# Patient Record
Sex: FEMALE | Race: BLACK OR AFRICAN AMERICAN | NOT HISPANIC OR LATINO | Employment: OTHER | URBAN - METROPOLITAN AREA
[De-identification: names, ages, dates, MRNs, and addresses within clinical notes are randomized per-mention and may not be internally consistent; named-entity substitution may affect disease eponyms.]

---

## 2017-01-19 ENCOUNTER — HOSPITAL ENCOUNTER (OUTPATIENT)
Dept: PREADMISSION TESTING | Facility: OTHER | Age: 72
Discharge: HOME | End: 2017-01-19
Attending: ORTHOPAEDIC SURGERY
Payer: MEDICARE

## 2017-01-19 VITALS
BODY MASS INDEX: 26.97 KG/M2 | OXYGEN SATURATION: 98 % | SYSTOLIC BLOOD PRESSURE: 160 MMHG | RESPIRATION RATE: 20 BRPM | HEART RATE: 90 BPM | WEIGHT: 125 LBS | DIASTOLIC BLOOD PRESSURE: 88 MMHG | HEIGHT: 57 IN | TEMPERATURE: 98 F

## 2017-01-19 LAB
ANION GAP SERPL CALC-SCNC: 10 MMOL/L
BASOPHILS # BLD AUTO: 0.02 K/UL
BASOPHILS NFR BLD: 0.2 %
BUN SERPL-MCNC: 13 MG/DL
CALCIUM SERPL-MCNC: 9.4 MG/DL
CHLORIDE SERPL-SCNC: 106 MMOL/L
CO2 SERPL-SCNC: 26 MMOL/L
CREAT SERPL-MCNC: 0.8 MG/DL
DIFFERENTIAL METHOD: ABNORMAL
EOSINOPHIL # BLD AUTO: 0.4 K/UL
EOSINOPHIL NFR BLD: 3.5 %
ERYTHROCYTE [DISTWIDTH] IN BLOOD BY AUTOMATED COUNT: 15.2 %
EST. GFR  (AFRICAN AMERICAN): >60 ML/MIN/1.73 M^2
EST. GFR  (NON AFRICAN AMERICAN): >60 ML/MIN/1.73 M^2
GLUCOSE SERPL-MCNC: 80 MG/DL
HCT VFR BLD AUTO: 39.6 %
HGB BLD-MCNC: 12.4 G/DL
LYMPHOCYTES # BLD AUTO: 2.8 K/UL
LYMPHOCYTES NFR BLD: 24.2 %
MCH RBC QN AUTO: 26.2 PG
MCHC RBC AUTO-ENTMCNC: 31.3 %
MCV RBC AUTO: 84 FL
MONOCYTES # BLD AUTO: 0.9 K/UL
MONOCYTES NFR BLD: 7.9 %
NEUTROPHILS # BLD AUTO: 7.5 K/UL
NEUTROPHILS NFR BLD: 63.9 %
PLATELET # BLD AUTO: 359 K/UL
PMV BLD AUTO: 9.2 FL
POTASSIUM SERPL-SCNC: 3.8 MMOL/L
RBC # BLD AUTO: 4.74 M/UL
SODIUM SERPL-SCNC: 142 MMOL/L
WBC # BLD AUTO: 11.71 K/UL

## 2017-01-19 PROCEDURE — 36415 COLL VENOUS BLD VENIPUNCTURE: CPT

## 2017-01-19 PROCEDURE — 85025 COMPLETE CBC W/AUTO DIFF WBC: CPT

## 2017-01-19 PROCEDURE — 80048 BASIC METABOLIC PNL TOTAL CA: CPT

## 2017-01-19 RX ORDER — HYDROXYZINE HYDROCHLORIDE 25 MG/1
25 TABLET, FILM COATED ORAL 3 TIMES DAILY PRN
COMMUNITY
End: 2017-08-15 | Stop reason: CLARIF

## 2017-01-19 RX ORDER — SODIUM CHLORIDE, SODIUM LACTATE, POTASSIUM CHLORIDE, CALCIUM CHLORIDE 600; 310; 30; 20 MG/100ML; MG/100ML; MG/100ML; MG/100ML
INJECTION, SOLUTION INTRAVENOUS CONTINUOUS
Status: CANCELLED | OUTPATIENT
Start: 2017-01-19

## 2017-01-19 RX ORDER — LIDOCAINE HYDROCHLORIDE 10 MG/ML
1 INJECTION, SOLUTION EPIDURAL; INFILTRATION; INTRACAUDAL; PERINEURAL ONCE
Status: CANCELLED | OUTPATIENT
Start: 2017-01-19 | End: 2017-01-19

## 2017-01-19 RX ORDER — CLINDAMYCIN HYDROCHLORIDE 300 MG/1
300 CAPSULE ORAL DAILY
COMMUNITY
End: 2019-10-22

## 2017-01-19 RX ORDER — HYDROCODONE BITARTRATE AND ACETAMINOPHEN 5; 325 MG/1; MG/1
1 TABLET ORAL EVERY 6 HOURS PRN
COMMUNITY
End: 2017-08-15 | Stop reason: CLARIF

## 2017-01-19 RX ORDER — FLUCONAZOLE 150 MG/1
150 TABLET ORAL DAILY
COMMUNITY
End: 2017-08-18

## 2017-01-19 NOTE — DISCHARGE INSTRUCTIONS
PRE-ADMIT TESTING -  [phone]    [address]  [facility]        OUTPATIENT SURGERY UNIT - [phone]    Your surgery has been scheduled at [facility]. We are pleased to have the opportunity to serve you. For Further Information please call [phone].    On the day of surgery please report to the Information Desk on the 1st floor.    CONTACT YOUR PHYSICIAN'S OFFICE THE DAY PRIOR TO YOUR SURGERY TO OBTAIN YOUR ARRIVAL TIME.     The evening before surgery do not eat anything after 9 p.m. ( this includes hard candy, chewing gum and mints).  You may have GATORADE, POWERADE AND WATER FROM 9 p.m. until leaving home to come to the hospital.   DO NOT DRINK ANY LIQUIDS ON THE WAY TO THE HOSPITAL.     SPECIAL MEDICATION INSTRUCTIONS: TAKE medications checked off by the Anesthesiologist on your Medication List.    Angiogram Patients: Take medications as instructed by your physician, including aspirin.     Surgery Patients:    If you take ASPIRIN - Your PHYSICIAN/SURGEON will need to inform you IF/OR when you need to stop taking aspirin prior to your surgery.     Do Not take any medications containing IBUPROFEN.  Do Not Wear any make-up or dark nail polish   (especially eye make-up) to surgery. If you come to surgery with makeup on you will be required to remove the makeup or nail polish.    Do not shave your surgical area at least 5 days prior to your surgery. The surgical prep will be performed at the hospital according to Infection Control regulations.    Leave all valuables at home.   Do Not wear any jewelry or watches, including any metal in body piercings.  Contact Lens must be removed before surgery. Either do not wear the contact lens or bring a case and solution for storage.  Please bring a container for eyeglasses or dentures as required.  Bring any paperwork your physician has provided, such as consent forms,  history and physicals, doctor's orders, etc.   Bring comfortable  clothes that are loose fitting to wear upon discharge. Take into consideration the type of surgery being performed.  Maintain your diet as advised per your physician the day prior to surgery.      Adequate rest the night before surgery is advised.   Park in the Parking lot behind the hospital or in the [facility] across the street from the parking lot. Parking is complimentary.  If you will be discharged the same day as your procedure, please arrange for a responsible adult to drive you home or to accompany you if traveling by taxi.   YOU WILL NOT BE PERMITTED TO DRIVE OR TO LEAVE THE HOSPITAL ALONE AFTER SURGERY.   It is strongly recommended that you arrange for someone to remain with you for the first 24 hrs following your surgery.       Thank you for your cooperation.  The Staff of [facility].        Bathing Instructions                                                                 Please shower the evening before and morning of your procedure with    ANTIBACTERIAL SOAP. ( DIAL, etc )  Concentrate on the surgical area   for at least 3 minutes and rinse completely. Dry off as usual.                            No lotions or creams.

## 2017-01-19 NOTE — PRE ADMISSION SCREENING
Pt cxd per Dr Juarez. Will see [facility] today for resp status. Dr Waite office called. Left message for Chanelle and BIRGIT Suazo notified.

## 2017-01-19 NOTE — IP AVS SNAPSHOT
[facility] Location ([facility])  [address] [place] [id number]  Phone: [phone]           Patient Discharge Instructions    Our goal is to set you up for success. This packet includes information on your condition, medications, and your home care. It will help you to care for yourself so you don't get sicker.     Please ask your nurse if you have any questions.        There are many details to remember when preparing for your surgery. Here is what you will need to do, please ask your nurse if there are more specific instructions and if you have any questions:    1. 24 hours before procedure Do not smoke or drink alcoholic beverages 24 hours prior to your procedure    2. Eating before procedure Do not eat or drink anything 8 hours before your procedure - this includes gum, mints, and candy.     3. Day of procedure Please remove all jewelry for the procedure. If you wear contact lenses, dentures, hearing aids or glasses, bring a container to put them in during your surgery and give to a family member for safekeeping.  If your doctor has scheduled you for an overnight stay, bring a small overnight bag with any personal items that you need.    4. After procedure Make arrangements in advance for transportation home by a responsible adult. It is not safe to drive a vehicle during the 24 hours following surgery.     PLEASE NOTE: You may be contacted the day before your surgery to confirm your surgery date and arrival time. The Surgery schedule has many variables which may affect the time of your surgery case. Family members should be available if your surgery time changes.                ** Verify the list of medication(s) below is accurate and up to date. Carry this with you in case of emergency. If your medications have changed, please notify your healthcare provider.             Medication List      TAKE these medications        Additional Info                      acetaminophen 325 MG tablet    Commonly known as:  TYLENOL   Refills:  0   Dose:  325 mg    Instructions:  Take 325 mg by mouth every 6 (six) hours as needed for Pain.     Begin Date    AM    Noon    PM    Bedtime       alprazolam 1 MG tablet   Commonly known as:  XANAX   Refills:  1      Begin Date    AM    Noon    PM    Bedtime       clindamycin 300 MG capsule   Commonly known as:  CLEOCIN   Refills:  0   Dose:  300 mg    Instructions:  Take 300 mg by mouth once daily.     Begin Date    AM    Noon    PM    Bedtime       fluconazole 150 MG Tab   Commonly known as:  DIFLUCAN   Refills:  0   Dose:  150 mg    Instructions:  Take 150 mg by mouth once daily.     Begin Date    AM    Noon    PM    Bedtime       hydrochlorothiazide 12.5 mg capsule   Commonly known as:  MICROZIDE   Refills:  6   Dose:  12.5 mg    Instructions:  Take 12.5 mg by mouth once daily.     Begin Date    AM    Noon    PM    Bedtime       hydrocodone-acetaminophen 5-325mg 5-325 mg per tablet   Commonly known as:  NORCO   Refills:  0   Dose:  1 tablet    Instructions:  Take 1 tablet by mouth every 6 (six) hours as needed for Pain.     Begin Date    AM    Noon    PM    Bedtime       hydrOXYzine HCl 25 MG tablet   Commonly known as:  ATARAX   Refills:  0   Dose:  25 mg    Instructions:  Take 25 mg by mouth 3 (three) times daily as needed for Itching.     Begin Date    AM    Noon    PM    Bedtime       leflunomide 20 MG Tab   Commonly known as:  ARAVA   Refills:  0   Dose:  20 mg    Instructions:  Take 20 mg by mouth once daily.     Begin Date    AM    Noon    PM    Bedtime       melatonin 5 mg Tab   Refills:  0    Instructions:  Take by mouth nightly.     Begin Date    AM    Noon    PM    Bedtime       memantine 5 MG Tab   Commonly known as:  NAMENDA   Quantity:  60 tablet   Refills:  5   Dose:  5 mg    Instructions:  Take 1 tablet (5 mg total) by mouth once daily.     Begin Date    AM    Noon    PM    Bedtime       olmesartan 40 MG tablet   Commonly known as:  BENICAR   Refills:   0   Dose:  40 mg    Instructions:  Take 40 mg by mouth 2 (two) times daily.     Begin Date    AM    Noon    PM    Bedtime       tramadol 50 mg tablet   Commonly known as:  ULTRAM   Refills:  1   Dose:  50 mg    Instructions:  Take 50 mg by mouth 2 (two) times daily as needed. for pain     Begin Date    AM    Noon    PM    Bedtime                  Please bring to all follow up appointments:    1. A copy of your discharge instructions.  2. All medicines you are currently taking in their original bottles.  3. Identification and insurance card.    Please arrive 15 minutes ahead of scheduled appointment time.    Please call 24 hours in advance if you must reschedule your appointment and/or time.        Your Scheduled Appointments     Jan 19, 2017 12:00 PM CST   Pre-Admit Testing Visit with PRE-ADMIT, [facility] [address] [place] [id number] [phone]              Your Future Surgeries/Procedures     Jan 20, 2017   Surgery with Claude S. Williams IV, MD   [facility]    [address] [place] [id number] [phone]                  Discharge Instructions       PRE-ADMIT TESTING -  [phone]    [address] [place] [address]        OUTPATIENT SURGERY UNIT - [phone]    Your surgery has been scheduled at [facility]. We are pleased to have the opportunity to serve you. For Further Information please call [phone].    On the day of surgery please report to the Information Desk on the 1st floor.    CONTACT YOUR PHYSICIAN'S OFFICE THE DAY PRIOR TO YOUR SURGERY TO OBTAIN YOUR ARRIVAL TIME.     The evening before surgery do not eat anything after 9 p.m. ( this includes hard candy, chewing gum and mints).  You may have GATORADE, POWERADE AND WATER FROM 9 p.m. until leaving home to come to the hospital.   DO NOT DRINK ANY LIQUIDS ON THE WAY TO THE HOSPITAL.      SPECIAL MEDICATION INSTRUCTIONS: TAKE medications checked off by the Anesthesiologist on your Medication List.    Angiogram Patients: Take medications as instructed by your physician, including aspirin.     Surgery Patients:    If you take ASPIRIN - Your PHYSICIAN/SURGEON will need to inform you IF/OR when you need to stop taking aspirin prior to your surgery.     Do Not take any medications containing IBUPROFEN.  Do Not Wear any make-up or dark nail polish   (especially eye make-up) to surgery. If you come to surgery with makeup on you will be required to remove the makeup or nail polish.    Do not shave your surgical area at least 5 days prior to your surgery. The surgical prep will be performed at the hospital according to Infection Control regulations.    Leave all valuables at home.   Do Not wear any jewelry or watches, including any metal in body piercings.  Contact Lens must be removed before surgery. Either do not wear the contact lens or bring a case and solution for storage.  Please bring a container for eyeglasses or dentures as required.  Bring any paperwork your physician has provided, such as consent forms,  history and physicals, doctor's orders, etc.   Bring comfortable clothes that are loose fitting to wear upon discharge. Take into consideration the type of surgery being performed.  Maintain your diet as advised per your physician the day prior to surgery.      Adequate rest the night before surgery is advised.   Park in the Parking lot behind the hospital or in the [facility] across the street from the parking lot. Parking is complimentary.  If you will be discharged the same day as your procedure, please arrange for a responsible adult to drive you home or to accompany you if traveling by taxi.   YOU WILL NOT BE PERMITTED TO DRIVE OR TO LEAVE THE HOSPITAL ALONE AFTER SURGERY.   It is strongly recommended that you arrange for someone to remain with you for the first 24 hrs  "following your surgery.       Thank you for your cooperation.  The Staff of [facility].        Bathing Instructions                                                                 Please shower the evening before and morning of your procedure with    ANTIBACTERIAL SOAP. ( DIAL, etc )  Concentrate on the surgical area   for at least 3 minutes and rinse completely. Dry off as usual.                            No lotions or creams.                        Admission Information     Date & Time Provider Department CSN    1/19/2017 12:00 PM Claude S. Williams IV, MD [facility] [id number]      Care Providers     Provider Role Specialty Primary office phone    Claude S. Williams IV, MD Attending Provider Orthopedic Surgery [phone]      Your Vitals Were     BP Pulse Temp Resp Height Weight    160/88 90 98 °F (36.7 °C) 20 4' 9" (1.448 m) 56.7 kg (125 lb)    SpO2 BMI             98% 27.05 kg/m2         Recent Lab Values     No lab values to display.      Allergies as of 1/19/2017     No Known Allergies      [facility]     [facility] Nurse Care Line - 24/7 Assistance  Unless otherwise directed by your provider, please contact [facility], our nurse care line that is available for 24/7 assistance.     Registered nurses in the [facility] Center provide clinical advisement, health education, appointment booking, and other advisory services.  Call for this free service at [phone].        Advance Directives     An advance directive is a document which, in the event you are no longer able to make decisions for yourself, tells your healthcare team what kind of treatment you do or do not want to receive, or who you would like to make those decisions for you.  If you do not currently have an advance directive, [facility] encourages you to create one.  For more information call:  [phone]), [phone] ([phone]),  or log on to " [email or web address].        Language Assistance Services     ATTENTION: Language assistance services are available, free of charge. Please call [phone].      ATENCIÓN: Si kwaku white, tiene a diaz disposición servicios gratuitos de asistencia lingüística. Llame al [phone].     CHÚ Ý: N?u b?n nói Ti?ng Vi?t, có các d?ch v? h? tr? ngôn ng? mi?n phí dành cho b?n. G?i s? [phone].        [facility] Sign-Up     Activating your [facility] account is as easy as 1-2-3!     1) Visit [email or web address], select Sign Up Now, enter this activation code and your date of birth, then select Next.  [email or web address]  Expires: 3/5/2017 10:47 AM      2) Create a username and password to use when you visit [facility] in the future and select a security question in case you lose your password and select Next.    3) Enter your e-mail address and click Sign Up!    Additional Information  If you have questions, please e-mail [email or web address] or call [phone] to talk to our [facility] staff. Remember, [facility] is NOT to be used for urgent needs. For medical emergencies, dial 911.          [facility] complies with applicable Federal civil rights laws and does not discriminate on the basis of race, color, national origin, age, disability, or sex.

## 2017-01-19 NOTE — PRE ADMISSION SCREENING
Clearance for  After 1/27 sent from Dr Lowe. Pt seen today for visit. Clearance faxed to Dr Waite office.

## 2017-01-24 ENCOUNTER — HOSPITAL ENCOUNTER (OUTPATIENT)
Dept: PREADMISSION TESTING | Facility: OTHER | Age: 72
Discharge: HOME | End: 2017-01-24

## 2017-03-20 ENCOUNTER — OFFICE VISIT (OUTPATIENT)
Dept: DERMATOLOGY | Facility: CLINIC | Age: 72
End: 2017-03-20
Payer: MEDICARE

## 2017-03-20 VITALS — WEIGHT: 125 LBS | BODY MASS INDEX: 27.05 KG/M2

## 2017-03-20 DIAGNOSIS — L82.1 SEBORRHEIC KERATOSES: Primary | ICD-10-CM

## 2017-03-20 DIAGNOSIS — K13.0 ANGULAR CHEILITIS: ICD-10-CM

## 2017-03-20 PROCEDURE — 1160F RVW MEDS BY RX/DR IN RCRD: CPT | Mod: S$GLB,,, | Performed by: DERMATOLOGY

## 2017-03-20 PROCEDURE — 99999 PR PBB SHADOW E&M-EST. PATIENT-LVL II: CPT | Mod: PBBFAC,,, | Performed by: DERMATOLOGY

## 2017-03-20 PROCEDURE — 1157F ADVNC CARE PLAN IN RCRD: CPT | Mod: S$GLB,,, | Performed by: DERMATOLOGY

## 2017-03-20 PROCEDURE — 99202 OFFICE O/P NEW SF 15 MIN: CPT | Mod: S$GLB,,, | Performed by: DERMATOLOGY

## 2017-03-20 PROCEDURE — 1159F MED LIST DOCD IN RCRD: CPT | Mod: S$GLB,,, | Performed by: DERMATOLOGY

## 2017-03-20 RX ORDER — AZITHROMYCIN 250 MG/1
TABLET, FILM COATED ORAL
Refills: 0 | COMMUNITY
Start: 2017-01-19 | End: 2017-08-15 | Stop reason: CLARIF

## 2017-03-20 RX ORDER — CELECOXIB 100 MG/1
CAPSULE ORAL
Refills: 6 | Status: ON HOLD | COMMUNITY
Start: 2017-03-09 | End: 2017-08-22 | Stop reason: HOSPADM

## 2017-03-20 RX ORDER — BENZONATATE 100 MG/1
CAPSULE ORAL
Refills: 0 | COMMUNITY
Start: 2017-01-19 | End: 2017-08-15 | Stop reason: CLARIF

## 2017-03-20 RX ORDER — DUREZOL 0.5 MG/ML
EMULSION OPHTHALMIC
Refills: 1 | COMMUNITY
Start: 2017-01-10 | End: 2017-08-15 | Stop reason: CLARIF

## 2017-03-20 RX ORDER — SODIUM, POTASSIUM,MAG SULFATES 17.5-3.13G
SOLUTION, RECONSTITUTED, ORAL ORAL
Refills: 0 | COMMUNITY
Start: 2017-02-07 | End: 2017-08-15 | Stop reason: CLARIF

## 2017-03-20 RX ORDER — CIPROFLOXACIN HYDROCHLORIDE 3 MG/ML
SOLUTION/ DROPS OPHTHALMIC
Refills: 1 | COMMUNITY
Start: 2017-01-10 | End: 2017-08-15 | Stop reason: CLARIF

## 2017-03-20 RX ORDER — NEPAFENAC 3 MG/ML
SUSPENSION/ DROPS OPHTHALMIC
Refills: 1 | COMMUNITY
Start: 2017-01-10 | End: 2017-08-15 | Stop reason: CLARIF

## 2017-03-20 NOTE — PROGRESS NOTES
Subjective:       Patient ID:  Francy Bain is a 71 y.o. female who presents for   Chief Complaint   Patient presents with    Mole     face    Lesion     HPI Comments: History of Present Illness: The patient presents with chief complaint of lesions.  Location: face  Duration: over a year  Signs/Symptoms: itch    Prior treatments: none    Also rash corners of mouth for months no tx.        Mole     Lesion         Review of Systems   Constitutional: Negative for fever.   Skin: Negative for itching and rash.   Hematologic/Lymphatic: Does not bruise/bleed easily.        Objective:    Physical Exam   Constitutional: She appears well-developed and well-nourished. No distress.   Neurological: She is alert and oriented to person, place, and time. She is not disoriented.   Psychiatric: She has a normal mood and affect.   Skin:   Areas Examined (abnormalities noted in diagram):   Head / Face Inspection Performed  Neck Inspection Performed  RUE Inspected  LUE Inspection Performed              Diagram Legend        Pigmented verrucoid papule/plaque c/w seborrheic keratosis        Movable subcutaneous cyst with punctum c/w epidermal inclusion cyst       Erythematous eczematous patches        Assessment / Plan:        Seborrheic keratoses  reassurance  cerave with pramoxine for pruritus    Angular cheilitis  Topicort ug til clear           Return if symptoms worsen or fail to improve.

## 2017-03-20 NOTE — MR AVS SNAPSHOT
[facility]   [facility]  [place] [address]  Phone: [phone]  Fax: [phone]                  Francy Bain   3/20/2017 10:10 AM   Office Visit    Description:  Female : 1945   Provider:  Breanna Avitia MD   Department:  [facility]           Reason for Visit     Mole     Lesion           Diagnoses this Visit        Comments    Seborrheic keratoses    -  Primary     Angular cheilitis                To Do List           Goals (5 Years of Data)     None      Follow-Up and Disposition     Return if symptoms worsen or fail to improve.      [facility]     [facility] On Call Nurse Care Line -  Assistance  Registered nurses in the [facility] On Call Center provide clinical advisement, health education, appointment booking, and other advisory services.  Call for this free service at [phone].             Medications                Verify that the below list of medications is an accurate representation of the medications you are currently taking.  If none reported, the list may be blank. If incorrect, please contact your healthcare provider. Carry this list with you in case of emergency.           Current Medications     acetaminophen (TYLENOL) 325 MG tablet Take 325 mg by mouth every 6 (six) hours as needed for Pain.    alprazolam (XANAX) 1 MG tablet     azithromycin (Z-ANALI) 250 MG tablet TAKE 2 TABLETS BY MOUTH ON DAY 1, THEN 1 TABLET DAILY DAYS 2-5    benzonatate (TESSALON) 100 MG capsule TAKE TWO CAPSULES BY MOUTH EVERY 8 HOURS AS NEEDED cough    celecoxib (CELEBREX) 100 MG capsule take 1 capsule (100 mg) by oral route 2 times per day    ciprofloxacin HCl (CILOXAN) 0.3 % ophthalmic solution instill ONE DROP to operated eye FOUR TIMES DAILY    clindamycin (CLEOCIN) 300 MG capsule Take 300 mg by mouth once daily.    DUREZOL 0.05 % Drop ophthalmic solution instill TWO DROPS to operated eye TWICE DAILY, FOR use AFTER surgery    fluconazole  (DIFLUCAN) 150 MG Tab Take 150 mg by mouth once daily.    hydrochlorothiazide (MICROZIDE) 12.5 mg capsule Take 12.5 mg by mouth once daily.    hydrocodone-acetaminophen 5-325mg (NORCO) 5-325 mg per tablet Take 1 tablet by mouth every 6 (six) hours as needed for Pain.    hydrOXYzine HCl (ATARAX) 25 MG tablet Take 25 mg by mouth 3 (three) times daily as needed for Itching.    ILEVRO 0.3 % DrpS instill ONE DROP IN EACH EYE    leflunomide (ARAVA) 20 MG Tab Take 20 mg by mouth once daily.    melatonin 5 mg Tab Take by mouth nightly.    olmesartan (BENICAR) 40 MG tablet Take 40 mg by mouth 2 (two) times daily.    SUPREP BOWEL PREP KIT 17.5-3.13-1.6 gram SolR TAKE AS DIRECTED    tramadol (ULTRAM) 50 mg tablet Take 50 mg by mouth 2 (two) times daily as needed. for pain    memantine (NAMENDA) 5 MG Tab Take 1 tablet (5 mg total) by mouth once daily.           Clinical Reference Information           Your Vitals Were     Weight BMI             56.7 kg (125 lb) 27.05 kg/m2         Allergies as of 3/20/2017     No Known Allergies      Immunizations Administered on Date of Encounter - 3/20/2017     None      [facility] Sign-Up     Activating your [facility] account is as easy as 1-2-3!     1) Visit [email or web address], select Sign Up Now, enter this activation code and your date of birth, then select Next.  [email or web address]  Expires: 5/4/2017 10:34 AM      2) Create a username and password to use when you visit [facility] in the future and select a security question in case you lose your password and select Next.    3) Enter your e-mail address and click Sign Up!    Additional Information  If you have questions, please e-mail [email or web address] or call [phone] to talk to our [facility] staff. Remember, [facility] is NOT to be used for urgent needs. For medical emergencies, dial 911.         Language Assistance Services     ATTENTION: Language assistance services are available, free of charge. Please call [phone].       ATENCIÓN: Si habla español, tiene a diaz disposición servicios gratuitos de asistencia lingüística. Capri al [phone].     CHÚ Ý: N?u b?n nói Ti?ng Vi?t, có các d?ch v? h? tr? ngôn ng? mi?n phí dành cho b?n. G?i s? [phone].         [facility] complies with applicable Federal civil rights laws and does not discriminate on the basis of race, color, national origin, age, disability, or sex.

## 2017-03-20 NOTE — LETTER
March 20, 2017      Jason Lowe MD  [address] [place] [address]  [facility]  [place] [address]           [facility] [facility]  [address] [facility]  [place] [address]  Phone: [phone]  Fax: [phone]          Patient: Francy Bain   MR Number: [id number]   YOB: 1945   Date of Visit: 3/20/2017       Dear Dr. Jason Lowe:    Thank you for referring Francy Bain to me for evaluation. Attached you will find relevant portions of my assessment and plan of care.    If you have questions, please do not hesitate to call me. I look forward to following Francy Bain along with you.    Sincerely,    Breanna Avitia MD    Enclosure  CC:  No Recipients    If you would like to receive this communication electronically, please contact [email or web address] or [phone] to request more information on [employer] Link access.    For providers and/or their staff who would like to refer a patient to [facility], please contact us through our one-stop-shop provider referral line, [facility], at [phone].    If you feel you have received this communication in error or would no longer like to receive these types of communications, please e-mail [email or web address]

## 2017-08-15 ENCOUNTER — HOSPITAL ENCOUNTER (OUTPATIENT)
Dept: PREADMISSION TESTING | Facility: OTHER | Age: 72
Discharge: HOME | End: 2017-08-15
Attending: ORTHOPAEDIC SURGERY
Payer: MEDICARE

## 2017-08-15 ENCOUNTER — ANESTHESIA EVENT (OUTPATIENT)
Dept: SURGERY | Facility: OTHER | Age: 72
End: 2017-08-15
Payer: MEDICARE

## 2017-08-15 VITALS
SYSTOLIC BLOOD PRESSURE: 154 MMHG | DIASTOLIC BLOOD PRESSURE: 75 MMHG | WEIGHT: 126 LBS | HEART RATE: 86 BPM | HEIGHT: 57 IN | BODY MASS INDEX: 27.18 KG/M2 | OXYGEN SATURATION: 96 % | TEMPERATURE: 98 F

## 2017-08-15 RX ORDER — FAMOTIDINE 20 MG/1
20 TABLET, FILM COATED ORAL
Status: CANCELLED | OUTPATIENT
Start: 2017-08-15 | End: 2017-08-15

## 2017-08-15 RX ORDER — HYDROXYZINE HYDROCHLORIDE 25 MG/1
25 TABLET, FILM COATED ORAL 3 TIMES DAILY PRN
COMMUNITY
End: 2019-10-22

## 2017-08-15 RX ORDER — OMEPRAZOLE 20 MG/1
20 CAPSULE, DELAYED RELEASE ORAL DAILY
COMMUNITY
End: 2021-07-19

## 2017-08-15 RX ORDER — SODIUM CHLORIDE, SODIUM LACTATE, POTASSIUM CHLORIDE, CALCIUM CHLORIDE 600; 310; 30; 20 MG/100ML; MG/100ML; MG/100ML; MG/100ML
INJECTION, SOLUTION INTRAVENOUS CONTINUOUS
Status: CANCELLED | OUTPATIENT
Start: 2017-08-15

## 2017-08-15 NOTE — DISCHARGE INSTRUCTIONS
PRE-ADMIT TESTING -  [phone]    [address]  [facility]        OUTPATIENT SURGERY UNIT - [phone]    Your surgery has been scheduled at [facility]. We are pleased to have the opportunity to serve you. For Further Information please call [phone].    On the day of surgery please report to the Information Desk on the 1st floor.    · CONTACT YOUR PHYSICIAN'S OFFICE THE DAY PRIOR TO YOUR SURGERY TO OBTAIN YOUR ARRIVAL TIME.     · The evening before surgery do not eat anything after 9 p.m. ( this includes hard candy, chewing gum and mints).  You may only have GATORADE, POWERADE AND WATER  from 9 p.m. until you leave your home.   DO NOT DRINK ANY LIQUIDS ON THE WAY TO THE HOSPITAL.      SPECIAL MEDICATION INSTRUCTIONS: TAKE medications checked off by the Anesthesiologist on your Medication List.    Angiogram Patients: Take medications as instructed by your physician, including aspirin.     Surgery Patients:    If you take ASPIRIN - Your PHYSICIAN/SURGEON will need to inform you IF/OR when you need to stop taking aspirin prior to your surgery.     Do Not take any medications containing IBUPROFEN.  Do Not Wear any make-up or dark nail polish   (especially eye make-up) to surgery. If you come to surgery with makeup on you will be required to remove the makeup or nail polish.    Do not shave your surgical area at least 5 days prior to your surgery. The surgical prep will be performed at the hospital according to Infection Control regulations.    Leave all valuables at home.   Do Not wear any jewelry or watches, including any metal in body piercings.  Contact Lens must be removed before surgery. Either do not wear the contact lens or bring a case and solution for storage.  Please bring a container for eyeglasses or dentures as required.  Bring any paperwork your physician has provided, such as consent forms,  history and physicals, doctor's orders, etc.   Bring comfortable clothes  that are loose fitting to wear upon discharge. Take into consideration the type of surgery being performed.  Maintain your diet as advised per your physician the day prior to surgery.      Adequate rest the night before surgery is advised.   Park in the Parking lot behind the hospital or in the [facility] across the street from the parking lot. Parking is complimentary.  If you will be discharged the same day as your procedure, please arrange for a responsible adult to drive you home or to accompany you if traveling by taxi.   YOU WILL NOT BE PERMITTED TO DRIVE OR TO LEAVE THE HOSPITAL ALONE AFTER SURGERY.   It is strongly recommended that you arrange for someone to remain with you for the first 24 hrs following your surgery.       Thank you for your cooperation.  The Staff of [facility].        Bathing Instructions                                                                 Please shower the evening before and morning of your procedure with    ANTIBACTERIAL SOAP. ( DIAL, etc )  Concentrate on the surgical area   for at least 3 minutes and rinse completely. Dry off as usual.   Do not use any deodorant, powder, body lotions, perfume, after shave or    cologne.

## 2017-08-15 NOTE — ANESTHESIA PREPROCEDURE EVALUATION
08/15/2017  Francy Bain is a 72 y.o., female.    Anesthesia Evaluation    I have reviewed the Patient Summary Reports.    I have reviewed the Nursing Notes.   I have reviewed the Medications.   Steroids Taken In Past Year: Prednisone    Review of Systems  Anesthesia Hx:  No problems with previous Anesthesia  History of prior surgery of interest to airway management or planning: Previous anesthesia: General Denies Family Hx of Anesthesia complications.   Denies Personal Hx of Anesthesia complications.   Social:  Non-Smoker    Hematology/Oncology:  Hematology Normal   Oncology Normal     EENT/Dental:EENT/Dental Normal   Cardiovascular:   Hypertension, well controlled    Pulmonary:   Pneumonia (January) Shortness of breath    Renal/:  Renal/ Normal     Hepatic/GI:   GERD, well controlled    Musculoskeletal:   Arthritis  Rheumatoid arthritis  Spine Disorders: cervical    Neurological:   CVA (40 y ago), no residual symptoms Memory impairment   Endocrine:  Endocrine Normal    Dermatological:  Skin Normal    Psych:  Psychiatric Normal           Physical Exam  General:  Well nourished    Airway/Jaw/Neck:  Airway Findings: Mouth Opening: Small, but > 3cm Mallampati: III  Improves to II with phonation.  TM Distance: 4 - 6 cm  Jaw/Neck Findings:  Neck ROM: Extension Decreased, Severe, Decreased Lateral Motion, to the right, to the left      Dental:  Dental Findings: Upper Dentures              Anesthesia Plan  Type of Anesthesia, risks & benefits discussed:  Anesthesia Type:  general  Patient's Preference:   Intra-op Monitoring Plan: standard ASA monitors  Intra-op Monitoring Plan Comments:   Post Op Pain Control Plan:   Post Op Pain Control Plan Comments:   Induction:   IV  Beta Blocker:         Informed Consent: Patient understands risks and agrees with Anesthesia plan.  Questions answered.  Anesthesia consent signed with patient.  ASA Score: 3     Day of Surgery Review of History & Physical:    H&P update referred to the surgeon.         Ready For Surgery From Anesthesia Perspective.

## 2017-08-17 DIAGNOSIS — Z01.810 PREOP CARDIOVASCULAR EXAM: Primary | ICD-10-CM

## 2017-08-17 NOTE — PRE ADMISSION SCREENING
Received last visit note (with Dr. Lowe), lab and EKG.  EKG is not legible per Judy in Dr. Lowe's office.  Patient has been referred to Cardiology for palpitations.  Patient missed her appt with Dr. Bruno on 8/14 - states she forgot.  [employer] message sent to Cardiology office requesting call to patient to reschedule.  Also requested they call patient's sister, Theresa Waite [phone] due to patient's memory issues.  Per Dr. Joyce, Anesthesia, patient needs to have Cardiology visit prior to surgery.

## 2017-08-18 ENCOUNTER — HOSPITAL ENCOUNTER (OUTPATIENT)
Dept: CARDIOLOGY | Facility: CLINIC | Age: 72
Discharge: HOME | End: 2017-08-18
Payer: MEDICARE

## 2017-08-18 ENCOUNTER — OFFICE VISIT (OUTPATIENT)
Dept: CARDIOLOGY | Facility: CLINIC | Age: 72
End: 2017-08-18
Payer: MEDICARE

## 2017-08-18 VITALS
DIASTOLIC BLOOD PRESSURE: 70 MMHG | SYSTOLIC BLOOD PRESSURE: 138 MMHG | WEIGHT: 131.63 LBS | HEIGHT: 57 IN | HEART RATE: 80 BPM | BODY MASS INDEX: 28.4 KG/M2

## 2017-08-18 DIAGNOSIS — Z01.810 PRE-OPERATIVE CARDIOVASCULAR EXAMINATION: Primary | ICD-10-CM

## 2017-08-18 DIAGNOSIS — I10 ESSENTIAL HYPERTENSION: ICD-10-CM

## 2017-08-18 DIAGNOSIS — Z01.810 PREOP CARDIOVASCULAR EXAM: ICD-10-CM

## 2017-08-18 PROCEDURE — 93000 ELECTROCARDIOGRAM COMPLETE: CPT | Mod: S$GLB,,, | Performed by: INTERNAL MEDICINE

## 2017-08-18 PROCEDURE — 3075F SYST BP GE 130 - 139MM HG: CPT | Mod: S$GLB,,, | Performed by: INTERNAL MEDICINE

## 2017-08-18 PROCEDURE — 99999 PR PBB SHADOW E&M-EST. PATIENT-LVL III: CPT | Mod: PBBFAC,,, | Performed by: INTERNAL MEDICINE

## 2017-08-18 PROCEDURE — 3078F DIAST BP <80 MM HG: CPT | Mod: S$GLB,,, | Performed by: INTERNAL MEDICINE

## 2017-08-18 PROCEDURE — 1159F MED LIST DOCD IN RCRD: CPT | Mod: S$GLB,,, | Performed by: INTERNAL MEDICINE

## 2017-08-18 PROCEDURE — 99203 OFFICE O/P NEW LOW 30 MIN: CPT | Mod: S$GLB,,, | Performed by: INTERNAL MEDICINE

## 2017-08-18 PROCEDURE — 3008F BODY MASS INDEX DOCD: CPT | Mod: S$GLB,,, | Performed by: INTERNAL MEDICINE

## 2017-08-18 PROCEDURE — 1125F AMNT PAIN NOTED PAIN PRSNT: CPT | Mod: S$GLB,,, | Performed by: INTERNAL MEDICINE

## 2017-08-18 RX ORDER — BENZONATATE 100 MG/1
100 CAPSULE ORAL 3 TIMES DAILY PRN
COMMUNITY
End: 2019-10-22

## 2017-08-18 NOTE — PROGRESS NOTES
"Subjective:   Patient ID:  Francy Bain is a 72 y.o. female who presents for follow-up of Pre-op Exam (right elbow surgery)      HPI:   Pt is here for pre-op evaluation prior to elbow surgery.  During her pre-op evaluation, she mentioned palpitations which triggered this consult.  Her only cardiac risk factor is HTN which appears to be under good control.  She denies any exertional chest discomfort, exertional dyspnea, TIA's, syncope or presyncope.    Her c/o of palpitations is really one of cardiac awareness.  Sxs occur when she is lying down and she can hear/feel her heartbeat.  Occasionally, she states that it will skip beat.  However, she denies sxs of frequent rapid heart beats.   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  She has deformities of the upper extremities bilaterally.    ECG is normal    Revised cardiac risk = .4% MACE      Vitals:    08/18/17 0952   BP: 138/70   BP Location: Left arm   Patient Position: Sitting   BP Method: Large (Automatic)   Pulse: 80   Weight: 59.7 kg (131 lb 9.8 oz)   Height: 4' 9" (1.448 m)     Body mass index is 28.48 kg/m².  CrCl cannot be calculated (Patient's most recent sCr result is older than the maximum 7 days allowed.).    Lab Results   Component Value Date     01/19/2017    K 3.8 01/19/2017     01/19/2017    CO2 26 01/19/2017    BUN 13 01/19/2017    CREATININE 0.8 01/19/2017    GLU 80 01/19/2017    WBC 11.71 01/19/2017    HGB 12.4 01/19/2017    HCT 39.6 01/19/2017    MCV 84 01/19/2017     (H) 01/19/2017       Current Outpatient Prescriptions   Medication Sig    benzonatate (TESSALON) 100 MG capsule Take 100 mg by mouth 3 (three) times daily as needed for Cough.    CALCIUM CITRATE/VITAMIN D3 (CITRACAL REGULAR ORAL) Take 1,200 mg by mouth once daily.    celecoxib (CELEBREX) 100 MG capsule take 1 capsule " (100 mg) by oral route 2 times per day    clindamycin (CLEOCIN) 300 MG capsule Take 300 mg by mouth once daily.    hydrochlorothiazide (MICROZIDE) 12.5 mg capsule Take 12.5 mg by mouth once daily.    HYDROCODONE-ACETAMINOPHEN ORAL Take by mouth daily as needed.    hydrOXYzine HCl (ATARAX) 25 MG tablet Take 25 mg by mouth 3 (three) times daily as needed for Itching.    olmesartan (BENICAR) 40 MG tablet Take 40 mg by mouth 2 (two) times daily.    omeprazole (PRILOSEC) 20 MG capsule Take 20 mg by mouth once daily.     No current facility-administered medications for this visit.        Review of Systems   Constitution: Negative for malaise/fatigue.   Eyes: Negative for visual disturbance.   Cardiovascular: Negative for chest pain, claudication, dyspnea on exertion, irregular heartbeat, near-syncope, orthopnea and palpitations.   Respiratory: Negative for shortness of breath and sleep disturbances due to breathing.    Musculoskeletal: Positive for joint pain. Negative for muscle cramps, muscle weakness and myalgias.   Gastrointestinal: Negative for abdominal pain and heartburn.   Genitourinary: Negative for nocturia.   Neurological: Negative for difficulty with concentration, excessive daytime sleepiness, light-headedness, loss of balance, paresthesias and vertigo.       Objective:   Physical Exam   Constitutional: She is oriented to person, place, and time. Vital signs are normal. She appears well-developed and well-nourished.   HENT:   Head: Normocephalic and atraumatic.   Neck: Neck supple. Normal carotid pulses and no JVD present. Carotid bruit is not present. No edema present.   Cardiovascular: Normal rate, regular rhythm, normal heart sounds and intact distal pulses.  PMI is not displaced.  Exam reveals no gallop and no friction rub.    No murmur heard.  Pulmonary/Chest: Effort normal and breath sounds normal. No respiratory distress. She has no wheezes. She has no rales. She exhibits no tenderness.    Abdominal: Soft. Normal appearance and bowel sounds are normal. There is no hepatosplenomegaly. There is no tenderness.   Musculoskeletal: Normal range of motion. She exhibits no edema.   Bilateral upper and lower exremity    Neurological: She is alert and oriented to person, place, and time.   Skin: Skin is warm and dry.   Psychiatric: She has a normal mood and affect. Her behavior is normal. Judgment and thought content normal.       Assessment:     1. Pre-operative cardiovascular examination    2. Essential hypertension        Plan:   Reassurance regarding palpitations.  Sxs are normal and possibly secondary to premature beats.  Pt is low risk for a low risk surgery.  No further cardiac workup is necessary.  Risk of MACE is .4%.   These recommendations follow the most current Guideline on Perioperative Cardiovascular Evaluation and Management of Patients Undergoing Noncardiac Surgery released by the ACC/AHA. (JACC 2014.07.944).

## 2017-08-18 NOTE — LETTER
August 18, 2017      Jason Lowe MD  [address] [place] [address]  [facility] [place] [address]           [facility] - Cardiology  [address]  [facility] [address]  Phone: [phone]          Patient: Francy Bain   MR Number: [id number]   YOB: 1945   Date of Visit: 8/18/2017       Dear Dr. Jason Lowe:    Thank you for referring Francy Bain to me for evaluation. Attached you will find relevant portions of my assessment and plan of care.    If you have questions, please do not hesitate to call me. I look forward to following Francy Bain along with you.    Sincerely,    Yosef Marinelli MD    Enclosure  CC:  No Recipients    If you would like to receive this communication electronically, please contact [email or web address] or [phone] to request more information on [employer] access.    For providers and/or their staff who would like to refer a patient to [facility], please contact us through our one-stop-shop provider referral line, [facility], at [phone].    If you feel you have received this communication in error or would no longer like to receive these types of communications, please e-mail [email or web address]

## 2017-08-22 ENCOUNTER — HOSPITAL ENCOUNTER (OUTPATIENT)
Facility: OTHER | Age: 72
Discharge: HOME | End: 2017-08-22
Attending: ORTHOPAEDIC SURGERY | Admitting: ORTHOPAEDIC SURGERY
Payer: MEDICARE

## 2017-08-22 ENCOUNTER — ANESTHESIA (OUTPATIENT)
Dept: SURGERY | Facility: OTHER | Age: 72
End: 2017-08-22
Payer: MEDICARE

## 2017-08-22 VITALS
OXYGEN SATURATION: 98 % | SYSTOLIC BLOOD PRESSURE: 132 MMHG | HEIGHT: 57 IN | DIASTOLIC BLOOD PRESSURE: 65 MMHG | TEMPERATURE: 98 F | BODY MASS INDEX: 27.18 KG/M2 | RESPIRATION RATE: 16 BRPM | WEIGHT: 126 LBS | HEART RATE: 62 BPM

## 2017-08-22 DIAGNOSIS — M70.21 OLECRANON BURSITIS OF RIGHT ELBOW: Primary | ICD-10-CM

## 2017-08-22 DIAGNOSIS — M71.129: ICD-10-CM

## 2017-08-22 DIAGNOSIS — M71.121 INFECTED OLECRANON BURSA, RIGHT: ICD-10-CM

## 2017-08-22 LAB
GRAM STN SPEC: NORMAL
GRAM STN SPEC: NORMAL

## 2017-08-22 PROCEDURE — 71000033 HC RECOVERY, INTIAL HOUR: Performed by: ORTHOPAEDIC SURGERY

## 2017-08-22 PROCEDURE — 63600175 PHARM REV CODE 636 W HCPCS: Performed by: NURSE ANESTHETIST, CERTIFIED REGISTERED

## 2017-08-22 PROCEDURE — 88311 DECALCIFY TISSUE: CPT | Mod: 26,,, | Performed by: PATHOLOGY

## 2017-08-22 PROCEDURE — 63600175 PHARM REV CODE 636 W HCPCS: Performed by: ANESTHESIOLOGY

## 2017-08-22 PROCEDURE — 87205 SMEAR GRAM STAIN: CPT

## 2017-08-22 PROCEDURE — 71000015 HC POSTOP RECOV 1ST HR: Performed by: ORTHOPAEDIC SURGERY

## 2017-08-22 PROCEDURE — 87116 MYCOBACTERIA CULTURE: CPT

## 2017-08-22 PROCEDURE — 37000009 HC ANESTHESIA EA ADD 15 MINS: Performed by: ORTHOPAEDIC SURGERY

## 2017-08-22 PROCEDURE — 36000709 HC OR TIME LEV III EA ADD 15 MIN: Performed by: ORTHOPAEDIC SURGERY

## 2017-08-22 PROCEDURE — 25000003 PHARM REV CODE 250: Performed by: ANESTHESIOLOGY

## 2017-08-22 PROCEDURE — 88305 TISSUE EXAM BY PATHOLOGIST: CPT | Mod: 26,,, | Performed by: PATHOLOGY

## 2017-08-22 PROCEDURE — 25000003 PHARM REV CODE 250: Performed by: NURSE ANESTHETIST, CERTIFIED REGISTERED

## 2017-08-22 PROCEDURE — 63600175 PHARM REV CODE 636 W HCPCS: Performed by: ORTHOPAEDIC SURGERY

## 2017-08-22 PROCEDURE — 87102 FUNGUS ISOLATION CULTURE: CPT

## 2017-08-22 PROCEDURE — 71000039 HC RECOVERY, EACH ADD'L HOUR: Performed by: ORTHOPAEDIC SURGERY

## 2017-08-22 PROCEDURE — 71000016 HC POSTOP RECOV ADDL HR: Performed by: ORTHOPAEDIC SURGERY

## 2017-08-22 PROCEDURE — 87070 CULTURE OTHR SPECIMN AEROBIC: CPT

## 2017-08-22 PROCEDURE — 36000708 HC OR TIME LEV III 1ST 15 MIN: Performed by: ORTHOPAEDIC SURGERY

## 2017-08-22 PROCEDURE — 88305 TISSUE EXAM BY PATHOLOGIST: CPT | Performed by: PATHOLOGY

## 2017-08-22 PROCEDURE — 37000008 HC ANESTHESIA 1ST 15 MINUTES: Performed by: ORTHOPAEDIC SURGERY

## 2017-08-22 PROCEDURE — 87075 CULTR BACTERIA EXCEPT BLOOD: CPT

## 2017-08-22 RX ORDER — PROPOFOL 10 MG/ML
VIAL (ML) INTRAVENOUS
Status: DISCONTINUED | OUTPATIENT
Start: 2017-08-22 | End: 2017-08-22

## 2017-08-22 RX ORDER — HYDROMORPHONE HYDROCHLORIDE 2 MG/ML
0.4 INJECTION, SOLUTION INTRAMUSCULAR; INTRAVENOUS; SUBCUTANEOUS EVERY 5 MIN PRN
Status: DISCONTINUED | OUTPATIENT
Start: 2017-08-22 | End: 2017-08-22 | Stop reason: HOSPADM

## 2017-08-22 RX ORDER — SODIUM CHLORIDE, SODIUM LACTATE, POTASSIUM CHLORIDE, CALCIUM CHLORIDE 600; 310; 30; 20 MG/100ML; MG/100ML; MG/100ML; MG/100ML
INJECTION, SOLUTION INTRAVENOUS CONTINUOUS
Status: DISCONTINUED | OUTPATIENT
Start: 2017-08-22 | End: 2017-08-22 | Stop reason: HOSPADM

## 2017-08-22 RX ORDER — CEFAZOLIN SODIUM 2 G/50ML
2 SOLUTION INTRAVENOUS
Status: COMPLETED | OUTPATIENT
Start: 2017-08-22 | End: 2017-08-22

## 2017-08-22 RX ORDER — ACETAMINOPHEN 10 MG/ML
INJECTION, SOLUTION INTRAVENOUS
Status: DISCONTINUED | OUTPATIENT
Start: 2017-08-22 | End: 2017-08-22

## 2017-08-22 RX ORDER — SODIUM CHLORIDE 0.9 % (FLUSH) 0.9 %
3 SYRINGE (ML) INJECTION
Status: DISCONTINUED | OUTPATIENT
Start: 2017-08-22 | End: 2017-08-22 | Stop reason: HOSPADM

## 2017-08-22 RX ORDER — MEPERIDINE HYDROCHLORIDE 50 MG/ML
12.5 INJECTION INTRAMUSCULAR; INTRAVENOUS; SUBCUTANEOUS ONCE AS NEEDED
Status: DISCONTINUED | OUTPATIENT
Start: 2017-08-22 | End: 2017-08-22 | Stop reason: HOSPADM

## 2017-08-22 RX ORDER — FENTANYL CITRATE 50 UG/ML
INJECTION, SOLUTION INTRAMUSCULAR; INTRAVENOUS
Status: DISCONTINUED | OUTPATIENT
Start: 2017-08-22 | End: 2017-08-22

## 2017-08-22 RX ORDER — OXYCODONE HYDROCHLORIDE 5 MG/1
5 TABLET ORAL
Status: DISCONTINUED | OUTPATIENT
Start: 2017-08-22 | End: 2017-08-22 | Stop reason: HOSPADM

## 2017-08-22 RX ORDER — HYDROCODONE BITARTRATE AND ACETAMINOPHEN 5; 325 MG/1; MG/1
1 TABLET ORAL EVERY 4 HOURS PRN
Qty: 30 TABLET | Refills: 0 | Status: SHIPPED | OUTPATIENT
Start: 2017-08-22 | End: 2019-10-22

## 2017-08-22 RX ORDER — HYDROCODONE BITARTRATE AND ACETAMINOPHEN 5; 325 MG/1; MG/1
1 TABLET ORAL EVERY 4 HOURS PRN
Status: DISCONTINUED | OUTPATIENT
Start: 2017-08-22 | End: 2017-08-22 | Stop reason: HOSPADM

## 2017-08-22 RX ORDER — ONDANSETRON 2 MG/ML
4 INJECTION INTRAMUSCULAR; INTRAVENOUS ONCE
Status: COMPLETED | OUTPATIENT
Start: 2017-08-22 | End: 2017-08-22

## 2017-08-22 RX ORDER — DEXAMETHASONE SODIUM PHOSPHATE 4 MG/ML
INJECTION, SOLUTION INTRA-ARTICULAR; INTRALESIONAL; INTRAMUSCULAR; INTRAVENOUS; SOFT TISSUE
Status: DISCONTINUED | OUTPATIENT
Start: 2017-08-22 | End: 2017-08-22

## 2017-08-22 RX ORDER — FAMOTIDINE 20 MG/1
20 TABLET, FILM COATED ORAL
Status: COMPLETED | OUTPATIENT
Start: 2017-08-22 | End: 2017-08-22

## 2017-08-22 RX ORDER — LIDOCAINE HCL/PF 100 MG/5ML
SYRINGE (ML) INTRAVENOUS
Status: DISCONTINUED | OUTPATIENT
Start: 2017-08-22 | End: 2017-08-22

## 2017-08-22 RX ORDER — FENTANYL CITRATE 50 UG/ML
25 INJECTION, SOLUTION INTRAMUSCULAR; INTRAVENOUS EVERY 5 MIN PRN
Status: DISCONTINUED | OUTPATIENT
Start: 2017-08-22 | End: 2017-08-22 | Stop reason: HOSPADM

## 2017-08-22 RX ORDER — ONDANSETRON HYDROCHLORIDE 2 MG/ML
INJECTION, SOLUTION INTRAMUSCULAR; INTRAVENOUS
Status: DISCONTINUED | OUTPATIENT
Start: 2017-08-22 | End: 2017-08-22

## 2017-08-22 RX ADMIN — DEXAMETHASONE SODIUM PHOSPHATE 8 MG: 4 INJECTION, SOLUTION INTRAMUSCULAR; INTRAVENOUS at 11:08

## 2017-08-22 RX ADMIN — ACETAMINOPHEN 1000 MG: 10 INJECTION, SOLUTION INTRAVENOUS at 11:08

## 2017-08-22 RX ADMIN — HYDROMORPHONE HYDROCHLORIDE 0.5 MG: 2 INJECTION, SOLUTION INTRAMUSCULAR; INTRAVENOUS; SUBCUTANEOUS at 12:08

## 2017-08-22 RX ADMIN — SODIUM CHLORIDE 100 ML: 9 INJECTION, SOLUTION INTRAVENOUS at 10:08

## 2017-08-22 RX ADMIN — FENTANYL CITRATE 50 MCG: 50 INJECTION, SOLUTION INTRAMUSCULAR; INTRAVENOUS at 11:08

## 2017-08-22 RX ADMIN — CEFAZOLIN SODIUM 2 G: 2 SOLUTION INTRAVENOUS at 11:08

## 2017-08-22 RX ADMIN — FENTANYL CITRATE 25 MCG: 50 INJECTION, SOLUTION INTRAMUSCULAR; INTRAVENOUS at 11:08

## 2017-08-22 RX ADMIN — SODIUM CHLORIDE, SODIUM LACTATE, POTASSIUM CHLORIDE, AND CALCIUM CHLORIDE: 600; 310; 30; 20 INJECTION, SOLUTION INTRAVENOUS at 10:08

## 2017-08-22 RX ADMIN — LIDOCAINE HYDROCHLORIDE 75 MG: 20 INJECTION, SOLUTION INTRAVENOUS at 11:08

## 2017-08-22 RX ADMIN — ONDANSETRON 4 MG: 2 INJECTION INTRAMUSCULAR; INTRAVENOUS at 02:08

## 2017-08-22 RX ADMIN — CARBOXYMETHYLCELLULOSE SODIUM 2 DROP: 2.5 SOLUTION/ DROPS OPHTHALMIC at 11:08

## 2017-08-22 RX ADMIN — PHENYLEPHRINE HYDROCHLORIDE 50 MCG/MIN: 10 INJECTION INTRAVENOUS at 11:08

## 2017-08-22 RX ADMIN — HYDROMORPHONE HYDROCHLORIDE 0.5 MG: 2 INJECTION, SOLUTION INTRAMUSCULAR; INTRAVENOUS; SUBCUTANEOUS at 11:08

## 2017-08-22 RX ADMIN — FAMOTIDINE 20 MG: 20 TABLET, FILM COATED ORAL at 10:08

## 2017-08-22 RX ADMIN — ONDANSETRON 4 MG: 2 INJECTION, SOLUTION INTRAMUSCULAR; INTRAVENOUS at 11:08

## 2017-08-22 RX ADMIN — PROMETHAZINE HYDROCHLORIDE 6.25 MG: 25 INJECTION INTRAMUSCULAR; INTRAVENOUS at 12:08

## 2017-08-22 RX ADMIN — PROPOFOL 130 MG: 10 INJECTION, EMULSION INTRAVENOUS at 11:08

## 2017-08-22 NOTE — OR NURSING
Francy Bain has met all discharge criteria from Phase II. Vital Signs are stable, ambulating  without difficulty.Pain is now under control and tolerable for the pt. Pain score 0 at this time.  Discharge instructions given, patient verbalized understanding. Discharged from facility via wheelchair in stable condition.

## 2017-08-22 NOTE — ANESTHESIA POSTPROCEDURE EVALUATION
"Anesthesia Post Evaluation    Patient: Francy Bain    Procedure(s) Performed: Procedure(s) (LRB):  BURSECTOMY - ELBOW (Right)    Final Anesthesia Type: general  Patient location during evaluation: PACU  Patient participation: Yes- Able to Participate  Level of consciousness: awake and alert  Post-procedure vital signs: reviewed and stable  Pain management: adequate  Airway patency: patent  PONV status at discharge: No PONV  Anesthetic complications: no      Cardiovascular status: blood pressure returned to baseline  Respiratory status: unassisted, spontaneous ventilation and room air  Hydration status: euvolemic  Follow-up not needed.        Visit Vitals  BP (!) 142/67 (BP Location: Left arm, Patient Position: Lying)   Pulse 60   Temp 36.4 °C (97.5 °F) (Oral)   Resp 16   Ht 4' 9" (1.448 m)   Wt 57.2 kg (126 lb)   SpO2 95%   Breastfeeding? No   BMI 27.27 kg/m²       Pain/Wilmer Score: Pain Assessment Performed: Yes (8/22/2017  1:30 PM)  Presence of Pain: complains of pain/discomfort (8/22/2017  1:30 PM)  Pain Rating Post Med Admin: 4 (8/22/2017  1:15 PM)  Wilmer Score: 10 (8/22/2017  1:30 PM)      "

## 2017-08-22 NOTE — BRIEF OP NOTE
[facility]  Brief Operative Note     SUMMARY     Surgery Date: 8/22/2017     Surgeon(s) and Role:     * Claude S. Williams IV, MD - Primary    Assisting Surgeon: None    Pre-op Diagnosis:  Infected olecranon bursa, right [M70.21]  Right total elbow arthroplasty    Post-op Diagnosis:  Post-Op Diagnosis Codes:     * Infected olecranon bursa, right [M70.21]  Right total elbow arthroplasty    Procedure(s) (LRB):  BURSECTOMY - ELBOW (Right)    Anesthesia: General    Description of the findings of the procedure: Right elbow prominent olecranon bursa with serous fluid communicating with the prosthetic elbow components.    Findings/Key Components: as above    Estimated Blood Loss: * No values recorded between 8/22/2017 11:27 AM and 8/22/2017 11:57 AM *         Specimens:   Specimen (12h ago through future)    Start     Ordered    08/22/17 1139  Specimen to Pathology - Surgery  Once     Comments:  1) Bursa sac from right elbow (permanent)      08/22/17 1139          Discharge Note    SUMMARY     Admit Date: 8/22/2017    Discharge Date and Time:  08/22/2017 11:59 AM    Hospital Course (synopsis of major diagnoses, care, treatment, and services provided during the course of the hospital stay): uneventful     Final Diagnosis: Post-Op Diagnosis Codes:     * Infected olecranon bursa, right [M70.21]    Disposition: Home or Self Care    Follow Up/Patient Instructions:     Medications:  Reconciled Home Medications:   Current Discharge Medication List      CONTINUE these medications which have NOT CHANGED    Details   ACETAMINOPHEN/DIPHENHYDRAMINE (TYLENOL PM ORAL) Take by mouth.      celecoxib (CELEBREX) 100 MG capsule take 1 capsule (100 mg) by oral route 2 times per day  Refills: 6      clindamycin (CLEOCIN) 300 MG capsule Take 300 mg by mouth once daily.      hydrochlorothiazide (MICROZIDE) 12.5 mg capsule Take 12.5 mg by mouth once daily.  Refills: 6      HYDROCODONE-ACETAMINOPHEN ORAL Take by mouth daily as  needed.      hydrOXYzine HCl (ATARAX) 25 MG tablet Take 25 mg by mouth 3 (three) times daily as needed for Itching.      olmesartan (BENICAR) 40 MG tablet Take 40 mg by mouth 2 (two) times daily.      omeprazole (PRILOSEC) 20 MG capsule Take 20 mg by mouth once daily.      benzonatate (TESSALON) 100 MG capsule Take 100 mg by mouth 3 (three) times daily as needed for Cough.      CALCIUM CITRATE/VITAMIN D3 (CITRACAL REGULAR ORAL) Take 1,200 mg by mouth once daily.           No discharge procedures on file.

## 2017-08-22 NOTE — DISCHARGE INSTRUCTIONS
NO HEAVY LIFTING    Discharge Instructions: After Your Surgery  Youve just had surgery. During surgery you were given medicine called anesthesia to keep you relaxed and free of pain. After surgery you may have some pain or nausea. This is common. Here are some tips for feeling better and getting well after surgery.     Stay on schedule with your medication.     Going home  Your doctor or nurse will show you how to take care of yourself when you go home. He or she will also answer your questions. Have an adult family member or friend drive you home. For the first 24 hours after your surgery:    · Do not drive or use heavy equipment.  · Do not make important decisions or sign legal papers.  · Do not drink alcohol.  · Have someone stay with you, if needed. He or she can watch for problems and help keep you safe.    Be sure to go to all follow-up visits with your doctor. And rest after your surgery for as long as your doctor tells you to.    Coping with pain  If you have pain after surgery, pain medicine will help you feel better. Take it as told, before pain becomes severe. Also, ask your doctor or pharmacist about other ways to control pain. This might be with heat, ice, or relaxation. And follow any other instructions your surgeon or nurse gives you.    Tips for taking pain medicine  To get the best relief possible, remember these points:    · Pain medicines can upset your stomach. Taking them with a little food may help.  · Most pain relievers taken by mouth need at least 20 to 30 minutes to start to work.  · Taking medicine on a schedule can help you remember to take it. Try to time your medicine so that you can take it before starting an activity. This might be before you get dressed, go for a walk, or sit down for dinner.  · Constipation is a common side effect of pain medicines. Call your doctor before taking any medicines such as laxatives or stool softeners to help ease constipation. Also ask if you should  skip any foods. Drinking lots of fluids and eating foods such as fruits and vegetables that are high in fiber can also help. Remember, do not take laxatives unless your surgeon has prescribed them.  · Drinking alcohol and taking pain medicine can cause dizziness and slow your breathing. It can even be deadly. Do not drink alcohol while taking pain medicine.  · Pain medicine can make you react more slowly to things. Do not drive or run machinery while taking pain medicine.    Your health care provider may tell you to take acetaminophen to help ease your pain. Ask him or her how much you are supposed to take each day. Acetaminophen or other pain relievers may interact with your prescription medicines or other over-the-counter (OTC) drugs. Some prescription medicines have acetaminophen and other ingredients. Using both prescription and OTC acetaminophen for pain can cause you to overdose. Read the labels on your OTC medicines with care. This will help you to clearly know the list of ingredients, how much to take, and any warnings. It may also help you not take too much acetaminophen. If you have questions or do not understand the information, ask your pharmacist or health care provider to explain it to you before you take the OTC medicine.    Managing nausea  Some people have an upset stomach after surgery. This is often because of anesthesia, pain, or pain medicine, or the stress of surgery. These tips will help you handle nausea and eat healthy foods as you get better. If you were on a special food plan before surgery, ask your doctor if you should follow it while you get better. These tips may help:    · Do not push yourself to eat. Your body will tell you when to eat and how much.  · Start off with clear liquids and soup. They are easier to digest.  · Next try semi-solid foods, such as mashed potatoes, applesauce, and gelatin, as you feel ready.  · Slowly move to solid foods. Dont eat fatty, rich, or spicy foods  at first.  · Do not force yourself to have 3 large meals a day. Instead eat smaller amounts more often.  · Take pain medicines with a small amount of solid food, such as crackers or toast, to avoid nausea.     Call your surgeon if  · You still have pain an hour after taking medicine. The medicine may not be strong enough.  · You feel too sleepy, dizzy, or groggy. The medicine may be too strong.  · You have side effects like nausea, vomiting, or skin changes, such as rash, itching, or hives.       If you have obstructive sleep apnea  You were given anesthesia medicine during surgery to keep you comfortable and free of pain. After surgery, you may have more apnea spells because of this medicine and other medicines you were given. The spells may last longer than usual.   At home:    · Keep using the continuous positive airway pressure (CPAP) device when you sleep. Unless your health care provider tells you not to, use it when you sleep, day or night. CPAP is a common device used to treat obstructive sleep apnea.  · Talk with your provider before taking any pain medicine, muscle relaxants, or sedatives. Your provider will tell you about the possible dangers of taking these medicines.    © 5374-7674 [employer]. [address] [place] [address], [place] [address]. All rights reserved. This information is not intended as a substitute for professional medical care. Always follow your healthcare professional's instructions.    PLEASE FOLLOW ANY OTHER INSTRUCTIONS PROVIDED TO YOU BY DR. REED!

## 2017-08-22 NOTE — TRANSFER OF CARE
"Anesthesia Transfer of Care Note    Patient: Francy Bain    Procedure(s) Performed: Procedure(s) (LRB):  BURSECTOMY - ELBOW (Right)    Patient location: PACU    Anesthesia Type: general    Transport from OR: Transported from OR on room air with adequate spontaneous ventilation    Post pain: adequate analgesia    Post assessment: no apparent anesthetic complications    Post vital signs: stable    Level of consciousness: awake    Nausea/Vomiting: no nausea/vomiting    Complications: none    Transfer of care protocol was followed      Last vitals:   Visit Vitals  BP (!) 140/74 (BP Location: Right arm, Patient Position: Sitting)   Pulse 77   Temp 36.7 °C (98.1 °F) (Oral)   Resp 16   Ht 4' 9" (1.448 m)   Wt 57.2 kg (126 lb)   SpO2 98%   Breastfeeding? No   BMI 27.27 kg/m²     "

## 2017-08-22 NOTE — INTERVAL H&P NOTE
The patient has been examined and the H&P has been reviewed:    I concur with the findings and no changes have occurred since H&P was written.    Anesthesia/Surgery risks, benefits and alternative options discussed and understood by patient/family.          Active Hospital Problems    Diagnosis  POA    Infected olecranon bursa [M70.20]  Yes      Resolved Hospital Problems    Diagnosis Date Resolved POA   No resolved problems to display.

## 2017-08-24 NOTE — OP NOTE
DATE OF PROCEDURE:  08/23/2017.    SURGEON:  Claude Williams, IV, M.D.    ASSISTANT:  None.    PREOPERATIVE DIAGNOSIS:  Right elbow total elbow arthroplasty, olecranon   bursitis.    POSTOPERATIVE DIAGNOSIS:  Right elbow total elbow arthroplasty, olecranon   bursitis.    PROCEDURE PERFORMED:  Right elbow olecranon bursectomy, irrigation and   debridement, right elbow prosthetic joint.    INDICATIONS:  Ms. Bain is a 72-year-old woman who has a remote history of   right total elbow arthroplasty and has had some pain and instability in the   elbow and wrist where she has also had a reconstructive procedure.  She has a   chronic condition over many years and has limited use of the right upper   extremity.  She has recently developed some significant prominence and swelling   of the olecranon bursa on the right and radiographs demonstrate possibly some   loosening of the components and some loose bony osteophytes and fragments.  We   had a lengthy discussion regarding her condition over several months.  After the   potential benefits as well as risks and complications of treatment options were   reviewed with the patient, she has elected to undergo right elbow olecranon   bursectomy irrigation and debridement.    PROCEDURE IN DETAIL:  After proper informed consent was obtained, the patient   was transported to the Operating Suite, placed supine on the operating table.    General endotracheal anesthesia was administered per the anesthesiologist   without difficulty.  The right upper extremity was then thoroughly prepped with   alcohol, ChloraPrep and draped in the usual sterile fashion with a padded   sterile tourniquet about the right upper extremity.  After Esmarch   exsanguination, the tourniquet was elevated to 250 mmHg.  Preoperative   antibiotics were administered and routine preoperative timeout was taken and the   operative site was positively identified by the operative team.  A longitudinal   incision was  then made over the posterior aspect of the right elbow and careful   dissection was carried down through the subdermal tissue using bipolar and   Bovie cautery for hemostasis.  A well demarcated olecranon bursal sac was   meticulously dissected from thick skin flaps, which were elevated and the   olecranon bursa was isolated.  Care was taken to protect the ulnar nerve at the   medial elbow and the sac was meticulously dissected from the elbow.  It appeared   to be somewhat communicating with the elbow joint and there were some fracture   fragments of the proximal ulna at the prosthetic bone interface.  The bursa and   these fragments were excised and the wound was cultured with for Gram stain,   aerobic and anaerobic culture and sensitivity and fungal cultures.  The elbow   joint had some serous thin fluid within the joint as well as this was thoroughly   lavaged with the Pulsavac.  The humeral and ulnar prosthetic components   appeared to be well fixed and the elbow articulation appeared stable.  After   thorough lavage, the tourniquet was deflated and hemostasis was confirmed.  The   incision was then closed with Monocryl and nylon skin suture.  A sterile soft   dressing was then applied.  The patient was awakened in the Operating Suite and   taken to the recovery area in stable condition.  She tolerated the procedure   very well.  Lap, instrument and needle counts were correct.    BLOOD LOSS:  Less than 50 mL    COMPLICATIONS:  None.      BEBA/IN  dd: 08/23/2017 15:24:01 (CDT)  td: 08/23/2017 20:01:46 (GERALD)  Doc ID   #[id number]  Job ID #[id number]    CC:

## 2017-08-25 LAB — BACTERIA SPEC AEROBE CULT: NO GROWTH

## 2017-08-30 LAB — BACTERIA SPEC ANAEROBE CULT: NORMAL

## 2017-09-06 ENCOUNTER — OFFICE VISIT (OUTPATIENT)
Dept: NEUROLOGY | Facility: CLINIC | Age: 72
End: 2017-09-06
Payer: MEDICARE

## 2017-09-06 VITALS
WEIGHT: 134.06 LBS | SYSTOLIC BLOOD PRESSURE: 133 MMHG | HEIGHT: 57 IN | HEART RATE: 80 BPM | BODY MASS INDEX: 28.92 KG/M2 | DIASTOLIC BLOOD PRESSURE: 67 MMHG

## 2017-09-06 DIAGNOSIS — G31.84 MCI (MILD COGNITIVE IMPAIRMENT): Primary | ICD-10-CM

## 2017-09-06 DIAGNOSIS — R41.3 MEMORY LOSS: ICD-10-CM

## 2017-09-06 DIAGNOSIS — F39 AFFECTIVE DISORDER: ICD-10-CM

## 2017-09-06 DIAGNOSIS — I10 ESSENTIAL HYPERTENSION: ICD-10-CM

## 2017-09-06 PROCEDURE — 1159F MED LIST DOCD IN RCRD: CPT | Mod: S$GLB,,, | Performed by: PSYCHIATRY & NEUROLOGY

## 2017-09-06 PROCEDURE — 99214 OFFICE O/P EST MOD 30 MIN: CPT | Mod: S$GLB,,, | Performed by: PSYCHIATRY & NEUROLOGY

## 2017-09-06 PROCEDURE — 3008F BODY MASS INDEX DOCD: CPT | Mod: S$GLB,,, | Performed by: PSYCHIATRY & NEUROLOGY

## 2017-09-06 PROCEDURE — 3078F DIAST BP <80 MM HG: CPT | Mod: S$GLB,,, | Performed by: PSYCHIATRY & NEUROLOGY

## 2017-09-06 PROCEDURE — 1125F AMNT PAIN NOTED PAIN PRSNT: CPT | Mod: S$GLB,,, | Performed by: PSYCHIATRY & NEUROLOGY

## 2017-09-06 PROCEDURE — 99999 PR PBB SHADOW E&M-EST. PATIENT-LVL III: CPT | Mod: PBBFAC,,, | Performed by: PSYCHIATRY & NEUROLOGY

## 2017-09-06 PROCEDURE — 3075F SYST BP GE 130 - 139MM HG: CPT | Mod: S$GLB,,, | Performed by: PSYCHIATRY & NEUROLOGY

## 2017-09-06 RX ORDER — MEMANTINE HYDROCHLORIDE 5 MG/1
5 TABLET ORAL DAILY
Qty: 30 TABLET | Refills: 5 | Status: SHIPPED | OUTPATIENT
Start: 2017-09-06 | End: 2019-10-22

## 2017-09-06 NOTE — PROGRESS NOTES
Subjective:       Patient ID: Francy Bain is a 72 y.o. female.    Chief Complaint:  Memory Loss      History of Present Illness  HPI  This is a 72-year-old female who had been seen by me for memory difficulties.  The patient came in for her appointment accompanied by her sister.  She was last seen by me 3 months ago.  Since the end of 2015 she has noted intermittent difficulty with her memory, primarily with retaining recent information and occasional word finding difficulty however she is otherwise quite functional and is independent.  She is presently disabled because of severe rheumatoid osteoarthritis having undergone multiple surgeries on her upper extremities and lower extremities with hardware, since the 1970s.  She has deformities of the upper extremities bilaterally.  However she can drive in the daytime without any problems and is able to take care of her day-to-day needs at home, including finances.  She is advised not to drive in fast traffic or on the Interstate or highways.  She lives with her 20-year-old grandson who moved in with her since the death of her  .  She has had no headaches, blackouts or history of head trauma.  Her medications were reviewed.  She had a CT scan of the brain done that showed evidence of small vessel ischemic changes most likely related to the history of hypertension.  She has history of anxiety and depression but only takes Xanax as needed at bedtime.  She was started on Namenda 5 mg daily however apparently never did continue the medications as she had been admitted to the hospital for elbow surgery on the right.  The sister does report that she has had intermittent problems with her memory primarily with recall of recent information however symptoms are not present all the time.       Review of Systems  Review of Systems   HENT: Negative.  Negative for hearing loss.    Eyes: Negative.  Negative for visual disturbance.   Respiratory: Negative.   Negative for shortness of breath.    Cardiovascular: Negative.  Negative for chest pain and palpitations.   Gastrointestinal: Negative.    Endocrine: Negative.    Genitourinary: Negative.    Musculoskeletal: Positive for arthralgias, back pain and neck pain. Negative for gait problem.   Skin: Negative.    Allergic/Immunologic: Negative.    Neurological: Positive for weakness. Negative for tremors, seizures, syncope, speech difficulty, numbness and headaches.   Hematological: Negative.    Psychiatric/Behavioral: Positive for decreased concentration and sleep disturbance. Negative for confusion. The patient is nervous/anxious.        Objective:      Neurologic Exam     Mental Status   Oriented to person, place, and time.   Registration: recalls 3 of 3 objects. Recall at 5 minutes: recalls 2 of 3 objects. Follows 3 step commands.   Attention: normal. Concentration: normal.   Speech: speech is normal   Level of consciousness: alert  Knowledge: good. Able to perform simple calculations.   Able to name object. Able to read. Able to repeat. Able to write. Normal comprehension.   Patient is able to give an adequate history.  No difficulty with spelling backwards.  Speech is of normal flow and content without dysarthria or aphasia.     Cranial Nerves   Cranial nerves II through XII intact.     Motor Exam   Muscle bulk: normal  Overall muscle tone: normalWeakness of  bilaterally related to wrist deformities and history of prior surgeries with hardware in the forearms and elbow.  She has limited movements at the wrist and elbow joints and shoulders related to rheumatoid arthritis and motor examination is somewhat limited.  Moves lower extremities well and her gait is stable.  Can ambulate without assistance.     Sensory Exam   Light touch normal.   Proprioception normal.   Pinprick normal.     Gait, Coordination, and Reflexes     Gait  Gait: normal (Somewhat stiff because of bilateral knee surgeries and  hardware)    Coordination   Romberg: negative  Finger-nose-finger test: Limited finger to nose because of upper extremity deformities.    Tremor   Resting tremor: absent  Intention tremor: absent  Action tremor: absent    Reflexes   Right brachioradialis: 1+  Left brachioradialis: 1+  Right biceps: 1+  Left biceps: 1+  Right triceps: 1+  Left triceps: 1+  Right patellar: 1+  Left patellar: 1+  Right achilles: 1+  Left achilles: 1+  Right plantar: normal  Left plantar: normal      Physical Exam   Constitutional: She is oriented to person, place, and time. She appears well-developed and well-nourished.   HENT:   Head: Normocephalic and atraumatic.   Neck: Normal range of motion. Neck supple. Carotid bruit is not present.   Neurological: She is oriented to person, place, and time. She has a normal Romberg Test. Finger-nose-finger test: Limited finger to nose because of upper extremity deformities. Gait normal.   Reflex Scores:       Tricep reflexes are 1+ on the right side and 1+ on the left side.       Bicep reflexes are 1+ on the right side and 1+ on the left side.       Brachioradialis reflexes are 1+ on the right side and 1+ on the left side.       Patellar reflexes are 1+ on the right side and 1+ on the left side.       Achilles reflexes are 1+ on the right side and 1+ on the left side.  Psychiatric: Her speech is normal.   Nursing note and vitals reviewed.        Assessment:        1. MCI (mild cognitive impairment)    2. Memory loss    3. Affective disorder    4. Essential hypertension             Plan:         Discussed with patient.  She has intermittent memory difficulties that suggest inattention and concentration difficulty.  This may be related to the history of an affective disorder, or medication effects however differential would include mild cognitive impairment especially given her vascular risk factors of hypertension.  She was apparently stopped the Namenda which will be restarted at a dose of 5 mg  daily in the morning.  Continue with present level of activities including driving restrictions as noted in the history.  Follow-up in 3 months if stable.

## 2017-09-26 LAB — FUNGUS SPEC CULT: NORMAL

## 2017-10-24 LAB
ACID FAST MOD KINY STN SPEC: NORMAL
MYCOBACTERIUM SPEC QL CULT: NORMAL

## 2019-10-22 ENCOUNTER — LAB VISIT (OUTPATIENT)
Dept: LAB | Facility: HOSPITAL | Age: 74
End: 2019-10-22
Attending: INTERNAL MEDICINE
Payer: MEDICARE

## 2019-10-22 ENCOUNTER — OFFICE VISIT (OUTPATIENT)
Dept: CARDIOLOGY | Facility: CLINIC | Age: 74
End: 2019-10-22
Payer: MEDICARE

## 2019-10-22 VITALS
BODY MASS INDEX: 28.51 KG/M2 | HEART RATE: 79 BPM | OXYGEN SATURATION: 98 % | SYSTOLIC BLOOD PRESSURE: 133 MMHG | HEIGHT: 58 IN | DIASTOLIC BLOOD PRESSURE: 65 MMHG | WEIGHT: 135.81 LBS

## 2019-10-22 DIAGNOSIS — R06.09 DOE (DYSPNEA ON EXERTION): ICD-10-CM

## 2019-10-22 DIAGNOSIS — I10 ESSENTIAL HYPERTENSION: ICD-10-CM

## 2019-10-22 DIAGNOSIS — R07.9 CHEST PAIN AT REST: ICD-10-CM

## 2019-10-22 DIAGNOSIS — I73.9 PAD (PERIPHERAL ARTERY DISEASE): Primary | ICD-10-CM

## 2019-10-22 DIAGNOSIS — R00.2 PALPITATIONS: ICD-10-CM

## 2019-10-22 LAB
BASOPHILS # BLD AUTO: 0.03 K/UL (ref 0–0.2)
BASOPHILS NFR BLD: 0.3 % (ref 0–1.9)
DIFFERENTIAL METHOD: ABNORMAL
EOSINOPHIL # BLD AUTO: 0.1 K/UL (ref 0–0.5)
EOSINOPHIL NFR BLD: 0.7 % (ref 0–8)
ERYTHROCYTE [DISTWIDTH] IN BLOOD BY AUTOMATED COUNT: 15.4 % (ref 11.5–14.5)
HCT VFR BLD AUTO: 50.8 % (ref 37–48.5)
HGB BLD-MCNC: 15.9 G/DL (ref 12–16)
IMM GRANULOCYTES # BLD AUTO: 0.05 K/UL (ref 0–0.04)
IMM GRANULOCYTES NFR BLD AUTO: 0.4 % (ref 0–0.5)
LYMPHOCYTES # BLD AUTO: 2.4 K/UL (ref 1–4.8)
LYMPHOCYTES NFR BLD: 21.3 % (ref 18–48)
MCH RBC QN AUTO: 27.7 PG (ref 27–31)
MCHC RBC AUTO-ENTMCNC: 31.3 G/DL (ref 32–36)
MCV RBC AUTO: 89 FL (ref 82–98)
MONOCYTES # BLD AUTO: 0.9 K/UL (ref 0.3–1)
MONOCYTES NFR BLD: 8.1 % (ref 4–15)
NEUTROPHILS # BLD AUTO: 7.8 K/UL (ref 1.8–7.7)
NEUTROPHILS NFR BLD: 69.2 % (ref 38–73)
NRBC BLD-RTO: 0 /100 WBC
PLATELET # BLD AUTO: 186 K/UL (ref 150–350)
PMV BLD AUTO: 10.2 FL (ref 9.2–12.9)
RBC # BLD AUTO: 5.74 M/UL (ref 4–5.4)
WBC # BLD AUTO: 11.34 K/UL (ref 3.9–12.7)

## 2019-10-22 PROCEDURE — 99214 PR OFFICE/OUTPT VISIT, EST, LEVL IV, 30-39 MIN: ICD-10-PCS | Mod: S$GLB,,, | Performed by: INTERNAL MEDICINE

## 2019-10-22 PROCEDURE — 99999 PR PBB SHADOW E&M-EST. PATIENT-LVL III: ICD-10-PCS | Mod: PBBFAC,,, | Performed by: INTERNAL MEDICINE

## 2019-10-22 PROCEDURE — 93000 EKG 12-LEAD: ICD-10-PCS | Mod: S$GLB,,, | Performed by: INTERNAL MEDICINE

## 2019-10-22 PROCEDURE — 3078F DIAST BP <80 MM HG: CPT | Mod: CPTII,S$GLB,, | Performed by: INTERNAL MEDICINE

## 2019-10-22 PROCEDURE — 93000 ELECTROCARDIOGRAM COMPLETE: CPT | Mod: S$GLB,,, | Performed by: INTERNAL MEDICINE

## 2019-10-22 PROCEDURE — 99214 OFFICE O/P EST MOD 30 MIN: CPT | Mod: S$GLB,,, | Performed by: INTERNAL MEDICINE

## 2019-10-22 PROCEDURE — 99999 PR PBB SHADOW E&M-EST. PATIENT-LVL III: CPT | Mod: PBBFAC,,, | Performed by: INTERNAL MEDICINE

## 2019-10-22 PROCEDURE — 36415 COLL VENOUS BLD VENIPUNCTURE: CPT

## 2019-10-22 PROCEDURE — 3078F PR MOST RECENT DIASTOLIC BLOOD PRESSURE < 80 MM HG: ICD-10-PCS | Mod: CPTII,S$GLB,, | Performed by: INTERNAL MEDICINE

## 2019-10-22 PROCEDURE — 3075F SYST BP GE 130 - 139MM HG: CPT | Mod: CPTII,S$GLB,, | Performed by: INTERNAL MEDICINE

## 2019-10-22 PROCEDURE — 85025 COMPLETE CBC W/AUTO DIFF WBC: CPT

## 2019-10-22 PROCEDURE — 3075F PR MOST RECENT SYSTOLIC BLOOD PRESS GE 130-139MM HG: ICD-10-PCS | Mod: CPTII,S$GLB,, | Performed by: INTERNAL MEDICINE

## 2019-10-22 RX ORDER — ADALIMUMAB 40MG/0.4ML
KIT SUBCUTANEOUS
COMMUNITY
Start: 2019-09-23 | End: 2021-01-15

## 2019-10-22 RX ORDER — LOSARTAN POTASSIUM 50 MG/1
TABLET ORAL
COMMUNITY
Start: 2019-09-24 | End: 2021-01-04 | Stop reason: SDUPTHER

## 2019-10-22 RX ORDER — ERYTHROMYCIN 500 MG/1
500 TABLET, COATED ORAL 3 TIMES DAILY
Refills: 2 | COMMUNITY
Start: 2019-09-27 | End: 2021-04-08 | Stop reason: ALTCHOICE

## 2019-10-22 RX ORDER — DOXEPIN HYDROCHLORIDE 10 MG/1
CAPSULE ORAL
COMMUNITY
Start: 2019-10-01 | End: 2021-04-08 | Stop reason: ALTCHOICE

## 2019-10-22 NOTE — PROGRESS NOTES
"Subjective:   Patient ID:  Francy Bain is a 74 y.o. female who presents for evaluation of Palpitations and Shortness of Breath      HPI:   The patient has difficulty with her memory but states she was referred for evaluation of a " blockage" on her left side " indicating it was her left leg. Home health visited her this past weekend and placed a device on her left toe that she states showed a blockage. Denies claudication but is restricted by severe RA. Has dysphagia with associated chest pain. Notes HAMMONDS walking 1/2 block. Has " skipping" of her heart at times. Francy Bain has hypertension with adequate control.  Review of Systems   Constitution: Negative for malaise/fatigue, weight gain and weight loss.        Hot flashes   Eyes: Negative for blurred vision.   Cardiovascular: Positive for dyspnea on exertion and palpitations. Negative for chest pain, claudication, cyanosis, irregular heartbeat, leg swelling, near-syncope, orthopnea, paroxysmal nocturnal dyspnea and syncope.   Respiratory: Negative for cough, shortness of breath and wheezing.    Musculoskeletal: Positive for arthritis and joint pain. Negative for falls and myalgias.   Gastrointestinal: Positive for diarrhea. Negative for abdominal pain, heartburn, nausea and vomiting.   Genitourinary: Negative for nocturia.   Neurological: Negative for brief paralysis, dizziness, focal weakness, headaches, numbness, paresthesias and weakness.   Psychiatric/Behavioral: Positive for memory loss. Negative for altered mental status.       Current Outpatient Medications   Medication Sig    ACETAMINOPHEN/DIPHENHYDRAMINE (TYLENOL PM ORAL) Take by mouth.    erythromycin base (E-MYCIN) 500 MG tablet Take 500 mg by mouth 3 (three) times daily.    EL,CF, PEN 40 mg/0.4 mL PnKt     losartan (COZAAR) 50 MG tablet     omeprazole (PRILOSEC) 20 MG capsule Take 20 mg by mouth once daily.    doxepin (SINEQUAN) 10 MG capsule     " "HYDROCODONE-ACETAMINOPHEN ORAL Take by mouth daily as needed.     No current facility-administered medications for this visit.      Objective:   Physical Exam   Constitutional: She is oriented to person, place, and time. She appears well-developed. No distress.   /65 (BP Location: Left arm, Patient Position: Sitting, BP Method: Medium (Automatic))   Pulse 79   Ht 4' 10" (1.473 m)   Wt 61.6 kg (135 lb 12.9 oz)   SpO2 98%   BMI 28.38 kg/m²    HENT:   Head: Normocephalic.   Eyes: Pupils are equal, round, and reactive to light. Conjunctivae are normal. No scleral icterus.   Neck: Neck supple. No JVD present. No thyromegaly present.   Cardiovascular: Normal rate, regular rhythm, normal heart sounds and intact distal pulses. PMI is not displaced. Exam reveals no gallop and no friction rub.   No murmur heard.  Pulses:       Femoral pulses are 1+ on the right side, and 1+ on the left side.       Dorsalis pedis pulses are 0 on the right side, and 0 on the left side.        Posterior tibial pulses are 0 on the right side, and 0 on the left side.   Pulmonary/Chest: Effort normal and breath sounds normal. No respiratory distress. She has no wheezes. She has no rales.   Abdominal: Soft. She exhibits no distension. There is no splenomegaly or hepatomegaly. There is no tenderness.   Musculoskeletal: She exhibits no edema or tenderness.   Gait normal  Arthritic defomities of her hands   Neurological: She is alert and oriented to person, place, and time.   Skin: Skin is warm and dry. She is not diaphoretic.   Psychiatric: She has a normal mood and affect. Her behavior is normal.       Lab Results   Component Value Date     01/19/2017    K 3.8 01/19/2017     01/19/2017    CO2 26 01/19/2017    BUN 13 01/19/2017    CREATININE 0.8 01/19/2017    GLU 80 01/19/2017    WBC 11.71 01/19/2017    HGB 12.4 01/19/2017    HCT 39.6 01/19/2017    MCV 84 01/19/2017     (H) 01/19/2017       Assessment:     1. PAD " (peripheral artery disease) : Probable by history and exam   2. HAMMONDS (dyspnea on exertion) : Multifactorial including pulmonary aspects of RA, possible diastolic dysfunction, deconditioning   3. Essential hypertension : Adequate control   4. Chest pain at rest : Appears esophogeal   5. Palpitations : c/w sx ectopy       Plan:     Francy was seen today for palpitations and shortness of breath.    Diagnoses and all orders for this visit:    PAD (peripheral artery disease)  -     CV Exercise ANNI; Future    HAMMONDS (dyspnea on exertion)  -     CBC auto differential; Future; Expected date: 10/22/2019  -     Echo Color Flow Doppler? Yes; Future  -     EKG 12-lead; Future    Essential hypertension  Continue current regimen    Chest pain at rest  Per her PCP  Palpitations  No additional evaluation at this time since unchanged in past 2 years  Other orders  -     HUMIRA,CF, PEN 40 mg/0.4 mL PnKt  -     doxepin (SINEQUAN) 10 MG capsule  -     erythromycin base (E-MYCIN) 500 MG tablet; Take 500 mg by mouth 3 (three) times daily.  -     losartan (COZAAR) 50 MG tablet

## 2019-10-22 NOTE — LETTER
October 22, 2019      Karon Pearson, NP  [address] [facility] [address]           [facility] - Cardiology  [address]  [place] [address]  Phone: [phone]          Patient: Francy Bain   MR Number: [id number]   YOB: 1945   Date of Visit: 10/22/2019       Dear Karon Pearson:    Thank you for referring Francy Bain to me for evaluation. Attached you will find relevant portions of my assessment and plan of care.    If you have questions, please do not hesitate to call me. I look forward to following Francy Bain along with you.    Sincerely,    Gerardo Jones MD    Enclosure  CC:  No Recipients    If you would like to receive this communication electronically, please contact [email or web address] or [phone] to request more information on [employer] - Financial Reconciliation Link access.    For providers and/or their staff who would like to refer a patient to [facility], please contact us through our one-stop-shop provider referral line, [facility] , at [phone].    If you feel you have received this communication in error or would no longer like to receive these types of communications, please e-mail [email or web address]

## 2019-10-28 ENCOUNTER — CLINICAL SUPPORT (OUTPATIENT)
Dept: CARDIOLOGY | Facility: CLINIC | Age: 74
End: 2019-10-28
Attending: INTERNAL MEDICINE
Payer: MEDICARE

## 2019-10-28 VITALS
SYSTOLIC BLOOD PRESSURE: 122 MMHG | HEART RATE: 78 BPM | DIASTOLIC BLOOD PRESSURE: 80 MMHG | HEIGHT: 58 IN | BODY MASS INDEX: 28.34 KG/M2 | WEIGHT: 135 LBS

## 2019-10-28 DIAGNOSIS — R06.09 DOE (DYSPNEA ON EXERTION): ICD-10-CM

## 2019-10-28 DIAGNOSIS — I73.9 PAD (PERIPHERAL ARTERY DISEASE): ICD-10-CM

## 2019-10-28 LAB
ASCENDING AORTA: 2.68 CM
AV INDEX (PROSTH): 0.73
AV MEAN GRADIENT: 3 MMHG
AV PEAK GRADIENT: 6 MMHG
AV VALVE AREA: 2.02 CM2
AV VELOCITY RATIO: 0.67
BSA FOR ECHO PROCEDURE: 1.58 M2
CV ECHO LV RWT: 0.3 CM
DOP CALC AO PEAK VEL: 1.26 M/S
DOP CALC AO VTI: 23.72 CM
DOP CALC LVOT AREA: 2.8 CM2
DOP CALC LVOT DIAMETER: 1.88 CM
DOP CALC LVOT PEAK VEL: 0.85 M/S
DOP CALC LVOT STROKE VOLUME: 47.8 CM3
DOP CALCLVOT PEAK VEL VTI: 17.23 CM
E WAVE DECELERATION TIME: 241.86 MSEC
E/A RATIO: 0.72
E/E' RATIO: 7.44 M/S
ECHO LV POSTERIOR WALL: 0.65 CM (ref 0.6–1.1)
FRACTIONAL SHORTENING: 30 % (ref 28–44)
IMMEDIATE ARM BP: 151 MMHG
IMMEDIATE LEFT ABI: 1.03
IMMEDIATE LEFT TIBIAL: 155 MMHG
IMMEDIATE RIGHT ABI: 1.15
IMMEDIATE RIGHT TIBIAL: 173 MMHG
INTERVENTRICULAR SEPTUM: 0.65 CM (ref 0.6–1.1)
IVRT: 0.09 MSEC
LA MAJOR: 4 CM
LA MINOR: 4 CM
LA WIDTH: 3.49 CM
LEFT ABI: 0.92
LEFT ARM BP: 146 MMHG
LEFT ATRIUM SIZE: 2.94 CM
LEFT ATRIUM VOLUME INDEX: 22.6 ML/M2
LEFT ATRIUM VOLUME: 34.89 CM3
LEFT DORSALIS PEDIS: 140 MMHG
LEFT INTERNAL DIMENSION IN SYSTOLE: 3.04 CM (ref 2.1–4)
LEFT POSTERIOR TIBIAL: 138 MMHG
LEFT VENTRICLE DIASTOLIC VOLUME INDEX: 55.41 ML/M2
LEFT VENTRICLE DIASTOLIC VOLUME: 85.4 ML
LEFT VENTRICLE MASS INDEX: 53 G/M2
LEFT VENTRICLE SYSTOLIC VOLUME INDEX: 23.4 ML/M2
LEFT VENTRICLE SYSTOLIC VOLUME: 36.11 ML
LEFT VENTRICULAR INTERNAL DIMENSION IN DIASTOLE: 4.35 CM (ref 3.5–6)
LEFT VENTRICULAR MASS: 82.18 G
LV LATERAL E/E' RATIO: 6.09 M/S
LV SEPTAL E/E' RATIO: 9.57 M/S
MV PEAK A VEL: 0.93 M/S
MV PEAK E VEL: 0.67 M/S
PISA TR MAX VEL: 2.62 M/S
PULM VEIN S/D RATIO: 1.71
PV PEAK D VEL: 0.31 M/S
PV PEAK S VEL: 0.53 M/S
RA MAJOR: 3.68 CM
RA PRESSURE: 3 MMHG
RA WIDTH: 2.95 CM
RIGHT ABI: 1.02
RIGHT ARM BP: 153 MMHG
RIGHT DORSALIS PEDIS: 156 MMHG
RIGHT POSTERIOR TIBIAL: 131 MMHG
RIGHT VENTRICULAR END-DIASTOLIC DIMENSION: 2.81 CM
SINUS: 2.78 CM
STJ: 2.47 CM
TDI LATERAL: 0.11 M/S
TDI SEPTAL: 0.07 M/S
TDI: 0.09 M/S
TOE RAISES: 20
TR MAX PG: 27 MMHG
TRICUSPID ANNULAR PLANE SYSTOLIC EXCURSION: 2.3 CM
TV REST PULMONARY ARTERY PRESSURE: 30 MMHG

## 2019-10-28 PROCEDURE — 99999 PR PBB SHADOW E&M-EST. PATIENT-LVL II: CPT | Mod: PBBFAC,,,

## 2019-10-28 PROCEDURE — 93924 CV US ANKLE / BRACHIAL INDICES W/ EXERCISE STRESS (CUPID ONLY): ICD-10-PCS | Mod: S$GLB,,, | Performed by: INTERNAL MEDICINE

## 2019-10-28 PROCEDURE — 93306 ECHO (CUPID ONLY): ICD-10-PCS | Mod: S$GLB,,, | Performed by: INTERNAL MEDICINE

## 2019-10-28 PROCEDURE — 99999 PR PBB SHADOW E&M-EST. PATIENT-LVL II: ICD-10-PCS | Mod: PBBFAC,,,

## 2019-10-28 PROCEDURE — 93924 LWR XTR VASC STDY BILAT: CPT | Mod: S$GLB,,, | Performed by: INTERNAL MEDICINE

## 2019-10-28 PROCEDURE — 93306 TTE W/DOPPLER COMPLETE: CPT | Mod: S$GLB,,, | Performed by: INTERNAL MEDICINE

## 2020-05-20 ENCOUNTER — LAB VISIT (OUTPATIENT)
Dept: PRIMARY CARE CLINIC | Facility: CLINIC | Age: 75
End: 2020-05-20
Payer: MEDICARE

## 2020-05-20 DIAGNOSIS — R05.9 COUGH: Primary | ICD-10-CM

## 2020-05-20 PROCEDURE — U0003 INFECTIOUS AGENT DETECTION BY NUCLEIC ACID (DNA OR RNA); SEVERE ACUTE RESPIRATORY SYNDROME CORONAVIRUS 2 (SARS-COV-2) (CORONAVIRUS DISEASE [COVID-19]), AMPLIFIED PROBE TECHNIQUE, MAKING USE OF HIGH THROUGHPUT TECHNOLOGIES AS DESCRIBED BY CMS-2020-01-R: HCPCS

## 2020-05-21 LAB — SARS-COV-2 RNA RESP QL NAA+PROBE: DETECTED

## 2020-06-25 ENCOUNTER — LAB VISIT (OUTPATIENT)
Dept: PRIMARY CARE CLINIC | Facility: OTHER | Age: 75
End: 2020-06-25
Payer: MEDICARE

## 2020-06-25 DIAGNOSIS — Z11.59 ENCOUNTER FOR SCREENING FOR OTHER VIRAL DISEASES: Primary | ICD-10-CM

## 2020-06-25 PROCEDURE — U0003 INFECTIOUS AGENT DETECTION BY NUCLEIC ACID (DNA OR RNA); SEVERE ACUTE RESPIRATORY SYNDROME CORONAVIRUS 2 (SARS-COV-2) (CORONAVIRUS DISEASE [COVID-19]), AMPLIFIED PROBE TECHNIQUE, MAKING USE OF HIGH THROUGHPUT TECHNOLOGIES AS DESCRIBED BY CMS-2020-01-R: HCPCS

## 2020-06-28 LAB — SARS-COV-2 RNA RESP QL NAA+PROBE: NOT DETECTED

## 2020-10-08 ENCOUNTER — OFFICE VISIT (OUTPATIENT)
Dept: PRIMARY CARE CLINIC | Facility: CLINIC | Age: 75
End: 2020-10-08
Payer: MEDICARE

## 2020-10-08 DIAGNOSIS — G31.84 MILD COGNITIVE IMPAIRMENT: ICD-10-CM

## 2020-10-08 DIAGNOSIS — Z00.00 LABORATORY EXAM ORDERED AS PART OF ROUTINE GENERAL MEDICAL EXAMINATION: ICD-10-CM

## 2020-10-08 DIAGNOSIS — Z13.6 ENCOUNTER FOR LIPID SCREENING FOR CARDIOVASCULAR DISEASE: ICD-10-CM

## 2020-10-08 DIAGNOSIS — G89.29 CHRONIC MUSCULOSKELETAL PAIN: ICD-10-CM

## 2020-10-08 DIAGNOSIS — R06.09 DOE (DYSPNEA ON EXERTION): ICD-10-CM

## 2020-10-08 DIAGNOSIS — Z11.59 NEED FOR HEPATITIS C SCREENING TEST: ICD-10-CM

## 2020-10-08 DIAGNOSIS — Z13.220 ENCOUNTER FOR LIPID SCREENING FOR CARDIOVASCULAR DISEASE: ICD-10-CM

## 2020-10-08 DIAGNOSIS — F39 AFFECTIVE DISORDER: ICD-10-CM

## 2020-10-08 DIAGNOSIS — R53.81 PHYSICAL DECONDITIONING: ICD-10-CM

## 2020-10-08 DIAGNOSIS — H40.9 GLAUCOMA, UNSPECIFIED GLAUCOMA TYPE, UNSPECIFIED LATERALITY: ICD-10-CM

## 2020-10-08 DIAGNOSIS — Z01.00 EYE EXAM, ROUTINE: ICD-10-CM

## 2020-10-08 DIAGNOSIS — Z86.39 PERSONAL HISTORY OF OTHER ENDOCRINE, METABOLIC, AND IMMUNITY DISORDERS: ICD-10-CM

## 2020-10-08 DIAGNOSIS — Z86.2 PERSONAL HISTORY OF OTHER ENDOCRINE, METABOLIC, AND IMMUNITY DISORDERS: ICD-10-CM

## 2020-10-08 DIAGNOSIS — M06.9 RHEUMATOID ARTHRITIS, INVOLVING UNSPECIFIED SITE, UNSPECIFIED WHETHER RHEUMATOID FACTOR PRESENT: ICD-10-CM

## 2020-10-08 DIAGNOSIS — M89.9 BONE DISORDER: ICD-10-CM

## 2020-10-08 DIAGNOSIS — M85.89 OSTEOPENIA OF MULTIPLE SITES: ICD-10-CM

## 2020-10-08 DIAGNOSIS — Z76.89 ENCOUNTER TO ESTABLISH CARE: Primary | ICD-10-CM

## 2020-10-08 DIAGNOSIS — I10 ESSENTIAL HYPERTENSION: ICD-10-CM

## 2020-10-08 DIAGNOSIS — Z86.73 HISTORY OF STROKE: ICD-10-CM

## 2020-10-08 DIAGNOSIS — M79.18 CHRONIC MUSCULOSKELETAL PAIN: ICD-10-CM

## 2020-10-08 PROBLEM — R00.2 PALPITATIONS: Status: RESOLVED | Noted: 2019-10-22 | Resolved: 2020-10-08

## 2020-10-08 PROBLEM — I73.9 PAD (PERIPHERAL ARTERY DISEASE): Status: RESOLVED | Noted: 2019-10-22 | Resolved: 2020-10-08

## 2020-10-08 PROBLEM — R07.9 CHEST PAIN AT REST: Status: RESOLVED | Noted: 2019-10-22 | Resolved: 2020-10-08

## 2020-10-08 PROCEDURE — 1159F PR MEDICATION LIST DOCUMENTED IN MEDICAL RECORD: ICD-10-PCS | Mod: HCNC,S$GLB,, | Performed by: FAMILY MEDICINE

## 2020-10-08 PROCEDURE — 99203 PR OFFICE/OUTPT VISIT, NEW, LEVL III, 30-44 MIN: ICD-10-PCS | Mod: HCNC,S$GLB,, | Performed by: FAMILY MEDICINE

## 2020-10-08 PROCEDURE — 99999 PR PBB SHADOW E&M-EST. PATIENT-LVL V: ICD-10-PCS | Mod: PBBFAC,HCNC,, | Performed by: FAMILY MEDICINE

## 2020-10-08 PROCEDURE — 99499 UNLISTED E&M SERVICE: CPT | Mod: S$GLB,,, | Performed by: FAMILY MEDICINE

## 2020-10-08 PROCEDURE — 99499 RISK ADDL DX/OHS AUDIT: ICD-10-PCS | Mod: S$GLB,,, | Performed by: FAMILY MEDICINE

## 2020-10-08 PROCEDURE — 1125F AMNT PAIN NOTED PAIN PRSNT: CPT | Mod: HCNC,S$GLB,, | Performed by: FAMILY MEDICINE

## 2020-10-08 PROCEDURE — 3077F PR MOST RECENT SYSTOLIC BLOOD PRESSURE >= 140 MM HG: ICD-10-PCS | Mod: HCNC,CPTII,S$GLB, | Performed by: FAMILY MEDICINE

## 2020-10-08 PROCEDURE — 3079F DIAST BP 80-89 MM HG: CPT | Mod: HCNC,CPTII,S$GLB, | Performed by: FAMILY MEDICINE

## 2020-10-08 PROCEDURE — 99999 PR PBB SHADOW E&M-EST. PATIENT-LVL V: CPT | Mod: PBBFAC,HCNC,, | Performed by: FAMILY MEDICINE

## 2020-10-08 PROCEDURE — 1101F PT FALLS ASSESS-DOCD LE1/YR: CPT | Mod: HCNC,CPTII,S$GLB, | Performed by: FAMILY MEDICINE

## 2020-10-08 PROCEDURE — 99203 OFFICE O/P NEW LOW 30 MIN: CPT | Mod: HCNC,S$GLB,, | Performed by: FAMILY MEDICINE

## 2020-10-08 PROCEDURE — 1125F PR PAIN SEVERITY QUANTIFIED, PAIN PRESENT: ICD-10-PCS | Mod: HCNC,S$GLB,, | Performed by: FAMILY MEDICINE

## 2020-10-08 PROCEDURE — 1159F MED LIST DOCD IN RCRD: CPT | Mod: HCNC,S$GLB,, | Performed by: FAMILY MEDICINE

## 2020-10-08 PROCEDURE — 3077F SYST BP >= 140 MM HG: CPT | Mod: HCNC,CPTII,S$GLB, | Performed by: FAMILY MEDICINE

## 2020-10-08 PROCEDURE — 3079F PR MOST RECENT DIASTOLIC BLOOD PRESSURE 80-89 MM HG: ICD-10-PCS | Mod: HCNC,CPTII,S$GLB, | Performed by: FAMILY MEDICINE

## 2020-10-08 PROCEDURE — 1101F PR PT FALLS ASSESS DOC 0-1 FALLS W/OUT INJ PAST YR: ICD-10-PCS | Mod: HCNC,CPTII,S$GLB, | Performed by: FAMILY MEDICINE

## 2020-10-08 RX ORDER — SARILUMAB 200 MG/1.14ML
200 INJECTION, SOLUTION SUBCUTANEOUS
COMMUNITY
Start: 2020-09-23 | End: 2023-06-15 | Stop reason: SDUPTHER

## 2020-10-08 RX ORDER — HYDROCORTISONE 25 MG/G
CREAM TOPICAL
COMMUNITY
Start: 2020-09-28

## 2020-10-08 RX ORDER — HYDROXYZINE HYDROCHLORIDE 25 MG/1
25 TABLET, FILM COATED ORAL DAILY
COMMUNITY
Start: 2020-08-22 | End: 2022-01-19

## 2020-10-08 NOTE — PROGRESS NOTES
"Subjective:       Patient ID: Francy Bain is a 75 y.o. female.    Chief Complaint: Establish Care      74 yo female presents to establish care.    - Mild cognitive impairment  Used to see Neurology. Non adherent to Namenda.     - History of stroke  No acute deficit    - Affective disorder  On doxepin    - Glaucoma  Needs a referral to eye doctor    - Hypertension  Reports home systolic of 140 mmHg. She has gained weight. She takes losartan.   She reports no headache, vision disturbance, chest pain, lower extremity swelling.    - Rheumatoid arthritis  Requesting referral. She has joint deformity most notably in upper extremities.    - Requesting Orthopedic referral s/p surgery to upper extremities.    - Osteopenia  She was unaware of this diagnosis. Last DEXA in 2017. Not taking adequate calcium or vitamin D.    - obesity, physically deconditioned  No routine exercises. Weight gain. Plans on changing her lifestyle.    The following portions of the patient's history were reviewed and updated as appropriate: allergies, current medications, past family history, past medical history, past social history, past surgical history and problem list.    Review of Systems       Objective:       Vitals:    10/08/20 1341   BP: (!) 180/80   Pulse: 79   Temp: 98.2 °F (36.8 °C)   TempSrc: Oral   SpO2: 97%   Weight: 67.8 kg (149 lb 7.6 oz)   Height: 4' 9" (1.448 m)     Physical Exam  Constitutional:       General: She is not in acute distress.     Appearance: She is well-developed. She is obese. She is not diaphoretic.   HENT:      Head: Normocephalic and atraumatic.      Right Ear: External ear normal.      Left Ear: External ear normal.   Eyes:      General: No scleral icterus.        Right eye: No discharge.         Left eye: No discharge.      Conjunctiva/sclera: Conjunctivae normal.      Pupils: Pupils are equal, round, and reactive to light.   Neck:      Musculoskeletal: Normal range of motion and neck supple.     "  Thyroid: No thyromegaly.   Cardiovascular:      Rate and Rhythm: Normal rate and regular rhythm.      Heart sounds: Normal heart sounds. No murmur. No friction rub. No gallop.    Pulmonary:      Effort: Pulmonary effort is normal. No respiratory distress.      Breath sounds: Normal breath sounds.   Chest:      Chest wall: No tenderness.   Abdominal:      General: Bowel sounds are normal. There is distension (obese).      Palpations: Abdomen is soft. There is no mass.      Tenderness: There is no abdominal tenderness. There is no guarding or rebound.   Musculoskeletal: Normal range of motion.         General: Deformity (upper extremity) present.   Lymphadenopathy:      Cervical: No cervical adenopathy.   Skin:     General: Skin is warm.      Findings: No rash.   Neurological:      Mental Status: She is alert and oriented to person, place, and time.      Cranial Nerves: No cranial nerve deficit.      Motor: No abnormal muscle tone.      Coordination: Coordination normal.      Deep Tendon Reflexes: Reflexes normal.   Psychiatric:         Thought Content: Thought content normal.         Judgment: Judgment normal.         Assessment:       1. Encounter to establish care    2. Rheumatoid arthritis, involving unspecified site, unspecified whether rheumatoid factor present    3. Chronic musculoskeletal pain    4. Eye exam, routine    5. Glaucoma, unspecified glaucoma type, unspecified laterality    6. Osteopenia of multiple sites    7. Bone disorder    8. Personal history of other endocrine, metabolic, and immunity disorders    9. Affective disorder    10. History of stroke    11. Essential hypertension    12. HAMMONDS (dyspnea on exertion)    13. Physical deconditioning    14. Mild cognitive impairment    15. Laboratory exam ordered as part of routine general medical examination    16. Need for hepatitis C screening test    17. Encounter for lipid screening for cardiovascular disease        Plan:         1. Encounter to  establish care  The patient has been advised of the reasonably likely side effects and complications of medications and treatment.  Medications were reviewed and reconciled with refills sent to pharmacy as needed. Health maintenance was reviewed with recommendations per age and sex.  Immunizations reviewed and updated per guidelines unless patient declines. All questions were addressed and answered. Patient has contact information to get hold of us as needed.      2. Rheumatoid arthritis, involving unspecified site, unspecified whether rheumatoid factor present  -     Ambulatory referral/consult to Rheumatology; Future; Expected date: 10/15/2020  -     CBC Without Differential; Future    3. Chronic musculoskeletal pain  -     Ambulatory referral/consult to Orthopedics; Future; Expected date: 10/15/2020  Multiple surgeries on her upper extremities and lower extremities with hardware, since the 1970s.  She has deformities of the upper extremities bilaterally.  However she can drive in the daytime without any problems and is able to take care of her day-to-day needs at home, including finances.    4. Eye exam, routine  5. Glaucoma, unspecified glaucoma type, unspecified laterality  -     Ambulatory referral/consult to Ophthalmology; Future; Expected date: 10/15/2020    6. Osteopenia of multiple sites  -     TSH; Future  -     DXA Bone Density Spine And Hip; Future    7. Bone disorder  -     Vitamin D; Future  -     DXA Bone Density Spine And Hip; Future    8. Personal history of other endocrine, metabolic, and immunity disorders  -     Hemoglobin A1C; Future  -     TSH; Future    9. Affective disorder  10. History of stroke  Followed by neurology. Importance of BP control discussed.    11. Essential hypertension  The patient continues with the antihypertensive medication(s) as prescribed. The blood pressure readings appear to be above goal. There does not appear to be any symptoms such as chest pain, shortness of  breath, palpitations, fatigue, syncope, seizures or headaches. There are no new visual problems. We have discussed the importance of getting more BP data - nurse only appointments can help get data into the chart and/or continuous outpatient blood pressure monitoring. Blood pressure above goal can lead to end organ damage. Further titration of blood pressure medication(s) is necessary per JNC guidelines.    12. HAMMONDS (dyspnea on exertion)  13. Physical deconditioning  Low impact exercises. Swimming and biking.    14. Mild cognitive impairment  Non adherent to Namenda    15. Laboratory exam ordered as part of routine general medical examination  -     CBC Without Differential; Future  -     Comprehensive Metabolic Panel; Future  -     Hepatitis C Antibody; Future  -     Lipid Panel; Future  -     Hemoglobin A1C; Future  -     TSH; Future  -     Vitamin D; Future    16. Need for hepatitis C screening test  -     Lipid Panel; Future    17. Encounter for lipid screening for cardiovascular disease  -     Lipid Panel; Future    Disclaimer: This note has been generated using voice-recognition software. There may be typographical errors that have been missed during proof-reading

## 2020-10-09 ENCOUNTER — DOCUMENTATION ONLY (OUTPATIENT)
Dept: ADMINISTRATIVE | Facility: HOSPITAL | Age: 75
End: 2020-10-09

## 2020-10-09 NOTE — LETTER
AUTHORIZATION FOR RELEASE OF   CONFIDENTIAL INFORMATION    TO: Pablo OZUNA,    We are seeing Francy Bain, date of birth 1945, in the clinic at [facility]. Ronit Hickey MD is the patient's PCP. Francy Bain has an outstanding lab/procedure at the time we reviewed her chart. In order to help keep her health information updated, she has authorized us to request the following medical record(s):        (  )  MAMMOGRAM                                      ( X )  COLONOSCOPY      (  )  PAP SMEAR                                          (  )  OUTSIDE LAB RESULTS     (  )  DEXA SCAN                                          (  )  EYE EXAM            (  )  FOOT EXAM                                          (  )  ENTIRE RECORD     (  )  OUTSIDE IMMUNIZATIONS                 (  )  _______________         Please fax records to [facility], Tenille Ottley-Sharpe, MD, [phone]     If you have any questions, please contact [place] at [phone].           Patient Name: Francy Bain  : 1945  Patient Phone #: [phone]

## 2020-10-12 ENCOUNTER — TELEPHONE (OUTPATIENT)
Dept: PRIMARY CARE CLINIC | Facility: CLINIC | Age: 75
End: 2020-10-12

## 2020-10-12 NOTE — TELEPHONE ENCOUNTER
----- Message from Marge Nobles sent at 10/12/2020  4:00 PM CDT -----  Contact: [phone]  Patient is returning a phone call.  Who left a message for the patient: Julia  Does patient know what this is regarding:  ?>  Comments:

## 2020-10-12 NOTE — TELEPHONE ENCOUNTER
----- Message from Vivian Garcia sent at 10/12/2020  2:30 PM CDT -----  Contact: self/ [phone]  Please have doctor to sign patient referral to her arthritis doctor. Please call and advise.

## 2020-10-13 ENCOUNTER — HOSPITAL ENCOUNTER (OUTPATIENT)
Dept: RADIOLOGY | Facility: OTHER | Age: 75
Discharge: HOME | End: 2020-10-13
Attending: FAMILY MEDICINE
Payer: MEDICARE

## 2020-10-13 VITALS
BODY MASS INDEX: 32.25 KG/M2 | DIASTOLIC BLOOD PRESSURE: 78 MMHG | HEART RATE: 79 BPM | SYSTOLIC BLOOD PRESSURE: 136 MMHG | HEIGHT: 57 IN | WEIGHT: 149.5 LBS | OXYGEN SATURATION: 97 % | TEMPERATURE: 98 F

## 2020-10-13 DIAGNOSIS — M85.89 OSTEOPENIA OF MULTIPLE SITES: ICD-10-CM

## 2020-10-13 DIAGNOSIS — M89.9 BONE DISORDER: ICD-10-CM

## 2020-10-13 PROCEDURE — 77080 DEXA BONE DENSITY SPINE HIP: ICD-10-PCS | Mod: 26,HCNC,, | Performed by: RADIOLOGY

## 2020-10-13 PROCEDURE — 77080 DXA BONE DENSITY AXIAL: CPT | Mod: 26,HCNC,, | Performed by: RADIOLOGY

## 2020-10-13 PROCEDURE — 77080 DXA BONE DENSITY AXIAL: CPT | Mod: TC,HCNC

## 2020-10-13 NOTE — TELEPHONE ENCOUNTER
----- Message from Ronit Hickey MD sent at 10/13/2020 12:57 PM CDT -----  Please let patient know result    Your bone density test was normal. Excellent!    Message sent to portal.

## 2020-10-14 ENCOUNTER — LAB VISIT (OUTPATIENT)
Dept: LAB | Facility: HOSPITAL | Age: 75
End: 2020-10-14
Payer: MEDICARE

## 2020-10-14 ENCOUNTER — TELEPHONE (OUTPATIENT)
Dept: PRIMARY CARE CLINIC | Facility: CLINIC | Age: 75
End: 2020-10-14

## 2020-10-14 DIAGNOSIS — M85.89 OSTEOPENIA OF MULTIPLE SITES: ICD-10-CM

## 2020-10-14 DIAGNOSIS — M89.9 BONE DISORDER: ICD-10-CM

## 2020-10-14 DIAGNOSIS — Z00.00 LABORATORY EXAM ORDERED AS PART OF ROUTINE GENERAL MEDICAL EXAMINATION: ICD-10-CM

## 2020-10-14 DIAGNOSIS — Z86.39 PERSONAL HISTORY OF OTHER ENDOCRINE, METABOLIC, AND IMMUNITY DISORDERS: ICD-10-CM

## 2020-10-14 DIAGNOSIS — Z11.59 NEED FOR HEPATITIS C SCREENING TEST: ICD-10-CM

## 2020-10-14 DIAGNOSIS — Z13.220 ENCOUNTER FOR LIPID SCREENING FOR CARDIOVASCULAR DISEASE: ICD-10-CM

## 2020-10-14 DIAGNOSIS — Z86.2 PERSONAL HISTORY OF OTHER ENDOCRINE, METABOLIC, AND IMMUNITY DISORDERS: ICD-10-CM

## 2020-10-14 DIAGNOSIS — M06.9 RHEUMATOID ARTHRITIS, INVOLVING UNSPECIFIED SITE, UNSPECIFIED WHETHER RHEUMATOID FACTOR PRESENT: Primary | ICD-10-CM

## 2020-10-14 DIAGNOSIS — Z13.6 ENCOUNTER FOR LIPID SCREENING FOR CARDIOVASCULAR DISEASE: ICD-10-CM

## 2020-10-14 LAB
25(OH)D3+25(OH)D2 SERPL-MCNC: 67 NG/ML (ref 30–96)
ALBUMIN SERPL BCP-MCNC: 3.8 G/DL (ref 3.5–5.2)
ALP SERPL-CCNC: 112 U/L (ref 55–135)
ALT SERPL W/O P-5'-P-CCNC: 22 U/L (ref 10–44)
ANION GAP SERPL CALC-SCNC: 10 MMOL/L (ref 8–16)
AST SERPL-CCNC: 21 U/L (ref 10–40)
BILIRUB SERPL-MCNC: 0.7 MG/DL (ref 0.1–1)
BUN SERPL-MCNC: 12 MG/DL (ref 8–23)
CALCIUM SERPL-MCNC: 9 MG/DL (ref 8.7–10.5)
CHLORIDE SERPL-SCNC: 108 MMOL/L (ref 95–110)
CHOLEST SERPL-MCNC: 216 MG/DL (ref 120–199)
CHOLEST/HDLC SERPL: 3.7 {RATIO} (ref 2–5)
CO2 SERPL-SCNC: 26 MMOL/L (ref 23–29)
CREAT SERPL-MCNC: 0.7 MG/DL (ref 0.5–1.4)
ERYTHROCYTE [DISTWIDTH] IN BLOOD BY AUTOMATED COUNT: 15.9 % (ref 11.5–14.5)
EST. GFR  (AFRICAN AMERICAN): >60 ML/MIN/1.73 M^2
EST. GFR  (NON AFRICAN AMERICAN): >60 ML/MIN/1.73 M^2
ESTIMATED AVG GLUCOSE: 100 MG/DL (ref 68–131)
GLUCOSE SERPL-MCNC: 76 MG/DL (ref 70–110)
HBA1C MFR BLD HPLC: 5.1 % (ref 4–5.6)
HCT VFR BLD AUTO: 46.3 % (ref 37–48.5)
HDLC SERPL-MCNC: 59 MG/DL (ref 40–75)
HDLC SERPL: 27.3 % (ref 20–50)
HGB BLD-MCNC: 14 G/DL (ref 12–16)
LDLC SERPL CALC-MCNC: 127.6 MG/DL (ref 63–159)
MCH RBC QN AUTO: 27.8 PG (ref 27–31)
MCHC RBC AUTO-ENTMCNC: 30.2 G/DL (ref 32–36)
MCV RBC AUTO: 92 FL (ref 82–98)
NONHDLC SERPL-MCNC: 157 MG/DL
PLATELET # BLD AUTO: 200 K/UL (ref 150–350)
PMV BLD AUTO: 11.1 FL (ref 9.2–12.9)
POTASSIUM SERPL-SCNC: 3.8 MMOL/L (ref 3.5–5.1)
PROT SERPL-MCNC: 7.1 G/DL (ref 6–8.4)
RBC # BLD AUTO: 5.04 M/UL (ref 4–5.4)
SODIUM SERPL-SCNC: 144 MMOL/L (ref 136–145)
TRIGL SERPL-MCNC: 147 MG/DL (ref 30–150)
TSH SERPL DL<=0.005 MIU/L-ACNC: 1.32 UIU/ML (ref 0.4–4)
WBC # BLD AUTO: 7.44 K/UL (ref 3.9–12.7)

## 2020-10-14 PROCEDURE — 80061 LIPID PANEL: CPT | Mod: HCNC

## 2020-10-14 PROCEDURE — 80053 COMPREHEN METABOLIC PANEL: CPT | Mod: HCNC

## 2020-10-14 PROCEDURE — 84443 ASSAY THYROID STIM HORMONE: CPT | Mod: HCNC

## 2020-10-14 PROCEDURE — 36415 COLL VENOUS BLD VENIPUNCTURE: CPT | Mod: HCNC,PN

## 2020-10-14 PROCEDURE — 86803 HEPATITIS C AB TEST: CPT | Mod: HCNC

## 2020-10-14 PROCEDURE — 82306 VITAMIN D 25 HYDROXY: CPT | Mod: HCNC

## 2020-10-14 PROCEDURE — 85027 COMPLETE CBC AUTOMATED: CPT | Mod: HCNC

## 2020-10-14 PROCEDURE — 83036 HEMOGLOBIN GLYCOSYLATED A1C: CPT | Mod: HCNC

## 2020-10-14 NOTE — TELEPHONE ENCOUNTER
"----- Message from Luis Grissom sent at 10/14/2020 10:02 AM CDT -----  Regarding: Advice  Contact: Patient [phone]  Patient would like to get medical advice.      Comments: Patient calling stating the Referral for "Arthritis" was sent but was not signed, call to update patient.    Also is needing to find an Arthritis Dr that can do surgery, is needing recommendation.    Please call an advise  Thank you    "

## 2020-10-15 LAB — HCV AB SERPL QL IA: NEGATIVE

## 2020-10-15 NOTE — TELEPHONE ENCOUNTER
----- Message from Sherine Arango sent at 10/15/2020 12:52 PM CDT -----  Regarding: Pt self Mobile/Home [phone]  Patient is returning a phone call.  Who left a message for the patient:   Does patient know what this is regarding:    Comments: Patient said she received a call on yesterday and she would like a call back please.

## 2020-10-16 ENCOUNTER — TELEPHONE (OUTPATIENT)
Dept: PRIMARY CARE CLINIC | Facility: CLINIC | Age: 75
End: 2020-10-16

## 2020-10-16 NOTE — TELEPHONE ENCOUNTER
----- Message from Ronit Hickey MD sent at 10/16/2020  4:11 PM CDT -----  Please let patient know results sent to portal     Hello,    No hepatitis-C    Vitamin-D within normal limits    Normal thyroid function    Normal liver and kidney function    Cholesterol is a little elevated.  There is a tool we use which calculates your 10-year risk of heart disease/ stroke. Your risk is higher than I would like. Taking a cholesterol medication is recommended. This would improve your life expectancy.    The most common side effect of cholesterol medication is muscle aches; please keep in mind that most people are able to tolerate the cholesterol medication (called a statin) well.    Taking the cholesterol medication, called a statin at bedtime minimizes the risk of muscle aches and pains with the medication.     Taking the cholesterol medication, called a statin does not replace the need to diet and exercise.    If you agree to starting the cholesterol medication; please let us know. I could send a prescription to your pharmacy (may substitute based on affordability)- Atorvastatin 40 mg at bedtime.

## 2020-10-16 NOTE — TELEPHONE ENCOUNTER
Left a voicemail requesting a return phone call to review results & advise the patient that the authorization request has been resubmitted to her insurance by the pre-certification department & that we are awaiting a response.

## 2020-10-16 NOTE — TELEPHONE ENCOUNTER
MEDICARE WELLNESS VISIT NOTE      HPI:   Sukhwinder Schulz Jr. presents for his Subsequent  Medicare Wellness Visit.   He has no current complaints or concerns.     Medication verified, no changes    Refills needed today? Yes         CHOLESTEROL (mg/dL)   Date Value   12/26/2013 116       HDL (mg/dL)   Date Value   12/26/2013 40     No components found for: CHOLHDLRATIO    TRIGLYCERIDE (mg/dL)   Date Value   12/26/2013 32       CALCULATED LDL (mg/dL)   Date Value   12/26/2013 70       Glucose (mg/dL)   Date Value   09/01/2017 153 (H)     No results found for: PSA        Health Maintenance   Topic Date Due   • Zoster Vaccine  06/05/2011   • Abdominal Aortic Aneurysm (AAA) Screening  06/05/2016   • Medicare Wellness 65+  06/05/2016   • Diabetes Foot Exam  08/08/2017   • Influenza Vaccine (1) 09/01/2017   • Pneumococcal Vaccine Adult (2 of 2 - PCV13) 10/21/2017   • Diabetes Eye Exam  01/01/2018   • Diabetes A1C  03/01/2018   • Diabetes GFR  09/01/2018   • Colorectal Cancer Screening-Colonoscopy  10/13/2019   • DTaP/Tdap/Td Vaccine (2 - Td) 07/15/2023       Patient Care Team:  Osman Garcia MD as PCP - General (Family Practice)  Kassidy Stringer RN as Registered Nurse (RN Transition in Care )        Patient Active Problem List    Diagnosis Date Noted   • Type 2 diabetes mellitus with diabetic polyneuropathy, with long-term current use of insulin (CMS/[facility]) 04/13/2017     Priority: Low   • Obesity due to excess calories 03/16/2017     Priority: Low   • Von Willebrand disease (CMS/[facility]) 11/21/2016     Priority: Low   • Neck injury 10/25/2016     Priority: Low   • Gout of multiple sites 09/08/2016     Priority: Low   • Lymphedema of both lower extremities 07/25/2016     Priority: Low   • Cough, persistent 04/15/2016     Priority: Low   • CAD (coronary artery disease) 03/24/2016     Priority: Low     s/p NSTEMI 5/2013 4/2013 Echo; mod increased LV wall thickness, EF 65%, no regional wall motion abnormalities   5/2013  ----- Message from Ronit Hickey MD sent at 10/16/2020  4:11 PM CDT -----  Please let patient know results sent to portal     Hello,    No hepatitis-C    Vitamin-D within normal limits    Normal thyroid function    Normal liver and kidney function    Cholesterol is a little elevated.  There is a tool we use which calculates your 10-year risk of heart disease/ stroke. Your risk is higher than I would like. Taking a cholesterol medication is recommended. This would improve your life expectancy.    The most common side effect of cholesterol medication is muscle aches; please keep in mind that most people are able to tolerate the cholesterol medication (called a statin) well.    Taking the cholesterol medication, called a statin at bedtime minimizes the risk of muscle aches and pains with the medication.     Taking the cholesterol medication, called a statin does not replace the need to diet and exercise.    If you agree to starting the cholesterol medication; please let us know. I could send a prescription to your pharmacy (may substitute based on affordability)- Atorvastatin 40 mg at bedtime.   Cardiac cath no significant CAD, EF 75%  12/27/2013 Stress Test: EF 60-65%, no ischemia, LVH  11/25/14 Echo - EF 71%, I/IV diastolic dysfunction, moderate LVH  8/17/16 NST - negative for ischemia, EF 75%  3/29/17 NST - negative for ischemia, EF 58%     • First degree AV block 02/15/2016     Priority: Low     10/1/2015 EKG  ms  2/15/2016 Rhythm strip SC 360ms.  Metoprolol decr 100mg BID to 25mg BID  3/28/2016 SC 280ms improved after BB decr  1/20/2017 SC 208ms     • Paroxysmal atrial flutter, CHADS VASC 3 (age,IDDM, HTN)  02/09/2016     Priority: Low     1/31/2014 [facility] guided DCCV for new onset AFlutter at [facility]. started Warfarin, BB, Multaq 400 mg BID    3/10/14 Metoprolol decreased to 50 mg bid due to fatigue  7/10/2014 s/p AFL abation  7//25/2014 DVT right IJ, Xarelto and ASA on hold, started Lovenox 4-6 weeks  8/2014 on Pradaxa, lovenox stopped.   9/29/14  Pradaxa dc'd as pt maintained SR       • Heart failure with preserved ejection fraction (CMS/[facility]) 02/09/2016     Priority: Low     11/25/14 Echo - I/IV diastolic dysfunction, EF 71%  7/25/16 - Echo - EF 60%, severely increased LV wall thickness, I/IV diastolic dysfunction  8/19/17 Echo- EF 70%, severely increased LV wall thickness, I/IV diastolic dysfunction     • Renal cyst 07/08/2015     Priority: Low   • Iron deficiency anemia 08/25/2014     Priority: Low   • DVT (deep venous thrombosis) right IJ 7/25/2014 08/06/2014     Priority: Low     On Lovenox 4-6 weeks     • Acute right hip pain 08/01/2014     Priority: Low   • Left knee pain 05/10/2014     Priority: Low   • Chronic back pain 04/09/2014     Priority: Low   • Unspecified constipation 04/09/2014     Priority: Low   • Dermatophytosis of nail 08/12/2013     Priority: Low   • Diabetic polyneuropathy (CMS/HCC) 08/12/2013     Priority: Low   • Other and unspecified hyperlipidemia 11/27/2012     Priority: Low   • Obstructive sleep apnea (adult) (pediatric) 11/27/2012     Priority: Low   • Chronic  kidney disease, stage III (moderate) 11/27/2012     Priority: Low   • Arthritis 06/21/2012     Priority: Low   • Benign Essential Hypertension 06/21/2012     Priority: Low     On Cardura  9/26/16 Per pt Dr. Alfredo discontinued the Amlodipine secondary to edema  3/14/2017 Started on Amlodipine 5mg by Dr Griffin  4/11/17 Dr. Griffin increased Amlodipine to 10 mg qd     • BPH (benign prostatic hypertrophy) with urinary obstruction 06/21/2012     Priority: Low   • Impotence of organic origin 06/21/2012     Priority: Low   • Monoclonal gammopathy 06/21/2012     Priority: Low         Past Medical History:   Diagnosis Date   • Anemia    • Arthritis    • ASHD (arteriosclerotic heart disease) 6/21/2012   • Blood clot associated with vein wall inflammation    • BPH (benign prostatic hypertrophy)    • Chronic diastolic CHF (congestive heart failure) (CMS/[facility]) 2/9/2016 11/25/14 Echo - I/IV diastolic dysfunction, EF 71% 7/25/16 - Echo - EF 60%, severely increased LV wall thickness, I/IV diastolic dysfunction    • Chronic pain    • CKD (chronic kidney disease)    • Congestive cardiac failure (CMS/[facility]) 7/2014   • Coronary artery disease    • Diabetes Mellitus 1994   • Diabetic neuropathy (CMS/[facility])    • Difficulty initiating walking    • DVT (deep venous thrombosis) (CMS/[facility]) 7/25/2014    right IJ   • Erectile dysfunction    • Esophageal reflux    • Gout    • Hyperlipidemia    • Hypertension    • MI (myocardial infarction) (CMS/[facility]) 2006 and 2009    Pt has normal coronaries per cardiac cath   in 05/2013. NSTEMI 5/2013.   • Obesity    • Onychomycosis    • Otitis media    • Rash    • Sleep apnea     use CPAP   • Von Willebrand disease (CMS/[facility]) 1991         Past Surgical History:   Procedure Laterality Date   • CARDIOVERSION  1/31/2014    [facility] for AFlutter, new onset   • COLONOSCOPY DIAGNOSTIC  10/13/14    Affi 5yr recall, 2 polyps tubular adenoma    • CORONARY ANGIOGRAM - CV  5/2/2013    normal coronaries, EF 75%   •  ECHO [facility]  1/31/2014    EF 55%, no shunts, no thrombus, marked LA enlargement   • ECHOCARDIOGRAM  1/30/2014    Coumobdulia Jones, EF 72%    • EP ABLATION  7/10/2014    AFL ablation   • HAND/FINGER SURGERY UNLISTED      Unspecified Hand/Finger procedure right thumb   • PTCA     • SERVICE TO GASTROENTEROLOGY     • SHOULDER SURG PROC UNLISTED  1999    Unspecified Shoulder Procedure right, rotator cuff,    • US EXTREMITY VENOUS DOPPLER BILATERAL  1/30/2014    Coumobdulia Jones, no DVT         Social History   Substance Use Topics   • Smoking status: Former Smoker     Years: 14.00     Types: Pipe     Quit date: 9/11/1980   • Smokeless tobacco: Former User   • Alcohol use 0.0 oz/week      Comment: occasional beer rare     History   Drug Use No     Comment:  years ago hashish, marijuana. quit 1986     Family History - Reviewed     Current Outpatient Prescriptions   Medication Sig Dispense Refill   • losartan (COZAAR) 25 MG tablet Take 3 tablets by mouth daily. 90 tablet 1   • hydrALAZINE (APRESOLINE) 50 MG tablet Take 1 tablet by mouth 3 times daily. 90 tablet 1   • metoPROLOL (LOPRESSOR) 100 MG tablet Take 1 tablet by mouth 2 times daily. 60 tablet 6   • atorvastatin (LIPITOR) 20 MG tablet Take 1 tablet by mouth nightly. 30 tablet 6   • amLODIPine (NORVASC) 10 MG tablet Take 1 tablet by mouth daily. 45 tablet 3   • Insulin Pen Needle (B-D U/F PEN NEEDLE 5/16\") 31G X 8 MM Misc Use daily as needed for injecting insulin 100 each PRN   • allopurinol (ZYLOPRIM) 300 MG tablet Take 1 tablet by mouth  daily 90 tablet 3   • insulin glargine (BASAGLAR KWIKPEN) 100 UNIT/ML pen-injector Inject 34 Units into the skin 2 times daily. 15 mL 12   • insulin lispro (HUMALOG KWIKPEN) 100 UNIT/ML pen-injector Inject 25 Units into the skin 3 times daily (before meals). With adjustment, BG<100, subtract 2 units, BG>150+2, >200+4, >250+6, >300+8 units 30 mL 12   • blood glucose test strip Blood glucose test strips for blood sugar monitoring tidac  and hs. 150 strip 1   • furosemide (LASIX) 80 MG tablet Take 1 tablet by mouth 2 times daily. 60 tablet 1   • minoxidil (LONITEN) 10 MG tablet TAKE 1 TABLET BY MOUTH EVERY MORNING AND 1 TABLET BY MOUTH EVERY EVENING 180 tablet 3   • Loperamide HCl 2 MG Chew Tab Chew 2 mg by mouth every 8 hours as needed (diarrhea). 2 tabs PO x1, then 1 tab PO after each loose stool; Max: 4 tabs/24h 20 tablet 0   • doxazosin (CARDURA) 8 MG tablet Take 1 tablet by mouth  nightly 90 tablet 3   • MITIGARE 0.6 MG capsule Take 1 capsule by mouth two times daily 180 capsule 3   • lidocaine (LIDODERM) 5 % patch APPLY TOPICALLY TO BOTH KNEES IN THE MORNING AND REMOVE AT BEDTIME (Patient taking differently: PRN) 30 patch 0   • gabapentin (NEURONTIN) 400 MG capsule Take 1 capsule by mouth 3  times daily 270 capsule 6   • tamsulosin (FLOMAX) 0.4 MG Cap Take 1 capsule by mouth daily after a meal. 30 capsule 11   • pantoprazole (PROTONIX) 40 MG tablet Take 1 tablet by mouth daily. 90 tablet 3   • exenatide ER (BYDUREON) 2 MG pen-injector Inject 2 mg into the skin once a week. 12 each 3   • bisacodyl (DULCOLAX) 10 MG suppository Place 10 mg rectally daily as needed for Constipation.     • potassium chloride (K-DUR, KLOR-CON) 10 MEQ CR tablet Take 10 mEq by mouth daily.      • acetaminophen (TYLENOL) 325 MG tablet Take 2 tablets by mouth every 4 hours as needed for Pain. 90 tablet 0   • Cholecalciferol (VITAMIN D) 2000 UNITS Cap Take 1 capsule by mouth every morning.     • fluticasone (FLONASE) 50 MCG/ACT nasal spray Spray 2 sprays in each nostril daily. Indications: sinus congestion (Patient taking differently: Spray 2 sprays in each nostril as needed. Indications: sinus congestion ) 16 g 3   • Luliconazole 1 % Cream Apply 1 application topically daily. 1 Tube 2   • Efinaconazole 10 % Solution Apply 1 application topically daily. Apply to affected nails daily 8 mL 11   • aspirin 81 MG chewable tablet Chew 1 tablet by mouth daily. 3 tablet 0   •  ferrous sulfate 325 (65 FE) MG tablet Take 325 mg by mouth daily (with breakfast).      • Pyridoxine HCl (VITAMIN B-6) 50 MG tablet Take 50 mg by mouth daily.     • Meclizine HCl 25 MG tablet TAKE 1-2 TABLETS BY MOUTH THREE TIMES A DAY AS NEEDED FOR DIZZINESS 15 tablet 0     No current facility-administered medications for this visit.         Medicare Health Risk Assessment in EHR reviewed.  The following items were identified and addressed:    No risks were identified  Vision and hearing screens documented:  Please see hearing and vision portion of this assessment  Advanced Directive: Patient doesn't have an Advance Directives document, and is not interested in additional information  Cognitive Assessment: no evidence of cognitive dysfunction by direct observation    MOCA: 29\30    Get up and go: <30 seconds - cane and walker used for ambulation    Needed Screening/Treatment:   none    Recommended follow up:  None    See orders   See Patient Instructions sectionReturn in about 1 year (around 9/6/2018) for Medicare Wellness Visit.

## 2020-10-16 NOTE — TELEPHONE ENCOUNTER
----- Message from Yee Dean sent at 10/16/2020  3:45 PM CDT -----  Contact: Patient @ [phone]  Patient is returning a phone call.  Who left a message for the patient: Julia Concepcion MA   Does patient know what this is regarding:    Comments: Call her HMO Insurance [phone]

## 2020-10-19 ENCOUNTER — TELEPHONE (OUTPATIENT)
Dept: PRIMARY CARE CLINIC | Facility: CLINIC | Age: 75
End: 2020-10-19

## 2020-10-19 DIAGNOSIS — M89.9 BONE DISORDER: ICD-10-CM

## 2020-10-19 DIAGNOSIS — M06.9 RHEUMATOID ARTHRITIS INVOLVING MULTIPLE SITES, UNSPECIFIED WHETHER RHEUMATOID FACTOR PRESENT: Primary | ICD-10-CM

## 2020-10-19 NOTE — TELEPHONE ENCOUNTER
----- Message from Marge Nobles sent at 10/19/2020 10:24 AM CDT -----  Contact: [phone]  Patient is returning a phone call.  Who left a message for the patient: Ricardo  Does patient know what this is regarding:  ?  Comments:

## 2020-10-26 ENCOUNTER — TELEPHONE (OUTPATIENT)
Dept: PRIMARY CARE CLINIC | Facility: CLINIC | Age: 75
End: 2020-10-26

## 2020-10-26 NOTE — TELEPHONE ENCOUNTER
----- Message from Beatrice Yoon sent at 10/26/2020  3:08 PM CDT -----  Regarding: Referall for arthiritis  Contact: [phone] Patient  Patient would like to get a referral.  Referral to what specialty:  arthritidis   Does the patient want the referral with a specific physician:  Dr. Watson  Is the specialist an [facility]:  No   Reason (be specific):    Does the patient already have the specialty clinic appointment scheduled:  No  If yes, what date is the appointment scheduled:     Is the insurance listed in Epic correct? (this is important for a referral):  Yes, Ashley  Advised patient that once provider approves this either a nurse or  will return their call?:   Comments:  Ashley needs a referral from Dr. Evans

## 2020-10-27 ENCOUNTER — TELEPHONE (OUTPATIENT)
Dept: PRIMARY CARE CLINIC | Facility: CLINIC | Age: 75
End: 2020-10-27

## 2020-10-27 NOTE — TELEPHONE ENCOUNTER
"----- Message from Luis Grissom sent at 10/27/2020  2:21 PM CDT -----  Regarding: Advice  Contact: Ashley [phone]  Patient would like to get medical advice.     Comments: Ashley calling stating patient has an referral in for [facility] for X Ray, but the only way the insurance will cover is with a "PA". Call to update.    Procedure [id number]  Tax [phone]  [phone]  9/16/2020    Please call an advise  Thank you    "

## 2020-10-27 NOTE — TELEPHONE ENCOUNTER
----- Message from Marge Nobles sent at 10/27/2020  4:12 PM CDT -----  Contact: [phone] Francisco Espinoza would like to speak to the nurse to find out why referrals are being denied. Please advise

## 2020-10-27 NOTE — TELEPHONE ENCOUNTER
Left a voicemail requesting a return phone call. Advised that the referral is being denied by insurance NOT the PCP who has written the referral twice.

## 2020-10-27 NOTE — TELEPHONE ENCOUNTER
----- Message from Yee Dean sent at 10/27/2020  1:15 PM CDT -----  Regarding: Referral  Contact: [phone] @ Patient  Patient would like to get a referral.  Referral to what specialty:  Rheumatology  Does the patient want the referral with a specific physician:  Dr Bettye Watson @ [facility] [address], [place] [address] [phone]  Is the specialist an [facility]:  Non [facility]  Reason (be specific):    Does the patient already have the specialty clinic appointment scheduled:  yes  If yes, what date is the appointment scheduled:   11/2/2020  Is the insurance listed in Epic correct? (this is important for a referral):  Yes  Advised patient that once provider approves this either a nurse or  will return their call?:   Comments:

## 2020-10-29 ENCOUNTER — PATIENT OUTREACH (OUTPATIENT)
Dept: ADMINISTRATIVE | Facility: HOSPITAL | Age: 75
End: 2020-10-29

## 2020-10-29 NOTE — LETTER
AUTHORIZATION FOR RELEASE OF   CONFIDENTIAL INFORMATION    TO: Pablo OZUNA,    We are seeing Francy Bain, date of birth 1945, in the clinic at [facility]. Ronit Hickey MD is the patient's PCP. Francy Bain has an outstanding lab/procedure at the time we reviewed her chart. In order to help keep her health information updated, she has authorized us to request the following medical record(s):        (  )  MAMMOGRAM                                      ( X )  COLONOSCOPY      (  )  PAP SMEAR                                          (  )  OUTSIDE LAB RESULTS     (  )  DEXA SCAN                                          (  )  EYE EXAM            (  )  FOOT EXAM                                          (  )  ENTIRE RECORD     (  )  OUTSIDE IMMUNIZATIONS                 (  )  _______________         Please fax records to [facility], Tenille Ottley-Sharpe, MD, [phone]     If you have any questions, please contact [place] at [phone].           Patient Name: Francy Bain  : 1945  Patient Phone #: [phone]

## 2020-10-29 NOTE — PROGRESS NOTES
 TETANUS VACCINE     Shingles Vaccine (1 of 2)    Colorectal Cancer Screening     Pneumococcal Vaccine (65+ Low/Medium Risk) (2 of 2 - PPSV23)    Influenza Vaccine (1)     [facility]ed [facility] for [facility] records.    Unable to log on to LINKS

## 2020-11-12 ENCOUNTER — TELEPHONE (OUTPATIENT)
Dept: ORTHOPEDICS | Facility: CLINIC | Age: 75
End: 2020-11-12

## 2020-11-12 DIAGNOSIS — R52 PAIN: Primary | ICD-10-CM

## 2020-11-12 NOTE — TELEPHONE ENCOUNTER
Call placed to patient,voicemail left along with callback number in regards to an appointment with imaging ordered and scheduled beforehand.     Joanne Russ LPN      ----- Message from Dulce Keys MA sent at 11/12/2020 12:33 PM CST -----  Regarding: FW: Dr Ratliff office [place]  Ekaterina Joanne, Dr. Aguilar agreed to see this pt. Can you please get her scheduled with ya'll? She will need to arrange transportation to get there.  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  He called me to see if we had someone that could take a look at it to see what the next step would be. Any takers?  I would really appreciate it.  GC  ----- Message -----  From: Dulce Keys MA  Sent: 11/10/2020  10:56 AM CST  To: Javier Palumbo MD  Subject: FW: Dr Ratliff office [place]                         I spoke with Arline at Dr. Ratliff's office. She said it is an elbow I told her you don't see elbows and that someone from our hand clinic can see her. She said Dr. Ratliff would like to talk to you first.   His cell number is [phone]  ----- Message -----  From: Javier Palumbo MD  Sent: 11/9/2020   1:42 PM CST  To: Dulce Keys MA  Subject: RE: Dr Ratliff office [place]                         Please confirm that this is an elbow.  If it is we will send to the appropriate person.  GC  ----- Message -----  From: Dulce Keys MA  Sent: 11/9/2020   1:28 PM CST  To: Javier Palumbo MD  Subject: FW: Dr Ratliff office [place]                         Did you talk to him about this? Maurice why he is sending you an elbow..  ----- Message -----  From: Roseanne Levine  Sent: 11/9/2020  12:59 PM CST  To: Deepali ROJAS Staff  Subject: Dr Ratliff office [place]                             Patient Requesting Sooner Appointment.      Reason for sooner appt.: Arline is calling to speak with the nurse for right elbow pain Dr Ratliff will like to speak with Dr Palumbo pt is coming in for loose prothesis   When is the first available appointment? N/A   Communication Preference: can you please call Arline at [phone]  Additional Information: none    ZAC

## 2020-11-13 ENCOUNTER — LAB VISIT (OUTPATIENT)
Dept: LAB | Facility: OTHER | Age: 75
End: 2020-11-13
Payer: MEDICARE

## 2020-11-13 DIAGNOSIS — Z03.818 ENCOUNTER FOR OBSERVATION FOR SUSPECTED EXPOSURE TO OTHER BIOLOGICAL AGENTS RULED OUT: ICD-10-CM

## 2020-11-13 PROCEDURE — U0003 INFECTIOUS AGENT DETECTION BY NUCLEIC ACID (DNA OR RNA); SEVERE ACUTE RESPIRATORY SYNDROME CORONAVIRUS 2 (SARS-COV-2) (CORONAVIRUS DISEASE [COVID-19]), AMPLIFIED PROBE TECHNIQUE, MAKING USE OF HIGH THROUGHPUT TECHNOLOGIES AS DESCRIBED BY CMS-2020-01-R: HCPCS

## 2020-11-15 LAB — SARS-COV-2 RNA RESP QL NAA+PROBE: NOT DETECTED

## 2020-11-23 ENCOUNTER — TELEPHONE (OUTPATIENT)
Dept: ORTHOPEDICS | Facility: CLINIC | Age: 75
End: 2020-11-23

## 2020-11-23 NOTE — TELEPHONE ENCOUNTER
Left patient a voicemail reminder of their scheduled xray and appointment on 11/24/20. If the patient has any questions we can be reached at [phone].

## 2020-11-24 ENCOUNTER — HOSPITAL ENCOUNTER (OUTPATIENT)
Dept: RADIOLOGY | Facility: HOSPITAL | Age: 75
Discharge: HOME | End: 2020-11-24
Attending: STUDENT IN AN ORGANIZED HEALTH CARE EDUCATION/TRAINING PROGRAM
Payer: MEDICARE

## 2020-11-24 ENCOUNTER — HOSPITAL ENCOUNTER (OUTPATIENT)
Dept: RADIOLOGY | Facility: OTHER | Age: 75
Discharge: HOME | End: 2020-11-24
Attending: STUDENT IN AN ORGANIZED HEALTH CARE EDUCATION/TRAINING PROGRAM
Payer: MEDICARE

## 2020-11-24 ENCOUNTER — OFFICE VISIT (OUTPATIENT)
Dept: ORTHOPEDICS | Facility: CLINIC | Age: 75
End: 2020-11-24
Payer: MEDICARE

## 2020-11-24 ENCOUNTER — HOSPITAL ENCOUNTER (OUTPATIENT)
Dept: RADIOLOGY | Facility: OTHER | Age: 75
Discharge: HOME | End: 2020-11-24
Attending: ORTHOPAEDIC SURGERY
Payer: MEDICARE

## 2020-11-24 VITALS
BODY MASS INDEX: 32.15 KG/M2 | DIASTOLIC BLOOD PRESSURE: 91 MMHG | HEIGHT: 57 IN | SYSTOLIC BLOOD PRESSURE: 177 MMHG | HEART RATE: 76 BPM | WEIGHT: 149 LBS

## 2020-11-24 DIAGNOSIS — M25.532 BILATERAL WRIST PAIN: ICD-10-CM

## 2020-11-24 DIAGNOSIS — M25.531 BILATERAL WRIST PAIN: ICD-10-CM

## 2020-11-24 DIAGNOSIS — M25.522 LEFT ELBOW PAIN: Primary | ICD-10-CM

## 2020-11-24 DIAGNOSIS — Z96.632: ICD-10-CM

## 2020-11-24 DIAGNOSIS — Z96.622: ICD-10-CM

## 2020-11-24 DIAGNOSIS — Z96.621: ICD-10-CM

## 2020-11-24 DIAGNOSIS — M25.522 LEFT ELBOW PAIN: ICD-10-CM

## 2020-11-24 DIAGNOSIS — R52 PAIN: ICD-10-CM

## 2020-11-24 PROCEDURE — 99999 PR PBB SHADOW E&M-EST. PATIENT-LVL IV: CPT | Mod: PBBFAC,,, | Performed by: ORTHOPAEDIC SURGERY

## 2020-11-24 PROCEDURE — 99203 PR OFFICE/OUTPT VISIT, NEW, LEVL III, 30-44 MIN: ICD-10-PCS | Mod: S$GLB,,, | Performed by: ORTHOPAEDIC SURGERY

## 2020-11-24 PROCEDURE — 73060 X-RAY EXAM OF HUMERUS: CPT | Mod: TC,FY,RT

## 2020-11-24 PROCEDURE — 73080 X-RAY EXAM OF ELBOW: CPT | Mod: 26,RT,, | Performed by: RADIOLOGY

## 2020-11-24 PROCEDURE — 1159F MED LIST DOCD IN RCRD: CPT | Mod: S$GLB,,, | Performed by: ORTHOPAEDIC SURGERY

## 2020-11-24 PROCEDURE — 73080 XR ELBOW COMPLETE 3 VIEW LEFT: ICD-10-PCS | Mod: 26,LT,, | Performed by: RADIOLOGY

## 2020-11-24 PROCEDURE — 73060 X-RAY EXAM OF HUMERUS: CPT | Mod: 26,RT,, | Performed by: RADIOLOGY

## 2020-11-24 PROCEDURE — 73130 X-RAY EXAM OF HAND: CPT | Mod: 26,50,, | Performed by: RADIOLOGY

## 2020-11-24 PROCEDURE — 73060 XR HUMERUS 2 VIEW RIGHT: ICD-10-PCS | Mod: 26,RT,, | Performed by: RADIOLOGY

## 2020-11-24 PROCEDURE — 73110 X-RAY EXAM OF WRIST: CPT | Mod: TC,50,FY

## 2020-11-24 PROCEDURE — 73080 X-RAY EXAM OF ELBOW: CPT | Mod: TC,FY,LT

## 2020-11-24 PROCEDURE — 73080 X-RAY EXAM OF ELBOW: CPT | Mod: 26,LT,, | Performed by: RADIOLOGY

## 2020-11-24 PROCEDURE — 73110 XR WRIST COMPLETE 3 VIEWS BILATERAL: ICD-10-PCS | Mod: 26,50,, | Performed by: RADIOLOGY

## 2020-11-24 PROCEDURE — 99203 OFFICE O/P NEW LOW 30 MIN: CPT | Mod: S$GLB,,, | Performed by: ORTHOPAEDIC SURGERY

## 2020-11-24 PROCEDURE — 3077F PR MOST RECENT SYSTOLIC BLOOD PRESSURE >= 140 MM HG: ICD-10-PCS | Mod: CPTII,S$GLB,, | Performed by: ORTHOPAEDIC SURGERY

## 2020-11-24 PROCEDURE — 3077F SYST BP >= 140 MM HG: CPT | Mod: CPTII,S$GLB,, | Performed by: ORTHOPAEDIC SURGERY

## 2020-11-24 PROCEDURE — 73080 XR ELBOW COMPLETE 3 VIEW RIGHT: ICD-10-PCS | Mod: 26,RT,, | Performed by: RADIOLOGY

## 2020-11-24 PROCEDURE — 73110 X-RAY EXAM OF WRIST: CPT | Mod: 26,50,, | Performed by: RADIOLOGY

## 2020-11-24 PROCEDURE — 1159F PR MEDICATION LIST DOCUMENTED IN MEDICAL RECORD: ICD-10-PCS | Mod: S$GLB,,, | Performed by: ORTHOPAEDIC SURGERY

## 2020-11-24 PROCEDURE — 73130 XR HAND COMPLETE 3 VIEWS BILATERAL: ICD-10-PCS | Mod: 26,50,, | Performed by: RADIOLOGY

## 2020-11-24 PROCEDURE — 3080F PR MOST RECENT DIASTOLIC BLOOD PRESSURE >= 90 MM HG: ICD-10-PCS | Mod: CPTII,S$GLB,, | Performed by: ORTHOPAEDIC SURGERY

## 2020-11-24 PROCEDURE — 73080 X-RAY EXAM OF ELBOW: CPT | Mod: TC,FY,RT

## 2020-11-24 PROCEDURE — 3080F DIAST BP >= 90 MM HG: CPT | Mod: CPTII,S$GLB,, | Performed by: ORTHOPAEDIC SURGERY

## 2020-11-24 PROCEDURE — 73130 X-RAY EXAM OF HAND: CPT | Mod: TC,50,FY

## 2020-11-24 PROCEDURE — 99999 PR PBB SHADOW E&M-EST. PATIENT-LVL IV: ICD-10-PCS | Mod: PBBFAC,,, | Performed by: ORTHOPAEDIC SURGERY

## 2020-11-24 NOTE — PROGRESS NOTES
DATE: 11/24/2020  PATIENT: Francy Bain    CHIEF COMPLAINT: R elbow pain    HPI:  75F with h/o RA on sarilumab, B TEA, B TKA, , B ankle fusion, L wrist arthroplasty, R wrist pancarpectomy all in the 1970s by Dr. Ratliff in [facility] who presents with R elbow > B wrist pain.  Present for a few months.  No injury.  No N/T.  No pain at rest, only with movement.  8.5/10 at worst.  Opens heavy doors with R arm.    Past Medical History:   Diagnosis Date    Affective disorder     Hypertension     Memory loss     Pneumonia 01/2017    Rheumatoid arthritis     Stroke        Past Surgical History:   Procedure Laterality Date    ELBOW ARTHROPLASTY      EYE SURGERY Right 03/2017    cataract    JOINT REPLACEMENT      KNEE ARTHROPLASTY         Family History   Problem Relation Age of Onset    Hypertension Mother     Arthritis Mother     Heart attack Neg Hx     Heart disease Neg Hx        Social History     Socioeconomic History    Marital status:      Spouse name: Not on file    Number of children: Not on file    Years of education: Not on file    Highest education level: Not on file   Occupational History    Not on file   Social Needs    Financial resource strain: Not on file    Food insecurity     Worry: Not on file     Inability: Not on file    Transportation needs     Medical: Not on file     Non-medical: Not on file   Tobacco Use    Smoking status: Never Smoker    Smokeless tobacco: Never Used   Substance and Sexual Activity    Alcohol use: No     Comment: rare    Drug use: No    Sexual activity: Not on file   Lifestyle    Physical activity     Days per week: Not on file     Minutes per session: Not on file    Stress: Not on file   Relationships    Social connections     Talks on phone: Not on file     Gets together: Not on file     Attends Jain service: Not on file     Active member of club or organization: Not on file     Attends meetings of clubs or  "organizations: Not on file     Relationship status: Not on file   Other Topics Concern    Are you pregnant or think you may be? Not Asked    Breast-feeding Not Asked   Social History Narrative    Not on file         Current Outpatient Medications:     ACETAMINOPHEN/DIPHENHYDRAMINE (TYLENOL PM ORAL), Take by mouth., Disp: , Rfl:     doxepin (SINEQUAN) 10 MG capsule, , Disp: , Rfl:     erythromycin base (E-MYCIN) 500 MG tablet, Take 500 mg by mouth 3 (three) times daily., Disp: , Rfl: 2    HUMIRA,CF, PEN 40 mg/0.4 mL PnKt, , Disp: , Rfl:     HYDROCODONE-ACETAMINOPHEN ORAL, Take by mouth daily as needed., Disp: , Rfl:     hydrocortisone 2.5 % cream, Use as part of compound twice daily under breast as needed for flare, Disp: , Rfl:     hydrOXYzine HCL (ATARAX) 25 MG tablet, TAKE 1 OR 2 TABLETS BY MOUTH AT BEDTIME FOR ITCHING, Disp: , Rfl:     KEVZARA 200 mg/1.14 mL PnIj, , Disp: , Rfl:     losartan (COZAAR) 50 MG tablet, , Disp: , Rfl:     omeprazole (PRILOSEC) 20 MG capsule, Take 20 mg by mouth once daily., Disp: , Rfl:     Review of patient's allergies indicates:   Allergen Reactions    Prednisone Shortness Of Breath       REVIEW OF SYSTEMS:  Constitutional: negative for fevers  Musculoskeletal: negative for paresthesias    PHYSICAL EXAMINATION:    Ht 4' 9" (1.448 m)   Wt 67.6 kg (149 lb)   BMI 32.24 kg/m²     General: No acute distress.  Cardio: Regular rate.  Chest: No increased work of breathing.     MSK:  B hand exam:    No discoloration  Healed dorsal wrist scars, posterior elbow scars  No wounds  Diffuse hand swelling  No thenar atrophy  No interossei atrophy  Multiple upper extremity deformities most significant R midforearm and ulnar deviation of B (L>R) wrist > fingers    No masses  Diffuse TTP worst at left ulnar shaft > L radial wrist > R wrist  Mild wrist effusion  Warm, well perfused    Fingers unable to fully flex fingers to distal palmar crease  Thumb unable to oppose to base of small " finger  Pain with active wrist flexion/extension  Elbow ROM 0-90  Weakness with elbow flexion    SILT and motor intact M/R/U  Radial pulse palpable    Tinel's test negative  Phalen's test negative  No Froment's sign  No Wartenberg's sign    Unable to actively flex IPJ of B thumbs  EPL intact  FDP/FDS intact to all fingers but with weakness      IMAGING:     XR R elbow showing TEA with significant loosening and thinning of cortices.  Ulna with possible cortical breach.  XR L elbow showing TEA with significant distal humerus decrease in bone stock.  XR L wrist showing dislocated wrist arthroplasty with diffuse arthritic change of fingers.  XR R wrist showing complete carpectomy with diffuse arthritic change of fingers.    ASSESSMENT/PLAN:    75F with R TEA loosening, L wrist arthroplasty dislocation, R wrist pseudoarthrosis, diffuse finger arthritic change.  Refer to Dr. Patel for R TEA evaluation.  Labs to r/o infection.  CT R elbow.  Will call to discuss results.

## 2020-11-27 ENCOUNTER — HOSPITAL ENCOUNTER (OUTPATIENT)
Dept: RADIOLOGY | Facility: OTHER | Age: 75
Discharge: HOME | End: 2020-11-27
Attending: STUDENT IN AN ORGANIZED HEALTH CARE EDUCATION/TRAINING PROGRAM
Payer: MEDICARE

## 2020-11-27 DIAGNOSIS — Z96.622: ICD-10-CM

## 2020-11-27 DIAGNOSIS — Z96.621: ICD-10-CM

## 2020-11-27 PROCEDURE — 73200 CT ARM ELBOW WITHOUT CONTRAST RIGHT: ICD-10-PCS | Mod: 26,RT,, | Performed by: RADIOLOGY

## 2020-11-27 PROCEDURE — 73200 CT UPPER EXTREMITY W/O DYE: CPT | Mod: TC,RT

## 2020-11-27 PROCEDURE — 73200 CT UPPER EXTREMITY W/O DYE: CPT | Mod: 26,RT,, | Performed by: RADIOLOGY

## 2020-12-01 ENCOUNTER — TELEPHONE (OUTPATIENT)
Dept: PRIMARY CARE CLINIC | Facility: CLINIC | Age: 75
End: 2020-12-01

## 2020-12-01 RX ORDER — LORAZEPAM 1 MG/1
TABLET ORAL
COMMUNITY
Start: 2020-11-10

## 2020-12-01 RX ORDER — TRAMADOL HYDROCHLORIDE 50 MG/1
50 TABLET ORAL EVERY 6 HOURS PRN
COMMUNITY
Start: 2020-11-10 | End: 2024-01-09

## 2020-12-01 NOTE — TELEPHONE ENCOUNTER
Pt advised that the medication needs to come from her Rheumatologist Dr. Watson.     LVM for Dr. Watson's staff advising the pt is requesting a medication refill for Kevzara to be sent to [facility], upon pt's request.

## 2020-12-01 NOTE — TELEPHONE ENCOUNTER
----- Message from Leslee Wang sent at 12/1/2020 11:15 AM CST -----  Regarding: refill  Contact: [phone]  Rx Refill/Request     Is this a Refill or New Rx:  refill     Rx Name and Strength:    KEVZARA 200 mg/1.14 mL Arcenio        Preferred Pharmacy with phone number:   [facility]  () [phone]      Communication Preference: [phone]    Additional Information:    Pt is requesting a call back when sent to the [facility] pharmacy. Pt states that her next shot is 12/4.

## 2020-12-07 ENCOUNTER — TELEPHONE (OUTPATIENT)
Dept: ORTHOPEDICS | Facility: CLINIC | Age: 75
End: 2020-12-07

## 2020-12-07 NOTE — TELEPHONE ENCOUNTER
Left patient a voicemail reminder of their scheduled appointment on 12/8/20. If the patient has any questions we can be reached at [phone].

## 2020-12-08 ENCOUNTER — TELEPHONE (OUTPATIENT)
Dept: ORTHOPEDICS | Facility: CLINIC | Age: 75
End: 2020-12-08

## 2020-12-08 NOTE — TELEPHONE ENCOUNTER
Spoke to patient and let her know she did not need to be seen by us today.Reji is messaging  staff to get her scheduled.

## 2020-12-08 NOTE — TELEPHONE ENCOUNTER
Left patient a voicemail letting her know there was some confusion you were supposed to follow up with .Please give us a call back at [phone].

## 2020-12-09 ENCOUNTER — TELEPHONE (OUTPATIENT)
Dept: ORTHOPEDICS | Facility: CLINIC | Age: 75
End: 2020-12-09

## 2020-12-09 NOTE — TELEPHONE ENCOUNTER
Patient stated her Rheumatoid doctor  would like to obtain her medical records from the hand clinic.I left a message for the doctors assistant at [phone] giving them the phone number to medical records which is [phone].The patient would like a call back from the hand clinic with the results from her xray. I let her know someone with the proper credentials will call her back.

## 2020-12-10 ENCOUNTER — TELEPHONE (OUTPATIENT)
Dept: ORTHOPEDICS | Facility: CLINIC | Age: 75
End: 2020-12-10

## 2020-12-10 NOTE — TELEPHONE ENCOUNTER
----- Message from Cris Valadez sent at 12/10/2020  2:29 PM CST -----  Contact: JAYDEN COBOS [[id number]]  Type: Patient Call Back Who called:JAYDEN COBOS [[id number]] What is the request in detail:Patient would like for you to send her x rays to her rheumatologist Dr. Esteban Watson via fax to [phone] and her number is  [phone] Can the clinic reply by [facility]?no Would the patient rather a call back or a response via [facility]? Call back Best call back number:[phone] (mobile)   Additional Information:

## 2020-12-10 NOTE — TELEPHONE ENCOUNTER
Spoke c pt. Informed her that her images cannot be faxed as they would not be legible for her rheumatologist. Advised that she request disc of images from the [facility]. She asked if I could instead fax her clinic note & reports instead. Confirmed fax # for Dr. Watson at  [phone]. Also confirmed that Dr. Patel has access to her images. Pt expressed understanding & was thankful.

## 2020-12-10 NOTE — TELEPHONE ENCOUNTER
Left patient a voicemail.The recent CT we obtained was just so that Dr. Patel is able to see more detail regarding the bones/ hardware prior to possible surgery. Dr. Patel will review your xrays again with you at your upcoming appt.

## 2020-12-11 ENCOUNTER — PATIENT MESSAGE (OUTPATIENT)
Dept: OTHER | Facility: OTHER | Age: 75
End: 2020-12-11

## 2020-12-14 NOTE — PROGRESS NOTES
I have personally taken the history and examined this patient. I agree with the resident's note as stated above.     75F with R TEA loosening, L wrist arthroplasty dislocation, R wrist pseudoarthrosis, diffuse finger arthritic change.  Refer to Dr. Patel for R TEA evaluation.  Labs to r/o infection.  CT R elbow.  Will call to discuss results.  This is a complex problem. Will address wrist after elbow

## 2020-12-17 ENCOUNTER — NURSE TRIAGE (OUTPATIENT)
Dept: ADMINISTRATIVE | Facility: CLINIC | Age: 75
End: 2020-12-17

## 2020-12-17 RX ORDER — AMLODIPINE BESYLATE 10 MG/1
10 TABLET ORAL DAILY
Qty: 30 TABLET | Refills: 11 | Status: SHIPPED | OUTPATIENT
Start: 2020-12-17 | End: 2022-06-09

## 2020-12-17 NOTE — TELEPHONE ENCOUNTER
Blood pressure is too high. I sent over a Rx for amlodipine to take with losartan for BP control. She will need to seek immediate care in ED for Chest pain, SOB, vision disturbance, speech disturbance, worsening headaches. ED care is appropriate for work up to exclude serious conditions like heart attack, stroke etc.  She will need to follow up with me as soon as her schedule allows re: uncontrolled hypertension.

## 2020-12-17 NOTE — TELEPHONE ENCOUNTER
Pt reports high BP since Tuesday.Last /93. Pt reporting on Tuesday night she vomited and had chest pain and bad headache.  Pt reports a little bit of chest pain right now. Per protocol, advised to go to ER. Pt did not seem to want to do this. RN will send a message to pt's PCP per pt's request, but RN continued to advise ER. Advised pt to call back with any questions or new/worsening symptoms.      Reason for Disposition   Systolic BP >= 160 OR Diastolic >= 100, and any cardiac or neurologic symptoms (e.g., chest pain, difficulty breathing, unsteady gait, blurred vision)    Additional Information   Negative: Sounds like a life-threatening emergency to the triager   Negative: Pregnant > 20 weeks or postpartum (< 6 weeks after delivery) and new hand or face swelling   Negative: Pregnant > 20 weeks and BP > 140/90    Protocols used: HIGH BLOOD PRESSURE-A-OH

## 2020-12-17 NOTE — TELEPHONE ENCOUNTER
Spoke with patient, current symptoms:  Headache -  Frontal/sides, sharp pain, intermittent, 6/10  Chest pains subsided.  Slight dizziness when turn too fast.  Denies blurred vision, nausea, or vomiting    Verified she is taking Losartan 50 mg

## 2020-12-17 NOTE — TELEPHONE ENCOUNTER
Spoke with patient, informed of provider message and recommendation.  She has a follow up appointment already schedule 01/08/2021.  Advised to continue home BP monitoring and record readings to bring to appointment. Patient verbalized understanding.

## 2020-12-21 ENCOUNTER — OFFICE VISIT (OUTPATIENT)
Dept: PRIMARY CARE CLINIC | Facility: CLINIC | Age: 75
End: 2020-12-21
Payer: MEDICARE

## 2020-12-21 ENCOUNTER — TELEPHONE (OUTPATIENT)
Dept: PRIMARY CARE CLINIC | Facility: CLINIC | Age: 75
End: 2020-12-21

## 2020-12-21 DIAGNOSIS — R05.9 COUGH: ICD-10-CM

## 2020-12-21 DIAGNOSIS — B37.9 YEAST INFECTION: ICD-10-CM

## 2020-12-21 DIAGNOSIS — J40 BRONCHITIS: ICD-10-CM

## 2020-12-21 DIAGNOSIS — R06.02 SOB (SHORTNESS OF BREATH): Primary | ICD-10-CM

## 2020-12-21 PROCEDURE — 99442 PR PHYSICIAN TELEPHONE EVALUATION 11-20 MIN: ICD-10-PCS | Mod: 95,,, | Performed by: FAMILY MEDICINE

## 2020-12-21 PROCEDURE — 99442 PR PHYSICIAN TELEPHONE EVALUATION 11-20 MIN: CPT | Mod: 95,,, | Performed by: FAMILY MEDICINE

## 2020-12-21 RX ORDER — BENZONATATE 200 MG/1
200 CAPSULE ORAL 3 TIMES DAILY PRN
Qty: 30 CAPSULE | Refills: 0 | Status: SHIPPED | OUTPATIENT
Start: 2020-12-21 | End: 2020-12-31

## 2020-12-21 RX ORDER — DEXTROMETHORPHAN HYDROBROMIDE AND GUAIFENESIN 10; 200 MG/1; MG/1
1 CAPSULE, GELATIN COATED ORAL 3 TIMES DAILY PRN
Qty: 30 EACH | Refills: 0 | Status: SHIPPED | OUTPATIENT
Start: 2020-12-21 | End: 2020-12-31

## 2020-12-21 RX ORDER — FLUCONAZOLE 150 MG/1
150 TABLET ORAL ONCE
Qty: 1 TABLET | Refills: 0 | Status: SHIPPED | OUTPATIENT
Start: 2020-12-21 | End: 2020-12-21

## 2020-12-21 RX ORDER — AMOXICILLIN AND CLAVULANATE POTASSIUM 875; 125 MG/1; MG/1
1 TABLET, FILM COATED ORAL 2 TIMES DAILY
Qty: 14 TABLET | Refills: 0 | Status: SHIPPED | OUTPATIENT
Start: 2020-12-21 | End: 2020-12-28

## 2020-12-21 RX ORDER — AZITHROMYCIN 250 MG/1
TABLET, FILM COATED ORAL
Qty: 6 TABLET | Refills: 0 | Status: SHIPPED | OUTPATIENT
Start: 2020-12-21 | End: 2020-12-26

## 2020-12-21 NOTE — PROGRESS NOTES
"Established Patient - Audio Only Telehealth Visit     The patient location is: home  The chief complaint leading to consultation is: Cough, headache, eye discharge, nasal congestion  Visit type: Virtual visit with audio only (telephone)  Total time spent with patient: 15 mins       The reason for the audio only service rather than synchronous audio and video virtual visit was related to technical difficulties or patient preference/necessity.     Each patient to whom I provide medical services by telemedicine is:  (1) informed of the relationship between the physician and patient and the respective role of any other health care provider with respect to management of the patient; and (2) notified that they may decline to receive medical services by telemedicine and may withdraw from such care at any time. Patient verbally consented to receive this service via voice-only telephone call.       HPI: 76 yo female tested positive for COVID 19 7 (seven) months ago. She has a past medical history of mild cognitive impairment, History of stroke, Affective disorder,  Glaucoma, Hypertension, Rheumatoid arthritis, Osteopenia, Obesity, physically deconditioned.  Blood pressure was elevated and she started amlodipine in addition to losartan. Her systolic's have now decreased from 186 to 150's with amlodipine. She then noticed that she started having "cold" symptoms for 7 days; no improvement. She has been using albuterol inhaler for SOB with exertion. She has stuffy and runny nose, with eye discharge, sneezing, cough productive of scant non bloody yellow sputum. SOB when walking; none at rest. Subjective fever. No chest pain. No bowel changes. No urinary changes. Tolerate diet. No sick contacts.     Assessment and plan:    1. SOB (shortness of breath)  -     X-Ray Chest PA And Lateral; Future; Expected date: 12/21/2020    2. Cough  -     dextromethorphan-guaifenesin (CORICIDIN HBP CHEST NIKKI-COUGH)  mg Cap; Take 1 each by " mouth 3 (three) times daily as needed.  Dispense: 30 each; Refill: 0  -     azithromycin (Z-ANALI) 250 MG tablet; Take 2 tablets by mouth on day 1; Take 1 tablet by mouth on days 2-5  Dispense: 6 tablet; Refill: 0  -     benzonatate (TESSALON) 200 MG capsule; Take 1 capsule (200 mg total) by mouth 3 (three) times daily as needed for Cough.  Dispense: 30 capsule; Refill: 0  -     amoxicillin-clavulanate 875-125mg (AUGMENTIN) 875-125 mg per tablet; Take 1 tablet by mouth 2 (two) times daily. for 7 days  Dispense: 14 tablet; Refill: 0  Worsening over 7 days, will cover with antibotics.    3. Bronchitis  -     X-Ray Chest PA And Lateral; Future; Expected date: 12/21/2020    4. Yeast infection  -     fluconazole (DIFLUCAN) 150 MG Tab; Take 1 tablet (150 mg total) by mouth once. For yeast infection for 1 dose  Dispense: 1 tablet; Refill: 0  History of yeast infection with antibiotics.     Seek immediate care in the emergency room should she experience respiratory distress, lethargy, chest pain, fainting, neurological deficit.  .  This service was not originating from a related E/M service provided within the previous 7 days nor will  to an E/M service or procedure within the next 24 hours or my soonest available appointment.  Prevailing standard of care was able to be met in this audio-only visit.

## 2020-12-21 NOTE — TELEPHONE ENCOUNTER
----- Message from Cassie Waite sent at 12/21/2020 10:01 AM CST -----  Regarding: Concerns about pressure and head cold  Contact: Self  Would like to get medical advice.    Symptoms (please be specific):  Pressure, head cold     How long has patient had these symptoms:  1 week     Pharmacy name and phone # (copy from chart):    [address] [address]  Phone: [phone] Fax: [phone]    [address] [address]  Phone: [phone] Fax: [phone]        Comments:  Self [phone]-----calling to spk with the nurse regarding the pt having a head cold and does not know if that can be a reason that she is having pressure issues. The pt also states that her pressure is still up and needs something sent over to the pharmacy. The pt is requesting a call back

## 2020-12-21 NOTE — TELEPHONE ENCOUNTER
"Spoke with the pt over the phone about the "head cold" symptoms. Patient advised that she has been coughing which is causing her head to hurt, she has nasal congestion, and has bilateral eye discharge. Patient worked in (per provider's okay) for an audio only visit with Dr. Hickey at 4:15 PM today.   "

## 2020-12-22 ENCOUNTER — HOSPITAL ENCOUNTER (OUTPATIENT)
Dept: RADIOLOGY | Facility: HOSPITAL | Age: 75
Discharge: HOME | End: 2020-12-22
Attending: FAMILY MEDICINE
Payer: MEDICARE

## 2020-12-22 DIAGNOSIS — R06.02 SOB (SHORTNESS OF BREATH): ICD-10-CM

## 2020-12-22 DIAGNOSIS — J40 BRONCHITIS: ICD-10-CM

## 2020-12-22 PROCEDURE — 71046 X-RAY EXAM CHEST 2 VIEWS: CPT | Mod: 26,,, | Performed by: RADIOLOGY

## 2020-12-22 PROCEDURE — 71046 XR CHEST PA AND LATERAL: ICD-10-PCS | Mod: 26,,, | Performed by: RADIOLOGY

## 2020-12-22 PROCEDURE — 71046 X-RAY EXAM CHEST 2 VIEWS: CPT | Mod: TC,PN

## 2020-12-23 ENCOUNTER — TELEPHONE (OUTPATIENT)
Dept: INTERNAL MEDICINE | Facility: CLINIC | Age: 75
End: 2020-12-23

## 2021-01-04 RX ORDER — LOSARTAN POTASSIUM 50 MG/1
50 TABLET ORAL DAILY
Qty: 90 TABLET | Refills: 3 | Status: SHIPPED | OUTPATIENT
Start: 2021-01-04 | End: 2022-01-18 | Stop reason: SDUPTHER

## 2021-01-08 ENCOUNTER — OFFICE VISIT (OUTPATIENT)
Dept: PRIMARY CARE CLINIC | Facility: CLINIC | Age: 76
End: 2021-01-08
Payer: MEDICARE

## 2021-01-08 VITALS
BODY MASS INDEX: 32.73 KG/M2 | WEIGHT: 151.69 LBS | OXYGEN SATURATION: 98 % | HEIGHT: 57 IN | SYSTOLIC BLOOD PRESSURE: 132 MMHG | TEMPERATURE: 98 F | DIASTOLIC BLOOD PRESSURE: 66 MMHG | HEART RATE: 91 BPM

## 2021-01-08 DIAGNOSIS — J98.9 REACTIVE AIRWAY DISEASE THAT IS NOT ASTHMA: Primary | ICD-10-CM

## 2021-01-08 DIAGNOSIS — G89.29 CHRONIC MUSCULOSKELETAL PAIN: ICD-10-CM

## 2021-01-08 DIAGNOSIS — R53.81 PHYSICAL DECONDITIONING: ICD-10-CM

## 2021-01-08 DIAGNOSIS — Z74.09 IMPAIRED FUNCTIONAL MOBILITY, BALANCE, GAIT, AND ENDURANCE: ICD-10-CM

## 2021-01-08 DIAGNOSIS — Z86.73 HISTORY OF STROKE: ICD-10-CM

## 2021-01-08 DIAGNOSIS — M85.89 OSTEOPENIA OF MULTIPLE SITES: ICD-10-CM

## 2021-01-08 DIAGNOSIS — G31.84 MILD COGNITIVE IMPAIRMENT: ICD-10-CM

## 2021-01-08 DIAGNOSIS — I10 ESSENTIAL HYPERTENSION: ICD-10-CM

## 2021-01-08 DIAGNOSIS — M79.18 CHRONIC MUSCULOSKELETAL PAIN: ICD-10-CM

## 2021-01-08 DIAGNOSIS — H40.1131 PRIMARY OPEN ANGLE GLAUCOMA (POAG) OF BOTH EYES, MILD STAGE: ICD-10-CM

## 2021-01-08 DIAGNOSIS — Z12.11 SCREENING FOR COLON CANCER: ICD-10-CM

## 2021-01-08 DIAGNOSIS — M06.9 RHEUMATOID ARTHRITIS INVOLVING MULTIPLE SITES, UNSPECIFIED WHETHER RHEUMATOID FACTOR PRESENT: ICD-10-CM

## 2021-01-08 DIAGNOSIS — F39 AFFECTIVE DISORDER: ICD-10-CM

## 2021-01-08 DIAGNOSIS — E66.09 CLASS 1 OBESITY DUE TO EXCESS CALORIES WITH SERIOUS COMORBIDITY AND BODY MASS INDEX (BMI) OF 32.0 TO 32.9 IN ADULT: ICD-10-CM

## 2021-01-08 PROBLEM — E66.811 CLASS 1 OBESITY DUE TO EXCESS CALORIES WITH SERIOUS COMORBIDITY AND BODY MASS INDEX (BMI) OF 32.0 TO 32.9 IN ADULT: Status: ACTIVE | Noted: 2021-01-08

## 2021-01-08 PROBLEM — R06.09 DOE (DYSPNEA ON EXERTION): Status: RESOLVED | Noted: 2019-10-22 | Resolved: 2021-01-08

## 2021-01-08 PROCEDURE — 99214 PR OFFICE/OUTPT VISIT, EST, LEVL IV, 30-39 MIN: ICD-10-PCS | Mod: S$GLB,,, | Performed by: FAMILY MEDICINE

## 2021-01-08 PROCEDURE — 99999 PR PBB SHADOW E&M-EST. PATIENT-LVL V: CPT | Mod: PBBFAC,,, | Performed by: FAMILY MEDICINE

## 2021-01-08 PROCEDURE — 1125F PR PAIN SEVERITY QUANTIFIED, PAIN PRESENT: ICD-10-PCS | Mod: S$GLB,,, | Performed by: FAMILY MEDICINE

## 2021-01-08 PROCEDURE — 1159F MED LIST DOCD IN RCRD: CPT | Mod: S$GLB,,, | Performed by: FAMILY MEDICINE

## 2021-01-08 PROCEDURE — 1125F AMNT PAIN NOTED PAIN PRSNT: CPT | Mod: S$GLB,,, | Performed by: FAMILY MEDICINE

## 2021-01-08 PROCEDURE — 99499 UNLISTED E&M SERVICE: CPT | Mod: S$PBB,,, | Performed by: FAMILY MEDICINE

## 2021-01-08 PROCEDURE — 3078F PR MOST RECENT DIASTOLIC BLOOD PRESSURE < 80 MM HG: ICD-10-PCS | Mod: CPTII,S$GLB,, | Performed by: FAMILY MEDICINE

## 2021-01-08 PROCEDURE — 3288F PR FALLS RISK ASSESSMENT DOCUMENTED: ICD-10-PCS | Mod: CPTII,S$GLB,, | Performed by: FAMILY MEDICINE

## 2021-01-08 PROCEDURE — 3075F PR MOST RECENT SYSTOLIC BLOOD PRESS GE 130-139MM HG: ICD-10-PCS | Mod: CPTII,S$GLB,, | Performed by: FAMILY MEDICINE

## 2021-01-08 PROCEDURE — 3078F DIAST BP <80 MM HG: CPT | Mod: CPTII,S$GLB,, | Performed by: FAMILY MEDICINE

## 2021-01-08 PROCEDURE — 3075F SYST BP GE 130 - 139MM HG: CPT | Mod: CPTII,S$GLB,, | Performed by: FAMILY MEDICINE

## 2021-01-08 PROCEDURE — 1101F PR PT FALLS ASSESS DOC 0-1 FALLS W/OUT INJ PAST YR: ICD-10-PCS | Mod: CPTII,S$GLB,, | Performed by: FAMILY MEDICINE

## 2021-01-08 PROCEDURE — 99214 OFFICE O/P EST MOD 30 MIN: CPT | Mod: S$GLB,,, | Performed by: FAMILY MEDICINE

## 2021-01-08 PROCEDURE — 1159F PR MEDICATION LIST DOCUMENTED IN MEDICAL RECORD: ICD-10-PCS | Mod: S$GLB,,, | Performed by: FAMILY MEDICINE

## 2021-01-08 PROCEDURE — 99499 RISK ADDL DX/OHS AUDIT: ICD-10-PCS | Mod: S$PBB,,, | Performed by: FAMILY MEDICINE

## 2021-01-08 PROCEDURE — 1101F PT FALLS ASSESS-DOCD LE1/YR: CPT | Mod: CPTII,S$GLB,, | Performed by: FAMILY MEDICINE

## 2021-01-08 PROCEDURE — 3288F FALL RISK ASSESSMENT DOCD: CPT | Mod: CPTII,S$GLB,, | Performed by: FAMILY MEDICINE

## 2021-01-08 PROCEDURE — 99999 PR PBB SHADOW E&M-EST. PATIENT-LVL V: ICD-10-PCS | Mod: PBBFAC,,, | Performed by: FAMILY MEDICINE

## 2021-01-08 RX ORDER — ALBUTEROL SULFATE 0.83 MG/ML
2.5 SOLUTION RESPIRATORY (INHALATION) EVERY 6 HOURS PRN
Qty: 12 ML | Refills: 0 | Status: SHIPPED | OUTPATIENT
Start: 2021-01-08 | End: 2021-08-27

## 2021-01-08 RX ORDER — ALBUTEROL SULFATE 90 UG/1
AEROSOL, METERED RESPIRATORY (INHALATION)
COMMUNITY
Start: 2020-12-16 | End: 2021-01-08

## 2021-01-08 RX ORDER — FLUCONAZOLE 150 MG/1
TABLET ORAL
COMMUNITY
Start: 2020-12-21 | End: 2021-04-08 | Stop reason: ALTCHOICE

## 2021-01-08 RX ORDER — ALBUTEROL SULFATE 0.83 MG/ML
2.5 SOLUTION RESPIRATORY (INHALATION) EVERY 6 HOURS PRN
Qty: 90 ML | Refills: 3 | Status: SHIPPED | OUTPATIENT
Start: 2021-01-08 | End: 2023-02-15

## 2021-01-09 ENCOUNTER — LAB VISIT (OUTPATIENT)
Dept: LAB | Facility: HOSPITAL | Age: 76
End: 2021-01-09
Payer: MEDICARE

## 2021-01-09 DIAGNOSIS — Z12.11 SCREENING FOR COLON CANCER: ICD-10-CM

## 2021-01-09 PROCEDURE — 82274 ASSAY TEST FOR BLOOD FECAL: CPT

## 2021-01-12 ENCOUNTER — TELEPHONE (OUTPATIENT)
Dept: NEUROLOGY | Facility: CLINIC | Age: 76
End: 2021-01-12

## 2021-01-13 ENCOUNTER — PATIENT OUTREACH (OUTPATIENT)
Dept: ADMINISTRATIVE | Facility: OTHER | Age: 76
End: 2021-01-13

## 2021-01-13 ENCOUNTER — TELEPHONE (OUTPATIENT)
Dept: PRIMARY CARE CLINIC | Facility: CLINIC | Age: 76
End: 2021-01-13

## 2021-01-13 LAB — HEMOCCULT STL QL IA: NEGATIVE

## 2021-01-14 ENCOUNTER — TELEPHONE (OUTPATIENT)
Dept: PRIMARY CARE CLINIC | Facility: CLINIC | Age: 76
End: 2021-01-14

## 2021-01-15 ENCOUNTER — OFFICE VISIT (OUTPATIENT)
Dept: ORTHOPEDICS | Facility: CLINIC | Age: 76
End: 2021-01-15
Payer: MEDICARE

## 2021-01-15 VITALS
WEIGHT: 150 LBS | BODY MASS INDEX: 32.36 KG/M2 | SYSTOLIC BLOOD PRESSURE: 164 MMHG | DIASTOLIC BLOOD PRESSURE: 83 MMHG | HEIGHT: 57 IN | HEART RATE: 82 BPM

## 2021-01-15 DIAGNOSIS — Z74.09 IMPAIRED FUNCTIONAL MOBILITY, BALANCE, GAIT, AND ENDURANCE: ICD-10-CM

## 2021-01-15 DIAGNOSIS — Z96.629: ICD-10-CM

## 2021-01-15 DIAGNOSIS — M79.18 CHRONIC MUSCULOSKELETAL PAIN: ICD-10-CM

## 2021-01-15 DIAGNOSIS — R53.81 PHYSICAL DECONDITIONING: ICD-10-CM

## 2021-01-15 DIAGNOSIS — M85.89 OSTEOPENIA OF MULTIPLE SITES: ICD-10-CM

## 2021-01-15 DIAGNOSIS — T84.038A: ICD-10-CM

## 2021-01-15 DIAGNOSIS — M06.9 RHEUMATOID ARTHRITIS INVOLVING MULTIPLE SITES, UNSPECIFIED WHETHER RHEUMATOID FACTOR PRESENT: Primary | ICD-10-CM

## 2021-01-15 DIAGNOSIS — Z96.621 HISTORY OF RIGHT ELBOW REPLACEMENT: ICD-10-CM

## 2021-01-15 DIAGNOSIS — G89.29 CHRONIC MUSCULOSKELETAL PAIN: ICD-10-CM

## 2021-01-15 PROCEDURE — 3079F PR MOST RECENT DIASTOLIC BLOOD PRESSURE 80-89 MM HG: ICD-10-PCS | Mod: CPTII,S$GLB,, | Performed by: ORTHOPAEDIC SURGERY

## 2021-01-15 PROCEDURE — 99204 PR OFFICE/OUTPT VISIT, NEW, LEVL IV, 45-59 MIN: ICD-10-PCS | Mod: S$GLB,,, | Performed by: ORTHOPAEDIC SURGERY

## 2021-01-15 PROCEDURE — 1159F MED LIST DOCD IN RCRD: CPT | Mod: S$GLB,,, | Performed by: ORTHOPAEDIC SURGERY

## 2021-01-15 PROCEDURE — 3077F SYST BP >= 140 MM HG: CPT | Mod: CPTII,S$GLB,, | Performed by: ORTHOPAEDIC SURGERY

## 2021-01-15 PROCEDURE — 99999 PR PBB SHADOW E&M-EST. PATIENT-LVL III: ICD-10-PCS | Mod: PBBFAC,,, | Performed by: ORTHOPAEDIC SURGERY

## 2021-01-15 PROCEDURE — 1125F PR PAIN SEVERITY QUANTIFIED, PAIN PRESENT: ICD-10-PCS | Mod: S$GLB,,, | Performed by: ORTHOPAEDIC SURGERY

## 2021-01-15 PROCEDURE — 1159F PR MEDICATION LIST DOCUMENTED IN MEDICAL RECORD: ICD-10-PCS | Mod: S$GLB,,, | Performed by: ORTHOPAEDIC SURGERY

## 2021-01-15 PROCEDURE — 99999 PR PBB SHADOW E&M-EST. PATIENT-LVL III: CPT | Mod: PBBFAC,,, | Performed by: ORTHOPAEDIC SURGERY

## 2021-01-15 PROCEDURE — 3077F PR MOST RECENT SYSTOLIC BLOOD PRESSURE >= 140 MM HG: ICD-10-PCS | Mod: CPTII,S$GLB,, | Performed by: ORTHOPAEDIC SURGERY

## 2021-01-15 PROCEDURE — 1125F AMNT PAIN NOTED PAIN PRSNT: CPT | Mod: S$GLB,,, | Performed by: ORTHOPAEDIC SURGERY

## 2021-01-15 PROCEDURE — 3079F DIAST BP 80-89 MM HG: CPT | Mod: CPTII,S$GLB,, | Performed by: ORTHOPAEDIC SURGERY

## 2021-01-15 PROCEDURE — 99204 OFFICE O/P NEW MOD 45 MIN: CPT | Mod: S$GLB,,, | Performed by: ORTHOPAEDIC SURGERY

## 2021-01-19 ENCOUNTER — TELEPHONE (OUTPATIENT)
Dept: PRIMARY CARE CLINIC | Facility: CLINIC | Age: 76
End: 2021-01-19

## 2021-01-19 RX ORDER — BENZONATATE 100 MG/1
100 CAPSULE ORAL 3 TIMES DAILY PRN
Qty: 30 CAPSULE | Refills: 2 | Status: SHIPPED | OUTPATIENT
Start: 2021-01-19 | End: 2022-09-28

## 2021-01-21 DIAGNOSIS — Z96.621 ELBOW JOINT REPLACEMENT STATUS, RIGHT: Primary | ICD-10-CM

## 2021-01-21 DIAGNOSIS — M21.931: Primary | ICD-10-CM

## 2021-01-25 ENCOUNTER — TELEPHONE (OUTPATIENT)
Dept: ORTHOPEDICS | Facility: CLINIC | Age: 76
End: 2021-01-25

## 2021-01-28 ENCOUNTER — TELEPHONE (OUTPATIENT)
Dept: PRIMARY CARE CLINIC | Facility: CLINIC | Age: 76
End: 2021-01-28

## 2021-01-28 DIAGNOSIS — M79.18 CHRONIC MUSCULOSKELETAL PAIN: Primary | ICD-10-CM

## 2021-01-28 DIAGNOSIS — M06.9 RHEUMATOID ARTHRITIS INVOLVING MULTIPLE SITES, UNSPECIFIED WHETHER RHEUMATOID FACTOR PRESENT: Primary | ICD-10-CM

## 2021-01-28 DIAGNOSIS — G89.29 CHRONIC MUSCULOSKELETAL PAIN: Primary | ICD-10-CM

## 2021-02-11 ENCOUNTER — TELEPHONE (OUTPATIENT)
Dept: ORTHOPEDICS | Facility: CLINIC | Age: 76
End: 2021-02-11

## 2021-02-18 ENCOUNTER — TELEPHONE (OUTPATIENT)
Dept: ORTHOPEDICS | Facility: CLINIC | Age: 76
End: 2021-02-18

## 2021-03-03 ENCOUNTER — TELEPHONE (OUTPATIENT)
Dept: PRIMARY CARE CLINIC | Facility: CLINIC | Age: 76
End: 2021-03-03

## 2021-03-03 DIAGNOSIS — M06.9 RHEUMATOID ARTHRITIS INVOLVING MULTIPLE SITES, UNSPECIFIED WHETHER RHEUMATOID FACTOR PRESENT: Primary | ICD-10-CM

## 2021-03-05 ENCOUNTER — TELEPHONE (OUTPATIENT)
Dept: PRIMARY CARE CLINIC | Facility: CLINIC | Age: 76
End: 2021-03-05

## 2021-03-08 ENCOUNTER — TELEPHONE (OUTPATIENT)
Dept: ORTHOPEDICS | Facility: CLINIC | Age: 76
End: 2021-03-08

## 2021-03-18 ENCOUNTER — PATIENT OUTREACH (OUTPATIENT)
Dept: ADMINISTRATIVE | Facility: OTHER | Age: 76
End: 2021-03-18

## 2021-03-19 ENCOUNTER — OFFICE VISIT (OUTPATIENT)
Dept: ORTHOPEDICS | Facility: CLINIC | Age: 76
End: 2021-03-19
Payer: MEDICARE

## 2021-03-19 VITALS
SYSTOLIC BLOOD PRESSURE: 163 MMHG | BODY MASS INDEX: 31.71 KG/M2 | DIASTOLIC BLOOD PRESSURE: 78 MMHG | HEART RATE: 87 BPM | WEIGHT: 147 LBS | HEIGHT: 57 IN

## 2021-03-19 DIAGNOSIS — Z96.621 ELBOW JOINT REPLACEMENT STATUS, RIGHT: Primary | ICD-10-CM

## 2021-03-19 DIAGNOSIS — Z96.629: ICD-10-CM

## 2021-03-19 DIAGNOSIS — M06.9 RHEUMATOID ARTHRITIS INVOLVING MULTIPLE SITES, UNSPECIFIED WHETHER RHEUMATOID FACTOR PRESENT: ICD-10-CM

## 2021-03-19 DIAGNOSIS — T84.038A: ICD-10-CM

## 2021-03-19 DIAGNOSIS — Z96.621 STATUS POST ELBOW JOINT REPLACEMENT, RIGHT: ICD-10-CM

## 2021-03-19 PROCEDURE — 1125F AMNT PAIN NOTED PAIN PRSNT: CPT | Mod: S$GLB,,, | Performed by: ORTHOPAEDIC SURGERY

## 2021-03-19 PROCEDURE — 3077F PR MOST RECENT SYSTOLIC BLOOD PRESSURE >= 140 MM HG: ICD-10-PCS | Mod: CPTII,S$GLB,, | Performed by: ORTHOPAEDIC SURGERY

## 2021-03-19 PROCEDURE — 1159F PR MEDICATION LIST DOCUMENTED IN MEDICAL RECORD: ICD-10-PCS | Mod: S$GLB,,, | Performed by: ORTHOPAEDIC SURGERY

## 2021-03-19 PROCEDURE — 3077F SYST BP >= 140 MM HG: CPT | Mod: CPTII,S$GLB,, | Performed by: ORTHOPAEDIC SURGERY

## 2021-03-19 PROCEDURE — 99214 OFFICE O/P EST MOD 30 MIN: CPT | Mod: S$GLB,,, | Performed by: ORTHOPAEDIC SURGERY

## 2021-03-19 PROCEDURE — 99999 PR PBB SHADOW E&M-EST. PATIENT-LVL V: ICD-10-PCS | Mod: PBBFAC,,, | Performed by: ORTHOPAEDIC SURGERY

## 2021-03-19 PROCEDURE — 99999 PR PBB SHADOW E&M-EST. PATIENT-LVL V: CPT | Mod: PBBFAC,,, | Performed by: ORTHOPAEDIC SURGERY

## 2021-03-19 PROCEDURE — 99214 PR OFFICE/OUTPT VISIT, EST, LEVL IV, 30-39 MIN: ICD-10-PCS | Mod: S$GLB,,, | Performed by: ORTHOPAEDIC SURGERY

## 2021-03-19 PROCEDURE — 1125F PR PAIN SEVERITY QUANTIFIED, PAIN PRESENT: ICD-10-PCS | Mod: S$GLB,,, | Performed by: ORTHOPAEDIC SURGERY

## 2021-03-19 PROCEDURE — 3078F DIAST BP <80 MM HG: CPT | Mod: CPTII,S$GLB,, | Performed by: ORTHOPAEDIC SURGERY

## 2021-03-19 PROCEDURE — 1159F MED LIST DOCD IN RCRD: CPT | Mod: S$GLB,,, | Performed by: ORTHOPAEDIC SURGERY

## 2021-03-19 PROCEDURE — 3078F PR MOST RECENT DIASTOLIC BLOOD PRESSURE < 80 MM HG: ICD-10-PCS | Mod: CPTII,S$GLB,, | Performed by: ORTHOPAEDIC SURGERY

## 2021-03-19 RX ORDER — MUPIROCIN 20 MG/G
OINTMENT TOPICAL
Status: CANCELLED | OUTPATIENT
Start: 2021-03-19

## 2021-03-19 RX ORDER — PREDNISONE 5 MG/1
5 TABLET ORAL DAILY
COMMUNITY
Start: 2021-03-01 | End: 2021-08-27

## 2021-03-19 RX ORDER — CEFAZOLIN SODIUM 2 G/50ML
2 SOLUTION INTRAVENOUS
Status: CANCELLED | OUTPATIENT
Start: 2021-03-19

## 2021-03-23 ENCOUNTER — TELEPHONE (OUTPATIENT)
Dept: PREADMISSION TESTING | Facility: HOSPITAL | Age: 76
End: 2021-03-23

## 2021-03-23 DIAGNOSIS — Z01.818 PRE-OP TESTING: ICD-10-CM

## 2021-03-24 ENCOUNTER — TELEPHONE (OUTPATIENT)
Dept: ORTHOPEDICS | Facility: CLINIC | Age: 76
End: 2021-03-24

## 2021-03-25 ENCOUNTER — TELEPHONE (OUTPATIENT)
Dept: ORTHOPEDICS | Facility: CLINIC | Age: 76
End: 2021-03-25

## 2021-03-26 ENCOUNTER — PATIENT MESSAGE (OUTPATIENT)
Dept: RESEARCH | Facility: HOSPITAL | Age: 76
End: 2021-03-26

## 2021-03-31 PROBLEM — K21.9 GERD (GASTROESOPHAGEAL REFLUX DISEASE): Status: ACTIVE | Noted: 2021-03-31

## 2021-04-01 ENCOUNTER — TELEPHONE (OUTPATIENT)
Dept: ORTHOPEDICS | Facility: CLINIC | Age: 76
End: 2021-04-01

## 2021-04-05 ENCOUNTER — TELEPHONE (OUTPATIENT)
Dept: PRIMARY CARE CLINIC | Facility: CLINIC | Age: 76
End: 2021-04-05

## 2021-04-05 RX ORDER — LORAZEPAM 1 MG/1
TABLET ORAL
OUTPATIENT
Start: 2021-04-05

## 2021-04-06 ENCOUNTER — TELEPHONE (OUTPATIENT)
Dept: PULMONOLOGY | Facility: CLINIC | Age: 76
End: 2021-04-06

## 2021-04-08 ENCOUNTER — HOSPITAL ENCOUNTER (OUTPATIENT)
Dept: RADIOLOGY | Facility: HOSPITAL | Age: 76
Discharge: HOME | End: 2021-04-08
Attending: NURSE PRACTITIONER
Payer: MEDICARE

## 2021-04-08 ENCOUNTER — OFFICE VISIT (OUTPATIENT)
Dept: PRIMARY CARE CLINIC | Facility: CLINIC | Age: 76
End: 2021-04-08
Payer: MEDICARE

## 2021-04-08 ENCOUNTER — OFFICE VISIT (OUTPATIENT)
Dept: INTERNAL MEDICINE | Facility: CLINIC | Age: 76
End: 2021-04-08
Payer: MEDICARE

## 2021-04-08 ENCOUNTER — TELEPHONE (OUTPATIENT)
Dept: PRIMARY CARE CLINIC | Facility: CLINIC | Age: 76
End: 2021-04-08

## 2021-04-08 VITALS
HEART RATE: 96 BPM | OXYGEN SATURATION: 95 % | SYSTOLIC BLOOD PRESSURE: 143 MMHG | BODY MASS INDEX: 32.36 KG/M2 | TEMPERATURE: 99 F | DIASTOLIC BLOOD PRESSURE: 67 MMHG | WEIGHT: 150 LBS | HEIGHT: 57 IN

## 2021-04-08 VITALS
TEMPERATURE: 98 F | HEIGHT: 57 IN | HEART RATE: 98 BPM | OXYGEN SATURATION: 96 % | BODY MASS INDEX: 32.77 KG/M2 | SYSTOLIC BLOOD PRESSURE: 136 MMHG | DIASTOLIC BLOOD PRESSURE: 70 MMHG | WEIGHT: 151.88 LBS

## 2021-04-08 DIAGNOSIS — I10 ESSENTIAL HYPERTENSION: ICD-10-CM

## 2021-04-08 DIAGNOSIS — R53.81 PHYSICAL DECONDITIONING: ICD-10-CM

## 2021-04-08 DIAGNOSIS — J98.9 REACTIVE AIRWAY DISEASE THAT IS NOT ASTHMA: ICD-10-CM

## 2021-04-08 DIAGNOSIS — Z01.818 PREOP EXAMINATION: Primary | ICD-10-CM

## 2021-04-08 DIAGNOSIS — R10.9 ABDOMINAL PAIN, UNSPECIFIED ABDOMINAL LOCATION: ICD-10-CM

## 2021-04-08 DIAGNOSIS — G89.29 CHRONIC MUSCULOSKELETAL PAIN: ICD-10-CM

## 2021-04-08 DIAGNOSIS — G31.84 MILD COGNITIVE IMPAIRMENT: ICD-10-CM

## 2021-04-08 DIAGNOSIS — K21.9 GASTROESOPHAGEAL REFLUX DISEASE WITHOUT ESOPHAGITIS: ICD-10-CM

## 2021-04-08 DIAGNOSIS — F39 AFFECTIVE DISORDER: ICD-10-CM

## 2021-04-08 DIAGNOSIS — M85.89 OSTEOPENIA OF MULTIPLE SITES: ICD-10-CM

## 2021-04-08 DIAGNOSIS — H40.1131 PRIMARY OPEN ANGLE GLAUCOMA (POAG) OF BOTH EYES, MILD STAGE: ICD-10-CM

## 2021-04-08 DIAGNOSIS — M79.18 CHRONIC MUSCULOSKELETAL PAIN: ICD-10-CM

## 2021-04-08 DIAGNOSIS — Z79.52 CURRENT USE OF STEROID MEDICATION: ICD-10-CM

## 2021-04-08 DIAGNOSIS — E66.09 CLASS 1 OBESITY DUE TO EXCESS CALORIES WITH SERIOUS COMORBIDITY AND BODY MASS INDEX (BMI) OF 32.0 TO 32.9 IN ADULT: ICD-10-CM

## 2021-04-08 DIAGNOSIS — Z86.73 HISTORY OF STROKE: ICD-10-CM

## 2021-04-08 DIAGNOSIS — K21.9 GASTROESOPHAGEAL REFLUX DISEASE, UNSPECIFIED WHETHER ESOPHAGITIS PRESENT: ICD-10-CM

## 2021-04-08 DIAGNOSIS — J45.909 ASTHMA, UNSPECIFIED ASTHMA SEVERITY, UNSPECIFIED WHETHER COMPLICATED, UNSPECIFIED WHETHER PERSISTENT: ICD-10-CM

## 2021-04-08 DIAGNOSIS — R06.02 SHORTNESS OF BREATH: ICD-10-CM

## 2021-04-08 DIAGNOSIS — R09.89 BIBASILAR CRACKLES: ICD-10-CM

## 2021-04-08 DIAGNOSIS — Z96.621 ELBOW JOINT REPLACEMENT STATUS, RIGHT: ICD-10-CM

## 2021-04-08 DIAGNOSIS — M06.9 RHEUMATOID ARTHRITIS INVOLVING MULTIPLE SITES, UNSPECIFIED WHETHER RHEUMATOID FACTOR PRESENT: ICD-10-CM

## 2021-04-08 DIAGNOSIS — R10.9 ABDOMINAL PAIN, UNSPECIFIED ABDOMINAL LOCATION: Primary | ICD-10-CM

## 2021-04-08 DIAGNOSIS — Z74.09 IMPAIRED FUNCTIONAL MOBILITY, BALANCE, GAIT, AND ENDURANCE: ICD-10-CM

## 2021-04-08 DIAGNOSIS — R10.9 ABDOMINAL DISCOMFORT: ICD-10-CM

## 2021-04-08 PROCEDURE — 99499 RISK ADDL DX/OHS AUDIT: ICD-10-PCS | Mod: HCNC,S$GLB,, | Performed by: FAMILY MEDICINE

## 2021-04-08 PROCEDURE — 71046 X-RAY EXAM CHEST 2 VIEWS: CPT | Mod: 26,,, | Performed by: RADIOLOGY

## 2021-04-08 PROCEDURE — 76700 US EXAM ABDOM COMPLETE: CPT | Mod: 26,,, | Performed by: RADIOLOGY

## 2021-04-08 PROCEDURE — 99215 OFFICE O/P EST HI 40 MIN: CPT | Mod: S$GLB,,, | Performed by: FAMILY MEDICINE

## 2021-04-08 PROCEDURE — 99999 PR PBB SHADOW E&M-EST. PATIENT-LVL V: CPT | Mod: PBBFAC,,, | Performed by: FAMILY MEDICINE

## 2021-04-08 PROCEDURE — 1159F PR MEDICATION LIST DOCUMENTED IN MEDICAL RECORD: ICD-10-PCS | Mod: S$GLB,,, | Performed by: HOSPITALIST

## 2021-04-08 PROCEDURE — 76700 US EXAM ABDOM COMPLETE: CPT | Mod: TC

## 2021-04-08 PROCEDURE — 1126F PR PAIN SEVERITY QUANTIFIED, NO PAIN PRESENT: ICD-10-PCS | Mod: S$GLB,,, | Performed by: FAMILY MEDICINE

## 2021-04-08 PROCEDURE — 3077F SYST BP >= 140 MM HG: CPT | Mod: CPTII,S$GLB,, | Performed by: HOSPITALIST

## 2021-04-08 PROCEDURE — 99499 UNLISTED E&M SERVICE: CPT | Mod: S$GLB,,, | Performed by: NURSE PRACTITIONER

## 2021-04-08 PROCEDURE — 76700 US ABDOMEN COMPLETE: ICD-10-PCS | Mod: 26,,, | Performed by: RADIOLOGY

## 2021-04-08 PROCEDURE — 99499 UNLISTED E&M SERVICE: CPT | Mod: HCNC,S$GLB,, | Performed by: FAMILY MEDICINE

## 2021-04-08 PROCEDURE — 99214 PR OFFICE/OUTPT VISIT, EST, LEVL IV, 30-39 MIN: ICD-10-PCS | Mod: S$GLB,,, | Performed by: HOSPITALIST

## 2021-04-08 PROCEDURE — 99215 PR OFFICE/OUTPT VISIT, EST, LEVL V, 40-54 MIN: ICD-10-PCS | Mod: S$GLB,,, | Performed by: FAMILY MEDICINE

## 2021-04-08 PROCEDURE — 99999 PR PBB SHADOW E&M-EST. PATIENT-LVL V: ICD-10-PCS | Mod: PBBFAC,,, | Performed by: FAMILY MEDICINE

## 2021-04-08 PROCEDURE — 71046 XR CHEST PA AND LATERAL: ICD-10-PCS | Mod: 26,,, | Performed by: RADIOLOGY

## 2021-04-08 PROCEDURE — 1101F PT FALLS ASSESS-DOCD LE1/YR: CPT | Mod: CPTII,S$GLB,, | Performed by: FAMILY MEDICINE

## 2021-04-08 PROCEDURE — 1101F PR PT FALLS ASSESS DOC 0-1 FALLS W/OUT INJ PAST YR: ICD-10-PCS | Mod: CPTII,S$GLB,, | Performed by: FAMILY MEDICINE

## 2021-04-08 PROCEDURE — 3078F PR MOST RECENT DIASTOLIC BLOOD PRESSURE < 80 MM HG: ICD-10-PCS | Mod: CPTII,S$GLB,, | Performed by: HOSPITALIST

## 2021-04-08 PROCEDURE — 3288F PR FALLS RISK ASSESSMENT DOCUMENTED: ICD-10-PCS | Mod: CPTII,S$GLB,, | Performed by: FAMILY MEDICINE

## 2021-04-08 PROCEDURE — 99999 PR PBB SHADOW E&M-EST. PATIENT-LVL IV: ICD-10-PCS | Mod: PBBFAC,,,

## 2021-04-08 PROCEDURE — 71046 X-RAY EXAM CHEST 2 VIEWS: CPT | Mod: TC,FY

## 2021-04-08 PROCEDURE — 3288F FALL RISK ASSESSMENT DOCD: CPT | Mod: CPTII,S$GLB,, | Performed by: FAMILY MEDICINE

## 2021-04-08 PROCEDURE — 3078F DIAST BP <80 MM HG: CPT | Mod: CPTII,S$GLB,, | Performed by: HOSPITALIST

## 2021-04-08 PROCEDURE — 1159F PR MEDICATION LIST DOCUMENTED IN MEDICAL RECORD: ICD-10-PCS | Mod: S$GLB,,, | Performed by: FAMILY MEDICINE

## 2021-04-08 PROCEDURE — 1159F MED LIST DOCD IN RCRD: CPT | Mod: S$GLB,,, | Performed by: FAMILY MEDICINE

## 2021-04-08 PROCEDURE — 1126F AMNT PAIN NOTED NONE PRSNT: CPT | Mod: S$GLB,,, | Performed by: FAMILY MEDICINE

## 2021-04-08 PROCEDURE — 99214 OFFICE O/P EST MOD 30 MIN: CPT | Mod: S$GLB,,, | Performed by: HOSPITALIST

## 2021-04-08 PROCEDURE — 99999 PR PBB SHADOW E&M-EST. PATIENT-LVL IV: CPT | Mod: PBBFAC,,,

## 2021-04-08 PROCEDURE — 1159F MED LIST DOCD IN RCRD: CPT | Mod: S$GLB,,, | Performed by: HOSPITALIST

## 2021-04-08 PROCEDURE — 99499 RISK ADDL DX/OHS AUDIT: ICD-10-PCS | Mod: S$GLB,,, | Performed by: NURSE PRACTITIONER

## 2021-04-08 PROCEDURE — 3077F PR MOST RECENT SYSTOLIC BLOOD PRESSURE >= 140 MM HG: ICD-10-PCS | Mod: CPTII,S$GLB,, | Performed by: HOSPITALIST

## 2021-04-08 RX ORDER — VALACYCLOVIR HYDROCHLORIDE 500 MG/1
500 TABLET, FILM COATED ORAL 2 TIMES DAILY
COMMUNITY
Start: 2021-03-05 | End: 2021-04-08 | Stop reason: ALTCHOICE

## 2021-04-09 ENCOUNTER — TELEPHONE (OUTPATIENT)
Dept: ORTHOPEDICS | Facility: CLINIC | Age: 76
End: 2021-04-09

## 2021-04-09 ENCOUNTER — TELEPHONE (OUTPATIENT)
Dept: PREADMISSION TESTING | Facility: HOSPITAL | Age: 76
End: 2021-04-09

## 2021-04-09 ENCOUNTER — PATIENT MESSAGE (OUTPATIENT)
Dept: ORTHOPEDICS | Facility: CLINIC | Age: 76
End: 2021-04-09

## 2021-04-09 DIAGNOSIS — J98.9 REACTIVE AIRWAY DISEASE THAT IS NOT ASTHMA: Primary | ICD-10-CM

## 2021-04-09 DIAGNOSIS — R06.09 DOE (DYSPNEA ON EXERTION): ICD-10-CM

## 2021-04-12 ENCOUNTER — NURSE TRIAGE (OUTPATIENT)
Dept: ADMINISTRATIVE | Facility: CLINIC | Age: 76
End: 2021-04-12

## 2021-04-12 ENCOUNTER — TELEPHONE (OUTPATIENT)
Dept: ORTHOPEDICS | Facility: CLINIC | Age: 76
End: 2021-04-12

## 2021-04-12 ENCOUNTER — TELEPHONE (OUTPATIENT)
Dept: PRIMARY CARE CLINIC | Facility: CLINIC | Age: 76
End: 2021-04-12

## 2021-04-13 RX ORDER — AZITHROMYCIN 250 MG/1
TABLET, FILM COATED ORAL
Qty: 6 TABLET | Refills: 0 | Status: SHIPPED | OUTPATIENT
Start: 2021-04-13 | End: 2021-04-18

## 2021-04-13 RX ORDER — AMOXICILLIN AND CLAVULANATE POTASSIUM 875; 125 MG/1; MG/1
1 TABLET, FILM COATED ORAL 2 TIMES DAILY
Qty: 14 TABLET | Refills: 0 | Status: ON HOLD | OUTPATIENT
Start: 2021-04-13 | End: 2021-08-17

## 2021-04-14 ENCOUNTER — OFFICE VISIT (OUTPATIENT)
Dept: PRIMARY CARE CLINIC | Facility: CLINIC | Age: 76
End: 2021-04-14
Payer: MEDICARE

## 2021-04-14 VITALS
OXYGEN SATURATION: 95 % | BODY MASS INDEX: 33.57 KG/M2 | WEIGHT: 149.25 LBS | HEART RATE: 93 BPM | TEMPERATURE: 98 F | SYSTOLIC BLOOD PRESSURE: 128 MMHG | HEIGHT: 56 IN | DIASTOLIC BLOOD PRESSURE: 62 MMHG

## 2021-04-14 DIAGNOSIS — B37.9 YEAST INFECTION: ICD-10-CM

## 2021-04-14 DIAGNOSIS — R14.0 ABDOMINAL BLOATING: ICD-10-CM

## 2021-04-14 DIAGNOSIS — R06.02 SOB (SHORTNESS OF BREATH): Primary | ICD-10-CM

## 2021-04-14 DIAGNOSIS — J18.9 PNEUMONIA OF RIGHT LOWER LOBE DUE TO INFECTIOUS ORGANISM: ICD-10-CM

## 2021-04-14 DIAGNOSIS — N28.1 RENAL CYST, ACQUIRED, RIGHT: ICD-10-CM

## 2021-04-14 PROCEDURE — 99999 PR PBB SHADOW E&M-EST. PATIENT-LVL IV: CPT | Mod: PBBFAC,,, | Performed by: FAMILY MEDICINE

## 2021-04-14 PROCEDURE — 99214 PR OFFICE/OUTPT VISIT, EST, LEVL IV, 30-39 MIN: ICD-10-PCS | Mod: S$GLB,,, | Performed by: FAMILY MEDICINE

## 2021-04-14 PROCEDURE — 3288F FALL RISK ASSESSMENT DOCD: CPT | Mod: CPTII,S$GLB,, | Performed by: FAMILY MEDICINE

## 2021-04-14 PROCEDURE — 1126F AMNT PAIN NOTED NONE PRSNT: CPT | Mod: S$GLB,,, | Performed by: FAMILY MEDICINE

## 2021-04-14 PROCEDURE — 1159F PR MEDICATION LIST DOCUMENTED IN MEDICAL RECORD: ICD-10-PCS | Mod: S$GLB,,, | Performed by: FAMILY MEDICINE

## 2021-04-14 PROCEDURE — 3288F PR FALLS RISK ASSESSMENT DOCUMENTED: ICD-10-PCS | Mod: CPTII,S$GLB,, | Performed by: FAMILY MEDICINE

## 2021-04-14 PROCEDURE — 1126F PR PAIN SEVERITY QUANTIFIED, NO PAIN PRESENT: ICD-10-PCS | Mod: S$GLB,,, | Performed by: FAMILY MEDICINE

## 2021-04-14 PROCEDURE — 1159F MED LIST DOCD IN RCRD: CPT | Mod: S$GLB,,, | Performed by: FAMILY MEDICINE

## 2021-04-14 PROCEDURE — 99999 PR PBB SHADOW E&M-EST. PATIENT-LVL IV: ICD-10-PCS | Mod: PBBFAC,,, | Performed by: FAMILY MEDICINE

## 2021-04-14 PROCEDURE — 99214 OFFICE O/P EST MOD 30 MIN: CPT | Mod: S$GLB,,, | Performed by: FAMILY MEDICINE

## 2021-04-14 PROCEDURE — 1101F PT FALLS ASSESS-DOCD LE1/YR: CPT | Mod: CPTII,S$GLB,, | Performed by: FAMILY MEDICINE

## 2021-04-14 PROCEDURE — 1101F PR PT FALLS ASSESS DOC 0-1 FALLS W/OUT INJ PAST YR: ICD-10-PCS | Mod: CPTII,S$GLB,, | Performed by: FAMILY MEDICINE

## 2021-04-14 RX ORDER — FLUCONAZOLE 150 MG/1
150 TABLET ORAL DAILY
Qty: 1 TABLET | Refills: 0 | Status: SHIPPED | OUTPATIENT
Start: 2021-04-14 | End: 2021-04-15

## 2021-04-14 RX ORDER — FUROSEMIDE 20 MG/1
20 TABLET ORAL DAILY
Qty: 3 TABLET | Refills: 0 | Status: SHIPPED | OUTPATIENT
Start: 2021-04-14 | End: 2021-06-03 | Stop reason: SDUPTHER

## 2021-04-15 ENCOUNTER — HOSPITAL ENCOUNTER (OUTPATIENT)
Dept: PULMONOLOGY | Facility: CLINIC | Age: 76
Discharge: HOME | End: 2021-04-15
Payer: MEDICARE

## 2021-04-15 ENCOUNTER — OFFICE VISIT (OUTPATIENT)
Dept: PULMONOLOGY | Facility: CLINIC | Age: 76
End: 2021-04-15
Payer: MEDICARE

## 2021-04-15 ENCOUNTER — HOSPITAL ENCOUNTER (OUTPATIENT)
Dept: RADIOLOGY | Facility: HOSPITAL | Age: 76
Discharge: HOME | End: 2021-04-15
Attending: NURSE PRACTITIONER
Payer: MEDICARE

## 2021-04-15 VITALS
WEIGHT: 148 LBS | SYSTOLIC BLOOD PRESSURE: 151 MMHG | HEIGHT: 57 IN | OXYGEN SATURATION: 96 % | BODY MASS INDEX: 31.96 KG/M2 | DIASTOLIC BLOOD PRESSURE: 72 MMHG | HEIGHT: 57 IN | WEIGHT: 148.13 LBS | HEART RATE: 99 BPM | BODY MASS INDEX: 31.93 KG/M2

## 2021-04-15 DIAGNOSIS — J98.9 REACTIVE AIRWAY DISEASE THAT IS NOT ASTHMA: ICD-10-CM

## 2021-04-15 DIAGNOSIS — R06.09 DOE (DYSPNEA ON EXERTION): ICD-10-CM

## 2021-04-15 DIAGNOSIS — J44.89 CHRONIC OBSTRUCTIVE ASTHMA: ICD-10-CM

## 2021-04-15 DIAGNOSIS — R06.02 SHORTNESS OF BREATH: ICD-10-CM

## 2021-04-15 DIAGNOSIS — R06.09 DYSPNEA ON EXERTION: ICD-10-CM

## 2021-04-15 DIAGNOSIS — R06.02 SHORTNESS OF BREATH: Primary | ICD-10-CM

## 2021-04-15 LAB
DLCO ADJ PRE: 7.53 ML/(MIN*MMHG) (ref 10.53–21.99)
DLCO SINGLE BREATH LLN: 10.53
DLCO SINGLE BREATH PRE REF: 48.3 %
DLCO SINGLE BREATH REF: 16.26
DLCOC SBVA LLN: 2.41
DLCOC SBVA PRE REF: 77.6 %
DLCOC SBVA REF: 4.3
DLCOC SINGLE BREATH LLN: 10.53
DLCOC SINGLE BREATH PRE REF: 46.3 %
DLCOC SINGLE BREATH REF: 16.26
DLCOCSBVAULN: 6.19
DLCOCSINGLEBREATHULN: 21.99
DLCOSINGLEBREATHULN: 21.99
DLCOVA LLN: 2.41
DLCOVA PRE REF: 81 %
DLCOVA PRE: 3.48 ML/(MIN*MMHG*L) (ref 2.41–6.19)
DLCOVA REF: 4.3
DLCOVAULN: 6.19
DLVAADJ PRE: 3.34 ML/(MIN*MMHG*L) (ref 2.41–6.19)
FEF 25 75 LLN: 0.44
FEF 25 75 PRE REF: 20.1 %
FEF 25 75 REF: 1.26
FEV05 LLN: 0.44
FEV05 REF: 1.3
FEV1 FVC LLN: 65
FEV1 FVC PRE REF: 66.2 %
FEV1 FVC REF: 79
FEV1 LLN: 0.94
FEV1 PRE REF: 52.7 %
FEV1 REF: 1.38
FVC LLN: 1.21
FVC PRE REF: 79.2 %
FVC REF: 1.76
IVC PRE: 1.12 L (ref 1.21–2.31)
IVC SINGLE BREATH LLN: 1.21
IVC SINGLE BREATH PRE REF: 63.8 %
IVC SINGLE BREATH REF: 1.76
IVCSINGLEBREATHULN: 2.31
PEF LLN: 1.75
PEF PRE REF: 82.8 %
PEF REF: 3.34
PHYSICIAN COMMENT: ABNORMAL
PRE DLCO: 7.85 ML/(MIN*MMHG) (ref 10.53–21.99)
PRE FEF 25 75: 0.25 L/S (ref 0.44–2.08)
PRE FET 100: 7.58 SEC
PRE FEV05 REF: 43.1 %
PRE FEV1 FVC: 52.19 % (ref 64.96–92.69)
PRE FEV1: 0.73 L (ref 0.94–1.82)
PRE FEV5: 0.56 L (ref 0.44–2.15)
PRE FVC: 1.39 L (ref 1.21–2.31)
PRE PEF: 2.77 L/S (ref 1.75–4.94)
VA PRE: 2.26 L (ref 3.63–3.63)
VA SINGLE BREATH LLN: 3.63
VA SINGLE BREATH PRE REF: 62.2 %
VA SINGLE BREATH REF: 3.63
VASINGLEBREATHULN: 3.63

## 2021-04-15 PROCEDURE — 99999 PR PBB SHADOW E&M-EST. PATIENT-LVL V: ICD-10-PCS | Mod: PBBFAC,,, | Performed by: NURSE PRACTITIONER

## 2021-04-15 PROCEDURE — 94729 PR C02/MEMBANE DIFFUSE CAPACITY: ICD-10-PCS | Mod: S$GLB,,, | Performed by: INTERNAL MEDICINE

## 2021-04-15 PROCEDURE — 94618 PULMONARY STRESS TESTING: ICD-10-PCS | Mod: S$GLB,,, | Performed by: INTERNAL MEDICINE

## 2021-04-15 PROCEDURE — 71250 CT CHEST WITHOUT CONTRAST: ICD-10-PCS | Mod: 26,,, | Performed by: RADIOLOGY

## 2021-04-15 PROCEDURE — 1159F PR MEDICATION LIST DOCUMENTED IN MEDICAL RECORD: ICD-10-PCS | Mod: S$GLB,,, | Performed by: NURSE PRACTITIONER

## 2021-04-15 PROCEDURE — 1126F AMNT PAIN NOTED NONE PRSNT: CPT | Mod: S$GLB,,, | Performed by: NURSE PRACTITIONER

## 2021-04-15 PROCEDURE — 99204 OFFICE O/P NEW MOD 45 MIN: CPT | Mod: S$GLB,,, | Performed by: NURSE PRACTITIONER

## 2021-04-15 PROCEDURE — 1159F MED LIST DOCD IN RCRD: CPT | Mod: S$GLB,,, | Performed by: NURSE PRACTITIONER

## 2021-04-15 PROCEDURE — 1101F PT FALLS ASSESS-DOCD LE1/YR: CPT | Mod: CPTII,S$GLB,, | Performed by: NURSE PRACTITIONER

## 2021-04-15 PROCEDURE — 71250 CT THORAX DX C-: CPT | Mod: 26,,, | Performed by: RADIOLOGY

## 2021-04-15 PROCEDURE — 99204 PR OFFICE/OUTPT VISIT, NEW, LEVL IV, 45-59 MIN: ICD-10-PCS | Mod: S$GLB,,, | Performed by: NURSE PRACTITIONER

## 2021-04-15 PROCEDURE — 3288F PR FALLS RISK ASSESSMENT DOCUMENTED: ICD-10-PCS | Mod: CPTII,S$GLB,, | Performed by: NURSE PRACTITIONER

## 2021-04-15 PROCEDURE — 99999 PR PBB SHADOW E&M-EST. PATIENT-LVL V: CPT | Mod: PBBFAC,,, | Performed by: NURSE PRACTITIONER

## 2021-04-15 PROCEDURE — 1126F PR PAIN SEVERITY QUANTIFIED, NO PAIN PRESENT: ICD-10-PCS | Mod: S$GLB,,, | Performed by: NURSE PRACTITIONER

## 2021-04-15 PROCEDURE — 71250 CT THORAX DX C-: CPT | Mod: TC

## 2021-04-15 PROCEDURE — 3288F FALL RISK ASSESSMENT DOCD: CPT | Mod: CPTII,S$GLB,, | Performed by: NURSE PRACTITIONER

## 2021-04-15 PROCEDURE — 94010 BREATHING CAPACITY TEST: ICD-10-PCS | Mod: 59,S$GLB,, | Performed by: INTERNAL MEDICINE

## 2021-04-15 PROCEDURE — 1101F PR PT FALLS ASSESS DOC 0-1 FALLS W/OUT INJ PAST YR: ICD-10-PCS | Mod: CPTII,S$GLB,, | Performed by: NURSE PRACTITIONER

## 2021-04-15 PROCEDURE — 94010 BREATHING CAPACITY TEST: CPT | Mod: 59,S$GLB,, | Performed by: INTERNAL MEDICINE

## 2021-04-15 PROCEDURE — 94618 PULMONARY STRESS TESTING: CPT | Mod: S$GLB,,, | Performed by: INTERNAL MEDICINE

## 2021-04-15 PROCEDURE — 94729 DIFFUSING CAPACITY: CPT | Mod: S$GLB,,, | Performed by: INTERNAL MEDICINE

## 2021-04-15 RX ORDER — FLUTICASONE FUROATE AND VILANTEROL TRIFENATATE 100; 25 UG/1; UG/1
1 POWDER RESPIRATORY (INHALATION) DAILY
Qty: 60 EACH | Refills: 11 | Status: SHIPPED | OUTPATIENT
Start: 2021-04-15 | End: 2021-04-19

## 2021-04-16 ENCOUNTER — TELEPHONE (OUTPATIENT)
Dept: PRIMARY CARE CLINIC | Facility: CLINIC | Age: 76
End: 2021-04-16

## 2021-04-16 ENCOUNTER — TELEPHONE (OUTPATIENT)
Dept: PULMONOLOGY | Facility: CLINIC | Age: 76
End: 2021-04-16

## 2021-04-16 DIAGNOSIS — N28.1 RENAL CYST, RIGHT: Primary | ICD-10-CM

## 2021-04-19 RX ORDER — FLUTICASONE FUROATE AND VILANTEROL TRIFENATATE 100; 25 UG/1; UG/1
1 POWDER RESPIRATORY (INHALATION) DAILY
Qty: 60 EACH | Refills: 11 | Status: SHIPPED | OUTPATIENT
Start: 2021-04-19

## 2021-04-20 ENCOUNTER — PATIENT OUTREACH (OUTPATIENT)
Dept: ADMINISTRATIVE | Facility: OTHER | Age: 76
End: 2021-04-20

## 2021-04-22 ENCOUNTER — OFFICE VISIT (OUTPATIENT)
Dept: UROLOGY | Facility: CLINIC | Age: 76
End: 2021-04-22
Payer: MEDICARE

## 2021-04-22 VITALS
HEART RATE: 80 BPM | SYSTOLIC BLOOD PRESSURE: 136 MMHG | HEIGHT: 57 IN | DIASTOLIC BLOOD PRESSURE: 71 MMHG | WEIGHT: 147.69 LBS | BODY MASS INDEX: 31.86 KG/M2

## 2021-04-22 DIAGNOSIS — R39.15 URGENCY OF URINATION: ICD-10-CM

## 2021-04-22 DIAGNOSIS — N39.41 URGE INCONTINENCE: ICD-10-CM

## 2021-04-22 DIAGNOSIS — N28.1 RENAL CYST, RIGHT: Primary | ICD-10-CM

## 2021-04-22 PROCEDURE — 99203 OFFICE O/P NEW LOW 30 MIN: CPT | Mod: S$GLB,,, | Performed by: PHYSICIAN ASSISTANT

## 2021-04-22 PROCEDURE — 99999 PR PBB SHADOW E&M-EST. PATIENT-LVL IV: ICD-10-PCS | Mod: PBBFAC,,, | Performed by: PHYSICIAN ASSISTANT

## 2021-04-22 PROCEDURE — 1159F PR MEDICATION LIST DOCUMENTED IN MEDICAL RECORD: ICD-10-PCS | Mod: S$GLB,,, | Performed by: PHYSICIAN ASSISTANT

## 2021-04-22 PROCEDURE — 1159F MED LIST DOCD IN RCRD: CPT | Mod: S$GLB,,, | Performed by: PHYSICIAN ASSISTANT

## 2021-04-22 PROCEDURE — 99999 PR PBB SHADOW E&M-EST. PATIENT-LVL IV: CPT | Mod: PBBFAC,,, | Performed by: PHYSICIAN ASSISTANT

## 2021-04-22 PROCEDURE — 1125F AMNT PAIN NOTED PAIN PRSNT: CPT | Mod: S$GLB,,, | Performed by: PHYSICIAN ASSISTANT

## 2021-04-22 PROCEDURE — 99203 PR OFFICE/OUTPT VISIT, NEW, LEVL III, 30-44 MIN: ICD-10-PCS | Mod: S$GLB,,, | Performed by: PHYSICIAN ASSISTANT

## 2021-04-22 PROCEDURE — 1125F PR PAIN SEVERITY QUANTIFIED, PAIN PRESENT: ICD-10-PCS | Mod: S$GLB,,, | Performed by: PHYSICIAN ASSISTANT

## 2021-04-25 PROBLEM — J44.89 CHRONIC OBSTRUCTIVE ASTHMA: Status: ACTIVE | Noted: 2021-04-25

## 2021-04-27 ENCOUNTER — HOSPITAL ENCOUNTER (OUTPATIENT)
Dept: PULMONOLOGY | Facility: CLINIC | Age: 76
Discharge: HOME | End: 2021-04-27
Payer: MEDICARE

## 2021-04-27 ENCOUNTER — HOSPITAL ENCOUNTER (OUTPATIENT)
Dept: CARDIOLOGY | Facility: HOSPITAL | Age: 76
Discharge: HOME | End: 2021-04-27
Attending: NURSE PRACTITIONER
Payer: MEDICARE

## 2021-04-27 VITALS
DIASTOLIC BLOOD PRESSURE: 72 MMHG | WEIGHT: 148 LBS | BODY MASS INDEX: 31.93 KG/M2 | HEART RATE: 76 BPM | SYSTOLIC BLOOD PRESSURE: 136 MMHG | HEIGHT: 57 IN

## 2021-04-27 DIAGNOSIS — R06.02 SHORTNESS OF BREATH: ICD-10-CM

## 2021-04-27 LAB
ASCENDING AORTA: 2.67 CM
AV INDEX (PROSTH): 0.88
AV MEAN GRADIENT: 3 MMHG
AV PEAK GRADIENT: 6 MMHG
AV VALVE AREA: 3.01 CM2
AV VELOCITY RATIO: 0.81
BSA FOR ECHO PROCEDURE: 1.64 M2
CV ECHO LV RWT: 0.45 CM
DOP CALC AO PEAK VEL: 1.21 M/S
DOP CALC AO VTI: 25.84 CM
DOP CALC LVOT AREA: 3.4 CM2
DOP CALC LVOT DIAMETER: 2.09 CM
DOP CALC LVOT PEAK VEL: 0.98 M/S
DOP CALC LVOT STROKE VOLUME: 77.91 CM3
DOP CALCLVOT PEAK VEL VTI: 22.72 CM
E WAVE DECELERATION TIME: 225.18 MSEC
E/A RATIO: 0.69
E/E' RATIO: 7.67 M/S
ECHO LV POSTERIOR WALL: 0.9 CM (ref 0.6–1.1)
EJECTION FRACTION: 60 %
FRACTIONAL SHORTENING: 40 % (ref 28–44)
INTERVENTRICULAR SEPTUM: 0.95 CM (ref 0.6–1.1)
LA MAJOR: 3.84 CM
LA MINOR: 4.14 CM
LA WIDTH: 2.91 CM
LEFT ATRIUM SIZE: 3.07 CM
LEFT ATRIUM VOLUME INDEX MOD: 14.5 ML/M2
LEFT ATRIUM VOLUME INDEX: 19.1 ML/M2
LEFT ATRIUM VOLUME MOD: 22.94 CM3
LEFT ATRIUM VOLUME: 30.26 CM3
LEFT INTERNAL DIMENSION IN SYSTOLE: 2.41 CM (ref 2.1–4)
LEFT VENTRICLE DIASTOLIC VOLUME INDEX: 40.59 ML/M2
LEFT VENTRICLE DIASTOLIC VOLUME: 64.13 ML
LEFT VENTRICLE MASS INDEX: 72 G/M2
LEFT VENTRICLE SYSTOLIC VOLUME INDEX: 12.9 ML/M2
LEFT VENTRICLE SYSTOLIC VOLUME: 20.31 ML
LEFT VENTRICULAR INTERNAL DIMENSION IN DIASTOLE: 4 CM (ref 3.5–6)
LEFT VENTRICULAR MASS: 113.92 G
LV LATERAL E/E' RATIO: 6.9 M/S
LV SEPTAL E/E' RATIO: 8.63 M/S
MV A" WAVE DURATION": 9.9 MSEC
MV PEAK A VEL: 1 M/S
MV PEAK E VEL: 0.69 M/S
MV STENOSIS PRESSURE HALF TIME: 65.3 MS
MV VALVE AREA P 1/2 METHOD: 3.37 CM2
PULM VEIN S/D RATIO: 1.29
PV PEAK D VEL: 0.34 M/S
PV PEAK S VEL: 0.44 M/S
RA MAJOR: 3.99 CM
RA PRESSURE: 3 MMHG
RA WIDTH: 2.34 CM
RIGHT VENTRICULAR END-DIASTOLIC DIMENSION: 2.79 CM
RV TISSUE DOPPLER FREE WALL SYSTOLIC VELOCITY 1 (APICAL 4 CHAMBER VIEW): 12 CM/S
SINUS: 2.77 CM
STJ: 2.47 CM
TDI LATERAL: 0.1 M/S
TDI SEPTAL: 0.08 M/S
TDI: 0.09 M/S
TRICUSPID ANNULAR PLANE SYSTOLIC EXCURSION: 1.91 CM

## 2021-04-27 PROCEDURE — 94060 EVALUATION OF WHEEZING: CPT | Mod: S$GLB,,, | Performed by: INTERNAL MEDICINE

## 2021-04-27 PROCEDURE — 94729 PR C02/MEMBANE DIFFUSE CAPACITY: ICD-10-PCS | Mod: S$GLB,,, | Performed by: INTERNAL MEDICINE

## 2021-04-27 PROCEDURE — 94727 PR PULM FUNCTION TEST BY GAS: ICD-10-PCS | Mod: S$GLB,,, | Performed by: INTERNAL MEDICINE

## 2021-04-27 PROCEDURE — 94729 DIFFUSING CAPACITY: CPT | Mod: S$GLB,,, | Performed by: INTERNAL MEDICINE

## 2021-04-27 PROCEDURE — 94727 GAS DIL/WSHOT DETER LNG VOL: CPT | Mod: S$GLB,,, | Performed by: INTERNAL MEDICINE

## 2021-04-27 PROCEDURE — 93306 TTE W/DOPPLER COMPLETE: CPT | Mod: 26,,, | Performed by: INTERNAL MEDICINE

## 2021-04-27 PROCEDURE — 94060 PR EVAL OF BRONCHOSPASM: ICD-10-PCS | Mod: S$GLB,,, | Performed by: INTERNAL MEDICINE

## 2021-04-27 PROCEDURE — 93306 ECHO (CUPID ONLY): ICD-10-PCS | Mod: 26,,, | Performed by: INTERNAL MEDICINE

## 2021-04-27 PROCEDURE — 93306 TTE W/DOPPLER COMPLETE: CPT

## 2021-04-28 ENCOUNTER — TELEPHONE (OUTPATIENT)
Dept: PRIMARY CARE CLINIC | Facility: CLINIC | Age: 76
End: 2021-04-28

## 2021-04-28 ENCOUNTER — TELEPHONE (OUTPATIENT)
Dept: PULMONOLOGY | Facility: CLINIC | Age: 76
End: 2021-04-28

## 2021-04-28 DIAGNOSIS — J44.89 CHRONIC OBSTRUCTIVE ASTHMA: ICD-10-CM

## 2021-04-28 DIAGNOSIS — J84.9 ILD (INTERSTITIAL LUNG DISEASE): Primary | ICD-10-CM

## 2021-04-28 RX ORDER — TIOTROPIUM BROMIDE INHALATION SPRAY 1.56 UG/1
2 SPRAY, METERED RESPIRATORY (INHALATION) DAILY
Qty: 4 G | Refills: 11 | Status: SHIPPED | OUTPATIENT
Start: 2021-04-28 | End: 2023-02-15

## 2021-04-29 ENCOUNTER — TELEPHONE (OUTPATIENT)
Dept: PULMONOLOGY | Facility: CLINIC | Age: 76
End: 2021-04-29

## 2021-04-30 ENCOUNTER — LAB VISIT (OUTPATIENT)
Dept: LAB | Facility: HOSPITAL | Age: 76
End: 2021-04-30
Attending: FAMILY MEDICINE
Payer: MEDICARE

## 2021-04-30 DIAGNOSIS — J84.9 ILD (INTERSTITIAL LUNG DISEASE): ICD-10-CM

## 2021-04-30 LAB
CCP AB SER IA-ACNC: 2.9 U/ML
CK SERPL-CCNC: 49 U/L (ref 20–180)
CRP SERPL-MCNC: 0.3 MG/L (ref 0–8.2)
RHEUMATOID FACT SERPL-ACNC: 39 IU/ML (ref 0–15)

## 2021-04-30 PROCEDURE — 86038 ANTINUCLEAR ANTIBODIES: CPT | Performed by: NURSE PRACTITIONER

## 2021-04-30 PROCEDURE — 86431 RHEUMATOID FACTOR QUANT: CPT | Performed by: NURSE PRACTITIONER

## 2021-04-30 PROCEDURE — 36415 COLL VENOUS BLD VENIPUNCTURE: CPT | Mod: PN | Performed by: NURSE PRACTITIONER

## 2021-04-30 PROCEDURE — 82550 ASSAY OF CK (CPK): CPT | Performed by: NURSE PRACTITIONER

## 2021-04-30 PROCEDURE — 86140 C-REACTIVE PROTEIN: CPT | Performed by: NURSE PRACTITIONER

## 2021-04-30 PROCEDURE — 86200 CCP ANTIBODY: CPT | Performed by: NURSE PRACTITIONER

## 2021-05-03 LAB — ANA SER QL IF: NORMAL

## 2021-05-04 ENCOUNTER — TELEPHONE (OUTPATIENT)
Dept: PRIMARY CARE CLINIC | Facility: CLINIC | Age: 76
End: 2021-05-04

## 2021-05-05 ENCOUNTER — TELEPHONE (OUTPATIENT)
Dept: ORTHOPEDICS | Facility: CLINIC | Age: 76
End: 2021-05-05

## 2021-05-05 ENCOUNTER — TELEPHONE (OUTPATIENT)
Dept: PRIMARY CARE CLINIC | Facility: CLINIC | Age: 76
End: 2021-05-05

## 2021-05-06 ENCOUNTER — TELEPHONE (OUTPATIENT)
Dept: ORTHOPEDICS | Facility: CLINIC | Age: 76
End: 2021-05-06

## 2021-05-06 ENCOUNTER — TELEPHONE (OUTPATIENT)
Dept: PRIMARY CARE CLINIC | Facility: CLINIC | Age: 76
End: 2021-05-06

## 2021-05-07 ENCOUNTER — TELEPHONE (OUTPATIENT)
Dept: PULMONOLOGY | Facility: CLINIC | Age: 76
End: 2021-05-07

## 2021-05-10 ENCOUNTER — TELEPHONE (OUTPATIENT)
Dept: PRIMARY CARE CLINIC | Facility: CLINIC | Age: 76
End: 2021-05-10

## 2021-05-12 ENCOUNTER — TELEPHONE (OUTPATIENT)
Dept: PULMONOLOGY | Facility: CLINIC | Age: 76
End: 2021-05-12

## 2021-05-14 ENCOUNTER — TELEPHONE (OUTPATIENT)
Dept: ORTHOPEDICS | Facility: CLINIC | Age: 76
End: 2021-05-14

## 2021-05-25 DIAGNOSIS — R93.89 ABNORMAL CHEST X-RAY: Primary | ICD-10-CM

## 2021-05-26 ENCOUNTER — PATIENT OUTREACH (OUTPATIENT)
Dept: ADMINISTRATIVE | Facility: OTHER | Age: 76
End: 2021-05-26

## 2021-05-28 ENCOUNTER — HOSPITAL ENCOUNTER (OUTPATIENT)
Dept: RADIOLOGY | Facility: HOSPITAL | Age: 76
Discharge: HOME | End: 2021-05-28
Attending: INTERNAL MEDICINE
Payer: MEDICARE

## 2021-05-28 ENCOUNTER — OFFICE VISIT (OUTPATIENT)
Dept: PULMONOLOGY | Facility: CLINIC | Age: 76
End: 2021-05-28
Payer: MEDICARE

## 2021-05-28 VITALS
OXYGEN SATURATION: 96 % | HEIGHT: 57 IN | DIASTOLIC BLOOD PRESSURE: 66 MMHG | SYSTOLIC BLOOD PRESSURE: 131 MMHG | BODY MASS INDEX: 33.1 KG/M2 | WEIGHT: 153.44 LBS | HEART RATE: 75 BPM

## 2021-05-28 DIAGNOSIS — R93.89 ABNORMAL CHEST X-RAY: ICD-10-CM

## 2021-05-28 DIAGNOSIS — D84.9 IMMUNOCOMPROMISED STATE: ICD-10-CM

## 2021-05-28 DIAGNOSIS — R93.89 ABNORMAL CHEST CT: ICD-10-CM

## 2021-05-28 DIAGNOSIS — J98.4 RESTRICTIVE LUNG DISEASE: ICD-10-CM

## 2021-05-28 DIAGNOSIS — R06.02 SHORTNESS OF BREATH: Primary | ICD-10-CM

## 2021-05-28 DIAGNOSIS — J44.89 ASTHMA-COPD OVERLAP SYNDROME: ICD-10-CM

## 2021-05-28 PROCEDURE — 1159F PR MEDICATION LIST DOCUMENTED IN MEDICAL RECORD: ICD-10-PCS | Mod: GC,S$GLB,, | Performed by: EMERGENCY MEDICINE

## 2021-05-28 PROCEDURE — 99499 UNLISTED E&M SERVICE: CPT | Mod: HCNC,S$GLB,, | Performed by: INTERNAL MEDICINE

## 2021-05-28 PROCEDURE — 99999 PR PBB SHADOW E&M-EST. PATIENT-LVL IV: CPT | Mod: PBBFAC,GC,, | Performed by: EMERGENCY MEDICINE

## 2021-05-28 PROCEDURE — 99999 PR PBB SHADOW E&M-EST. PATIENT-LVL IV: ICD-10-PCS | Mod: PBBFAC,GC,, | Performed by: EMERGENCY MEDICINE

## 2021-05-28 PROCEDURE — 99214 PR OFFICE/OUTPT VISIT, EST, LEVL IV, 30-39 MIN: ICD-10-PCS | Mod: GC,S$GLB,, | Performed by: EMERGENCY MEDICINE

## 2021-05-28 PROCEDURE — 99499 RISK ADDL DX/OHS AUDIT: ICD-10-PCS | Mod: HCNC,S$GLB,, | Performed by: INTERNAL MEDICINE

## 2021-05-28 PROCEDURE — 99214 OFFICE O/P EST MOD 30 MIN: CPT | Mod: GC,S$GLB,, | Performed by: EMERGENCY MEDICINE

## 2021-05-28 PROCEDURE — 71046 X-RAY EXAM CHEST 2 VIEWS: CPT | Mod: TC,FY

## 2021-05-28 PROCEDURE — 1159F MED LIST DOCD IN RCRD: CPT | Mod: GC,S$GLB,, | Performed by: EMERGENCY MEDICINE

## 2021-05-28 PROCEDURE — 71046 XR CHEST PA AND LATERAL: ICD-10-PCS | Mod: 26,,, | Performed by: RADIOLOGY

## 2021-05-28 PROCEDURE — 71046 X-RAY EXAM CHEST 2 VIEWS: CPT | Mod: 26,,, | Performed by: RADIOLOGY

## 2021-06-03 RX ORDER — FUROSEMIDE 20 MG/1
20 TABLET ORAL DAILY PRN
Qty: 30 TABLET | Refills: 11 | Status: SHIPPED | OUTPATIENT
Start: 2021-06-03 | End: 2022-02-08

## 2021-06-04 PROBLEM — J44.89 ASTHMA-COPD OVERLAP SYNDROME: Status: ACTIVE | Noted: 2021-06-04

## 2021-06-04 PROBLEM — J98.4 RESTRICTIVE LUNG DISEASE: Status: ACTIVE | Noted: 2021-06-04

## 2021-06-07 ENCOUNTER — TELEPHONE (OUTPATIENT)
Dept: ORTHOPEDICS | Facility: CLINIC | Age: 76
End: 2021-06-07

## 2021-06-08 ENCOUNTER — TELEPHONE (OUTPATIENT)
Dept: ORTHOPEDICS | Facility: CLINIC | Age: 76
End: 2021-06-08

## 2021-06-09 ENCOUNTER — TELEPHONE (OUTPATIENT)
Dept: PRIMARY CARE CLINIC | Facility: CLINIC | Age: 76
End: 2021-06-09

## 2021-06-16 ENCOUNTER — TELEPHONE (OUTPATIENT)
Dept: ORTHOPEDICS | Facility: CLINIC | Age: 76
End: 2021-06-16

## 2021-06-23 ENCOUNTER — TELEPHONE (OUTPATIENT)
Dept: PRIMARY CARE CLINIC | Facility: CLINIC | Age: 76
End: 2021-06-23

## 2021-07-09 ENCOUNTER — TELEPHONE (OUTPATIENT)
Dept: ORTHOPEDICS | Facility: CLINIC | Age: 76
End: 2021-07-09

## 2021-07-12 ENCOUNTER — TELEPHONE (OUTPATIENT)
Dept: ORTHOPEDICS | Facility: CLINIC | Age: 76
End: 2021-07-12

## 2021-07-19 ENCOUNTER — TELEPHONE (OUTPATIENT)
Dept: ORTHOPEDICS | Facility: CLINIC | Age: 76
End: 2021-07-19

## 2021-07-22 ENCOUNTER — PATIENT OUTREACH (OUTPATIENT)
Dept: ADMINISTRATIVE | Facility: OTHER | Age: 76
End: 2021-07-22

## 2021-07-23 ENCOUNTER — OFFICE VISIT (OUTPATIENT)
Dept: ORTHOPEDICS | Facility: CLINIC | Age: 76
End: 2021-07-23
Payer: MEDICARE

## 2021-07-23 ENCOUNTER — HOSPITAL ENCOUNTER (OUTPATIENT)
Dept: RADIOLOGY | Facility: HOSPITAL | Age: 76
Discharge: HOME | End: 2021-07-23
Attending: ORTHOPAEDIC SURGERY
Payer: MEDICARE

## 2021-07-23 VITALS
SYSTOLIC BLOOD PRESSURE: 132 MMHG | HEIGHT: 57 IN | BODY MASS INDEX: 33.22 KG/M2 | DIASTOLIC BLOOD PRESSURE: 79 MMHG | HEART RATE: 91 BPM | WEIGHT: 154 LBS

## 2021-07-23 DIAGNOSIS — T84.038A: ICD-10-CM

## 2021-07-23 DIAGNOSIS — Z96.629: ICD-10-CM

## 2021-07-23 DIAGNOSIS — M85.89 OSTEOPENIA OF MULTIPLE SITES: ICD-10-CM

## 2021-07-23 DIAGNOSIS — Z96.621 ELBOW JOINT REPLACEMENT STATUS, RIGHT: ICD-10-CM

## 2021-07-23 DIAGNOSIS — M06.9 RHEUMATOID ARTHRITIS INVOLVING MULTIPLE SITES, UNSPECIFIED WHETHER RHEUMATOID FACTOR PRESENT: ICD-10-CM

## 2021-07-23 DIAGNOSIS — Z96.629: Primary | ICD-10-CM

## 2021-07-23 DIAGNOSIS — T84.038A: Primary | ICD-10-CM

## 2021-07-23 PROCEDURE — 73080 XR ELBOW COMPLETE 3 VIEW RIGHT: ICD-10-PCS | Mod: 26,RT,, | Performed by: RADIOLOGY

## 2021-07-23 PROCEDURE — 73080 X-RAY EXAM OF ELBOW: CPT | Mod: TC,RT

## 2021-07-23 PROCEDURE — 99999 PR PBB SHADOW E&M-EST. PATIENT-LVL IV: CPT | Mod: PBBFAC,,, | Performed by: ORTHOPAEDIC SURGERY

## 2021-07-23 PROCEDURE — 99215 OFFICE O/P EST HI 40 MIN: CPT | Mod: S$GLB,,, | Performed by: ORTHOPAEDIC SURGERY

## 2021-07-23 PROCEDURE — 73090 X-RAY EXAM OF FOREARM: CPT | Mod: TC,RT

## 2021-07-23 PROCEDURE — 73110 X-RAY EXAM OF WRIST: CPT | Mod: TC,RT

## 2021-07-23 PROCEDURE — 73060 XR HUMERUS 2 VIEW RIGHT: ICD-10-PCS | Mod: 26,RT,, | Performed by: RADIOLOGY

## 2021-07-23 PROCEDURE — 1159F PR MEDICATION LIST DOCUMENTED IN MEDICAL RECORD: ICD-10-PCS | Mod: CPTII,S$GLB,, | Performed by: ORTHOPAEDIC SURGERY

## 2021-07-23 PROCEDURE — 73060 X-RAY EXAM OF HUMERUS: CPT | Mod: TC,RT

## 2021-07-23 PROCEDURE — 3078F DIAST BP <80 MM HG: CPT | Mod: CPTII,S$GLB,, | Performed by: ORTHOPAEDIC SURGERY

## 2021-07-23 PROCEDURE — 3075F PR MOST RECENT SYSTOLIC BLOOD PRESS GE 130-139MM HG: ICD-10-PCS | Mod: CPTII,S$GLB,, | Performed by: ORTHOPAEDIC SURGERY

## 2021-07-23 PROCEDURE — 1125F AMNT PAIN NOTED PAIN PRSNT: CPT | Mod: CPTII,S$GLB,, | Performed by: ORTHOPAEDIC SURGERY

## 2021-07-23 PROCEDURE — 99999 PR PBB SHADOW E&M-EST. PATIENT-LVL IV: ICD-10-PCS | Mod: PBBFAC,,, | Performed by: ORTHOPAEDIC SURGERY

## 2021-07-23 PROCEDURE — 73110 X-RAY EXAM OF WRIST: CPT | Mod: 26,RT,, | Performed by: RADIOLOGY

## 2021-07-23 PROCEDURE — 1159F MED LIST DOCD IN RCRD: CPT | Mod: CPTII,S$GLB,, | Performed by: ORTHOPAEDIC SURGERY

## 2021-07-23 PROCEDURE — 73060 X-RAY EXAM OF HUMERUS: CPT | Mod: 26,RT,, | Performed by: RADIOLOGY

## 2021-07-23 PROCEDURE — 73090 X-RAY EXAM OF FOREARM: CPT | Mod: 26,RT,, | Performed by: RADIOLOGY

## 2021-07-23 PROCEDURE — 3078F PR MOST RECENT DIASTOLIC BLOOD PRESSURE < 80 MM HG: ICD-10-PCS | Mod: CPTII,S$GLB,, | Performed by: ORTHOPAEDIC SURGERY

## 2021-07-23 PROCEDURE — 1125F PR PAIN SEVERITY QUANTIFIED, PAIN PRESENT: ICD-10-PCS | Mod: CPTII,S$GLB,, | Performed by: ORTHOPAEDIC SURGERY

## 2021-07-23 PROCEDURE — 73090 XR FOREARM RIGHT: ICD-10-PCS | Mod: 26,RT,, | Performed by: RADIOLOGY

## 2021-07-23 PROCEDURE — 3075F SYST BP GE 130 - 139MM HG: CPT | Mod: CPTII,S$GLB,, | Performed by: ORTHOPAEDIC SURGERY

## 2021-07-23 PROCEDURE — 73110 XR WRIST COMPLETE 3 VIEWS RIGHT: ICD-10-PCS | Mod: 26,RT,, | Performed by: RADIOLOGY

## 2021-07-23 PROCEDURE — 73080 X-RAY EXAM OF ELBOW: CPT | Mod: 26,RT,, | Performed by: RADIOLOGY

## 2021-07-23 PROCEDURE — 99215 PR OFFICE/OUTPT VISIT, EST, LEVL V, 40-54 MIN: ICD-10-PCS | Mod: S$GLB,,, | Performed by: ORTHOPAEDIC SURGERY

## 2021-07-30 ENCOUNTER — TELEPHONE (OUTPATIENT)
Dept: ORTHOPEDICS | Facility: CLINIC | Age: 76
End: 2021-07-30

## 2021-07-30 DIAGNOSIS — Z96.629: Primary | ICD-10-CM

## 2021-07-30 DIAGNOSIS — T84.038A: Primary | ICD-10-CM

## 2021-08-09 ENCOUNTER — TELEPHONE (OUTPATIENT)
Dept: ORTHOPEDICS | Facility: CLINIC | Age: 76
End: 2021-08-09

## 2021-08-09 DIAGNOSIS — Z01.818 PRE-OP TESTING: ICD-10-CM

## 2021-08-10 ENCOUNTER — TELEPHONE (OUTPATIENT)
Dept: ORTHOPEDICS | Facility: CLINIC | Age: 76
End: 2021-08-10

## 2021-08-12 ENCOUNTER — TELEPHONE (OUTPATIENT)
Dept: ORTHOPEDICS | Facility: CLINIC | Age: 76
End: 2021-08-12

## 2021-08-13 ENCOUNTER — TELEPHONE (OUTPATIENT)
Dept: ORTHOPEDICS | Facility: CLINIC | Age: 76
End: 2021-08-13

## 2021-08-14 ENCOUNTER — LAB VISIT (OUTPATIENT)
Dept: SPORTS MEDICINE | Facility: CLINIC | Age: 76
End: 2021-08-14
Payer: MEDICARE

## 2021-08-14 DIAGNOSIS — Z01.818 PRE-OP TESTING: ICD-10-CM

## 2021-08-14 PROCEDURE — U0005 INFEC AGEN DETEC AMPLI PROBE: HCPCS | Performed by: ORTHOPAEDIC SURGERY

## 2021-08-14 PROCEDURE — U0003 INFECTIOUS AGENT DETECTION BY NUCLEIC ACID (DNA OR RNA); SEVERE ACUTE RESPIRATORY SYNDROME CORONAVIRUS 2 (SARS-COV-2) (CORONAVIRUS DISEASE [COVID-19]), AMPLIFIED PROBE TECHNIQUE, MAKING USE OF HIGH THROUGHPUT TECHNOLOGIES AS DESCRIBED BY CMS-2020-01-R: HCPCS | Performed by: ORTHOPAEDIC SURGERY

## 2021-08-15 LAB
SARS-COV-2 RNA RESP QL NAA+PROBE: NOT DETECTED
SARS-COV-2- CYCLE NUMBER: -1

## 2021-08-16 ENCOUNTER — TELEPHONE (OUTPATIENT)
Dept: ORTHOPEDICS | Facility: CLINIC | Age: 76
End: 2021-08-16

## 2021-08-16 ENCOUNTER — ANESTHESIA EVENT (OUTPATIENT)
Dept: SURGERY | Facility: HOSPITAL | Age: 76
End: 2021-08-16
Payer: MEDICARE

## 2021-08-17 ENCOUNTER — HOSPITAL ENCOUNTER (OUTPATIENT)
Facility: HOSPITAL | Age: 76
Discharge: HOME | End: 2021-08-17
Attending: ORTHOPAEDIC SURGERY | Admitting: ORTHOPAEDIC SURGERY
Payer: MEDICARE

## 2021-08-17 ENCOUNTER — ANESTHESIA (OUTPATIENT)
Dept: SURGERY | Facility: HOSPITAL | Age: 76
End: 2021-08-17
Payer: MEDICARE

## 2021-08-17 VITALS
SYSTOLIC BLOOD PRESSURE: 130 MMHG | WEIGHT: 156 LBS | OXYGEN SATURATION: 97 % | RESPIRATION RATE: 16 BRPM | HEIGHT: 57 IN | HEART RATE: 71 BPM | TEMPERATURE: 98 F | DIASTOLIC BLOOD PRESSURE: 61 MMHG | BODY MASS INDEX: 33.66 KG/M2

## 2021-08-17 DIAGNOSIS — Z96.629 PERIPROSTHETIC FRACTURE AROUND INTERNAL PROSTHETIC ELBOW JOINT, INITIAL ENCOUNTER: ICD-10-CM

## 2021-08-17 DIAGNOSIS — M19.021 ARTHRITIS OF RIGHT ELBOW: ICD-10-CM

## 2021-08-17 DIAGNOSIS — M06.9 RHEUMATOID ARTHRITIS OF RIGHT ELBOW, UNSPECIFIED WHETHER RHEUMATOID FACTOR PRESENT: ICD-10-CM

## 2021-08-17 DIAGNOSIS — M97.8XXA PERIPROSTHETIC FRACTURE AROUND INTERNAL PROSTHETIC ELBOW JOINT, INITIAL ENCOUNTER: ICD-10-CM

## 2021-08-17 PROCEDURE — 25150 PARTIAL REMOVAL OF ULNA: CPT | Mod: 51,RT,, | Performed by: ORTHOPAEDIC SURGERY

## 2021-08-17 PROCEDURE — 63600175 PHARM REV CODE 636 W HCPCS: Performed by: STUDENT IN AN ORGANIZED HEALTH CARE EDUCATION/TRAINING PROGRAM

## 2021-08-17 PROCEDURE — 25000003 PHARM REV CODE 250: Performed by: STUDENT IN AN ORGANIZED HEALTH CARE EDUCATION/TRAINING PROGRAM

## 2021-08-17 PROCEDURE — 24370 REVISE RECONST ELBOW JOINT: CPT | Mod: RT,,, | Performed by: ORTHOPAEDIC SURGERY

## 2021-08-17 PROCEDURE — 88305 TISSUE EXAM BY PATHOLOGIST: CPT | Mod: 26,,, | Performed by: PATHOLOGY

## 2021-08-17 PROCEDURE — 94761 N-INVAS EAR/PLS OXIMETRY MLT: CPT

## 2021-08-17 PROCEDURE — 76942 SUPRACLAVICULAR NERVE BLOCK CATHETER: ICD-10-PCS | Mod: 26,,, | Performed by: STUDENT IN AN ORGANIZED HEALTH CARE EDUCATION/TRAINING PROGRAM

## 2021-08-17 PROCEDURE — 64416 NJX AA&/STRD BRCH PL NFS IMG: CPT | Mod: 59,RT,, | Performed by: STUDENT IN AN ORGANIZED HEALTH CARE EDUCATION/TRAINING PROGRAM

## 2021-08-17 PROCEDURE — 25000003 PHARM REV CODE 250: Performed by: ORTHOPAEDIC SURGERY

## 2021-08-17 PROCEDURE — 27800903 OPTIME MED/SURG SUP & DEVICES OTHER IMPLANTS: Performed by: ORTHOPAEDIC SURGERY

## 2021-08-17 PROCEDURE — 37000009 HC ANESTHESIA EA ADD 15 MINS: Performed by: ORTHOPAEDIC SURGERY

## 2021-08-17 PROCEDURE — 88311 DECALCIFY TISSUE: CPT | Performed by: PATHOLOGY

## 2021-08-17 PROCEDURE — 64416 NJX AA&/STRD BRCH PL NFS IMG: CPT | Performed by: STUDENT IN AN ORGANIZED HEALTH CARE EDUCATION/TRAINING PROGRAM

## 2021-08-17 PROCEDURE — D9220A PRA ANESTHESIA: Mod: ,,, | Performed by: STUDENT IN AN ORGANIZED HEALTH CARE EDUCATION/TRAINING PROGRAM

## 2021-08-17 PROCEDURE — 24370: ICD-10-PCS | Mod: RT,,, | Performed by: ORTHOPAEDIC SURGERY

## 2021-08-17 PROCEDURE — 64416 SUPRACLAVICULAR NERVE BLOCK CATHETER: ICD-10-PCS | Mod: 59,RT,, | Performed by: STUDENT IN AN ORGANIZED HEALTH CARE EDUCATION/TRAINING PROGRAM

## 2021-08-17 PROCEDURE — 27201423 OPTIME MED/SURG SUP & DEVICES STERILE SUPPLY: Performed by: ORTHOPAEDIC SURGERY

## 2021-08-17 PROCEDURE — 88311 DECALCIFY TISSUE: CPT | Mod: 26,,, | Performed by: PATHOLOGY

## 2021-08-17 PROCEDURE — 88311 PR  DECALCIFY TISSUE: ICD-10-PCS | Mod: 26,,, | Performed by: PATHOLOGY

## 2021-08-17 PROCEDURE — 36000711: Performed by: ORTHOPAEDIC SURGERY

## 2021-08-17 PROCEDURE — 25000242 PHARM REV CODE 250 ALT 637 W/ HCPCS: Performed by: STUDENT IN AN ORGANIZED HEALTH CARE EDUCATION/TRAINING PROGRAM

## 2021-08-17 PROCEDURE — 25545 OPTX ULNAR SHFT FX INT FIXJ: CPT | Mod: 51,RT,, | Performed by: ORTHOPAEDIC SURGERY

## 2021-08-17 PROCEDURE — 71000033 HC RECOVERY, INTIAL HOUR: Performed by: ORTHOPAEDIC SURGERY

## 2021-08-17 PROCEDURE — C1713 ANCHOR/SCREW BN/BN,TIS/BN: HCPCS | Performed by: ORTHOPAEDIC SURGERY

## 2021-08-17 PROCEDURE — 25150: ICD-10-PCS | Mod: 51,RT,, | Performed by: ORTHOPAEDIC SURGERY

## 2021-08-17 PROCEDURE — 88305 TISSUE EXAM BY PATHOLOGIST: CPT | Performed by: PATHOLOGY

## 2021-08-17 PROCEDURE — 88305 TISSUE EXAM BY PATHOLOGIST: ICD-10-PCS | Mod: 26,,, | Performed by: PATHOLOGY

## 2021-08-17 PROCEDURE — 36000710: Performed by: ORTHOPAEDIC SURGERY

## 2021-08-17 PROCEDURE — C1751 CATH, INF, PER/CENT/MIDLINE: HCPCS | Performed by: STUDENT IN AN ORGANIZED HEALTH CARE EDUCATION/TRAINING PROGRAM

## 2021-08-17 PROCEDURE — 71000015 HC POSTOP RECOV 1ST HR: Performed by: ORTHOPAEDIC SURGERY

## 2021-08-17 PROCEDURE — 76942 ECHO GUIDE FOR BIOPSY: CPT | Mod: 26,,, | Performed by: STUDENT IN AN ORGANIZED HEALTH CARE EDUCATION/TRAINING PROGRAM

## 2021-08-17 PROCEDURE — 37000008 HC ANESTHESIA 1ST 15 MINUTES: Performed by: ORTHOPAEDIC SURGERY

## 2021-08-17 PROCEDURE — 25545 PR OPEN TREATMENT OF ULNAR SHAFT FRACTURE: ICD-10-PCS | Mod: 51,RT,, | Performed by: ORTHOPAEDIC SURGERY

## 2021-08-17 PROCEDURE — D9220A PRA ANESTHESIA: ICD-10-PCS | Mod: ,,, | Performed by: STUDENT IN AN ORGANIZED HEALTH CARE EDUCATION/TRAINING PROGRAM

## 2021-08-17 DEVICE — IMPLANTABLE DEVICE: Type: IMPLANTABLE DEVICE | Site: ELBOW | Status: FUNCTIONAL

## 2021-08-17 DEVICE — SCREW BONE LOCK T8 2.7X12MM: Type: IMPLANTABLE DEVICE | Site: ELBOW | Status: FUNCTIONAL

## 2021-08-17 DEVICE — SCREW BONE NON LOCK 3.5X18MM: Type: IMPLANTABLE DEVICE | Site: ELBOW | Status: FUNCTIONAL

## 2021-08-17 DEVICE — SCREW BONE LOCK T10 3.5X10MM: Type: IMPLANTABLE DEVICE | Site: ELBOW | Status: FUNCTIONAL

## 2021-08-17 DEVICE — SCREW VARIAX LOCKING 2.7X10MM: Type: IMPLANTABLE DEVICE | Site: ELBOW | Status: FUNCTIONAL

## 2021-08-17 DEVICE — SCREW BONE LOCK T10 3.5X12MM: Type: IMPLANTABLE DEVICE | Site: ELBOW | Status: FUNCTIONAL

## 2021-08-17 DEVICE — SCREW TITANIUM NONLOK 3.5X10MM: Type: IMPLANTABLE DEVICE | Site: ELBOW | Status: FUNCTIONAL

## 2021-08-17 DEVICE — CEMENT BONE WHOLE BATCH: Type: IMPLANTABLE DEVICE | Site: ELBOW | Status: FUNCTIONAL

## 2021-08-17 RX ORDER — ACETAMINOPHEN 325 MG/1
650 TABLET ORAL EVERY 8 HOURS PRN
Status: CANCELLED | OUTPATIENT
Start: 2021-08-17

## 2021-08-17 RX ORDER — FENTANYL CITRATE 50 UG/ML
25 INJECTION, SOLUTION INTRAMUSCULAR; INTRAVENOUS EVERY 5 MIN PRN
Status: DISCONTINUED | OUTPATIENT
Start: 2021-08-17 | End: 2021-08-17 | Stop reason: HOSPADM

## 2021-08-17 RX ORDER — PROPOFOL 10 MG/ML
VIAL (ML) INTRAVENOUS
Status: DISCONTINUED | OUTPATIENT
Start: 2021-08-17 | End: 2021-08-17

## 2021-08-17 RX ORDER — OXYCODONE HYDROCHLORIDE 5 MG/1
5 TABLET ORAL EVERY 4 HOURS PRN
Status: CANCELLED | OUTPATIENT
Start: 2021-08-17

## 2021-08-17 RX ORDER — CEFAZOLIN SODIUM 1 G/3ML
INJECTION, POWDER, FOR SOLUTION INTRAMUSCULAR; INTRAVENOUS
Status: DISCONTINUED | OUTPATIENT
Start: 2021-08-17 | End: 2021-08-17

## 2021-08-17 RX ORDER — LIDOCAINE HYDROCHLORIDE 10 MG/ML
1 INJECTION, SOLUTION EPIDURAL; INFILTRATION; INTRACAUDAL; PERINEURAL ONCE
Status: CANCELLED | OUTPATIENT
Start: 2021-08-17 | End: 2021-08-17

## 2021-08-17 RX ORDER — ALBUTEROL SULFATE 90 UG/1
AEROSOL, METERED RESPIRATORY (INHALATION)
Status: DISCONTINUED | OUTPATIENT
Start: 2021-08-17 | End: 2021-08-17

## 2021-08-17 RX ORDER — FENTANYL CITRATE 50 UG/ML
INJECTION, SOLUTION INTRAMUSCULAR; INTRAVENOUS
Status: DISCONTINUED | OUTPATIENT
Start: 2021-08-17 | End: 2021-08-17

## 2021-08-17 RX ORDER — CEFAZOLIN SODIUM 1 G/3ML
2 INJECTION, POWDER, FOR SOLUTION INTRAMUSCULAR; INTRAVENOUS
Status: CANCELLED | OUTPATIENT
Start: 2021-08-17

## 2021-08-17 RX ORDER — LIDOCAINE HYDROCHLORIDE 10 MG/ML
2 INJECTION INFILTRATION; PERINEURAL ONCE
Status: COMPLETED | OUTPATIENT
Start: 2021-08-17 | End: 2021-08-17

## 2021-08-17 RX ORDER — HALOPERIDOL 5 MG/ML
0.5 INJECTION INTRAMUSCULAR EVERY 10 MIN PRN
Status: DISCONTINUED | OUTPATIENT
Start: 2021-08-17 | End: 2021-08-17 | Stop reason: HOSPADM

## 2021-08-17 RX ORDER — DRONABINOL 2.5 MG/1
2.5 CAPSULE ORAL ONCE
Status: DISCONTINUED | OUTPATIENT
Start: 2021-08-17 | End: 2021-08-17 | Stop reason: HOSPADM

## 2021-08-17 RX ORDER — ACETAMINOPHEN 500 MG
1000 TABLET ORAL EVERY 6 HOURS
Status: DISCONTINUED | OUTPATIENT
Start: 2021-08-17 | End: 2021-08-17 | Stop reason: HOSPADM

## 2021-08-17 RX ORDER — MIDAZOLAM HYDROCHLORIDE 1 MG/ML
2 INJECTION INTRAMUSCULAR; INTRAVENOUS
Status: DISCONTINUED | OUTPATIENT
Start: 2021-08-17 | End: 2021-08-17 | Stop reason: HOSPADM

## 2021-08-17 RX ORDER — ACETAMINOPHEN 325 MG/1
650 TABLET ORAL EVERY 4 HOURS PRN
Status: CANCELLED | OUTPATIENT
Start: 2021-08-17

## 2021-08-17 RX ORDER — TRANEXAMIC ACID 100 MG/ML
INJECTION, SOLUTION INTRAVENOUS
Status: DISCONTINUED | OUTPATIENT
Start: 2021-08-17 | End: 2021-08-17

## 2021-08-17 RX ORDER — ROPIVACAINE HYDROCHLORIDE 2 MG/ML
INJECTION, SOLUTION EPIDURAL; INFILTRATION; PERINEURAL CONTINUOUS
Status: DISCONTINUED | OUTPATIENT
Start: 2021-08-17 | End: 2021-08-17 | Stop reason: HOSPADM

## 2021-08-17 RX ORDER — TALC
6 POWDER (GRAM) TOPICAL NIGHTLY PRN
Status: CANCELLED | OUTPATIENT
Start: 2021-08-17

## 2021-08-17 RX ORDER — CELECOXIB 200 MG/1
200 CAPSULE ORAL DAILY
Status: DISCONTINUED | OUTPATIENT
Start: 2021-08-18 | End: 2021-08-17

## 2021-08-17 RX ORDER — SODIUM CHLORIDE 0.9 % (FLUSH) 0.9 %
10 SYRINGE (ML) INJECTION
Status: CANCELLED | OUTPATIENT
Start: 2021-08-17

## 2021-08-17 RX ORDER — ROPIVACAINE HYDROCHLORIDE 2 MG/ML
INJECTION, SOLUTION EPIDURAL; INFILTRATION; PERINEURAL
Status: DISCONTINUED
Start: 2021-08-17 | End: 2021-08-17 | Stop reason: HOSPADM

## 2021-08-17 RX ORDER — BUPIVACAINE HYDROCHLORIDE AND EPINEPHRINE 2.5; 5 MG/ML; UG/ML
INJECTION, SOLUTION EPIDURAL; INFILTRATION; INTRACAUDAL; PERINEURAL
Status: COMPLETED | OUTPATIENT
Start: 2021-08-17 | End: 2021-08-17

## 2021-08-17 RX ORDER — OXYCODONE HYDROCHLORIDE 5 MG/1
5 TABLET ORAL EVERY 4 HOURS PRN
Qty: 28 TABLET | Refills: 0 | Status: SHIPPED | OUTPATIENT
Start: 2021-08-17 | End: 2021-08-17 | Stop reason: SDUPTHER

## 2021-08-17 RX ORDER — SODIUM CHLORIDE 9 MG/ML
INJECTION, SOLUTION INTRAVENOUS CONTINUOUS
Status: DISCONTINUED | OUTPATIENT
Start: 2021-08-17 | End: 2021-08-17 | Stop reason: HOSPADM

## 2021-08-17 RX ORDER — MUPIROCIN 20 MG/G
OINTMENT TOPICAL
Status: CANCELLED | OUTPATIENT
Start: 2021-08-17

## 2021-08-17 RX ORDER — ROCURONIUM BROMIDE 10 MG/ML
INJECTION, SOLUTION INTRAVENOUS
Status: DISCONTINUED | OUTPATIENT
Start: 2021-08-17 | End: 2021-08-17

## 2021-08-17 RX ORDER — CELECOXIB 200 MG/1
400 CAPSULE ORAL ONCE
Status: DISCONTINUED | OUTPATIENT
Start: 2021-08-17 | End: 2021-08-17

## 2021-08-17 RX ORDER — FAMOTIDINE 10 MG/ML
INJECTION INTRAVENOUS
Status: DISCONTINUED | OUTPATIENT
Start: 2021-08-17 | End: 2021-08-17

## 2021-08-17 RX ORDER — ONDANSETRON 2 MG/ML
INJECTION INTRAMUSCULAR; INTRAVENOUS
Status: DISCONTINUED | OUTPATIENT
Start: 2021-08-17 | End: 2021-08-17

## 2021-08-17 RX ORDER — LIDOCAINE HYDROCHLORIDE 20 MG/ML
INJECTION, SOLUTION EPIDURAL; INFILTRATION; INTRACAUDAL; PERINEURAL
Status: DISCONTINUED | OUTPATIENT
Start: 2021-08-17 | End: 2021-08-17

## 2021-08-17 RX ORDER — ONDANSETRON 8 MG/1
8 TABLET, ORALLY DISINTEGRATING ORAL EVERY 8 HOURS PRN
Status: CANCELLED | OUTPATIENT
Start: 2021-08-17

## 2021-08-17 RX ORDER — NEOSTIGMINE METHYLSULFATE 0.5 MG/ML
INJECTION, SOLUTION INTRAVENOUS
Status: DISCONTINUED | OUTPATIENT
Start: 2021-08-17 | End: 2021-08-17

## 2021-08-17 RX ORDER — TRANEXAMIC ACID 100 MG/ML
1000 INJECTION, SOLUTION INTRAVENOUS
Status: CANCELLED | OUTPATIENT
Start: 2021-08-17

## 2021-08-17 RX ORDER — DEXAMETHASONE SODIUM PHOSPHATE 4 MG/ML
INJECTION, SOLUTION INTRA-ARTICULAR; INTRALESIONAL; INTRAMUSCULAR; INTRAVENOUS; SOFT TISSUE
Status: DISCONTINUED | OUTPATIENT
Start: 2021-08-17 | End: 2021-08-17

## 2021-08-17 RX ORDER — SODIUM CHLORIDE 0.9 % (FLUSH) 0.9 %
10 SYRINGE (ML) INJECTION
Status: DISCONTINUED | OUTPATIENT
Start: 2021-08-17 | End: 2021-08-17 | Stop reason: HOSPADM

## 2021-08-17 RX ORDER — OXYCODONE HYDROCHLORIDE 5 MG/1
5 TABLET ORAL EVERY 4 HOURS PRN
Qty: 28 TABLET | Refills: 0 | Status: SHIPPED | OUTPATIENT
Start: 2021-08-17 | End: 2021-09-10 | Stop reason: SDUPTHER

## 2021-08-17 RX ORDER — FENTANYL CITRATE 50 UG/ML
100 INJECTION, SOLUTION INTRAMUSCULAR; INTRAVENOUS EVERY 5 MIN PRN
Status: COMPLETED | OUTPATIENT
Start: 2021-08-17 | End: 2021-08-17

## 2021-08-17 RX ADMIN — FAMOTIDINE 20 MG: 10 INJECTION INTRAVENOUS at 12:08

## 2021-08-17 RX ADMIN — ALBUTEROL SULFATE 6 PUFF: 108 INHALANT RESPIRATORY (INHALATION) at 12:08

## 2021-08-17 RX ADMIN — PROPOFOL 110 MG: 10 INJECTION, EMULSION INTRAVENOUS at 07:08

## 2021-08-17 RX ADMIN — ROCURONIUM BROMIDE 5 MG: 10 INJECTION INTRAVENOUS at 08:08

## 2021-08-17 RX ADMIN — FENTANYL CITRATE 25 MCG: 50 INJECTION INTRAMUSCULAR; INTRAVENOUS at 12:08

## 2021-08-17 RX ADMIN — LIDOCAINE HYDROCHLORIDE 60 MG: 20 INJECTION, SOLUTION EPIDURAL; INFILTRATION; INTRACAUDAL at 07:08

## 2021-08-17 RX ADMIN — LIDOCAINE HYDROCHLORIDE 2 MG: 10 INJECTION, SOLUTION EPIDURAL; INFILTRATION; INTRACAUDAL at 06:08

## 2021-08-17 RX ADMIN — NEOSTIGMINE METHYLSULFATE 2 MG: 0.5 INJECTION INTRAVENOUS at 12:08

## 2021-08-17 RX ADMIN — PROPOFOL 20 MG: 10 INJECTION, EMULSION INTRAVENOUS at 07:08

## 2021-08-17 RX ADMIN — GLYCOPYRROLATE 0.4 MG: 0.2 INJECTION, SOLUTION INTRAMUSCULAR; INTRAVITREAL at 12:08

## 2021-08-17 RX ADMIN — ALBUTEROL SULFATE 2 PUFF: 108 INHALANT RESPIRATORY (INHALATION) at 07:08

## 2021-08-17 RX ADMIN — TRANEXAMIC ACID 1000 MG: 1 INJECTION, SOLUTION INTRAVENOUS at 08:08

## 2021-08-17 RX ADMIN — ROCURONIUM BROMIDE 45 MG: 10 INJECTION INTRAVENOUS at 07:08

## 2021-08-17 RX ADMIN — SODIUM CHLORIDE: 0.9 INJECTION, SOLUTION INTRAVENOUS at 06:08

## 2021-08-17 RX ADMIN — DEXAMETHASONE SODIUM PHOSPHATE 4 MG: 4 INJECTION INTRA-ARTICULAR; INTRALESIONAL; INTRAMUSCULAR; INTRAVENOUS; SOFT TISSUE at 08:08

## 2021-08-17 RX ADMIN — FENTANYL CITRATE 50 MCG: 50 INJECTION INTRAMUSCULAR; INTRAVENOUS at 06:08

## 2021-08-17 RX ADMIN — Medication: at 02:08

## 2021-08-17 RX ADMIN — MIDAZOLAM 1 MG: 1 INJECTION INTRAMUSCULAR; INTRAVENOUS at 06:08

## 2021-08-17 RX ADMIN — CEFAZOLIN 2 G: 330 INJECTION, POWDER, FOR SOLUTION INTRAMUSCULAR; INTRAVENOUS at 07:08

## 2021-08-17 RX ADMIN — PREGABALIN 75 MG: 50 CAPSULE ORAL at 02:08

## 2021-08-17 RX ADMIN — PROPOFOL 10 MG: 10 INJECTION, EMULSION INTRAVENOUS at 12:08

## 2021-08-17 RX ADMIN — BUPIVACAINE HYDROCHLORIDE AND EPINEPHRINE BITARTRATE 25 ML: 2.5; .0091 INJECTION, SOLUTION EPIDURAL; INFILTRATION; INTRACAUDAL; PERINEURAL at 06:08

## 2021-08-17 RX ADMIN — ONDANSETRON 4 MG: 2 INJECTION INTRAMUSCULAR; INTRAVENOUS at 12:08

## 2021-08-17 RX ADMIN — FENTANYL CITRATE 50 MCG: 50 INJECTION INTRAMUSCULAR; INTRAVENOUS at 07:08

## 2021-08-17 RX ADMIN — TRANEXAMIC ACID 1000 MG: 1 INJECTION, SOLUTION INTRAVENOUS at 12:08

## 2021-08-17 RX ADMIN — ACETAMINOPHEN 1000 MG: 500 TABLET ORAL at 01:08

## 2021-08-17 RX ADMIN — FENTANYL CITRATE 25 MCG: 50 INJECTION INTRAMUSCULAR; INTRAVENOUS at 06:08

## 2021-08-19 ENCOUNTER — TELEPHONE (OUTPATIENT)
Dept: ORTHOPEDICS | Facility: CLINIC | Age: 76
End: 2021-08-19

## 2021-08-20 ENCOUNTER — TELEPHONE (OUTPATIENT)
Dept: ORTHOPEDICS | Facility: CLINIC | Age: 76
End: 2021-08-20

## 2021-08-20 LAB
FINAL PATHOLOGIC DIAGNOSIS: NORMAL
GROSS: NORMAL
Lab: NORMAL

## 2021-08-23 ENCOUNTER — TELEPHONE (OUTPATIENT)
Dept: ORTHOPEDICS | Facility: CLINIC | Age: 76
End: 2021-08-23

## 2021-08-27 ENCOUNTER — OFFICE VISIT (OUTPATIENT)
Dept: ORTHOPEDICS | Facility: CLINIC | Age: 76
End: 2021-08-27
Payer: MEDICARE

## 2021-08-27 VITALS
HEIGHT: 57 IN | TEMPERATURE: 98 F | SYSTOLIC BLOOD PRESSURE: 144 MMHG | HEART RATE: 84 BPM | DIASTOLIC BLOOD PRESSURE: 69 MMHG | WEIGHT: 153.88 LBS | BODY MASS INDEX: 33.2 KG/M2

## 2021-08-27 DIAGNOSIS — Z96.621 ELBOW JOINT REPLACEMENT STATUS, RIGHT: Primary | ICD-10-CM

## 2021-08-27 PROCEDURE — 3077F PR MOST RECENT SYSTOLIC BLOOD PRESSURE >= 140 MM HG: ICD-10-PCS | Mod: HCNC,CPTII,S$GLB, | Performed by: ORTHOPAEDIC SURGERY

## 2021-08-27 PROCEDURE — 99999 PR PBB SHADOW E&M-EST. PATIENT-LVL IV: ICD-10-PCS | Mod: PBBFAC,HCNC,, | Performed by: ORTHOPAEDIC SURGERY

## 2021-08-27 PROCEDURE — 3078F PR MOST RECENT DIASTOLIC BLOOD PRESSURE < 80 MM HG: ICD-10-PCS | Mod: HCNC,CPTII,S$GLB, | Performed by: ORTHOPAEDIC SURGERY

## 2021-08-27 PROCEDURE — 1159F PR MEDICATION LIST DOCUMENTED IN MEDICAL RECORD: ICD-10-PCS | Mod: HCNC,CPTII,S$GLB, | Performed by: ORTHOPAEDIC SURGERY

## 2021-08-27 PROCEDURE — 99024 POSTOP FOLLOW-UP VISIT: CPT | Mod: HCNC,S$GLB,, | Performed by: ORTHOPAEDIC SURGERY

## 2021-08-27 PROCEDURE — 1159F MED LIST DOCD IN RCRD: CPT | Mod: HCNC,CPTII,S$GLB, | Performed by: ORTHOPAEDIC SURGERY

## 2021-08-27 PROCEDURE — 3077F SYST BP >= 140 MM HG: CPT | Mod: HCNC,CPTII,S$GLB, | Performed by: ORTHOPAEDIC SURGERY

## 2021-08-27 PROCEDURE — 99999 PR PBB SHADOW E&M-EST. PATIENT-LVL IV: CPT | Mod: PBBFAC,HCNC,, | Performed by: ORTHOPAEDIC SURGERY

## 2021-08-27 PROCEDURE — 99024 PR POST-OP FOLLOW-UP VISIT: ICD-10-PCS | Mod: HCNC,S$GLB,, | Performed by: ORTHOPAEDIC SURGERY

## 2021-08-27 PROCEDURE — 1125F AMNT PAIN NOTED PAIN PRSNT: CPT | Mod: HCNC,CPTII,S$GLB, | Performed by: ORTHOPAEDIC SURGERY

## 2021-08-27 PROCEDURE — 3078F DIAST BP <80 MM HG: CPT | Mod: HCNC,CPTII,S$GLB, | Performed by: ORTHOPAEDIC SURGERY

## 2021-08-27 PROCEDURE — 1125F PR PAIN SEVERITY QUANTIFIED, PAIN PRESENT: ICD-10-PCS | Mod: HCNC,CPTII,S$GLB, | Performed by: ORTHOPAEDIC SURGERY

## 2021-08-27 RX ORDER — PREDNISONE 2.5 MG/1
TABLET ORAL
COMMUNITY
Start: 2021-08-09 | End: 2023-06-15 | Stop reason: SDUPTHER

## 2021-09-03 ENCOUNTER — TELEPHONE (OUTPATIENT)
Dept: PRIMARY CARE CLINIC | Facility: CLINIC | Age: 76
End: 2021-09-03

## 2021-09-09 ENCOUNTER — TELEPHONE (OUTPATIENT)
Dept: ORTHOPEDICS | Facility: CLINIC | Age: 76
End: 2021-09-09

## 2021-09-10 ENCOUNTER — HOSPITAL ENCOUNTER (OUTPATIENT)
Dept: RADIOLOGY | Facility: HOSPITAL | Age: 76
Discharge: HOME | End: 2021-09-10
Attending: STUDENT IN AN ORGANIZED HEALTH CARE EDUCATION/TRAINING PROGRAM
Payer: MEDICARE

## 2021-09-10 ENCOUNTER — OFFICE VISIT (OUTPATIENT)
Dept: ORTHOPEDICS | Facility: CLINIC | Age: 76
End: 2021-09-10
Payer: MEDICARE

## 2021-09-10 VITALS
DIASTOLIC BLOOD PRESSURE: 77 MMHG | SYSTOLIC BLOOD PRESSURE: 129 MMHG | TEMPERATURE: 97 F | HEART RATE: 70 BPM | BODY MASS INDEX: 33.47 KG/M2 | WEIGHT: 154.63 LBS

## 2021-09-10 DIAGNOSIS — M06.9 RHEUMATOID ARTHRITIS INVOLVING MULTIPLE SITES, UNSPECIFIED WHETHER RHEUMATOID FACTOR PRESENT: ICD-10-CM

## 2021-09-10 DIAGNOSIS — Z96.621 ELBOW JOINT REPLACEMENT STATUS, RIGHT: Primary | ICD-10-CM

## 2021-09-10 DIAGNOSIS — Z96.621 ELBOW JOINT REPLACEMENT STATUS, RIGHT: ICD-10-CM

## 2021-09-10 PROCEDURE — 3074F PR MOST RECENT SYSTOLIC BLOOD PRESSURE < 130 MM HG: ICD-10-PCS | Mod: HCNC,CPTII,S$GLB, | Performed by: ORTHOPAEDIC SURGERY

## 2021-09-10 PROCEDURE — 3078F DIAST BP <80 MM HG: CPT | Mod: HCNC,CPTII,S$GLB, | Performed by: ORTHOPAEDIC SURGERY

## 2021-09-10 PROCEDURE — 99024 PR POST-OP FOLLOW-UP VISIT: ICD-10-PCS | Mod: HCNC,S$GLB,, | Performed by: ORTHOPAEDIC SURGERY

## 2021-09-10 PROCEDURE — 99024 POSTOP FOLLOW-UP VISIT: CPT | Mod: HCNC,S$GLB,, | Performed by: ORTHOPAEDIC SURGERY

## 2021-09-10 PROCEDURE — 3074F SYST BP LT 130 MM HG: CPT | Mod: HCNC,CPTII,S$GLB, | Performed by: ORTHOPAEDIC SURGERY

## 2021-09-10 PROCEDURE — 1125F AMNT PAIN NOTED PAIN PRSNT: CPT | Mod: HCNC,CPTII,S$GLB, | Performed by: ORTHOPAEDIC SURGERY

## 2021-09-10 PROCEDURE — 73080 X-RAY EXAM OF ELBOW: CPT | Mod: TC,HCNC,RT

## 2021-09-10 PROCEDURE — 3078F PR MOST RECENT DIASTOLIC BLOOD PRESSURE < 80 MM HG: ICD-10-PCS | Mod: HCNC,CPTII,S$GLB, | Performed by: ORTHOPAEDIC SURGERY

## 2021-09-10 PROCEDURE — 73080 XR ELBOW COMPLETE 3 VIEW RIGHT: ICD-10-PCS | Mod: 26,HCNC,RT, | Performed by: RADIOLOGY

## 2021-09-10 PROCEDURE — 99999 PR PBB SHADOW E&M-EST. PATIENT-LVL III: CPT | Mod: PBBFAC,HCNC,, | Performed by: ORTHOPAEDIC SURGERY

## 2021-09-10 PROCEDURE — 73080 X-RAY EXAM OF ELBOW: CPT | Mod: 26,HCNC,RT, | Performed by: RADIOLOGY

## 2021-09-10 PROCEDURE — 99999 PR PBB SHADOW E&M-EST. PATIENT-LVL III: ICD-10-PCS | Mod: PBBFAC,HCNC,, | Performed by: ORTHOPAEDIC SURGERY

## 2021-09-10 PROCEDURE — 1125F PR PAIN SEVERITY QUANTIFIED, PAIN PRESENT: ICD-10-PCS | Mod: HCNC,CPTII,S$GLB, | Performed by: ORTHOPAEDIC SURGERY

## 2021-09-10 RX ORDER — OXYCODONE HYDROCHLORIDE 5 MG/1
5 TABLET ORAL EVERY 12 HOURS PRN
Qty: 20 TABLET | Refills: 0 | Status: SHIPPED | OUTPATIENT
Start: 2021-09-10 | End: 2022-09-28

## 2021-09-16 ENCOUNTER — LAB VISIT (OUTPATIENT)
Dept: LAB | Facility: HOSPITAL | Age: 76
End: 2021-09-16
Attending: PSYCHIATRY & NEUROLOGY
Payer: MEDICARE

## 2021-09-16 ENCOUNTER — OFFICE VISIT (OUTPATIENT)
Dept: NEUROLOGY | Facility: CLINIC | Age: 76
End: 2021-09-16
Payer: MEDICARE

## 2021-09-16 VITALS
SYSTOLIC BLOOD PRESSURE: 129 MMHG | HEIGHT: 57 IN | WEIGHT: 154.63 LBS | DIASTOLIC BLOOD PRESSURE: 82 MMHG | BODY MASS INDEX: 33.36 KG/M2 | HEART RATE: 92 BPM

## 2021-09-16 DIAGNOSIS — R90.82 WHITE MATTER DISEASE: ICD-10-CM

## 2021-09-16 DIAGNOSIS — R41.9 UNSPECIFIED SYMPTOMS AND SIGNS INVOLVING COGNITIVE FUNCTIONS AND AWARENESS: ICD-10-CM

## 2021-09-16 DIAGNOSIS — R29.818 SUSPECTED SLEEP APNEA: Primary | ICD-10-CM

## 2021-09-16 DIAGNOSIS — G31.84 MILD COGNITIVE IMPAIRMENT: ICD-10-CM

## 2021-09-16 PROCEDURE — 3074F PR MOST RECENT SYSTOLIC BLOOD PRESSURE < 130 MM HG: ICD-10-PCS | Mod: CPTII,S$GLB,, | Performed by: PSYCHIATRY & NEUROLOGY

## 2021-09-16 PROCEDURE — 1125F PR PAIN SEVERITY QUANTIFIED, PAIN PRESENT: ICD-10-PCS | Mod: CPTII,S$GLB,, | Performed by: PSYCHIATRY & NEUROLOGY

## 2021-09-16 PROCEDURE — 36415 COLL VENOUS BLD VENIPUNCTURE: CPT | Mod: HCNC | Performed by: PSYCHIATRY & NEUROLOGY

## 2021-09-16 PROCEDURE — 3074F SYST BP LT 130 MM HG: CPT | Mod: CPTII,S$GLB,, | Performed by: PSYCHIATRY & NEUROLOGY

## 2021-09-16 PROCEDURE — 3288F PR FALLS RISK ASSESSMENT DOCUMENTED: ICD-10-PCS | Mod: CPTII,S$GLB,, | Performed by: PSYCHIATRY & NEUROLOGY

## 2021-09-16 PROCEDURE — 83921 ORGANIC ACID SINGLE QUANT: CPT | Mod: HCNC | Performed by: PSYCHIATRY & NEUROLOGY

## 2021-09-16 PROCEDURE — 3079F DIAST BP 80-89 MM HG: CPT | Mod: CPTII,S$GLB,, | Performed by: PSYCHIATRY & NEUROLOGY

## 2021-09-16 PROCEDURE — 3079F PR MOST RECENT DIASTOLIC BLOOD PRESSURE 80-89 MM HG: ICD-10-PCS | Mod: CPTII,S$GLB,, | Performed by: PSYCHIATRY & NEUROLOGY

## 2021-09-16 PROCEDURE — 1101F PT FALLS ASSESS-DOCD LE1/YR: CPT | Mod: CPTII,S$GLB,, | Performed by: PSYCHIATRY & NEUROLOGY

## 2021-09-16 PROCEDURE — 99204 PR OFFICE/OUTPT VISIT, NEW, LEVL IV, 45-59 MIN: ICD-10-PCS | Mod: S$GLB,,, | Performed by: PSYCHIATRY & NEUROLOGY

## 2021-09-16 PROCEDURE — 1101F PR PT FALLS ASSESS DOC 0-1 FALLS W/OUT INJ PAST YR: ICD-10-PCS | Mod: CPTII,S$GLB,, | Performed by: PSYCHIATRY & NEUROLOGY

## 2021-09-16 PROCEDURE — 99999 PR PBB SHADOW E&M-EST. PATIENT-LVL V: CPT | Mod: PBBFAC,HCNC,, | Performed by: PSYCHIATRY & NEUROLOGY

## 2021-09-16 PROCEDURE — 99999 PR PBB SHADOW E&M-EST. PATIENT-LVL V: ICD-10-PCS | Mod: PBBFAC,HCNC,, | Performed by: PSYCHIATRY & NEUROLOGY

## 2021-09-16 PROCEDURE — 1125F AMNT PAIN NOTED PAIN PRSNT: CPT | Mod: CPTII,S$GLB,, | Performed by: PSYCHIATRY & NEUROLOGY

## 2021-09-16 PROCEDURE — 3288F FALL RISK ASSESSMENT DOCD: CPT | Mod: CPTII,S$GLB,, | Performed by: PSYCHIATRY & NEUROLOGY

## 2021-09-16 PROCEDURE — 99204 OFFICE O/P NEW MOD 45 MIN: CPT | Mod: S$GLB,,, | Performed by: PSYCHIATRY & NEUROLOGY

## 2021-09-21 LAB — METHYLMALONATE SERPL-SCNC: 0.27 UMOL/L

## 2021-09-27 ENCOUNTER — TELEPHONE (OUTPATIENT)
Dept: ORTHOPEDICS | Facility: CLINIC | Age: 76
End: 2021-09-27

## 2021-09-27 DIAGNOSIS — M25.532 BILATERAL WRIST PAIN: Primary | ICD-10-CM

## 2021-09-27 DIAGNOSIS — M25.531 BILATERAL WRIST PAIN: Primary | ICD-10-CM

## 2021-09-28 ENCOUNTER — TELEPHONE (OUTPATIENT)
Dept: NEUROLOGY | Facility: CLINIC | Age: 76
End: 2021-09-28

## 2021-09-29 ENCOUNTER — TELEPHONE (OUTPATIENT)
Dept: NEUROLOGY | Facility: CLINIC | Age: 76
End: 2021-09-29

## 2021-09-30 ENCOUNTER — TELEPHONE (OUTPATIENT)
Dept: PRIMARY CARE CLINIC | Facility: CLINIC | Age: 76
End: 2021-09-30

## 2021-09-30 DIAGNOSIS — Z13.820 ENCOUNTER FOR OSTEOPOROSIS SCREENING IN ASYMPTOMATIC POSTMENOPAUSAL PATIENT: ICD-10-CM

## 2021-09-30 DIAGNOSIS — Z78.0 ENCOUNTER FOR OSTEOPOROSIS SCREENING IN ASYMPTOMATIC POSTMENOPAUSAL PATIENT: ICD-10-CM

## 2021-09-30 DIAGNOSIS — M85.89 OSTEOPENIA OF MULTIPLE SITES: Primary | ICD-10-CM

## 2021-10-04 ENCOUNTER — TELEPHONE (OUTPATIENT)
Dept: NEUROLOGY | Facility: CLINIC | Age: 76
End: 2021-10-04

## 2021-10-06 ENCOUNTER — PATIENT MESSAGE (OUTPATIENT)
Dept: RESEARCH | Facility: HOSPITAL | Age: 76
End: 2021-10-06

## 2021-10-12 ENCOUNTER — TELEPHONE (OUTPATIENT)
Dept: NEUROLOGY | Facility: CLINIC | Age: 76
End: 2021-10-12

## 2021-10-13 ENCOUNTER — TELEPHONE (OUTPATIENT)
Dept: NEUROLOGY | Facility: CLINIC | Age: 76
End: 2021-10-13

## 2021-10-14 ENCOUNTER — TELEPHONE (OUTPATIENT)
Dept: SLEEP MEDICINE | Facility: CLINIC | Age: 76
End: 2021-10-14

## 2021-10-21 ENCOUNTER — TELEPHONE (OUTPATIENT)
Dept: NEUROLOGY | Facility: CLINIC | Age: 76
End: 2021-10-21

## 2021-10-21 ENCOUNTER — TELEPHONE (OUTPATIENT)
Dept: ORTHOPEDICS | Facility: CLINIC | Age: 76
End: 2021-10-21

## 2021-10-22 ENCOUNTER — OFFICE VISIT (OUTPATIENT)
Dept: ORTHOPEDICS | Facility: CLINIC | Age: 76
End: 2021-10-22
Payer: MEDICARE

## 2021-10-22 ENCOUNTER — PATIENT MESSAGE (OUTPATIENT)
Dept: NEUROLOGY | Facility: CLINIC | Age: 76
End: 2021-10-22
Payer: MEDICARE

## 2021-10-22 ENCOUNTER — HOSPITAL ENCOUNTER (OUTPATIENT)
Dept: RADIOLOGY | Facility: HOSPITAL | Age: 76
Discharge: HOME | End: 2021-10-22
Attending: ORTHOPAEDIC SURGERY
Payer: MEDICARE

## 2021-10-22 VITALS
TEMPERATURE: 97 F | HEART RATE: 77 BPM | DIASTOLIC BLOOD PRESSURE: 64 MMHG | SYSTOLIC BLOOD PRESSURE: 117 MMHG | RESPIRATION RATE: 20 BRPM | WEIGHT: 153.88 LBS | BODY MASS INDEX: 33.2 KG/M2 | HEIGHT: 57 IN

## 2021-10-22 DIAGNOSIS — Z96.621 ELBOW JOINT REPLACEMENT STATUS, RIGHT: ICD-10-CM

## 2021-10-22 DIAGNOSIS — Z96.631: ICD-10-CM

## 2021-10-22 DIAGNOSIS — M06.9 RHEUMATOID ARTHRITIS INVOLVING MULTIPLE SITES, UNSPECIFIED WHETHER RHEUMATOID FACTOR PRESENT: ICD-10-CM

## 2021-10-22 DIAGNOSIS — M25.531 BILATERAL WRIST PAIN: ICD-10-CM

## 2021-10-22 DIAGNOSIS — M25.532 BILATERAL WRIST PAIN: ICD-10-CM

## 2021-10-22 DIAGNOSIS — M06.9 RHEUMATOID ARTHRITIS INVOLVING MULTIPLE SITES, UNSPECIFIED WHETHER RHEUMATOID FACTOR PRESENT: Primary | ICD-10-CM

## 2021-10-22 DIAGNOSIS — Z96.632: ICD-10-CM

## 2021-10-22 PROBLEM — T84.038A: Status: RESOLVED | Noted: 2021-01-15 | Resolved: 2021-10-22

## 2021-10-22 PROBLEM — Z96.629: Status: RESOLVED | Noted: 2021-01-15 | Resolved: 2021-10-22

## 2021-10-22 PROCEDURE — 99024 PR POST-OP FOLLOW-UP VISIT: ICD-10-PCS | Mod: HCNC,S$GLB,, | Performed by: ORTHOPAEDIC SURGERY

## 2021-10-22 PROCEDURE — 1125F PR PAIN SEVERITY QUANTIFIED, PAIN PRESENT: ICD-10-PCS | Mod: HCNC,CPTII,S$GLB, | Performed by: ORTHOPAEDIC SURGERY

## 2021-10-22 PROCEDURE — 73090 X-RAY EXAM OF FOREARM: CPT | Mod: TC,HCNC,RT

## 2021-10-22 PROCEDURE — 73080 X-RAY EXAM OF ELBOW: CPT | Mod: TC,HCNC,RT

## 2021-10-22 PROCEDURE — 99024 POSTOP FOLLOW-UP VISIT: CPT | Mod: HCNC,S$GLB,, | Performed by: ORTHOPAEDIC SURGERY

## 2021-10-22 PROCEDURE — 73110 X-RAY EXAM OF WRIST: CPT | Mod: TC,50,HCNC

## 2021-10-22 PROCEDURE — 1159F MED LIST DOCD IN RCRD: CPT | Mod: HCNC,CPTII,S$GLB, | Performed by: ORTHOPAEDIC SURGERY

## 2021-10-22 PROCEDURE — 73090 XR FOREARM RIGHT: ICD-10-PCS | Mod: 26,HCNC,RT, | Performed by: RADIOLOGY

## 2021-10-22 PROCEDURE — 73080 X-RAY EXAM OF ELBOW: CPT | Mod: 26,HCNC,RT, | Performed by: RADIOLOGY

## 2021-10-22 PROCEDURE — 3288F FALL RISK ASSESSMENT DOCD: CPT | Mod: HCNC,CPTII,S$GLB, | Performed by: ORTHOPAEDIC SURGERY

## 2021-10-22 PROCEDURE — 3074F PR MOST RECENT SYSTOLIC BLOOD PRESSURE < 130 MM HG: ICD-10-PCS | Mod: HCNC,CPTII,S$GLB, | Performed by: ORTHOPAEDIC SURGERY

## 2021-10-22 PROCEDURE — 73110 XR WRIST COMPLETE 3 VIEWS BILATERAL: ICD-10-PCS | Mod: 26,50,HCNC, | Performed by: RADIOLOGY

## 2021-10-22 PROCEDURE — 3074F SYST BP LT 130 MM HG: CPT | Mod: HCNC,CPTII,S$GLB, | Performed by: ORTHOPAEDIC SURGERY

## 2021-10-22 PROCEDURE — 3078F DIAST BP <80 MM HG: CPT | Mod: HCNC,CPTII,S$GLB, | Performed by: ORTHOPAEDIC SURGERY

## 2021-10-22 PROCEDURE — 99999 PR PBB SHADOW E&M-EST. PATIENT-LVL IV: CPT | Mod: PBBFAC,HCNC,, | Performed by: ORTHOPAEDIC SURGERY

## 2021-10-22 PROCEDURE — 73080 XR ELBOW COMPLETE 3 VIEW RIGHT: ICD-10-PCS | Mod: 26,HCNC,RT, | Performed by: RADIOLOGY

## 2021-10-22 PROCEDURE — 1159F PR MEDICATION LIST DOCUMENTED IN MEDICAL RECORD: ICD-10-PCS | Mod: HCNC,CPTII,S$GLB, | Performed by: ORTHOPAEDIC SURGERY

## 2021-10-22 PROCEDURE — 1101F PT FALLS ASSESS-DOCD LE1/YR: CPT | Mod: HCNC,CPTII,S$GLB, | Performed by: ORTHOPAEDIC SURGERY

## 2021-10-22 PROCEDURE — 99999 PR PBB SHADOW E&M-EST. PATIENT-LVL IV: ICD-10-PCS | Mod: PBBFAC,HCNC,, | Performed by: ORTHOPAEDIC SURGERY

## 2021-10-22 PROCEDURE — 73090 X-RAY EXAM OF FOREARM: CPT | Mod: 26,HCNC,RT, | Performed by: RADIOLOGY

## 2021-10-22 PROCEDURE — 1125F AMNT PAIN NOTED PAIN PRSNT: CPT | Mod: HCNC,CPTII,S$GLB, | Performed by: ORTHOPAEDIC SURGERY

## 2021-10-22 PROCEDURE — 73110 X-RAY EXAM OF WRIST: CPT | Mod: 26,50,HCNC, | Performed by: RADIOLOGY

## 2021-10-22 PROCEDURE — 3288F PR FALLS RISK ASSESSMENT DOCUMENTED: ICD-10-PCS | Mod: HCNC,CPTII,S$GLB, | Performed by: ORTHOPAEDIC SURGERY

## 2021-10-22 PROCEDURE — 1101F PR PT FALLS ASSESS DOC 0-1 FALLS W/OUT INJ PAST YR: ICD-10-PCS | Mod: HCNC,CPTII,S$GLB, | Performed by: ORTHOPAEDIC SURGERY

## 2021-10-22 PROCEDURE — 3078F PR MOST RECENT DIASTOLIC BLOOD PRESSURE < 80 MM HG: ICD-10-PCS | Mod: HCNC,CPTII,S$GLB, | Performed by: ORTHOPAEDIC SURGERY

## 2021-10-25 ENCOUNTER — TELEPHONE (OUTPATIENT)
Dept: PRIMARY CARE CLINIC | Facility: CLINIC | Age: 76
End: 2021-10-25
Payer: MEDICARE

## 2021-11-19 ENCOUNTER — TELEPHONE (OUTPATIENT)
Dept: NEUROLOGY | Facility: CLINIC | Age: 76
End: 2021-11-19
Payer: MEDICARE

## 2021-11-22 DIAGNOSIS — G31.84 MILD COGNITIVE IMPAIRMENT: Primary | ICD-10-CM

## 2021-11-22 DIAGNOSIS — R41.9 UNSPECIFIED SYMPTOMS AND SIGNS INVOLVING COGNITIVE FUNCTIONS AND AWARENESS: ICD-10-CM

## 2021-11-23 ENCOUNTER — LAB VISIT (OUTPATIENT)
Dept: LAB | Facility: HOSPITAL | Age: 76
End: 2021-11-23
Attending: PSYCHIATRY & NEUROLOGY
Payer: MEDICARE

## 2021-11-23 DIAGNOSIS — R41.9 UNSPECIFIED SYMPTOMS AND SIGNS INVOLVING COGNITIVE FUNCTIONS AND AWARENESS: ICD-10-CM

## 2021-11-23 DIAGNOSIS — G31.84 MILD COGNITIVE IMPAIRMENT: ICD-10-CM

## 2021-11-23 LAB
ALBUMIN SERPL BCP-MCNC: 3.9 G/DL (ref 3.5–5.2)
ALP SERPL-CCNC: 128 U/L (ref 55–135)
ALT SERPL W/O P-5'-P-CCNC: 21 U/L (ref 10–44)
ANION GAP SERPL CALC-SCNC: 11 MMOL/L (ref 8–16)
AST SERPL-CCNC: 21 U/L (ref 10–40)
BASOPHILS # BLD AUTO: 0.03 K/UL (ref 0–0.2)
BASOPHILS NFR BLD: 0.4 % (ref 0–1.9)
BILIRUB SERPL-MCNC: 0.6 MG/DL (ref 0.1–1)
BUN SERPL-MCNC: 13 MG/DL (ref 8–23)
CALCIUM SERPL-MCNC: 9.6 MG/DL (ref 8.7–10.5)
CHLORIDE SERPL-SCNC: 105 MMOL/L (ref 95–110)
CO2 SERPL-SCNC: 23 MMOL/L (ref 23–29)
CREAT SERPL-MCNC: 0.8 MG/DL (ref 0.5–1.4)
DIFFERENTIAL METHOD: ABNORMAL
EOSINOPHIL # BLD AUTO: 0.1 K/UL (ref 0–0.5)
EOSINOPHIL NFR BLD: 1.7 % (ref 0–8)
ERYTHROCYTE [DISTWIDTH] IN BLOOD BY AUTOMATED COUNT: 14 % (ref 11.5–14.5)
EST. GFR  (AFRICAN AMERICAN): >60 ML/MIN/1.73 M^2
EST. GFR  (NON AFRICAN AMERICAN): >60 ML/MIN/1.73 M^2
FOLATE SERPL-MCNC: 13.6 NG/ML (ref 4–24)
GLUCOSE SERPL-MCNC: 91 MG/DL (ref 70–110)
HCT VFR BLD AUTO: 49.5 % (ref 37–48.5)
HCYS SERPL-SCNC: 9.6 UMOL/L (ref 4–15.5)
HGB BLD-MCNC: 15.6 G/DL (ref 12–16)
IMM GRANULOCYTES # BLD AUTO: 0.02 K/UL (ref 0–0.04)
IMM GRANULOCYTES NFR BLD AUTO: 0.3 % (ref 0–0.5)
LYMPHOCYTES # BLD AUTO: 1.8 K/UL (ref 1–4.8)
LYMPHOCYTES NFR BLD: 26.3 % (ref 18–48)
MCH RBC QN AUTO: 28.8 PG (ref 27–31)
MCHC RBC AUTO-ENTMCNC: 31.5 G/DL (ref 32–36)
MCV RBC AUTO: 92 FL (ref 82–98)
MONOCYTES # BLD AUTO: 0.6 K/UL (ref 0.3–1)
MONOCYTES NFR BLD: 8.7 % (ref 4–15)
NEUTROPHILS # BLD AUTO: 4.3 K/UL (ref 1.8–7.7)
NEUTROPHILS NFR BLD: 62.6 % (ref 38–73)
NRBC BLD-RTO: 0 /100 WBC
PLATELET # BLD AUTO: 209 K/UL (ref 150–450)
PMV BLD AUTO: 10.9 FL (ref 9.2–12.9)
POTASSIUM SERPL-SCNC: 3.7 MMOL/L (ref 3.5–5.1)
PROT SERPL-MCNC: 7.1 G/DL (ref 6–8.4)
RBC # BLD AUTO: 5.41 M/UL (ref 4–5.4)
SODIUM SERPL-SCNC: 139 MMOL/L (ref 136–145)
T4 FREE SERPL-MCNC: 0.9 NG/DL (ref 0.71–1.51)
TSH SERPL DL<=0.005 MIU/L-ACNC: 1.36 UIU/ML (ref 0.4–4)
VIT B12 SERPL-MCNC: 417 PG/ML (ref 210–950)
WBC # BLD AUTO: 6.91 K/UL (ref 3.9–12.7)

## 2021-11-23 PROCEDURE — 84425 ASSAY OF VITAMIN B-1: CPT | Mod: HCNC | Performed by: PSYCHIATRY & NEUROLOGY

## 2021-11-23 PROCEDURE — 80053 COMPREHEN METABOLIC PANEL: CPT | Mod: HCNC | Performed by: PSYCHIATRY & NEUROLOGY

## 2021-11-23 PROCEDURE — 82607 VITAMIN B-12: CPT | Mod: HCNC | Performed by: PSYCHIATRY & NEUROLOGY

## 2021-11-23 PROCEDURE — 84443 ASSAY THYROID STIM HORMONE: CPT | Mod: HCNC | Performed by: PSYCHIATRY & NEUROLOGY

## 2021-11-23 PROCEDURE — 85025 COMPLETE CBC W/AUTO DIFF WBC: CPT | Mod: HCNC | Performed by: PSYCHIATRY & NEUROLOGY

## 2021-11-23 PROCEDURE — 84439 ASSAY OF FREE THYROXINE: CPT | Mod: HCNC | Performed by: PSYCHIATRY & NEUROLOGY

## 2021-11-23 PROCEDURE — 83090 ASSAY OF HOMOCYSTEINE: CPT | Mod: HCNC | Performed by: PSYCHIATRY & NEUROLOGY

## 2021-11-23 PROCEDURE — 82746 ASSAY OF FOLIC ACID SERUM: CPT | Mod: HCNC | Performed by: PSYCHIATRY & NEUROLOGY

## 2021-11-23 PROCEDURE — 36415 COLL VENOUS BLD VENIPUNCTURE: CPT | Mod: HCNC,PN | Performed by: PSYCHIATRY & NEUROLOGY

## 2021-11-29 LAB — VIT B1 BLD-MCNC: 71 UG/L (ref 38–122)

## 2021-11-30 ENCOUNTER — TELEPHONE (OUTPATIENT)
Dept: ORTHOPEDICS | Facility: CLINIC | Age: 76
End: 2021-11-30
Payer: MEDICARE

## 2021-12-06 ENCOUNTER — TELEPHONE (OUTPATIENT)
Dept: NEUROLOGY | Facility: CLINIC | Age: 76
End: 2021-12-06
Payer: MEDICARE

## 2021-12-09 ENCOUNTER — TELEPHONE (OUTPATIENT)
Dept: NEUROLOGY | Facility: CLINIC | Age: 76
End: 2021-12-09
Payer: MEDICARE

## 2021-12-10 ENCOUNTER — TELEPHONE (OUTPATIENT)
Dept: NEUROLOGY | Facility: CLINIC | Age: 76
End: 2021-12-10
Payer: MEDICARE

## 2021-12-15 ENCOUNTER — DOCUMENTATION ONLY (OUTPATIENT)
Dept: NEUROLOGY | Facility: CLINIC | Age: 76
End: 2021-12-15
Payer: MEDICARE

## 2021-12-15 NOTE — PROGRESS NOTES
Discussed use of hydroxyzine with Ms. Bain. Recommend avoiding due to it being anticholinergic and easily crossing the blood brain barrier. Claritin is a better option as it does not cross blood brain barrier. Recommended that she follow up with PCP regarding itching.       Hao Wallace MD   Behavioral Neurology & Neuropsychiatry  [facility]

## 2021-12-28 ENCOUNTER — TELEPHONE (OUTPATIENT)
Dept: NEUROLOGY | Facility: CLINIC | Age: 76
End: 2021-12-28
Payer: MEDICARE

## 2021-12-29 ENCOUNTER — PATIENT OUTREACH (OUTPATIENT)
Dept: ADMINISTRATIVE | Facility: OTHER | Age: 76
End: 2021-12-29
Payer: MEDICARE

## 2021-12-30 ENCOUNTER — TELEPHONE (OUTPATIENT)
Dept: NEUROLOGY | Facility: CLINIC | Age: 76
End: 2021-12-30
Payer: MEDICARE

## 2021-12-30 ENCOUNTER — TELEPHONE (OUTPATIENT)
Dept: PRIMARY CARE CLINIC | Facility: CLINIC | Age: 76
End: 2021-12-30
Payer: MEDICARE

## 2022-01-03 ENCOUNTER — TELEPHONE (OUTPATIENT)
Dept: NEUROLOGY | Facility: CLINIC | Age: 77
End: 2022-01-03
Payer: MEDICARE

## 2022-01-03 NOTE — TELEPHONE ENCOUNTER
----- Message from Kelly Adorno sent at 1/3/2022 10:04 AM CST -----  Contact: Pt  Pt's requesting a call back re: medication - Pt's unsure of Rx name. Pls send Rx to pharmacy below.    Confirmed patient's contact info below:  Contact Name: Francy Bain  Phone Number: [phone]     [facility] [address] [address]  Phone: [phone] Fax: [phone]

## 2022-01-04 ENCOUNTER — TELEPHONE (OUTPATIENT)
Dept: NEUROLOGY | Facility: CLINIC | Age: 77
End: 2022-01-04
Payer: MEDICARE

## 2022-01-04 NOTE — TELEPHONE ENCOUNTER
----- Message from Clarissa Mccloud sent at 1/4/2022 10:00 AM CST -----  Regarding: Refill Request  Contact: patient  Pt. Requesting a call back in regards to RX. Pt. Stated she needs a refill, unsure of the name of medication.            Pt.@[phone]

## 2022-01-05 ENCOUNTER — TELEPHONE (OUTPATIENT)
Dept: ORTHOPEDICS | Facility: CLINIC | Age: 77
End: 2022-01-05
Payer: MEDICARE

## 2022-01-05 DIAGNOSIS — G47.00 INSOMNIA, UNSPECIFIED TYPE: Primary | ICD-10-CM

## 2022-01-05 RX ORDER — TRAZODONE HYDROCHLORIDE 50 MG/1
50 TABLET ORAL NIGHTLY
Qty: 30 TABLET | Refills: 11 | Status: SHIPPED | OUTPATIENT
Start: 2022-01-05 | End: 2022-11-01 | Stop reason: SDUPTHER

## 2022-01-05 NOTE — TELEPHONE ENCOUNTER
Spoke c pt. Confirmed appt location & time change  c Dr. Cardona 01/25/22 to 01/27/22. Pt expressed understanding & was thankful.

## 2022-01-13 NOTE — TELEPHONE ENCOUNTER
Care Due:                  Date            Visit Type   Department     Provider  --------------------------------------------------------------------------------                                             [facility]  Last Visit: 04-      None         CARE           Ronit Hickey  Next Visit: None Scheduled  None         None Found                                                            Last  Test          Frequency    Reason                     Performed    Due Date  --------------------------------------------------------------------------------    Office Visit  12 months..  furosemide, omeprazole...  04- 04-    Powered by [employer]. Reference number: [id number].   1/13/2022 3:06:38 PM CST

## 2022-01-13 NOTE — TELEPHONE ENCOUNTER
No new care gaps identified.  Powered by [employer]. Reference number: [id number].   1/13/2022 3:19:46 PM CST

## 2022-01-18 RX ORDER — AMLODIPINE BESYLATE 10 MG/1
TABLET ORAL
Qty: 90 TABLET | Refills: 0 | OUTPATIENT
Start: 2022-01-18

## 2022-01-18 NOTE — TELEPHONE ENCOUNTER
----- Message from Colleen Sánchez sent at 1/18/2022 10:09 AM CST -----  Regarding: refill  Contact: pt @ [phone]  Rx Refill/Request     Is this a Refill or New Rx:  refill    Rx Name and Strength:  losartan (COZAAR) 50 MG tablet    Preferred Pharmacy with phone number:   [address] [address]  Phone: [phone] Fax: [phone]      Communication Preference:pt @ [phone]  Additional Information:

## 2022-01-18 NOTE — TELEPHONE ENCOUNTER
No new care gaps identified.  Powered by [employer]. Reference number: [id number].   1/18/2022 12:05:11 PM CST

## 2022-01-18 NOTE — TELEPHONE ENCOUNTER
[facility] Note  Quick DC. Inappropriate Request   Refill request requires further review by MD: NO   Medication Therapy Plan: Pharmacy is requesting new script(s) for the following medications without required information, (sig/ frequency/qty/etc)     [facility] action(s):  Quick Discontinue      Duplicate Pended Encounter(s)/ Last Prescribed Details:    Pharmacies have been requesting medications for patients without required information, (sig, frequency, qty, etc.). In addition, requests are sent for medication(s) pt. are currently not taking, and medications patients have never taken.    We have spoken to the pharmacies about these request types and advised their teams previously that we are unable to assess these New Script requests and require all details for these requests. This is a known issue and has been reported.        Medication related problems are not assessed for QDC.   Medication Reconciliation Completed? NO Were there pending details that required adjustment? NO     Automatic Epic Generated Protocol Data Below:   Requested Prescriptions   Pending Prescriptions Disp Refills    amLODIPine (NORVASC) 10 MG tablet [Pharmacy Med Name: AMLODIPINE 10MG TAB] 90 tablet 0              Appointments      Date Provider   Last Visit   4/14/2021 Ronit Hickey MD   Next Visit   1/13/2022 Ronit Hickey MD        Note composed:9:21 AM 01/18/2022

## 2022-01-19 RX ORDER — LOSARTAN POTASSIUM 50 MG/1
50 TABLET ORAL DAILY
Qty: 90 TABLET | Refills: 3 | OUTPATIENT
Start: 2022-01-19 | End: 2023-01-19

## 2022-01-19 RX ORDER — LOSARTAN POTASSIUM 50 MG/1
50 TABLET ORAL DAILY
Qty: 90 TABLET | Refills: 0 | Status: SHIPPED | OUTPATIENT
Start: 2022-01-19 | End: 2022-02-08

## 2022-01-19 RX ORDER — HYDROXYZINE HYDROCHLORIDE 25 MG/1
25 TABLET, FILM COATED ORAL 3 TIMES DAILY PRN
Qty: 90 TABLET | Refills: 0 | Status: SHIPPED | OUTPATIENT
Start: 2022-01-19 | End: 2022-02-18

## 2022-01-19 NOTE — TELEPHONE ENCOUNTER
LOV 4/14/21 I lvm informing pt that I will be routing a high priority refill request. Also requested that she contact the office to schedule her annual  Pt is out of medications

## 2022-01-19 NOTE — TELEPHONE ENCOUNTER
----- Message from Esperanza Wright sent at 1/19/2022 12:37 PM CST -----  Contact: [phone]  Pt called to advise that she has called multiple times to get her blood pressure medicine refilled with no response. Pt is completely out of this medication. Please Advise     Requesting an RX refill or new RX.  Is this a refill or new RX: refill  RX name and strength (copy/paste from chart): losartan (COZAAR) 50 MG tablet  Is this a 30 day or 90 day RX: 90 day  Patient advised that in the future they can use their [facility] account to request a refill?:  advised -  Patient advised that RX refills can take up to 72 hours?:  [place]   Pharmacy name and phone # (copy/paste from chart):    [facility] - [place], [place] - [address] [place] [address]  Phone: [phone] Fax: [phone]      Comments:

## 2022-01-19 NOTE — TELEPHONE ENCOUNTER
----- Message from Mary Alice Starr sent at 1/19/2022  1:49 PM CST -----  Contact: Francy mason [phone]  Patient is returning a phone call.  Who left a message for the patient: not sure  Does patient know what this is regarding:    Would you like a call back, or a response through your [facility] portal?: call back  Comments:  Pt was returning the nurses call

## 2022-01-20 NOTE — TELEPHONE ENCOUNTER
Refill Authorization Note   Francy Bain  is requesting a refill authorization.  Brief Assessment and Rationale for Refill:  Approve     Medication Therapy Plan:       Medication Reconciliation Completed: No   Comments:   --->Care Gap information included below if applicable.   Orders Placed This Encounter    losartan (COZAAR) 50 MG tablet      Requested Prescriptions   Signed Prescriptions Disp Refills    losartan (COZAAR) 50 MG tablet 90 tablet 0     Sig: Take 1 tablet (50 mg total) by mouth once daily.       Cardiovascular:  Angiotensin Receptor Blockers Passed - 1/18/2022 12:04 PM        Passed - Patient is at least 18 years old        Passed - Last BP in normal range within 360 days     BP Readings from Last 1 Encounters:   10/22/21 117/64               Passed - Valid encounter within last 15 months     Recent Visits  Date Type Provider Dept   04/14/21 Office Visit Ronit Hickey MD [facility]   04/08/21 Office Visit Ronit Hickey MD [facility]   01/08/21 Office Visit Ronit Hickey MD [facility]   12/21/20 Office Visit Ronit Hickey MD [facility]   10/08/20 Office Visit Roint Hickey MD [facility]   Showing recent visits within past 720 days and meeting all other requirements  Future Appointments  No visits were found meeting these conditions.  Showing future appointments within next 150 days and meeting all other requirements      Future Appointments              In 1 week Rosa Aguilar MD [facility]    In 2 months Ronit Hickey MD [facility]                Passed - Cr is 1.39 or below and within 360 days     Lab Results   Component Value Date    CREATININE 0.8 11/23/2021    CREATININE 0.8 04/08/2021    CREATININE 0.7 10/14/2020              Passed - K is 5.2 or below and within 360 days     Potassium   Date Value Ref Range Status   11/23/2021 3.7 3.5 - 5.1  mmol/L Final   04/08/2021 3.8 3.5 - 5.1 mmol/L Final   10/14/2020 3.8 3.5 - 5.1 mmol/L Final              Passed - eGFR within 360 days     Lab Results   Component Value Date    EGFRNONAA >60.0 11/23/2021    EGFRNONAA >60.0 04/08/2021    EGFRNONAA >60.0 10/14/2020                    Appointments  past 12m or future 3m with PCP    Date Provider   Last Visit   4/14/2021 Ronit Hickey MD   Next Visit   1/19/2022 Ronit Hickey MD   ED visits in past 90 days: 0     Note composed:7:41 PM 01/19/2022

## 2022-01-20 NOTE — TELEPHONE ENCOUNTER
Quick DC. Request already responded to by other means (e.g. phone or fax)   Refill Authorization Note   Francy Bain  is requesting a refill authorization.  Brief Assessment and Rationale for Refill:  Quick Discontinue  Medication Therapy Plan:       Medication Reconciliation Completed:  No      Comments:   Pended Medication(s)       Requested Prescriptions     Refused Prescriptions Disp Refills    losartan (COZAAR) 50 MG tablet [Pharmacy Med Name: losartan 50 mg tablet] 90 tablet 3     Sig: Take 1 tablet (50 mg total) by mouth once daily.     Refused By: ROEL BIRCH     Reason for Refusal: Request already responded to by other means (e.g. phone or fax)        Duplicate Pended Encounter(s)/ Last Prescribed Details: (includes pharmacy & prescriber details)   Pharmacy    [facility] [address] [address]   Phone:  [phone]  Fax:  [phone]   MARY #:  --   ZOË Reason: --       Outpatient Medication Detail     Disp Refills Start End ZOË   losartan (COZAAR) 50 MG tablet 90 tablet 0 1/19/2022  No   Sig - Route: Take 1 tablet (50 mg total) by mouth once daily. - Oral   Sent to pharmacy as: losartan (COZAAR) 50 MG tablet   Class: Normal   Order: [id number]   Date/Time Signed: 1/19/2022 19:41       E-Prescribing Status: Sent to pharmacy (1/19/2022  7:41 PM CST)     Ordering Encounter Report    Associated Reports   View Encounter                Note composed:7:42 PM 01/19/2022

## 2022-01-26 ENCOUNTER — TELEPHONE (OUTPATIENT)
Dept: ORTHOPEDICS | Facility: CLINIC | Age: 77
End: 2022-01-26
Payer: MEDICARE

## 2022-01-27 ENCOUNTER — OFFICE VISIT (OUTPATIENT)
Dept: ORTHOPEDICS | Facility: CLINIC | Age: 77
End: 2022-01-27
Payer: MEDICARE

## 2022-01-27 VITALS — WEIGHT: 153 LBS | HEIGHT: 57 IN | BODY MASS INDEX: 33.01 KG/M2

## 2022-01-27 DIAGNOSIS — M25.631 DECREASED RANGE OF MOTION OF BOTH WRISTS: ICD-10-CM

## 2022-01-27 DIAGNOSIS — Z96.632: Primary | ICD-10-CM

## 2022-01-27 DIAGNOSIS — M06.9 RHEUMATOID ARTHRITIS INVOLVING MULTIPLE SITES, UNSPECIFIED WHETHER RHEUMATOID FACTOR PRESENT: ICD-10-CM

## 2022-01-27 DIAGNOSIS — M25.632 DECREASED RANGE OF MOTION OF BOTH WRISTS: ICD-10-CM

## 2022-01-27 DIAGNOSIS — Z96.631: Primary | ICD-10-CM

## 2022-01-27 PROCEDURE — 1101F PT FALLS ASSESS-DOCD LE1/YR: CPT | Mod: HCNC,CPTII,S$GLB, | Performed by: ORTHOPAEDIC SURGERY

## 2022-01-27 PROCEDURE — 3288F FALL RISK ASSESSMENT DOCD: CPT | Mod: HCNC,CPTII,S$GLB, | Performed by: ORTHOPAEDIC SURGERY

## 2022-01-27 PROCEDURE — 1101F PR PT FALLS ASSESS DOC 0-1 FALLS W/OUT INJ PAST YR: ICD-10-PCS | Mod: HCNC,CPTII,S$GLB, | Performed by: ORTHOPAEDIC SURGERY

## 2022-01-27 PROCEDURE — 99999 PR PBB SHADOW E&M-EST. PATIENT-LVL III: CPT | Mod: PBBFAC,HCNC,, | Performed by: ORTHOPAEDIC SURGERY

## 2022-01-27 PROCEDURE — 99214 PR OFFICE/OUTPT VISIT, EST, LEVL IV, 30-39 MIN: ICD-10-PCS | Mod: HCNC,S$GLB,, | Performed by: ORTHOPAEDIC SURGERY

## 2022-01-27 PROCEDURE — 1159F PR MEDICATION LIST DOCUMENTED IN MEDICAL RECORD: ICD-10-PCS | Mod: HCNC,CPTII,S$GLB, | Performed by: ORTHOPAEDIC SURGERY

## 2022-01-27 PROCEDURE — 1125F PR PAIN SEVERITY QUANTIFIED, PAIN PRESENT: ICD-10-PCS | Mod: HCNC,CPTII,S$GLB, | Performed by: ORTHOPAEDIC SURGERY

## 2022-01-27 PROCEDURE — 3288F PR FALLS RISK ASSESSMENT DOCUMENTED: ICD-10-PCS | Mod: HCNC,CPTII,S$GLB, | Performed by: ORTHOPAEDIC SURGERY

## 2022-01-27 PROCEDURE — 1159F MED LIST DOCD IN RCRD: CPT | Mod: HCNC,CPTII,S$GLB, | Performed by: ORTHOPAEDIC SURGERY

## 2022-01-27 PROCEDURE — 99999 PR PBB SHADOW E&M-EST. PATIENT-LVL III: ICD-10-PCS | Mod: PBBFAC,HCNC,, | Performed by: ORTHOPAEDIC SURGERY

## 2022-01-27 PROCEDURE — 99214 OFFICE O/P EST MOD 30 MIN: CPT | Mod: HCNC,S$GLB,, | Performed by: ORTHOPAEDIC SURGERY

## 2022-01-27 PROCEDURE — 1125F AMNT PAIN NOTED PAIN PRSNT: CPT | Mod: HCNC,CPTII,S$GLB, | Performed by: ORTHOPAEDIC SURGERY

## 2022-01-27 NOTE — PROGRESS NOTES
DATE: 1/27/2022  PATIENT: Francy Bain    CHIEF COMPLAINT: L wrist pain    HPI:  11/24/20  75F with h/o RA on sarilumab, B TEA, B TKA, , B ankle fusion, L wrist arthroplasty, R wrist pancarpectomy all in the 1970s by Dr. Ratliff in [facility] who presents with R elbow > B wrist pain.  Present for a few months.  No injury.  No N/T.  No pain at rest, only with movement.  8.5/10 at worst.  Opens heavy doors with R arm.    01/27/22  Francy Bain is a 76 y.o. female returns for left & right wrist evaluation. She underwent revision R elbow arthroplasty, ulnar component with Massive ulna allograft, ORIF whole ulnar allograft to ulnar shaft, excision/craterization right proximal ulna Dr. Patel 08/17/21.  She is concerned that she has less function in right wrist since surgery. Denies numbness, tingling, burning, radiating pain. Denies swelling.  She feels like her fingers do not move as well that she can not make a full fist she states this was her strong her hand in comparison to her left side        Past Medical History:   Diagnosis Date    Affective disorder     Hypertension     Renal cyst, acquired, right 4/14/2021    Rheumatoid arthritis        Past Surgical History:   Procedure Laterality Date    ANKLE FUSION Bilateral     ELBOW ARTHROPLASTY      EYE SURGERY Right 03/2017    cataract    JOINT REPLACEMENT      bilateral knee replacement; bilateral elbow and wrist replacement    KNEE ARTHROPLASTY      OPEN REDUCTION AND INTERNAL FIXATION (ORIF) OF FRACTURE OF ULNA Right 8/17/2021    Procedure: ORIF, FRACTURE, ULNA  AND MASSIVE ALLOGRAFT;  Surgeon: Tio Patel MD;  Location: [facility] OR [address] [place] [address];  Service: Orthopedics;  Laterality: Right;    REVISION OF TOTAL ARTHROPLASTY OF ELBOW Right 8/17/2021    Procedure: REVISION, TOTAL ARTHROPLASTY, ELBOW;  Surgeon: Tio Patel MD;  Location: [facility] OR [address] [place] [address];  Service: Orthopedics;  Laterality: Right;       Family History    Problem Relation Age of Onset    Hypertension Mother     Arthritis Mother     Heart attack Neg Hx     Heart disease Neg Hx        Social History     Socioeconomic History    Marital status:     Number of children: 1   Occupational History    Occupation: Worked [employer]     Comment: retired   Tobacco Use    Smoking status: Never Smoker    Smokeless tobacco: Never Used   Substance and Sexual Activity    Alcohol use: No     Comment: rare- once per month    Drug use: No   Social History Narrative    Live in an apartment- no stairs         Current Outpatient Medications:     benzonatate (TESSALON) 100 MG capsule, Take 1 capsule (100 mg total) by mouth 3 (three) times daily as needed for Cough., Disp: 30 capsule, Rfl: 2    carboxymethylcellulose sodium (REFRESH TEARS OPHT), Apply to eye. Use as needed, Disp: , Rfl:     diphenhydrAMINE-acetaminophen (TYLENOL PM EXTRA STRENGTH)  mg Tab, Take 1 tablet by mouth nightly as needed (pain/ sleep)., Disp: 90 tablet, Rfl: 3    ergocalciferol, vitamin D2, (VITAMIN D ORAL), Take 1,000 Units by mouth once daily. Hold 7 days prior to surgery, Disp: , Rfl:     fluticasone furoate-vilanteroL (BREO ELLIPTA) 100-25 mcg/dose diskus inhaler, Inhale 1 puff into the lungs once daily. Controller, Disp: 60 each, Rfl: 11    furosemide (LASIX) 20 MG tablet, Take 1 tablet (20 mg total) by mouth daily as needed (edema, fluid overload)., Disp: 30 tablet, Rfl: 11    hydrocortisone 2.5 % cream, Do not apply morning of surgery, Disp: , Rfl:     hydrOXYzine HCL (ATARAX) 25 MG tablet, Take 1 tablet (25 mg total) by mouth 3 (three) times daily as needed for Itching., Disp: 90 tablet, Rfl: 0    KEVZARA 200 mg/1.14 mL PnIj, Inject 200 mg into the skin every 14 (fourteen) days. Per -hold 1 week before surgery. Gets from [facility], Disp: , Rfl:     LORazepam (ATIVAN) 1 MG tablet, TAKE ONE TABLET BY MOUTH EVERY DAY AT BEDTIME AS NEEDED FOR ANXIETY OR  "sleep, Disp: , Rfl:     losartan (COZAAR) 50 MG tablet, Take 1 tablet (50 mg total) by mouth once daily., Disp: 90 tablet, Rfl: 0    omeprazole (PRILOSEC) 20 MG capsule, Take 1 capsule by mouth on empty stomach before breakfast to protect your stomach, Disp: 90 capsule, Rfl: 3    oxyCODONE (ROXICODONE) 5 MG immediate release tablet, Take 1 tablet (5 mg total) by mouth every 12 (twelve) hours as needed for Pain., Disp: 20 tablet, Rfl: 0    predniSONE (DELTASONE) 2.5 MG tablet, take 1 tablet daily with food for arthritis stiffness and inflammation, Disp: , Rfl:     tiotropium bromide (SPIRIVA RESPIMAT) 1.25 mcg/actuation inhaler, Inhale 2 puffs into the lungs once daily. Controller, Disp: 4 g, Rfl: 11    TOBRAMYCIN OPHT, Apply to eye., Disp: , Rfl:     traMADoL (ULTRAM) 50 mg tablet, Take 50 mg by mouth every 6 (six) hours as needed. Take as needed, Disp: , Rfl:     traZODone (DESYREL) 50 MG tablet, Take 1 tablet (50 mg total) by mouth every evening., Disp: 30 tablet, Rfl: 11    albuterol (PROVENTIL) 2.5 mg /3 mL (0.083 %) nebulizer solution, Take 3 mLs (2.5 mg total) by nebulization every 6 (six) hours as needed for Wheezing. Rescue, Disp: 90 mL, Rfl: 3    amLODIPine (NORVASC) 10 MG tablet, Take 1 tablet (10 mg total) by mouth once daily. May dispense 90 day supply if patient prefers. (Patient not taking: Reported on 10/22/2021), Disp: 30 tablet, Rfl: 11    Review of patient's allergies indicates:  No Known Allergies    REVIEW OF SYSTEMS:  Constitutional: negative for fevers  Musculoskeletal: negative for paresthesias    PHYSICAL EXAMINATION:    Ht 4' 9" (1.448 m)   Wt 69.4 kg (153 lb)   BMI 33.11 kg/m²     General: No acute distress.  Cardio: Regular rate.  Chest: No increased work of breathing.     MSK:  B hand exam:    No discoloration  Healed dorsal wrist scars, posterior elbow scars  Small wound on volar aspect of left long finger  No thenar atrophy  No interossei atrophy  Multiple upper extremity " deformities most significant R midforearm and ulnar deviation of B (L>R) wrist > fingers    No masses  Diffuse TTP worst at left ulnar shaft > L radial wrist > R wrist  Warm, well perfused    Fingers unable to fully flex fingers to distal palmar crease  Thumb unable to oppose to base of small finger  Pain with active wrist flexion/extension  Elbow ROM 0-90  Weakness with elbow flexion    SILT and motor intact M/R/U  Radial pulse palpable    Tinel's test negative  Phalen's test negative  No Froment's sign  No Wartenberg's sign    Unable to actively flex IPJ of B thumbs  EPL intact  FDP/FDS intact to all fingers but with weakness      IMAGING:   XR R/L hand 10/22/21  Left: Stable postoperative appearance of the distal radius and 1st and 2nd metacarpals.  I see no periprosthetic fracture or lucency.  Severe erosive changes in the wrist.  The ulnar styloid is either eroded or absent.  There is at least subluxation if not dislocation of the 2nd MCP joint. Findings in keeping with history of rheumatoid arthritis.     Right: Postoperative changes along the ulna.  Deformity of the distal radius may be on account of erosive changes or may be postoperative in nature.  Similarly, deformity of the distal ulna may be on account of postoperative change or erosions.  Many of the carpal bones are absent. Small erosive changes at the MCP joints.     Impression:  Stable abnormal appearance of both wrists and the MCP joints in this patient with rheumatoid arthritis.    ASSESSMENT/PLAN:    ICD-10-CM ICD-9-CM   1. History of arthroplasty of both wrists  Z96.631 V43.63    Z96.632    2. Decreased range of motion of both wrists  M25.631 719.53    M25.632    3. Rheumatoid arthritis involving multiple sites, unspecified whether rheumatoid factor present  M06.9 714.0     Plan -   - XR reviewed  - The patient's pathophysiology was explained in detail. Discussed that wrist fusion will not improve right hand function   - Treatment options were  discussed in detail including conservative (OT) & surgical options (wrist fusion).   - OT order placed  - Follow PRN  We reviewed the x-rays with the patient she actually has great alignment of the wrist as w the elbow    Will do therapy I did discuss with the patient she has got a complex issue she has an old arthroplasty on the left that is dislocated that would require a fusion with hardware removal which will give her a straight wrist but her fingers may not make a full fist she has learned to adapt over the years with her ham in relationship to her right side x-rays of her look would rate that Dr. Patel performed her wrist is nice and straight fingers may not have full  but we need to do some therapy to see what we can get back I do not think I can do anything to improve this alignment check she looks great will do therapy in see what  strength she can gains    Orders Placed This Encounter   Procedures    Ambulatory referral/consult to Physical/Occupational Therapy       This note has been scribed in part by Candice Meza MS, [facility], my Sports Medicine Assistant (SMA). This SMA performed & documented a complete history pre-assessment including the history of present illness, which I, Rosa Aguilar MD, explored & confirmed personally with the patient. The SMA has scribed portions of this note including my physical exanimation, diagnostic imaging interpretation, procedures performed, my plan of care & diagnosis. I agree that the scribed documentation is accurate & complete.

## 2022-02-02 ENCOUNTER — CLINICAL SUPPORT (OUTPATIENT)
Dept: REHABILITATION | Facility: HOSPITAL | Age: 77
End: 2022-02-02
Attending: ORTHOPAEDIC SURGERY
Payer: MEDICARE

## 2022-02-02 DIAGNOSIS — M25.632 DECREASED RANGE OF MOTION OF BOTH WRISTS: ICD-10-CM

## 2022-02-02 DIAGNOSIS — Z96.632: ICD-10-CM

## 2022-02-02 DIAGNOSIS — M06.9 RHEUMATOID ARTHRITIS INVOLVING MULTIPLE SITES, UNSPECIFIED WHETHER RHEUMATOID FACTOR PRESENT: ICD-10-CM

## 2022-02-02 DIAGNOSIS — M25.631 DECREASED RANGE OF MOTION OF BOTH WRISTS: ICD-10-CM

## 2022-02-02 DIAGNOSIS — Z96.631: ICD-10-CM

## 2022-02-02 PROCEDURE — 97110 THERAPEUTIC EXERCISES: CPT | Mod: HCNC,PO

## 2022-02-02 PROCEDURE — 97166 OT EVAL MOD COMPLEX 45 MIN: CPT | Mod: HCNC,PO

## 2022-02-02 PROCEDURE — 97010 HOT OR COLD PACKS THERAPY: CPT | Mod: HCNC,PO

## 2022-02-02 NOTE — PLAN OF CARE
[facility] Occupational Therapy  Initial Evaluation     Date: 2/2/2022  Patient: Francy Bain  Chart Number: [id number]  Referring Physician: Rosa Aguilar, *  Therapy Diagnosis: No diagnosis found.    Medical Diagnosis:   Z96.631,Z96.632 (ICD-10-CM) - History of arthroplasty of both wrists   M25.631,M25.632 (ICD-10-CM) - Decreased range of motion of both wrists   M06.9 (ICD-10-CM) - Rheumatoid arthritis involving multiple sites, unspecified whether rheumatoid factor present     Physician Orders: Eval and treat   R/L hand/wrist  eval & treat   ROM, strength, modalities PRN, HEP  1-2x/wk 6-8wk        Evaluation Date: 2/2/2022  Plan of Care Certification Date: 2/2/2022-4/2/2022  Authorization Period: 1/27/2022-1/27/2023  Surgery Date and Procedure: 8/17/2021  Date of Return to MD: to be determined    Visit #: 1 of 1  Time In: 11:00am  Time Out: 11:45am  Total Billable Time: 45 min    Precautions: Standard , Standard    Subjective     Involved Side: both wrists  Dominant Side: right  Date of Onset: 8/17/2021  History of Current Condition:Francy Bian is a 76 y.o. female returns for left & right wrist evaluation. She underwent revision R elbow arthroplasty, ulnar component with Massive ulna allograft, ORIF whole ulnar allograft to ulnar shaft, excision/craterization right proximal ulna Dr. Patel 08/17/21.  She is concerned that she has less function in right wrist since surgery. Denies numbness, tingling, burning, radiating pain. Denies swelling     Previous Therapy:3 years ago    Patient's Goals for Therapy: Gain more function in both hands    Pain:  Functional Pain Scale Rating 0-10:   6/10 on average  5/10 at best  10/10 at worst  Location: both wrists  Description: shooting  Aggravating Factors: bang hand  Easing Factors: Meds    Previous Level of function Has needed assistance with ADLs for over 30 years due to deformities    Current Level of Function Has  difficulty with daily living tasks,dressing ,etc    Occupation:  retired  Working presently: home-maker  Duties: retired    Past Medical History/Physical Systems Review:   Francy Bain  has a past medical history of Affective disorder, Hypertension, Renal cyst, acquired, right, and Rheumatoid arthritis.    Francy Bain  has a past surgical history that includes Knee Arthroplasty; Elbow Arthroplasty; Eye surgery (Right, 03/2017); Joint replacement; Ankle Fusion (Bilateral); Open reduction and internal fixation (ORIF) of fracture of ulna (Right, 8/17/2021); and Revision of total arthroplasty of elbow (Right, 8/17/2021).    Francy has a current medication list which includes the following prescription(s): albuterol, amlodipine, benzonatate, carboxymethylcellulose sodium, diphenhydramine-acetaminophen, ergocalciferol (vitamin d2), breo ellipta, furosemide, hydrocortisone, hydroxyzine hcl, kevzara, lorazepam, losartan, omeprazole, oxycodone, prednisone, spiriva respimat, tobramycin, tramadol, and trazodone.    Review of patient's allergies indicates:  No Known Allergies       Objective     Mental status: alert    Observation:   Pt presents with severe deformities of both wrists and hands, and contracted left elbow        Range of Motion:   Right and left limited elbow ,wrist and hands lacking functional mobility      SManual Muscle Testing:   deferred     Strength: (WON Dynamometer in psi.)      2/2/2022 2/2/2022    Left Right   Rung II 5# 2#       Pinch Strength (Measured in psi)     2/2/2022 2/2/2022    Left Right   Key Pinch X  X    3pt Pinch 2# 1#   2pt Pinch X  X           Treatment     Treatment Time In: 11:25am  Treatment Time Out: 11:45am  Total Treatment time separate from Evaluation time:20 min    Francy received the following supervised modalities after being cleared for contraindications for 10 minutes:   -moist heat      Francy performed therapeutic exercises for 10 minutes  including:  -functional  and pinch with yellow sponge for home exercising      Home Exercise Program/Education:  Issued HEP (see patient instructions in EMR) and educated on modality use for pain management . Exercises were reviewed and Francy was able to demonstrate them prior to the end of the session.   Pt received a written copy of exercises to perform at home. Francy demonstrated good understanding of the education provided.  Pt was advised to perform these exercises free of pain, and to stop performing them if pain occurs.    Patient/Family Education: role of OT, goals for OT, scheduling/cancellations - pt verbalized understanding. Discussed insurance limitations with patient.    Additional Education provided: Use of moist heat at home prior to exercising      Assessment     Francy Bain is a 76 y.o. female presents with limitations as described in problem list. Patient can benefit from Occupational Therapy services for Iontophoresis, ultrasound, moist heat, therapeutic exercises, home exercise program provied with written instructions, ice and strengthening and orthotics, if deemed necessary . The following goals were discussed with the patient and she is in agreement with them as to be addressed in the treatment plan.    The patient's rehab potential is Guarded.     Anticipated barriers to occupational therapy: contracted joints  Pt has no cultural, educational or language barriers to learning provided.    Profile and History Assessment of Occupational Performance Level of Clinical Decision Making Complexity Score   Occupational Profile:   Francy Bain is a 76 y.o. female who is retired Francy Bain has difficulty with  ADLs and IADLs as listed previously, which  affecting his/her daily functional abilities.      Comorbidities:    has a past medical history of Affective disorder, Hypertension, Renal cyst, acquired, right, and Rheumatoid arthritis.    Medical and  Therapy History Review:   Brief               Performance Deficits    Physical:  Joint Mobility  Muscle Power/Strength  Control of Voluntary Movement   Strength  Pinch Strength  Pain    Cognitive:  No Deficits    Psychosocial:    No Deficits     Clinical Decision Making:  low    Assessment Process:  Problem-Focused Assessments    Modification/Need for Assistance:  Not Necessary    Intervention Selection:  Limited Treatment Options       low  Based on PMHX, co morbidities , data from assessments and functional level of assistance required with task and clinical presentation directly impacting function.         Goals:    LTG's (1weeks):  1)   Increase functional ROM  5 degrees to increase functional hand use for ADLs  2)   Increase  strength 3 lbs. to grasp household items  3)   Increase 3pt pinch 3 psis for functional prehension  4)   Decrease complaints of pain to  5 out of 10 at worst to increase functional hand use for ADL/work/leisure activities.  5)Pt to be independent with HEP and modalities for stiffness and pain management.          Plan     Pt to be treated by Occupational Therapy 1 times per week for 1weeks during the certification period from 2/2/2022 to 2/9/2022 to achieve the established goals.     Treatment to include: Modalities for pain management, Strengthening and Joint Protection, as well as any other treatments deemed necessary based on the patient's needs or progress.     Ирина Elizalde, OT  Occupational therapist,  Hand Therapist

## 2022-02-08 RX ORDER — LOSARTAN POTASSIUM 50 MG/1
TABLET ORAL
Qty: 90 TABLET | Refills: 0 | Status: SHIPPED | OUTPATIENT
Start: 2022-02-08 | End: 2022-04-25

## 2022-02-08 RX ORDER — FUROSEMIDE 20 MG/1
20 TABLET ORAL DAILY
Qty: 90 TABLET | Refills: 0 | Status: SHIPPED | OUTPATIENT
Start: 2022-02-08 | End: 2022-05-03 | Stop reason: SDUPTHER

## 2022-02-08 RX ORDER — OMEPRAZOLE 20 MG/1
20 CAPSULE, DELAYED RELEASE ORAL DAILY
Qty: 90 CAPSULE | Refills: 0 | Status: SHIPPED | OUTPATIENT
Start: 2022-02-08 | End: 2022-07-21

## 2022-02-08 NOTE — TELEPHONE ENCOUNTER
No new care gaps identified.  Powered by [employer]. Reference number: [id number].   2/08/2022 1:09:27 PM CST

## 2022-02-08 NOTE — TELEPHONE ENCOUNTER
No new care gaps identified.  Powered by [employer]. Reference number: [id number].   2/08/2022 1:07:46 PM CST

## 2022-02-08 NOTE — TELEPHONE ENCOUNTER
No new care gaps identified.  Powered by [employer]. Reference number: [id number].   2/08/2022 1:07:19 PM CST

## 2022-02-09 NOTE — PROGRESS NOTES
Occupational Therapy Daily Treatment Note     Date: 2/11/2022  Name: Francy Ryan [facility]  Clinic Number: [id number]    Therapy Diagnosis: No diagnosis found.  Physician: Rosa Aguilar, *    Physician OrdersR/L hand/wrist  eval & treat   ROM, strength, modalities PRN, HEP  1-2x/wk 6-8wk:   Medical Diagnosis:  Z96.631,Z96.632 (ICD-10-CM) - History of arthroplasty of both wrists   M25.631,M25.632 (ICD-10-CM) - Decreased range of motion of both wrists   M06.9 (ICD-10-CM) - Rheumatoid arthritis involving multiple sites, unspecified whether rheumatoid factor present     Surgical Procedure and Date: 8/17/2021  Evaluation Date: 2/2/2022  Insurance Authorization Period Expiration: 3/31/2022  Plan of Care Certification Period: 4/2/2022  Date of Return to MD: to be determined    Visit # / Visits authorized: 1 / 20  Time In:10:30am  Time Out: 11:15am  Total Billable Time: 45 minutes    Precautions:  Standard      Subjective     Pt reports:  Pain only with picking up or dressing herself  Response to previous treatment:can move her hands better    Pain: 4/10  Location: hands    Objective         Francy performed therapeutic exercises to maximize upper extremity motion and/or strength for 30 minutes including:  -HEPs, functional shoulder ROM    Francy participated in dynamic functional therapeutic activities to improve functional performance for 15  minutes, including:  -moist heat, clothespin reaches      Home Exercises and Education Provided     Education provided:   -joint protection, shoulder exercises for functional mobility  - Progress towards goals-Pt is gaining functional  after utilizing yellow sponge this past week    Written Home Exercises Provided: yes.  Exercises were reviewed and Francy was able to demonstrate them prior to the end of the session.  Francy demonstrated fair  understanding of the HEP provided.   .        Assessment     Pt would continue to benefit from skilled OT 1X week    Francy is progressing  slowly towards her goals and there are no updates to goals at this time. Pt prognosis is Fair.     Pt will continue to benefit from skilled outpatient occupational therapy to address the deficits listed in the problem list on initial evaluation provide pt/family education and to maximize pt's level of independence in the home and community environment.     Anticipated barriers to therapy: limited wrist mobility      Pt's spiritual, cultural and educational needs considered and pt agreeable to plan of care and goals.    Goals:  Pt will gain functional gripping for ADLs  Pt will be able to reach behind her head for combing hair    Plan   Cont OT to address above goals.      Ирина Elizalde, OT

## 2022-02-11 ENCOUNTER — CLINICAL SUPPORT (OUTPATIENT)
Dept: REHABILITATION | Facility: HOSPITAL | Age: 77
End: 2022-02-11
Payer: MEDICARE

## 2022-02-11 DIAGNOSIS — M25.631 DECREASED RANGE OF MOTION OF BOTH WRISTS: Primary | ICD-10-CM

## 2022-02-11 DIAGNOSIS — M25.632 DECREASED RANGE OF MOTION OF BOTH WRISTS: Primary | ICD-10-CM

## 2022-02-11 PROCEDURE — 97110 THERAPEUTIC EXERCISES: CPT | Mod: HCNC,PO

## 2022-02-11 PROCEDURE — 97010 HOT OR COLD PACKS THERAPY: CPT | Mod: HCNC,PO

## 2022-02-23 DIAGNOSIS — D84.9 IMMUNOSUPPRESSED STATUS: ICD-10-CM

## 2022-02-23 NOTE — PROGRESS NOTES
Occupational Therapy Daily Treatment Note     Date: 2/25/2022  Name: Francy Ryan [facility]  Clinic Number: [id number]    Therapy Diagnosis: No diagnosis found.  Physician: Rosa Aguilar, *    Physician Orders  Z96.631,Z96.632 (ICD-10-CM) - History of arthroplasty of both wrists   M25.631,M25.632 (ICD-10-CM) - Decreased range of motion of both wrists   M06.9 (ICD-10-CM) - Rheumatoid arthritis involving multiple sites, unspecified whether rheumatoid factor present      Physician Orders: Eval and treat    R/L hand/wrist  eval & treat   ROM, strength, modalities PRN, HEP  1-2x/wk 6-8wk         Evaluation Date: 2/2/2022  Plan of Care Certification Date: 2/2/2022-4/2/2022  Authorization Period: 1/27/2022-1/27/2023  Surgery Date and Procedure: 8/17/2021  Date of Return to MD: to be determined      Visit # / Visits authorized: 2 / 20  Time In:10:30am  Time Out: 11:15am  Total Billable Time: 45 minutes    Precautions:  Standard      Subjective     Pt reports: feeling better  Response to previous treatment:Home exercises for shoulder helped with functioning at home    Pain: 0/10  Location: both hands    Objective     Francy received the following supervised modalities after being cleared for contraindications for 15 minutes in order to promote increased blood flow and tissue elasticity:   -moist heat        Francy participated in dynamic functional therapeutic activities to improve functional performance for 30  minutes, including:  -clothespin tree yellow and red, dowel exercises for functional mobility BUE's,resistive pegboard,pink sponge right hand      Strength: (WON Dynamometer in lbs.):       Left  Right    Rung II 5#  5#        Home Exercises and Education Provided     Education provided:   - continue HEPs  - Progress towards goals - Pt is progressing towards achieving more functional use of BUEs    Written Home Exercises Provided: Patient instructed to cont prior HEP.  Exercises were reviewed and Francy was  able to demonstrate them prior to the end of the session.  Francy demonstrated good  understanding of the HEP provided.   .   See EMR under Patient Instructions for exercises provided prior visit.        Assessment     Pt would continue to benefit from skilled OT 1X week     Francy is progressing slowly towards her goals and there are no updates to goals at this time. Pt prognosis is Fair.     Pt will continue to benefit from skilled outpatient occupational therapy to address the deficits listed in the problem list on initial evaluation provide pt/family education and to maximize pt's level of independence in the home and community environment.     Anticipated barriers to therapy: contractures both wrists      Pt's spiritual, cultural and educational needs considered and pt agreeable to plan of care and goals.    Goals:  Pt will continue HEPs for shoulder and hands to increase BUE functional mobility      Plan   Cont OT to address above goals.        Ирина Elizalde, OT

## 2022-02-23 NOTE — PROGRESS NOTES
"  Occupational Therapy Daily Treatment Note     Date: 2/28/2022  Name: Francy Ryan [facility] Number: [id number]    Therapy Diagnosis: No diagnosis found.  Physician: Rosa Aguilar, *    Physician OrdersOrdersR/L hand/wrist  eval & treat   ROM, strength, modalities PRN, HEP  1-2x/wk 6-8wk:   Medical Diagnosis:  Z96.631,Z96.632 (ICD-10-CM) - History of arthroplasty of both wrists   M25.631,M25.632 (ICD-10-CM) - Decreased range of motion of both wrists   M06.9 (ICD-10-CM) - Rheumatoid arthritis involving multiple sites, unspecified whether rheumatoid factor present      Surgical Procedure and Date: 8/17/2021  Evaluation Date: 2/2/2022  Insurance Authorization Period Expiration: 3/31/2022  Plan of Care Certification Period: 4/2/2022  Date of Return to MD: to be determined    :  Visit # / Visits authorized: 2 / 20  Time In:10:00am  Time Out: 10:45am  Total Billable Time: 45 minutes    Precautions:  Standard      Subjective     Pt reports: pain in both hands  Response to previous treatment ":I felt good"    Pain: 3/10  Location: both hands    Objective     Francy received the following supervised modalities after being cleared for contraindications for 15 minutes in order to promote increased blood flow and tissue elasticity:   -moist heat and paraffin      Francy participated in dynamic functional therapeutic activities to improve functional performance for 30 minutes, including:  -functional reaching activities  Resistive pegboard, clothespins, dowel exercises,        Home Exercises and Education Provided     Education provided:   - continue HEPs at home  - Progress towards goals- Pt is progressing towards achieving more functional AROM with both UE's     Written Home Exercises Provided: Patient instructed to cont prior HEP.  Exercises were reviewed and Francy was able to demonstrate them prior to the end of the session.  Francy demonstrated good  understanding of the HEP provided.   .   See EMR under Patient " Instructions for exercises provided prior visit.        Assessment     Pt would continue to benefit from skilled OT 1X week     Francy is progressing slowly towards her goals and there are no updates to goals at this time. Pt prognosis is Fair.     Pt will continue to benefit from skilled outpatient occupational therapy to address the deficits listed in the problem list on initial evaluation provide pt/family education and to maximize pt's level of independence in the home and community environment.     Anticipated barriers to therapy: contractures left wrist      Pt's spiritual, cultural and educational needs considered and pt agreeable to plan of care and goals.    Goals:  Pts bilateral hand pain will decrease to 1/10  Pts hand strength will increase 5#  Pts BUE functional mobility will increase and get stronger, shoulders and hands as evidenced by her ability to do more at home,self care, etc.    Plan   Cont OT to address above goals.      Ирина Elizalde, OT

## 2022-02-25 ENCOUNTER — CLINICAL SUPPORT (OUTPATIENT)
Dept: REHABILITATION | Facility: HOSPITAL | Age: 77
End: 2022-02-25
Attending: ORTHOPAEDIC SURGERY
Payer: MEDICARE

## 2022-02-25 DIAGNOSIS — M25.632 DECREASED RANGE OF MOTION OF BOTH WRISTS: Primary | ICD-10-CM

## 2022-02-25 DIAGNOSIS — M25.631 DECREASED RANGE OF MOTION OF BOTH WRISTS: Primary | ICD-10-CM

## 2022-02-25 PROCEDURE — 97110 THERAPEUTIC EXERCISES: CPT | Mod: HCNC,PO

## 2022-02-25 PROCEDURE — 97010 HOT OR COLD PACKS THERAPY: CPT | Mod: HCNC,PO

## 2022-02-28 ENCOUNTER — CLINICAL SUPPORT (OUTPATIENT)
Dept: REHABILITATION | Facility: HOSPITAL | Age: 77
End: 2022-02-28
Attending: ORTHOPAEDIC SURGERY
Payer: MEDICARE

## 2022-02-28 PROCEDURE — 97018 PARAFFIN BATH THERAPY: CPT | Mod: HCNC,PO

## 2022-02-28 PROCEDURE — 97530 THERAPEUTIC ACTIVITIES: CPT | Mod: HCNC,PO

## 2022-02-28 PROCEDURE — 97010 HOT OR COLD PACKS THERAPY: CPT | Mod: HCNC,PO

## 2022-04-18 ENCOUNTER — TELEPHONE (OUTPATIENT)
Dept: PRIMARY CARE CLINIC | Facility: CLINIC | Age: 77
End: 2022-04-18
Payer: MEDICARE

## 2022-04-18 NOTE — TELEPHONE ENCOUNTER
----- Message from Kelsey Zepeda sent at 4/14/2022  4:43 PM CDT -----  Contact: Patient [phone]  Requesting an RX refill or new RX.  Is this a refill or new RX: new  RX name and strength (copy/paste from chart):  fluconazole (DIFLUCAN) 150 MG Tab   Is this a 30 day or 90 day RX:   Pharmacy name and phone # (copy/paste from chart):    [address] [address]  Phone: [phone] Fax: [phone]    Comments: Pt states the drug store faxed the office and is waiting for approval. Pt states this is urgent

## 2022-04-21 ENCOUNTER — TELEPHONE (OUTPATIENT)
Dept: PRIMARY CARE CLINIC | Facility: CLINIC | Age: 77
End: 2022-04-21
Payer: MEDICARE

## 2022-04-21 NOTE — TELEPHONE ENCOUNTER
Message left on patient's callback number to return call and let us know what happened on Monday that she missed her appointment because that is when her rxs were going to be refilled.  We cannot refill them until she sees us.

## 2022-04-21 NOTE — TELEPHONE ENCOUNTER
----- Message from Yee Oakesmarcella sent at 4/21/2022  1:42 PM CDT -----  Contact: Patient @ [phone]  Requesting an RX refill or new RX.  Is this a refill or new RX:   RX name and strength LORazepam (ATIVAN) 1 MG tablet  Is this a 30 day or 90 day RX:   Pharmacy name and phone # [facility] - [place], [place] - [address] [place] [address]  The doctors have asked that we provide their patients with the following 2 reminders -- prescription refills can take up to 72 hours, and a friendly reminder that in the future you can use your [facility] account to request refills: yes        Caller is requesting an earlier appointment then we can schedule.  Caller is requesting a message be sent to the provider.  If this is for urgent care symptoms, did you offer other providers at this location, providers at other locations, or [facility]? (yes, no, n/a):  yes  If this is for the patients physical, did you offer to schedule next available and put on wait list, or to see NP or PA for their physical?  (yes, no, n/a):  yes  When is the next available appointment with their provider:  06/2022  Reason for the appointment:  Annual Visit  Patient preference of timeframe to be scheduled:  Morning only  Would the patient like a call back, or a response through their [facility] portal?: call   Comments:   Patient asked for Rx to be refilled until she comes in               Date & Time: 4/1/2022, 4:30 AM  Patient: Brett Wood  Encounter Provider(s):    Iman Romero MD         This certifies that I, Brett Wood, a patient at an [facility] facility, am leaving the facility voluntarily and against the advice of my physician. I acknowledge that I have been:    1. informed that my physician believes that I need to receive care here;  2. informed that if I leave, I could become sicker or even die; and  3. provided discharge instructions consistent with my current diagnosis. I hereby release my physician, the facility, and its employees from all responsibility for any ill effects which may result from this action. __________________________________  Patient or authorized caregiver signature    __________________________________  RN signature    If no patient or patient representative signature was obtained, sign below to acknowledge that the form was reviewed with the patient and that the patient refused to sign.     __________________________________  RN signature

## 2022-04-23 NOTE — TELEPHONE ENCOUNTER
No new care gaps identified.  Powered by [employer]. Reference number: [id number].   4/23/2022 1:50:35 PM CDT

## 2022-04-24 NOTE — TELEPHONE ENCOUNTER
Refill Routing Note   Medication(s) are not appropriate for processing by [facility] for the following reason(s):      - Patient has been seen in the ED/Hospital since the last PCP visit    [facility] action(s):  Defer       Medication Therapy Plan: Last PCP Visit: 4/14/2021 Last Admission: 8/17/2021  Medication reconciliation completed: No     Appointments  past 12m or future 3m with PCP    Date Provider   Last Visit   4/14/2021 Ronit Hickey MD   Next Visit   6/9/2022 Ronit Hickey MD   ED visits in past 90 days: 0        Note composed:7:25 PM 04/23/2022

## 2022-04-25 RX ORDER — LOSARTAN POTASSIUM 50 MG/1
TABLET ORAL
Qty: 90 TABLET | Refills: 0 | Status: SHIPPED | OUTPATIENT
Start: 2022-04-25 | End: 2022-06-09 | Stop reason: SDUPTHER

## 2022-05-03 RX ORDER — FUROSEMIDE 20 MG/1
20 TABLET ORAL DAILY
Qty: 90 TABLET | Refills: 0 | Status: SHIPPED | OUTPATIENT
Start: 2022-05-03 | End: 2022-06-09 | Stop reason: SDUPTHER

## 2022-05-03 NOTE — TELEPHONE ENCOUNTER
No new care gaps identified.  [facility] Embedded Care Gaps. Reference number: [id number]. 5/03/2022   2:25:08 PM CDT

## 2022-06-02 ENCOUNTER — TELEPHONE (OUTPATIENT)
Dept: PRIMARY CARE CLINIC | Facility: CLINIC | Age: 77
End: 2022-06-02
Payer: MEDICARE

## 2022-06-02 NOTE — TELEPHONE ENCOUNTER
----- Message from Sonja Farrell sent at 6/2/2022 10:10 AM CDT -----  Contact: [phone]  Patient would like to know if pcp want her to have labs done before she see her. Please call and advise. Thank you

## 2022-06-09 ENCOUNTER — TELEPHONE (OUTPATIENT)
Dept: PRIMARY CARE CLINIC | Facility: CLINIC | Age: 77
End: 2022-06-09

## 2022-06-09 ENCOUNTER — HOSPITAL ENCOUNTER (OUTPATIENT)
Dept: RADIOLOGY | Facility: HOSPITAL | Age: 77
Discharge: HOME | End: 2022-06-09
Attending: FAMILY MEDICINE
Payer: MEDICARE

## 2022-06-09 ENCOUNTER — OFFICE VISIT (OUTPATIENT)
Dept: PRIMARY CARE CLINIC | Facility: CLINIC | Age: 77
End: 2022-06-09
Payer: MEDICARE

## 2022-06-09 VITALS
DIASTOLIC BLOOD PRESSURE: 80 MMHG | HEIGHT: 57 IN | SYSTOLIC BLOOD PRESSURE: 138 MMHG | HEART RATE: 80 BPM | OXYGEN SATURATION: 99 % | WEIGHT: 155.63 LBS | RESPIRATION RATE: 18 BRPM | BODY MASS INDEX: 33.57 KG/M2

## 2022-06-09 DIAGNOSIS — Z13.220 ENCOUNTER FOR LIPID SCREENING FOR CARDIOVASCULAR DISEASE: ICD-10-CM

## 2022-06-09 DIAGNOSIS — Z96.631: ICD-10-CM

## 2022-06-09 DIAGNOSIS — M25.551 RIGHT HIP PAIN: ICD-10-CM

## 2022-06-09 DIAGNOSIS — G31.84 MILD COGNITIVE IMPAIRMENT: ICD-10-CM

## 2022-06-09 DIAGNOSIS — K21.9 GASTROESOPHAGEAL REFLUX DISEASE WITHOUT ESOPHAGITIS: ICD-10-CM

## 2022-06-09 DIAGNOSIS — F51.01 PRIMARY INSOMNIA: ICD-10-CM

## 2022-06-09 DIAGNOSIS — R53.81 PHYSICAL DECONDITIONING: ICD-10-CM

## 2022-06-09 DIAGNOSIS — N76.0 ACUTE VAGINITIS: ICD-10-CM

## 2022-06-09 DIAGNOSIS — J44.89 ASTHMA-COPD OVERLAP SYNDROME: ICD-10-CM

## 2022-06-09 DIAGNOSIS — F39 AFFECTIVE DISORDER: ICD-10-CM

## 2022-06-09 DIAGNOSIS — Z96.632: ICD-10-CM

## 2022-06-09 DIAGNOSIS — R30.0 DYSURIA: ICD-10-CM

## 2022-06-09 DIAGNOSIS — M05.79 RHEUMATOID ARTHRITIS INVOLVING MULTIPLE SITES WITH POSITIVE RHEUMATOID FACTOR: ICD-10-CM

## 2022-06-09 DIAGNOSIS — M85.89 OSTEOPENIA OF MULTIPLE SITES: ICD-10-CM

## 2022-06-09 DIAGNOSIS — Z96.621 HISTORY OF RIGHT ELBOW REPLACEMENT: ICD-10-CM

## 2022-06-09 DIAGNOSIS — Z79.52 CURRENT USE OF STEROID MEDICATION: ICD-10-CM

## 2022-06-09 DIAGNOSIS — H40.1131 PRIMARY OPEN ANGLE GLAUCOMA (POAG) OF BOTH EYES, MILD STAGE: ICD-10-CM

## 2022-06-09 DIAGNOSIS — Z13.6 ENCOUNTER FOR LIPID SCREENING FOR CARDIOVASCULAR DISEASE: ICD-10-CM

## 2022-06-09 DIAGNOSIS — Z00.00 ROUTINE GENERAL MEDICAL EXAMINATION AT A HEALTH CARE FACILITY: Primary | ICD-10-CM

## 2022-06-09 DIAGNOSIS — E66.09 CLASS 1 OBESITY DUE TO EXCESS CALORIES WITH SERIOUS COMORBIDITY AND BODY MASS INDEX (BMI) OF 33.0 TO 33.9 IN ADULT: ICD-10-CM

## 2022-06-09 DIAGNOSIS — I10 PRIMARY HYPERTENSION: ICD-10-CM

## 2022-06-09 DIAGNOSIS — D84.9 IMMUNOCOMPROMISED STATE: ICD-10-CM

## 2022-06-09 DIAGNOSIS — Z86.73 HISTORY OF STROKE: ICD-10-CM

## 2022-06-09 PROCEDURE — 1101F PR PT FALLS ASSESS DOC 0-1 FALLS W/OUT INJ PAST YR: ICD-10-PCS | Mod: CPTII,S$GLB,, | Performed by: FAMILY MEDICINE

## 2022-06-09 PROCEDURE — 73502 X-RAY EXAM HIP UNI 2-3 VIEWS: CPT | Mod: 26,RT,, | Performed by: RADIOLOGY

## 2022-06-09 PROCEDURE — 3075F PR MOST RECENT SYSTOLIC BLOOD PRESS GE 130-139MM HG: ICD-10-PCS | Mod: CPTII,S$GLB,, | Performed by: FAMILY MEDICINE

## 2022-06-09 PROCEDURE — 3079F PR MOST RECENT DIASTOLIC BLOOD PRESSURE 80-89 MM HG: ICD-10-PCS | Mod: CPTII,S$GLB,, | Performed by: FAMILY MEDICINE

## 2022-06-09 PROCEDURE — 99499 RISK ADDL DX/OHS AUDIT: ICD-10-PCS | Mod: S$GLB,,, | Performed by: FAMILY MEDICINE

## 2022-06-09 PROCEDURE — 99999 PR PBB SHADOW E&M-EST. PATIENT-LVL V: CPT | Mod: PBBFAC,,, | Performed by: FAMILY MEDICINE

## 2022-06-09 PROCEDURE — 87481 CANDIDA DNA AMP PROBE: CPT | Mod: 59 | Performed by: FAMILY MEDICINE

## 2022-06-09 PROCEDURE — 3288F FALL RISK ASSESSMENT DOCD: CPT | Mod: CPTII,S$GLB,, | Performed by: FAMILY MEDICINE

## 2022-06-09 PROCEDURE — 99999 PR PBB SHADOW E&M-EST. PATIENT-LVL V: ICD-10-PCS | Mod: PBBFAC,,, | Performed by: FAMILY MEDICINE

## 2022-06-09 PROCEDURE — 3288F PR FALLS RISK ASSESSMENT DOCUMENTED: ICD-10-PCS | Mod: CPTII,S$GLB,, | Performed by: FAMILY MEDICINE

## 2022-06-09 PROCEDURE — 1101F PT FALLS ASSESS-DOCD LE1/YR: CPT | Mod: CPTII,S$GLB,, | Performed by: FAMILY MEDICINE

## 2022-06-09 PROCEDURE — 3075F SYST BP GE 130 - 139MM HG: CPT | Mod: CPTII,S$GLB,, | Performed by: FAMILY MEDICINE

## 2022-06-09 PROCEDURE — 1159F PR MEDICATION LIST DOCUMENTED IN MEDICAL RECORD: ICD-10-PCS | Mod: CPTII,S$GLB,, | Performed by: FAMILY MEDICINE

## 2022-06-09 PROCEDURE — 1160F PR REVIEW ALL MEDS BY PRESCRIBER/CLIN PHARMACIST DOCUMENTED: ICD-10-PCS | Mod: CPTII,S$GLB,, | Performed by: FAMILY MEDICINE

## 2022-06-09 PROCEDURE — 1159F MED LIST DOCD IN RCRD: CPT | Mod: CPTII,S$GLB,, | Performed by: FAMILY MEDICINE

## 2022-06-09 PROCEDURE — 1126F AMNT PAIN NOTED NONE PRSNT: CPT | Mod: CPTII,S$GLB,, | Performed by: FAMILY MEDICINE

## 2022-06-09 PROCEDURE — 73502 XR HIP WITH PELVIS WHEN PERFORMED, 2 OR 3  VIEWS RIGHT: ICD-10-PCS | Mod: 26,RT,, | Performed by: RADIOLOGY

## 2022-06-09 PROCEDURE — 3079F DIAST BP 80-89 MM HG: CPT | Mod: CPTII,S$GLB,, | Performed by: FAMILY MEDICINE

## 2022-06-09 PROCEDURE — 99214 PR OFFICE/OUTPT VISIT, EST, LEVL IV, 30-39 MIN: ICD-10-PCS | Mod: S$GLB,,, | Performed by: FAMILY MEDICINE

## 2022-06-09 PROCEDURE — 73502 X-RAY EXAM HIP UNI 2-3 VIEWS: CPT | Mod: TC,PN,RT

## 2022-06-09 PROCEDURE — 1126F PR PAIN SEVERITY QUANTIFIED, NO PAIN PRESENT: ICD-10-PCS | Mod: CPTII,S$GLB,, | Performed by: FAMILY MEDICINE

## 2022-06-09 PROCEDURE — 99214 OFFICE O/P EST MOD 30 MIN: CPT | Mod: S$GLB,,, | Performed by: FAMILY MEDICINE

## 2022-06-09 PROCEDURE — 99499 UNLISTED E&M SERVICE: CPT | Mod: S$GLB,,, | Performed by: FAMILY MEDICINE

## 2022-06-09 PROCEDURE — 1160F RVW MEDS BY RX/DR IN RCRD: CPT | Mod: CPTII,S$GLB,, | Performed by: FAMILY MEDICINE

## 2022-06-09 PROCEDURE — 87801 DETECT AGNT MULT DNA AMPLI: CPT | Performed by: FAMILY MEDICINE

## 2022-06-09 RX ORDER — FUROSEMIDE 20 MG/1
20 TABLET ORAL DAILY
Qty: 90 TABLET | Refills: 3 | Status: SHIPPED | OUTPATIENT
Start: 2022-06-09 | End: 2022-09-28 | Stop reason: SDUPTHER

## 2022-06-09 RX ORDER — FLUCONAZOLE 150 MG/1
150 TABLET ORAL DAILY
Qty: 1 TABLET | Refills: 0 | Status: SHIPPED | OUTPATIENT
Start: 2022-06-09 | End: 2022-06-10

## 2022-06-09 RX ORDER — LOSARTAN POTASSIUM 50 MG/1
50 TABLET ORAL DAILY
Qty: 90 TABLET | Refills: 3 | Status: SHIPPED | OUTPATIENT
Start: 2022-06-09 | End: 2022-07-14 | Stop reason: SDUPTHER

## 2022-06-09 NOTE — PROGRESS NOTES
Subjective:       Patient ID: Francy Bain is a 77 y.o. female.    Chief Complaint: Annual Exam      78 yo female  with a past medical history of mild cognitive impairment, History of stroke, Affective disorder,  Glaucoma, Hypertension, Rheumatoid arthritis, long term steroids, GERD, Osteopenia, Obesity, physically deconditioned, Hx of bronchitis, s/p surgery to upper extremities.     - Mild cognitive impairment  Used to see Neurology. Non adherent to [facility].      - History of stroke  No acute deficit     - Affective disorder  On doxepin     - Glaucoma  Needs a referral to eye doctor     - Hypertension  Reports home systolic of 140 mmHg. She has gained weight. She takes losartan.   She reports no headache, vision disturbance, chest pain, lower extremity swelling.     - Rheumatoid arthritis  Requesting referral. She has joint deformity most notably in upper extremities.     - Requesting Orthopedic referral s/p surgery to upper extremities.     - Osteopenia  She was unaware of this diagnosis. Last DEXA in 2017. Not taking adequate calcium or vitamin D.     - GERD  No unintentional weight loss. No trouble or pain with swallowing. No vomiting blood or blood in stool.    - obesity, physically deconditioned  No routine exercises. Weight gain. Plans on changing her lifestyle.    - Glaucoma  No worsening vision disturbance     - Bronchitis  No cough, SOB or wheezing.    The following portions of the patient's history were reviewed and updated as appropriate: allergies, current medications, past family history, past medical history, past social history, past surgical history and problem list.    Review of Systems   Constitutional: Negative for activity change, appetite change, chills, diaphoresis, fatigue, fever and unexpected weight change.   HENT: Negative for nasal congestion, dental problem, facial swelling, nosebleeds, postnasal drip, rhinorrhea, sore throat, tinnitus and trouble swallowing.    Eyes:  "Negative for pain, discharge, itching and visual disturbance.   Respiratory: Negative for apnea, chest tightness, shortness of breath, wheezing and stridor.    Cardiovascular: Negative for chest pain, palpitations and leg swelling.   Gastrointestinal: Negative for abdominal distention, abdominal pain, constipation, diarrhea, nausea, rectal pain and vomiting.   Endocrine: Negative for cold intolerance, heat intolerance and polyuria.   Genitourinary: Positive for difficulty urinating and vaginal discharge. Negative for dysuria, frequency, hematuria and urgency.   Musculoskeletal: Positive for arthralgias. Negative for gait problem, myalgias, neck pain and neck stiffness.   Integumentary:  Negative for color change and rash.   Neurological: Negative for dizziness, tremors, seizures, syncope, facial asymmetry, weakness and headaches.   Hematological: Negative for adenopathy. Does not bruise/bleed easily.   Psychiatric/Behavioral: Positive for sleep disturbance. Negative for agitation, confusion, hallucinations, self-injury and suicidal ideas. The patient is not hyperactive.           Objective:       Vitals:    06/09/22 1049 06/09/22 1115   BP: (!) 143/82 138/80   BP Location: Right arm    Patient Position: Sitting    BP Method: Medium (Manual)    Pulse: 80    Resp: 18    SpO2: 99%    Weight: 70.6 kg (155 lb 10.3 oz)    Height: 4' 9" (1.448 m)      Physical Exam  Constitutional:       General: She is not in acute distress.     Appearance: She is well-developed. She is obese. She is not diaphoretic.   HENT:      Head: Normocephalic and atraumatic.      Right Ear: External ear normal.      Left Ear: External ear normal.   Eyes:      General: No scleral icterus.        Right eye: No discharge.         Left eye: No discharge.      Conjunctiva/sclera: Conjunctivae normal.      Pupils: Pupils are equal, round, and reactive to light.   Neck:      Thyroid: No thyromegaly.   Cardiovascular:      Rate and Rhythm: Normal rate and " regular rhythm.      Heart sounds: Normal heart sounds. No murmur heard.    No friction rub. No gallop.   Pulmonary:      Effort: Pulmonary effort is normal. No respiratory distress.      Breath sounds: Normal breath sounds.   Chest:      Chest wall: No tenderness.   Abdominal:      General: Bowel sounds are normal. There is distension.      Palpations: Abdomen is soft. There is no mass.      Tenderness: There is no abdominal tenderness. There is no guarding or rebound.   Musculoskeletal:         General: Deformity (upper extremity) present. Normal range of motion.      Cervical back: Normal range of motion and neck supple.      Comments: No swelling of right hip. Pain on palpation over right greater trochanter. Negative AMY and FADIR testing.   Lymphadenopathy:      Cervical: No cervical adenopathy.   Skin:     General: Skin is warm.      Capillary Refill: Capillary refill takes less than 2 seconds.      Findings: No rash.   Neurological:      Mental Status: She is alert and oriented to person, place, and time.      Cranial Nerves: No cranial nerve deficit.      Motor: No abnormal muscle tone.      Coordination: Coordination normal.      Deep Tendon Reflexes: Reflexes normal.   Psychiatric:         Thought Content: Thought content normal.         Judgment: Judgment normal.         Assessment:       1. Routine general medical examination at a health care facility    2. Mild cognitive impairment    3. Right hip pain    4. Acute vaginitis    5. Dysuria    6. Gastroesophageal reflux disease without esophagitis    7. Primary insomnia    8. Affective disorder    9. Primary hypertension    10. History of stroke    11. Rheumatoid arthritis involving multiple sites with positive rheumatoid factor    12. Current use of steroid medication    13. Immunocompromised state    14. Class 1 obesity due to excess calories with serious comorbidity and body mass index (BMI) of 33.0 to 33.9 in adult    15. Osteopenia of multiple sites     16. Asthma-COPD overlap syndrome    17. Physical deconditioning    18. Primary open angle glaucoma (POAG) of both eyes, mild stage    19. History of arthroplasty of both wrists    20. History of right elbow replacement    21. Encounter for lipid screening for cardiovascular disease        Plan:       1. Routine general medical examination at a health care facility  The patient has been advised of the reasonably likely side effects and complications of medications and treatment.  Medications were reviewed and reconciled with refills sent to pharmacy as needed. Health maintenance was reviewed with recommendations per age and sex.  Immunizations reviewed and updated per guidelines unless patient declines. All questions were addressed and answered.    2. Mild cognitive impairment  Patient unaccompanied at this visit. No new or worsening neurologic disturbance warranting imaging.    3. Right hip pain  -     X-Ray Hip 2 or 3 views Right (with Pelvis when performed); Future; Expected date: 06/09/2022  -     Ambulatory referral/consult to Sports Medicine; Future; Expected date: 06/16/2022    4. Acute vaginitis  -     Vaginosis Screen by DNA Probe  -     fluconazole (DIFLUCAN) 150 MG Tab; Take 1 tablet (150 mg total) by mouth once daily. for 1 day  Dispense: 1 tablet; Refill: 0    5. Dysuria  -     Urinalysis; Future  -     Urine culture; Future    6. Gastroesophageal reflux disease without esophagitis  On PPI    7. Primary insomnia  8. Affective disorder  On trazodone    9. Primary hypertension  -     CBC Auto Differential; Future  -     Comprehensive Metabolic Panel; Future  -     TSH; Future  -     losartan (COZAAR) 50 MG tablet; Take 1 tablet (50 mg total) by mouth once daily.  Dispense: 90 tablet; Refill: 3    9. History of stroke  Lost to follow up with Neurology    10. Rheumatoid arthritis involving multiple sites with positive rheumatoid factor  11. Current use of steroid medication  12. Immunocompromised  state  Follows with rheumatology    13. Class 1 obesity due to excess calories with serious comorbidity and body mass index (BMI) of 33.0 to 33.9 in adult  The importance of optimum diet & exercise discussed. The goal for weight management is 5-10 % of total body weight reduction in 3 months.     14. Osteopenia of multiple sites  Encouraged adequate calcium and vitamin-D intake    15. Asthma-COPD overlap syndrome  Continue inhaler therapy    16. Physical deconditioning  Encourage exercise regimen    17. Primary open angle glaucoma (POAG) of both eyes, mild stage  Stay up to date on eye exams    18. History of arthroplasty of both wrists  19. History of right elbow replacement  No adverse events    20. Encounter for lipid screening for cardiovascular disease  -     Lipid Panel; Future    Disclaimer: This note has been generated using voice-recognition software. There may be typographical errors that have been missed during proof-reading

## 2022-06-09 NOTE — TELEPHONE ENCOUNTER
I faxed an ARIA to Dr. Painting (Rheumatology) at [phone] to get a copy of the patient's most recent labs that she did with them. Patient stated she did labs in May and doesn't want to repeat labs if it's not necessary.

## 2022-06-09 NOTE — TELEPHONE ENCOUNTER
----- Message from Ronit Hickey MD sent at 6/9/2022 12:00 PM CDT -----  Hello,    You have arthritis of the right hip. Follow up with sports medicine.

## 2022-06-09 NOTE — TELEPHONE ENCOUNTER
LVM advising results are visible via [facility] and referral in. Letter sent. Advised a call back with questions or if unable to review the results.

## 2022-06-13 ENCOUNTER — LAB VISIT (OUTPATIENT)
Dept: LAB | Facility: HOSPITAL | Age: 77
End: 2022-06-13
Attending: FAMILY MEDICINE
Payer: MEDICARE

## 2022-06-13 DIAGNOSIS — R30.0 DYSURIA: ICD-10-CM

## 2022-06-13 LAB
BACTERIA #/AREA URNS AUTO: ABNORMAL /HPF
BILIRUB UR QL STRIP: NEGATIVE
CAOX CRY UR QL COMP ASSIST: ABNORMAL
CLARITY UR REFRACT.AUTO: ABNORMAL
COLOR UR AUTO: YELLOW
GLUCOSE UR QL STRIP: NEGATIVE
HGB UR QL STRIP: NEGATIVE
KETONES UR QL STRIP: NEGATIVE
LEUKOCYTE ESTERASE UR QL STRIP: NEGATIVE
MICROSCOPIC COMMENT: ABNORMAL
NITRITE UR QL STRIP: NEGATIVE
PH UR STRIP: 5 [PH] (ref 5–8)
PROT UR QL STRIP: NEGATIVE
SP GR UR STRIP: 1.02 (ref 1–1.03)
SQUAMOUS #/AREA URNS AUTO: 1 /HPF
URN SPEC COLLECT METH UR: ABNORMAL
YEAST UR QL AUTO: ABNORMAL

## 2022-06-13 PROCEDURE — 81001 URINALYSIS AUTO W/SCOPE: CPT | Performed by: FAMILY MEDICINE

## 2022-06-13 PROCEDURE — 87086 URINE CULTURE/COLONY COUNT: CPT | Performed by: FAMILY MEDICINE

## 2022-06-14 LAB
BACTERIA UR CULT: NORMAL
BACTERIA UR CULT: NORMAL

## 2022-06-15 ENCOUNTER — TELEPHONE (OUTPATIENT)
Dept: PRIMARY CARE CLINIC | Facility: CLINIC | Age: 77
End: 2022-06-15
Payer: MEDICARE

## 2022-06-15 NOTE — TELEPHONE ENCOUNTER
----- Message from Ronit Hickey MD sent at 6/15/2022  9:59 AM CDT -----  Hello, No urinary tract infection

## 2022-06-16 LAB
BACTERIAL VAGINOSIS DNA: NEGATIVE
CANDIDA GLABRATA DNA: POSITIVE
CANDIDA KRUSEI DNA: NEGATIVE
CANDIDA RRNA VAG QL PROBE: NEGATIVE
T VAGINALIS RRNA GENITAL QL PROBE: NEGATIVE

## 2022-06-17 ENCOUNTER — TELEPHONE (OUTPATIENT)
Dept: PRIMARY CARE CLINIC | Facility: CLINIC | Age: 77
End: 2022-06-17
Payer: MEDICARE

## 2022-06-17 NOTE — TELEPHONE ENCOUNTER
----- Message from Gloria Tay sent at 6/17/2022 12:19 PM CDT -----  Type:  Patient Returning Call    Who Called: pt   Who Left Message for Patient: pt   Does the patient know what this is regarding?: pt need a call to go over her xr  Would the patient rather a call back or a response via [facility]? Call   Best Call Back Number [phone]  Additional Information:  call back

## 2022-06-17 NOTE — TELEPHONE ENCOUNTER
Message left for patient you did not have a UTI contact the clinic with further questions. [phone]

## 2022-06-30 NOTE — PROGRESS NOTES
"CC: right hip pain    77 y.o. Female presents today for evaluation of her right hip pain. Pt reports she has had intermittent hip pain for about 4 months. Pt reports gradual onset. Pt localizes pain to anterior and lateral hip. Pt reports pain is 5/10 today. Pt reports catching sensation in her hip if she "moves too quick." Pt denies numbness/tingling.     Side: right  Problem: hip pain  Pain Score: 5/10  SYMPTOMS:   Time of onset: 4 months  Trauma, injury: gradual onset  Pain location: anterior, lateral    C-sign: yes    Radiation past knee: no    Exacerbating factors / difficult actions:    Nocturnal pain lying with ipsilateral side down: yes    Pivoting: yes    Putting on shoes / socks: yes    Getting into / out of cars: no    Getting up / down from chairs: "sometimes"    Known back problems: none  Weakness: none   Numbness: none  Mechanical symptoms:     Clicking, catching: yes    Snapping internal: none    Snapping external: none    INTERVENTIONS:   Medications tried: prednisone, tramadol (has relief, doesn't take often)  Physical therapy: none  Injections: none    RELEVANT HISTORY:   Imaging to date: 06/09/22    Occupation: retired     REVIEW OF SYSTEMS:   Constitution: Patient denies fever or chills. Pt reports "sweats this morning."  Eyes: Patient denies eye pain or vision changes.  HEENT: Patient denies ear pain, sore throat, or nasal discharge.  CVS: Patient denies chest pain.  Lungs: Patient denies shortness of breath or cough.  Abdomen: Patient denies any stomach pain, nausea, vomiting, or diarrhea  Skin: Patient reports skin rash under breast area.   Musculoskeletal: Patient denies recent injuries or trauma.  Neuro: Patient denies any numbness or tingling in upper or lower extremities.  Psych: Patient denies any current anxiety or nervousness.    PAST MEDICAL HISTORY:   Past Medical History:   Diagnosis Date    Affective disorder     Hypertension     Renal cyst, acquired, right 4/14/2021    " Rheumatoid arthritis        PAST SURGICAL HISTORY:  Past Surgical History:   Procedure Laterality Date    ANKLE FUSION Bilateral     ELBOW ARTHROPLASTY      EYE SURGERY Right 03/2017    cataract    JOINT REPLACEMENT      bilateral knee replacement; bilateral elbow and wrist replacement    KNEE ARTHROPLASTY      OPEN REDUCTION AND INTERNAL FIXATION (ORIF) OF FRACTURE OF ULNA Right 8/17/2021    Procedure: ORIF, FRACTURE, ULNA  AND MASSIVE ALLOGRAFT;  Surgeon: Tio Patel MD;  Location: [address];  Service: Orthopedics;  Laterality: Right;    REVISION OF TOTAL ARTHROPLASTY OF ELBOW Right 8/17/2021    Procedure: REVISION, TOTAL ARTHROPLASTY, ELBOW;  Surgeon: Tio Patel MD;  Location: [facility] OR [address] [place] [address];  Service: Orthopedics;  Laterality: Right;       FAMILY HISTORY:  Family History   Problem Relation Age of Onset    Hypertension Mother     Arthritis Mother     Heart attack Neg Hx     Heart disease Neg Hx        SOCIAL HISTORY:  Social History     Socioeconomic History    Marital status:     Number of children: 1   Occupational History    Occupation: Worked [employer]     Comment: retired   Tobacco Use    Smoking status: Never Smoker    Smokeless tobacco: Never Used   Substance and Sexual Activity    Alcohol use: No     Comment: rare- once per month    Drug use: No   Social History Narrative    Live in an apartment- no stairs       MEDICATIONS:     Current Outpatient Medications:     benzonatate (TESSALON) 100 MG capsule, Take 1 capsule (100 mg total) by mouth 3 (three) times daily as needed for Cough., Disp: 30 capsule, Rfl: 2    carboxymethylcellulose sodium (REFRESH TEARS OPHT), Apply to eye. Use as needed, Disp: , Rfl:     ergocalciferol, vitamin D2, (VITAMIN D ORAL), Take 1,000 Units by mouth once daily. Hold 7 days prior to surgery, Disp: , Rfl:     fluticasone furoate-vilanteroL (BREO ELLIPTA) 100-25 mcg/dose diskus inhaler, Inhale 1 puff into the lungs  once daily. Controller, Disp: 60 each, Rfl: 11    furosemide (LASIX) 20 MG tablet, Take 1 tablet (20 mg total) by mouth once daily., Disp: 90 tablet, Rfl: 3    hydrocortisone 2.5 % cream, Do not apply morning of surgery, Disp: , Rfl:     KEVZARA 200 mg/1.14 mL PnIj, Inject 200 mg into the skin every 14 (fourteen) days. Per  1 week before surgery. [facility], Disp: , Rfl:     LORazepam (ATIVAN) 1 MG tablet, TAKE ONE TABLET BY MOUTH EVERY DAY AT BEDTIME AS NEEDED FOR ANXIETY OR sleep, Disp: , Rfl:     losartan (COZAAR) 50 MG tablet, Take 1 tablet (50 mg total) by mouth once daily., Disp: 90 tablet, Rfl: 3    omeprazole (PRILOSEC) 20 MG capsule, Take 1 capsule (20 mg total) by mouth once daily., Disp: 90 capsule, Rfl: 0    predniSONE (DELTASONE) 2.5 MG tablet, take 1 tablet daily with food for arthritis stiffness and inflammation, Disp: , Rfl:     traMADoL (ULTRAM) 50 mg tablet, Take 50 mg by mouth every 6 (six) hours as needed. Take as needed, Disp: , Rfl:     albuterol (PROVENTIL) 2.5 mg /3 mL (0.083 %) nebulizer solution, Take 3 mLs (2.5 mg total) by nebulization every 6 (six) hours as needed for Wheezing. Rescue, Disp: 90 mL, Rfl: 3    diphenhydrAMINE-acetaminophen (TYLENOL PM EXTRA STRENGTH)  mg Tab, Take 1 tablet by mouth nightly as needed (pain/ sleep). (Patient not taking: Reported on 7/5/2022), Disp: 90 tablet, Rfl: 3    oxyCODONE (ROXICODONE) 5 MG immediate release tablet, Take 1 tablet (5 mg total) by mouth every 12 (twelve) hours as needed for Pain. (Patient not taking: Reported on 7/5/2022), Disp: 20 tablet, Rfl: 0    tiotropium bromide (SPIRIVA RESPIMAT) 1.25 mcg/actuation inhaler, Inhale 2 puffs into the lungs once daily. Controller (Patient not taking: Reported on 7/5/2022), Disp: 4 g, Rfl: 11    TOBRAMYCIN OPHT, Apply to eye., Disp: , Rfl:     traZODone (DESYREL) 50 MG tablet, Take 1 tablet (50 mg total) by mouth every evening. (Patient not taking: Reported on  "2022), Disp: 30 tablet, Rfl: 11    ALLERGIES:   Review of patient's allergies indicates:  No Known Allergies     IMAGIN. Hip X-ray ordered due to right hip pain. 2 views taken 22.   2. X-ray images were reviewed personally by me and then directly with patient.  3. FINDINGS: DJD with osteophytosis arising from the acetabulum bilaterally and adjacent to the greater trochanters.  No acute fracture or dislocation.  No bone destruction identified    4. IMPRESSION:  See above.    PHYSICAL EXAMINATION:  BP (!) 156/85   Pulse 89   Ht 4' 9" (1.448 m)   Wt 70.5 kg (155 lb 6.8 oz)   BMI 33.63 kg/m²   Vitals signs and nursing note have been reviewed.    General: In no acute distress, well developed, well nourished, no diaphoresis  Eyes: EOM full and smooth, no eye redness or discharge  HEENT: normocephalic and atraumatic, neck supple, trachea midline, no nasal discharge  Cardiovascular: no LE edema  Lungs: respirations non-labored, no conversational dyspnea   Neuro: AAOx3, CN2-12 grossly intact  Skin: No rashes, warm and dry  Psychiatric: cooperative, pleasant, mood and affect appropriate for age    Right Hip:   Gait: slightly antalgic    Inspection/Palpation:   -Deformities     TTP:  +Greater trochanter  +Abductors near GT insertion  -ASIS  -AIIS     ROM:    Flexion & extension WNL & sym B/L  -Log roll B/L     Functional:    +AMY   -FADIR   -Snapping ext    Strength/Functional:  + 4+/5 in glut med / abductors with pain against resistance        ASSESSMENT:      ICD-10-CM ICD-9-CM   1. Right hip pain  M25.551 719.45   2. Greater trochanteric pain syndrome of right lower extremity  M25.551 719.45   3. Primary osteoarthritis of right hip  M16.11 715.15   4. Elevated blood pressure reading  R03.0 796.2         PLAN:  Based on patient history, physical exam findings, and imaging I believe patient's main pain generator is her greater trochanteric pain syndrome.  I believe patient does have signs and symptoms " consistent with right hip impingement, but that the main pain generator is her greater trochanteric bursa/gluteus medius tendons.  Patient would like to start with conservative treatment options.  Patient given a home exercise program at today's visit.  Follow-up in 6 weeks.    Future planning includes - HOME EXERCISE PROGRAM (HEP): The patient was taught a homegoing physical therapy regimen for greater trochanteric pain syndrome. The patient demonstrated understanding of the exercises and proper technique of their execution. This interaction took 15 minutes and included demonstration of exercise as well as counseling to encourage patient to do exercises daily.        All questions were answered to the best of my ability and all concerns were addressed at this time.    Follow up in 6 week(s) for above, or sooner if needed.      This note is dictated using the M*Modal Fluency Direct word recognition program. There are word recognition mistakes that are occasionally missed on review.

## 2022-07-05 ENCOUNTER — OFFICE VISIT (OUTPATIENT)
Dept: SPORTS MEDICINE | Facility: CLINIC | Age: 77
End: 2022-07-05
Payer: MEDICARE

## 2022-07-05 VITALS
WEIGHT: 155.44 LBS | HEIGHT: 57 IN | SYSTOLIC BLOOD PRESSURE: 156 MMHG | DIASTOLIC BLOOD PRESSURE: 85 MMHG | HEART RATE: 89 BPM | BODY MASS INDEX: 33.54 KG/M2

## 2022-07-05 DIAGNOSIS — R03.0 ELEVATED BLOOD PRESSURE READING: ICD-10-CM

## 2022-07-05 DIAGNOSIS — M25.551 RIGHT HIP PAIN: Primary | ICD-10-CM

## 2022-07-05 DIAGNOSIS — M25.551 GREATER TROCHANTERIC PAIN SYNDROME OF RIGHT LOWER EXTREMITY: ICD-10-CM

## 2022-07-05 DIAGNOSIS — M16.11 PRIMARY OSTEOARTHRITIS OF RIGHT HIP: ICD-10-CM

## 2022-07-05 PROCEDURE — 1159F PR MEDICATION LIST DOCUMENTED IN MEDICAL RECORD: ICD-10-PCS | Mod: CPTII,S$GLB,, | Performed by: STUDENT IN AN ORGANIZED HEALTH CARE EDUCATION/TRAINING PROGRAM

## 2022-07-05 PROCEDURE — 99204 OFFICE O/P NEW MOD 45 MIN: CPT | Mod: S$GLB,,, | Performed by: STUDENT IN AN ORGANIZED HEALTH CARE EDUCATION/TRAINING PROGRAM

## 2022-07-05 PROCEDURE — 99999 PR PBB SHADOW E&M-EST. PATIENT-LVL IV: CPT | Mod: PBBFAC,,, | Performed by: STUDENT IN AN ORGANIZED HEALTH CARE EDUCATION/TRAINING PROGRAM

## 2022-07-05 PROCEDURE — 3288F PR FALLS RISK ASSESSMENT DOCUMENTED: ICD-10-PCS | Mod: CPTII,S$GLB,, | Performed by: STUDENT IN AN ORGANIZED HEALTH CARE EDUCATION/TRAINING PROGRAM

## 2022-07-05 PROCEDURE — 97110 THERAPEUTIC EXERCISES: CPT | Mod: GP,S$GLB,, | Performed by: STUDENT IN AN ORGANIZED HEALTH CARE EDUCATION/TRAINING PROGRAM

## 2022-07-05 PROCEDURE — 1160F PR REVIEW ALL MEDS BY PRESCRIBER/CLIN PHARMACIST DOCUMENTED: ICD-10-PCS | Mod: CPTII,S$GLB,, | Performed by: STUDENT IN AN ORGANIZED HEALTH CARE EDUCATION/TRAINING PROGRAM

## 2022-07-05 PROCEDURE — 1160F RVW MEDS BY RX/DR IN RCRD: CPT | Mod: CPTII,S$GLB,, | Performed by: STUDENT IN AN ORGANIZED HEALTH CARE EDUCATION/TRAINING PROGRAM

## 2022-07-05 PROCEDURE — 97110 PR THERAPEUTIC EXERCISES: ICD-10-PCS | Mod: GP,S$GLB,, | Performed by: STUDENT IN AN ORGANIZED HEALTH CARE EDUCATION/TRAINING PROGRAM

## 2022-07-05 PROCEDURE — 3288F FALL RISK ASSESSMENT DOCD: CPT | Mod: CPTII,S$GLB,, | Performed by: STUDENT IN AN ORGANIZED HEALTH CARE EDUCATION/TRAINING PROGRAM

## 2022-07-05 PROCEDURE — 1101F PR PT FALLS ASSESS DOC 0-1 FALLS W/OUT INJ PAST YR: ICD-10-PCS | Mod: CPTII,S$GLB,, | Performed by: STUDENT IN AN ORGANIZED HEALTH CARE EDUCATION/TRAINING PROGRAM

## 2022-07-05 PROCEDURE — 1125F PR PAIN SEVERITY QUANTIFIED, PAIN PRESENT: ICD-10-PCS | Mod: CPTII,S$GLB,, | Performed by: STUDENT IN AN ORGANIZED HEALTH CARE EDUCATION/TRAINING PROGRAM

## 2022-07-05 PROCEDURE — 3077F SYST BP >= 140 MM HG: CPT | Mod: CPTII,S$GLB,, | Performed by: STUDENT IN AN ORGANIZED HEALTH CARE EDUCATION/TRAINING PROGRAM

## 2022-07-05 PROCEDURE — 3077F PR MOST RECENT SYSTOLIC BLOOD PRESSURE >= 140 MM HG: ICD-10-PCS | Mod: CPTII,S$GLB,, | Performed by: STUDENT IN AN ORGANIZED HEALTH CARE EDUCATION/TRAINING PROGRAM

## 2022-07-05 PROCEDURE — 1126F AMNT PAIN NOTED NONE PRSNT: CPT | Mod: CPTII,S$GLB,, | Performed by: STUDENT IN AN ORGANIZED HEALTH CARE EDUCATION/TRAINING PROGRAM

## 2022-07-05 PROCEDURE — 99204 PR OFFICE/OUTPT VISIT, NEW, LEVL IV, 45-59 MIN: ICD-10-PCS | Mod: S$GLB,,, | Performed by: STUDENT IN AN ORGANIZED HEALTH CARE EDUCATION/TRAINING PROGRAM

## 2022-07-05 PROCEDURE — 3079F DIAST BP 80-89 MM HG: CPT | Mod: CPTII,S$GLB,, | Performed by: STUDENT IN AN ORGANIZED HEALTH CARE EDUCATION/TRAINING PROGRAM

## 2022-07-05 PROCEDURE — 1125F AMNT PAIN NOTED PAIN PRSNT: CPT | Mod: CPTII,S$GLB,, | Performed by: STUDENT IN AN ORGANIZED HEALTH CARE EDUCATION/TRAINING PROGRAM

## 2022-07-05 PROCEDURE — 1101F PT FALLS ASSESS-DOCD LE1/YR: CPT | Mod: CPTII,S$GLB,, | Performed by: STUDENT IN AN ORGANIZED HEALTH CARE EDUCATION/TRAINING PROGRAM

## 2022-07-05 PROCEDURE — 99999 PR PBB SHADOW E&M-EST. PATIENT-LVL IV: ICD-10-PCS | Mod: PBBFAC,,, | Performed by: STUDENT IN AN ORGANIZED HEALTH CARE EDUCATION/TRAINING PROGRAM

## 2022-07-05 PROCEDURE — 3079F PR MOST RECENT DIASTOLIC BLOOD PRESSURE 80-89 MM HG: ICD-10-PCS | Mod: CPTII,S$GLB,, | Performed by: STUDENT IN AN ORGANIZED HEALTH CARE EDUCATION/TRAINING PROGRAM

## 2022-07-05 PROCEDURE — 1159F MED LIST DOCD IN RCRD: CPT | Mod: CPTII,S$GLB,, | Performed by: STUDENT IN AN ORGANIZED HEALTH CARE EDUCATION/TRAINING PROGRAM

## 2022-07-05 PROCEDURE — 1126F PR PAIN SEVERITY QUANTIFIED, NO PAIN PRESENT: ICD-10-PCS | Mod: CPTII,S$GLB,, | Performed by: STUDENT IN AN ORGANIZED HEALTH CARE EDUCATION/TRAINING PROGRAM

## 2022-07-14 DIAGNOSIS — I10 PRIMARY HYPERTENSION: ICD-10-CM

## 2022-07-15 RX ORDER — LOSARTAN POTASSIUM 50 MG/1
50 TABLET ORAL DAILY
Qty: 90 TABLET | Refills: 3 | Status: SHIPPED | OUTPATIENT
Start: 2022-07-15 | End: 2022-09-28 | Stop reason: SDUPTHER

## 2022-07-21 RX ORDER — OMEPRAZOLE 20 MG/1
CAPSULE, DELAYED RELEASE ORAL
Qty: 90 CAPSULE | Refills: 0 | Status: SHIPPED | OUTPATIENT
Start: 2022-07-21 | End: 2022-09-28 | Stop reason: SDUPTHER

## 2022-07-21 NOTE — TELEPHONE ENCOUNTER
Refill Routing Note   Medication(s) are not appropriate for processing by [facility] for the following reason(s):      - Non-participating provider    [facility] action(s):  Route          Medication reconciliation completed: No     Appointments  past 12m or future 3m with PCP    Date Provider   Last Visit   6/9/2022 Ronti Hickey MD   Next Visit   Visit date not found Ronit Hickye MD   ED visits in past 90 days: 0        Note composed:7:37 PM 07/20/2022

## 2022-08-09 ENCOUNTER — TELEPHONE (OUTPATIENT)
Dept: PRIMARY CARE CLINIC | Facility: CLINIC | Age: 77
End: 2022-08-09
Payer: MEDICARE

## 2022-08-09 NOTE — TELEPHONE ENCOUNTER
----- Message from Katharine Wooten sent at 8/9/2022  1:53 PM CDT -----  Contact: Self/[phone]  Pt said that she is calling in regards to needing to get a copy of her Xray report faxed to Dr. Varma @[phone]. Please advise

## 2022-08-16 ENCOUNTER — TELEPHONE (OUTPATIENT)
Dept: SPORTS MEDICINE | Facility: CLINIC | Age: 77
End: 2022-08-16
Payer: MEDICARE

## 2022-08-16 NOTE — TELEPHONE ENCOUNTER
Called pt due to being over 30 mins late to appointment today. Pt elected to r/s to 8/23.    Susana Corrigan MS, OTC  Clinical Assistant to Dr. Belen Patel

## 2022-08-19 NOTE — PROGRESS NOTES
CC: right hip pain    77 y.o. Female presents today for follow up evaluation of her right hip pain. Pt reports her pain remains unchanged. Pt reports she was compliant to HEP, performing it 3 times a week. Pt reports pain is 5/10 today. Pt localizes pain to lateral hip. Pt denies mechanical symptoms. Pt denies numbness/tingling. Pt reports cramping sensation in her hip with turning.     Attempted treatments: HEP (3x/wk)  Pain score: 5/10  History of trauma/injury: none since last visit   Affecting ADLs: yes      REVIEW OF SYSTEMS:   Constitution: Patient denies fever or chills. Pt reports sweats.   Eyes: Patient denies vision changes. Pt reports eye pain.   HEENT: Patient denies ear pain, sore throat, or nasal discharge.  CVS: Patient denies chest pain.  Lungs: Patient denies cough. Pt reports shortness of breath.   Skin: Patient reports skin rash and itching on left lateral thigh.   Musculoskeletal: Patient denies recent falls. See HPI.  Psych: Patient denies any current anxiety or nervousness.    PAST MEDICAL HISTORY:   Past Medical History:   Diagnosis Date    Affective disorder     Hypertension     Renal cyst, acquired, right 4/14/2021    Rheumatoid arthritis        MEDICATIONS:     Current Outpatient Medications:     carboxymethylcellulose sodium (REFRESH TEARS OPHT), Apply to eye. Use as needed, Disp: , Rfl:     diphenhydrAMINE-acetaminophen (TYLENOL PM EXTRA STRENGTH)  mg Tab, Take 1 tablet by mouth nightly as needed (pain/ sleep)., Disp: 90 tablet, Rfl: 3    ergocalciferol, vitamin D2, (VITAMIN D ORAL), Take 1,000 Units by mouth once daily. Hold 7 days prior to surgery, Disp: , Rfl:     fluticasone furoate-vilanteroL (BREO ELLIPTA) 100-25 mcg/dose diskus inhaler, Inhale 1 puff into the lungs once daily. Controller, Disp: 60 each, Rfl: 11    furosemide (LASIX) 20 MG tablet, Take 1 tablet (20 mg total) by mouth once daily., Disp: 90 tablet, Rfl: 3    hydrocortisone 2.5 % cream, Do not apply  "morning of surgery, Disp: , Rfl:     KEVZARA 200 mg/1.14 mL PnIj, Inject 200 mg into the skin every 14 (fourteen) days. Per  1 week before surgery. Gets [facility], Disp: , Rfl:     LORazepam (ATIVAN) 1 MG tablet, TAKE ONE TABLET BY MOUTH EVERY DAY AT BEDTIME AS NEEDED FOR ANXIETY OR sleep, Disp: , Rfl:     losartan (COZAAR) 50 MG tablet, Take 1 tablet (50 mg total) by mouth once daily., Disp: 90 tablet, Rfl: 3    omeprazole (PRILOSEC) 20 MG capsule, TAKE 1 CAPSULE ONE TIME DAILY, Disp: 90 capsule, Rfl: 0    oxyCODONE (ROXICODONE) 5 MG immediate release tablet, Take 1 tablet (5 mg total) by mouth every 12 (twelve) hours as needed for Pain., Disp: 20 tablet, Rfl: 0    predniSONE (DELTASONE) 2.5 MG tablet, take 1 tablet daily with food for arthritis stiffness and inflammation, Disp: , Rfl:     tiotropium bromide (SPIRIVA RESPIMAT) 1.25 mcg/actuation inhaler, Inhale 2 puffs into the lungs once daily. Controller, Disp: 4 g, Rfl: 11    TOBRAMYCIN OPHT, Apply to eye., Disp: , Rfl:     traMADoL (ULTRAM) 50 mg tablet, Take 50 mg by mouth every 6 (six) hours as needed. Take as needed, Disp: , Rfl:     traZODone (DESYREL) 50 MG tablet, Take 1 tablet (50 mg total) by mouth every evening., Disp: 30 tablet, Rfl: 11    albuterol (PROVENTIL) 2.5 mg /3 mL (0.083 %) nebulizer solution, Take 3 mLs (2.5 mg total) by nebulization every 6 (six) hours as needed for Wheezing. Rescue, Disp: 90 mL, Rfl: 3    benzonatate (TESSALON) 100 MG capsule, Take 1 capsule (100 mg total) by mouth 3 (three) times daily as needed for Cough. (Patient not taking: Reported on 8/23/2022), Disp: 30 capsule, Rfl: 2    naproxen (NAPROSYN) 500 MG tablet, Take 1 tablet (500 mg total) by mouth 2 (two) times daily with meals., Disp: 60 tablet, Rfl: 1    ALLERGIES:   Review of patient's allergies indicates:  No Known Allergies     PHYSICAL EXAMINATION:  BP (!) 166/85   Ht 4' 9" (1.448 m)   Wt 69.3 kg (152 lb 12.5 oz)   BMI 33.06 kg/m² "   Vitals signs and nursing note have been reviewed.    General: In no acute distress, well developed, well nourished, no diaphoresis  Eyes: EOM full and smooth, no eye redness or discharge  HENT: normocephalic and atraumatic, neck supple, trachea midline, no nasal discharge  Cardiovascular: no LE edema  Lungs: respirations non-labored, no conversational dyspnea   Neuro: AAOx3, CN2-12 grossly intact  Skin: No rashes, warm and dry  Psychiatric: cooperative, pleasant, mood and affect appropriate for age    Right Hip:   Gait: slightly antalgic     Inspection/Palpation:   -Deformities      TTP:  +Greater trochanter  +Abductors near GT insertion  -ASIS  -AIIS      ROM:    Flexion & extension WNL & sym B/L  -Log roll B/L      Functional:    +AMY   +FADIR   +Log roll   -Snapping ext     Strength/Functional:  + 4+/5 in glut med / abductors with pain against resistance    ASSESSMENT:      ICD-10-CM ICD-9-CM   1. Right hip pain  M25.551 719.45   2. Primary osteoarthritis of right hip  M16.11 715.15   3. Greater trochanteric pain syndrome of right lower extremity  M25.551 719.45   4. Elevated blood pressure reading  R03.0 796.2         PLAN:  Patient presented with more intra-articular symptoms at today's visit.  Patient would like to continue with conservative treatment options.  Patient will be sent to formal physical therapy.  We will do a trial of anti-inflammatories for pain control.  Follow-up in 6 weeks.    Future planning includes - naproxen 500 mg b.i.d., formal physical therapy    All questions were answered to the best of my ability and all concerns were addressed at this time.    Follow up in 6 week(s) for above, or sooner if needed.      This note is dictated using the M*Modal Fluency Direct word recognition program. There are word recognition mistakes that are occasionally missed on review.

## 2022-08-23 ENCOUNTER — OFFICE VISIT (OUTPATIENT)
Dept: SPORTS MEDICINE | Facility: CLINIC | Age: 77
End: 2022-08-23
Payer: MEDICARE

## 2022-08-23 VITALS
DIASTOLIC BLOOD PRESSURE: 85 MMHG | HEIGHT: 57 IN | BODY MASS INDEX: 32.96 KG/M2 | WEIGHT: 152.75 LBS | SYSTOLIC BLOOD PRESSURE: 166 MMHG

## 2022-08-23 DIAGNOSIS — M16.11 PRIMARY OSTEOARTHRITIS OF RIGHT HIP: ICD-10-CM

## 2022-08-23 DIAGNOSIS — M25.551 GREATER TROCHANTERIC PAIN SYNDROME OF RIGHT LOWER EXTREMITY: ICD-10-CM

## 2022-08-23 DIAGNOSIS — M25.551 RIGHT HIP PAIN: Primary | ICD-10-CM

## 2022-08-23 DIAGNOSIS — R03.0 ELEVATED BLOOD PRESSURE READING: ICD-10-CM

## 2022-08-23 PROCEDURE — 99999 PR PBB SHADOW E&M-EST. PATIENT-LVL IV: ICD-10-PCS | Mod: PBBFAC,,, | Performed by: STUDENT IN AN ORGANIZED HEALTH CARE EDUCATION/TRAINING PROGRAM

## 2022-08-23 PROCEDURE — 3079F PR MOST RECENT DIASTOLIC BLOOD PRESSURE 80-89 MM HG: ICD-10-PCS | Mod: CPTII,S$GLB,, | Performed by: STUDENT IN AN ORGANIZED HEALTH CARE EDUCATION/TRAINING PROGRAM

## 2022-08-23 PROCEDURE — 1159F PR MEDICATION LIST DOCUMENTED IN MEDICAL RECORD: ICD-10-PCS | Mod: CPTII,S$GLB,, | Performed by: STUDENT IN AN ORGANIZED HEALTH CARE EDUCATION/TRAINING PROGRAM

## 2022-08-23 PROCEDURE — 1159F MED LIST DOCD IN RCRD: CPT | Mod: CPTII,S$GLB,, | Performed by: STUDENT IN AN ORGANIZED HEALTH CARE EDUCATION/TRAINING PROGRAM

## 2022-08-23 PROCEDURE — 3079F DIAST BP 80-89 MM HG: CPT | Mod: CPTII,S$GLB,, | Performed by: STUDENT IN AN ORGANIZED HEALTH CARE EDUCATION/TRAINING PROGRAM

## 2022-08-23 PROCEDURE — 3077F PR MOST RECENT SYSTOLIC BLOOD PRESSURE >= 140 MM HG: ICD-10-PCS | Mod: CPTII,S$GLB,, | Performed by: STUDENT IN AN ORGANIZED HEALTH CARE EDUCATION/TRAINING PROGRAM

## 2022-08-23 PROCEDURE — 99999 PR PBB SHADOW E&M-EST. PATIENT-LVL IV: CPT | Mod: PBBFAC,,, | Performed by: STUDENT IN AN ORGANIZED HEALTH CARE EDUCATION/TRAINING PROGRAM

## 2022-08-23 PROCEDURE — 1160F PR REVIEW ALL MEDS BY PRESCRIBER/CLIN PHARMACIST DOCUMENTED: ICD-10-PCS | Mod: CPTII,S$GLB,, | Performed by: STUDENT IN AN ORGANIZED HEALTH CARE EDUCATION/TRAINING PROGRAM

## 2022-08-23 PROCEDURE — 3077F SYST BP >= 140 MM HG: CPT | Mod: CPTII,S$GLB,, | Performed by: STUDENT IN AN ORGANIZED HEALTH CARE EDUCATION/TRAINING PROGRAM

## 2022-08-23 PROCEDURE — 1125F PR PAIN SEVERITY QUANTIFIED, PAIN PRESENT: ICD-10-PCS | Mod: CPTII,S$GLB,, | Performed by: STUDENT IN AN ORGANIZED HEALTH CARE EDUCATION/TRAINING PROGRAM

## 2022-08-23 PROCEDURE — 99214 PR OFFICE/OUTPT VISIT, EST, LEVL IV, 30-39 MIN: ICD-10-PCS | Mod: S$GLB,,, | Performed by: STUDENT IN AN ORGANIZED HEALTH CARE EDUCATION/TRAINING PROGRAM

## 2022-08-23 PROCEDURE — 1160F RVW MEDS BY RX/DR IN RCRD: CPT | Mod: CPTII,S$GLB,, | Performed by: STUDENT IN AN ORGANIZED HEALTH CARE EDUCATION/TRAINING PROGRAM

## 2022-08-23 PROCEDURE — 99214 OFFICE O/P EST MOD 30 MIN: CPT | Mod: S$GLB,,, | Performed by: STUDENT IN AN ORGANIZED HEALTH CARE EDUCATION/TRAINING PROGRAM

## 2022-08-23 PROCEDURE — 1125F AMNT PAIN NOTED PAIN PRSNT: CPT | Mod: CPTII,S$GLB,, | Performed by: STUDENT IN AN ORGANIZED HEALTH CARE EDUCATION/TRAINING PROGRAM

## 2022-08-23 RX ORDER — NAPROXEN 500 MG/1
500 TABLET ORAL 2 TIMES DAILY WITH MEALS
Qty: 60 TABLET | Refills: 1 | Status: SHIPPED | OUTPATIENT
Start: 2022-08-23 | End: 2022-09-28

## 2022-08-23 NOTE — PATIENT INSTRUCTIONS
Take prescribed medication (NSAID) for 3 weeks.  After 3 weeks, stop medication.  If pain returns, you may continue taking medication until your follow-up. Stop medications 3 days before your follow-up visit.

## 2022-09-19 ENCOUNTER — OFFICE VISIT (OUTPATIENT)
Dept: UROLOGY | Facility: CLINIC | Age: 77
End: 2022-09-19
Payer: MEDICARE

## 2022-09-19 VITALS
BODY MASS INDEX: 31.07 KG/M2 | OXYGEN SATURATION: 95 % | HEIGHT: 57 IN | SYSTOLIC BLOOD PRESSURE: 135 MMHG | HEART RATE: 92 BPM | WEIGHT: 144 LBS | DIASTOLIC BLOOD PRESSURE: 72 MMHG | RESPIRATION RATE: 16 BRPM

## 2022-09-19 DIAGNOSIS — R82.90 ABNORMAL URINALYSIS: Primary | ICD-10-CM

## 2022-09-19 DIAGNOSIS — N28.1 COMPLEX RENAL CYST: ICD-10-CM

## 2022-09-19 PROCEDURE — 1160F PR REVIEW ALL MEDS BY PRESCRIBER/CLIN PHARMACIST DOCUMENTED: ICD-10-PCS | Mod: CPTII,S$GLB,, | Performed by: NURSE PRACTITIONER

## 2022-09-19 PROCEDURE — 3288F PR FALLS RISK ASSESSMENT DOCUMENTED: ICD-10-PCS | Mod: CPTII,S$GLB,, | Performed by: NURSE PRACTITIONER

## 2022-09-19 PROCEDURE — 87086 URINE CULTURE/COLONY COUNT: CPT | Performed by: NURSE PRACTITIONER

## 2022-09-19 PROCEDURE — 87077 CULTURE AEROBIC IDENTIFY: CPT | Performed by: NURSE PRACTITIONER

## 2022-09-19 PROCEDURE — 99213 OFFICE O/P EST LOW 20 MIN: CPT | Mod: S$GLB,,, | Performed by: NURSE PRACTITIONER

## 2022-09-19 PROCEDURE — 3078F PR MOST RECENT DIASTOLIC BLOOD PRESSURE < 80 MM HG: ICD-10-PCS | Mod: CPTII,S$GLB,, | Performed by: NURSE PRACTITIONER

## 2022-09-19 PROCEDURE — 3078F DIAST BP <80 MM HG: CPT | Mod: CPTII,S$GLB,, | Performed by: NURSE PRACTITIONER

## 2022-09-19 PROCEDURE — 87186 SC STD MICRODIL/AGAR DIL: CPT | Performed by: NURSE PRACTITIONER

## 2022-09-19 PROCEDURE — 1159F PR MEDICATION LIST DOCUMENTED IN MEDICAL RECORD: ICD-10-PCS | Mod: CPTII,S$GLB,, | Performed by: NURSE PRACTITIONER

## 2022-09-19 PROCEDURE — 1101F PT FALLS ASSESS-DOCD LE1/YR: CPT | Mod: CPTII,S$GLB,, | Performed by: NURSE PRACTITIONER

## 2022-09-19 PROCEDURE — 3075F SYST BP GE 130 - 139MM HG: CPT | Mod: CPTII,S$GLB,, | Performed by: NURSE PRACTITIONER

## 2022-09-19 PROCEDURE — 1126F AMNT PAIN NOTED NONE PRSNT: CPT | Mod: CPTII,S$GLB,, | Performed by: NURSE PRACTITIONER

## 2022-09-19 PROCEDURE — 99213 PR OFFICE/OUTPT VISIT, EST, LEVL III, 20-29 MIN: ICD-10-PCS | Mod: S$GLB,,, | Performed by: NURSE PRACTITIONER

## 2022-09-19 PROCEDURE — 1160F RVW MEDS BY RX/DR IN RCRD: CPT | Mod: CPTII,S$GLB,, | Performed by: NURSE PRACTITIONER

## 2022-09-19 PROCEDURE — 1101F PR PT FALLS ASSESS DOC 0-1 FALLS W/OUT INJ PAST YR: ICD-10-PCS | Mod: CPTII,S$GLB,, | Performed by: NURSE PRACTITIONER

## 2022-09-19 PROCEDURE — 3075F PR MOST RECENT SYSTOLIC BLOOD PRESS GE 130-139MM HG: ICD-10-PCS | Mod: CPTII,S$GLB,, | Performed by: NURSE PRACTITIONER

## 2022-09-19 PROCEDURE — 1159F MED LIST DOCD IN RCRD: CPT | Mod: CPTII,S$GLB,, | Performed by: NURSE PRACTITIONER

## 2022-09-19 PROCEDURE — 87088 URINE BACTERIA CULTURE: CPT | Performed by: NURSE PRACTITIONER

## 2022-09-19 PROCEDURE — 3288F FALL RISK ASSESSMENT DOCD: CPT | Mod: CPTII,S$GLB,, | Performed by: NURSE PRACTITIONER

## 2022-09-19 PROCEDURE — 1126F PR PAIN SEVERITY QUANTIFIED, NO PAIN PRESENT: ICD-10-PCS | Mod: CPTII,S$GLB,, | Performed by: NURSE PRACTITIONER

## 2022-09-19 RX ORDER — SULFAMETHOXAZOLE AND TRIMETHOPRIM 400; 80 MG/1; MG/1
1 TABLET ORAL 2 TIMES DAILY
COMMUNITY
End: 2023-02-15

## 2022-09-19 RX ORDER — FAMOTIDINE 20 MG/1
20 TABLET, FILM COATED ORAL 2 TIMES DAILY
COMMUNITY
Start: 2022-04-28 | End: 2022-09-28

## 2022-09-19 NOTE — PROGRESS NOTES
"Subjective:      Francy Bain is a 77 y.o. female who was referred by her rheumatologist provider for evaluation of her "urinary infection."  Established patient of Elsie Goss and is new to me today,    The patient has a known minimally complex right renal cyst.     States that she recently had urine testing that indicated an infection- no results available for review. She denies dysuria, frequency, gross hematuria, flank pain, N/V and fever/chills. Reports urinary urgency at baseline. Denies a history of recurrent UTIs and nephrolithiasis.     The following portions of the patient's history were reviewed and updated as appropriate: allergies, current medications, past family history, past medical history, past social history, past surgical history and problem list.    Review of Systems  Constitutional: no fever or chills  ENT: no nasal congestion or sore throat  Respiratory: no cough or shortness of breath  Cardiovascular: no chest pain or palpitations  Gastrointestinal: no nausea or vomiting, tolerating diet  Genitourinary: as per HPI  Hematologic/Lymphatic: no easy bruising or lymphadenopathy  Musculoskeletal: no arthralgias or myalgias  Neurological: no seizures or tremors  Behavioral/Psych: no auditory or visual hallucinations     Objective:   Vital Signs:  Vitals:    09/19/22 0947   BP: 135/72   Pulse: 92   Resp: 16     Physical Exam   General: alert and oriented, no acute distress  Head: normocephalic, atraumatic  Neck: supple, normal ROM  Respiratory: Symmetric expansion, non-labored breathing  Cardiovascular: regular rate and rhythm  Abdomen: soft, non tender, non distended  Pelvic: deferred  Skin: normal coloration and turgor, no rashes, no suspicious skin lesions noted  Neuro: alert and oriented x3, no gross deficits  Psych: normal judgment and insight, normal mood/affect, and non-anxious    Lab Review   Urinalysis demonstrates positive for leukocytes, red blood cells  Lab Results "   Component Value Date    WBC 6.91 11/23/2021    HGB 15.6 11/23/2021    HCT 49.5 (H) 11/23/2021    MCV 92 11/23/2021     11/23/2021     Lab Results   Component Value Date    CREATININE 0.8 11/23/2021    BUN 13 11/23/2021       Imaging   None    Assessment:   Abnormal urinalysis  Complex renal cyst    Plan:   Francy was seen today for pyelonephritis.    Diagnoses and all orders for this visit:    Abnormal urinalysis  -     Urine culture    Complex renal cyst  -     US Retroperitoneal Complete (Kidney and; Future    Plan:  --Unfortunately no urine results available for review  --Repeat urine culture, hold on antibiotics while awaiting results  --JULISSA to monitor complex renal cyst

## 2022-09-21 LAB — BACTERIA UR CULT: ABNORMAL

## 2022-09-22 ENCOUNTER — TELEPHONE (OUTPATIENT)
Dept: UROLOGY | Facility: CLINIC | Age: 77
End: 2022-09-22
Payer: MEDICARE

## 2022-09-22 DIAGNOSIS — N30.00 ACUTE CYSTITIS WITHOUT HEMATURIA: Primary | ICD-10-CM

## 2022-09-22 RX ORDER — AMOXICILLIN AND CLAVULANATE POTASSIUM 875; 125 MG/1; MG/1
1 TABLET, FILM COATED ORAL 2 TIMES DAILY
Qty: 10 TABLET | Refills: 0 | Status: SHIPPED | OUTPATIENT
Start: 2022-09-22 | End: 2022-09-28

## 2022-09-22 NOTE — TELEPHONE ENCOUNTER
"Patient asking "where did the bacteria in my urine come from?" Explained that bacteria can come from many sources, externally and internally. Also reminded patient that she has an upcoming Ultrasound for her kidneys. Patient plans to make scheduled Ultrasound. No further questions at this time.   "

## 2022-09-22 NOTE — TELEPHONE ENCOUNTER
----- Message from Roney Martinez sent at 9/22/2022  1:49 PM CDT -----  PLEASE CALL PT SHE HAS SOME QUESTION [phone]

## 2022-09-28 ENCOUNTER — OFFICE VISIT (OUTPATIENT)
Dept: PRIMARY CARE CLINIC | Facility: CLINIC | Age: 77
End: 2022-09-28
Payer: MEDICARE

## 2022-09-28 ENCOUNTER — LAB VISIT (OUTPATIENT)
Dept: LAB | Facility: HOSPITAL | Age: 77
End: 2022-09-28
Attending: FAMILY MEDICINE
Payer: MEDICARE

## 2022-09-28 ENCOUNTER — TELEPHONE (OUTPATIENT)
Dept: NEUROLOGY | Facility: CLINIC | Age: 77
End: 2022-09-28
Payer: MEDICARE

## 2022-09-28 ENCOUNTER — TELEPHONE (OUTPATIENT)
Dept: PULMONOLOGY | Facility: CLINIC | Age: 77
End: 2022-09-28
Payer: MEDICARE

## 2022-09-28 VITALS
OXYGEN SATURATION: 97 % | DIASTOLIC BLOOD PRESSURE: 78 MMHG | SYSTOLIC BLOOD PRESSURE: 126 MMHG | WEIGHT: 152.13 LBS | BODY MASS INDEX: 32.82 KG/M2 | HEIGHT: 57 IN | HEART RATE: 86 BPM

## 2022-09-28 DIAGNOSIS — R53.81 PHYSICAL DECONDITIONING: ICD-10-CM

## 2022-09-28 DIAGNOSIS — I10 ESSENTIAL HYPERTENSION: ICD-10-CM

## 2022-09-28 DIAGNOSIS — M05.79 RHEUMATOID ARTHRITIS INVOLVING MULTIPLE SITES WITH POSITIVE RHEUMATOID FACTOR: ICD-10-CM

## 2022-09-28 DIAGNOSIS — Z00.00 GENERAL MEDICAL EXAM: ICD-10-CM

## 2022-09-28 DIAGNOSIS — J98.9 REACTIVE AIRWAY DISEASE WITHOUT ASTHMA: ICD-10-CM

## 2022-09-28 DIAGNOSIS — R79.9 ABNORMAL FINDING OF BLOOD CHEMISTRY, UNSPECIFIED: ICD-10-CM

## 2022-09-28 DIAGNOSIS — R06.02 SOB (SHORTNESS OF BREATH): ICD-10-CM

## 2022-09-28 DIAGNOSIS — R68.89 OTHER GENERAL SYMPTOMS AND SIGNS: ICD-10-CM

## 2022-09-28 DIAGNOSIS — Z96.621 HISTORY OF RIGHT ELBOW REPLACEMENT: ICD-10-CM

## 2022-09-28 DIAGNOSIS — Z74.09 IMPAIRED FUNCTIONAL MOBILITY, BALANCE, GAIT, AND ENDURANCE: ICD-10-CM

## 2022-09-28 DIAGNOSIS — Z78.0 ASYMPTOMATIC MENOPAUSAL STATE: ICD-10-CM

## 2022-09-28 DIAGNOSIS — M85.89 OSTEOPENIA OF MULTIPLE SITES: ICD-10-CM

## 2022-09-28 DIAGNOSIS — Z96.621 ELBOW JOINT REPLACEMENT STATUS, RIGHT: ICD-10-CM

## 2022-09-28 DIAGNOSIS — K21.9 GASTROESOPHAGEAL REFLUX DISEASE WITHOUT ESOPHAGITIS: ICD-10-CM

## 2022-09-28 DIAGNOSIS — I10 PRIMARY HYPERTENSION: ICD-10-CM

## 2022-09-28 DIAGNOSIS — Z96.632: ICD-10-CM

## 2022-09-28 DIAGNOSIS — G31.84 MILD COGNITIVE IMPAIRMENT: ICD-10-CM

## 2022-09-28 DIAGNOSIS — Z76.89 ESTABLISHING CARE WITH NEW DOCTOR, ENCOUNTER FOR: Primary | ICD-10-CM

## 2022-09-28 DIAGNOSIS — Z13.820 OSTEOPOROSIS SCREENING: ICD-10-CM

## 2022-09-28 DIAGNOSIS — Z79.52 CURRENT USE OF STEROID MEDICATION: ICD-10-CM

## 2022-09-28 DIAGNOSIS — J44.89 CHRONIC OBSTRUCTIVE ASTHMA: ICD-10-CM

## 2022-09-28 DIAGNOSIS — E66.09 CLASS 1 OBESITY DUE TO EXCESS CALORIES WITH SERIOUS COMORBIDITY AND BODY MASS INDEX (BMI) OF 31.0 TO 31.9 IN ADULT: ICD-10-CM

## 2022-09-28 DIAGNOSIS — Z96.631: ICD-10-CM

## 2022-09-28 DIAGNOSIS — J44.89 ASTHMA-COPD OVERLAP SYNDROME: ICD-10-CM

## 2022-09-28 DIAGNOSIS — Z86.73 HISTORY OF STROKE: ICD-10-CM

## 2022-09-28 PROBLEM — G89.29 CHRONIC MUSCULOSKELETAL PAIN: Status: RESOLVED | Noted: 2020-10-08 | Resolved: 2022-09-28

## 2022-09-28 PROBLEM — M79.18 CHRONIC MUSCULOSKELETAL PAIN: Status: RESOLVED | Noted: 2020-10-08 | Resolved: 2022-09-28

## 2022-09-28 PROBLEM — R09.89 BIBASILAR CRACKLES: Status: RESOLVED | Noted: 2021-04-08 | Resolved: 2022-09-28

## 2022-09-28 PROBLEM — R06.09 DYSPNEA ON EXERTION: Status: RESOLVED | Noted: 2019-10-22 | Resolved: 2022-09-28

## 2022-09-28 PROBLEM — J98.4 RESTRICTIVE LUNG DISEASE: Status: RESOLVED | Noted: 2021-06-04 | Resolved: 2022-09-28

## 2022-09-28 PROBLEM — M06.9: Status: RESOLVED | Noted: 2021-08-17 | Resolved: 2022-09-28

## 2022-09-28 LAB
ALBUMIN SERPL BCP-MCNC: 3.8 G/DL (ref 3.5–5.2)
ALP SERPL-CCNC: 133 U/L (ref 55–135)
ALT SERPL W/O P-5'-P-CCNC: 20 U/L (ref 10–44)
ANION GAP SERPL CALC-SCNC: 15 MMOL/L (ref 8–16)
AST SERPL-CCNC: 20 U/L (ref 10–40)
BILIRUB SERPL-MCNC: 0.5 MG/DL (ref 0.1–1)
BUN SERPL-MCNC: 13 MG/DL (ref 8–23)
CALCIUM SERPL-MCNC: 9.7 MG/DL (ref 8.7–10.5)
CHLORIDE SERPL-SCNC: 108 MMOL/L (ref 95–110)
CHOLEST SERPL-MCNC: 232 MG/DL (ref 120–199)
CHOLEST/HDLC SERPL: 4.1 {RATIO} (ref 2–5)
CO2 SERPL-SCNC: 22 MMOL/L (ref 23–29)
CREAT SERPL-MCNC: 0.9 MG/DL (ref 0.5–1.4)
ERYTHROCYTE [DISTWIDTH] IN BLOOD BY AUTOMATED COUNT: 14 % (ref 11.5–14.5)
EST. GFR  (NO RACE VARIABLE): >60 ML/MIN/1.73 M^2
ESTIMATED AVG GLUCOSE: 108 MG/DL (ref 68–131)
GLUCOSE SERPL-MCNC: 67 MG/DL (ref 70–110)
HBA1C MFR BLD: 5.4 % (ref 4–5.6)
HCT VFR BLD AUTO: 49.9 % (ref 37–48.5)
HDLC SERPL-MCNC: 56 MG/DL (ref 40–75)
HDLC SERPL: 24.1 % (ref 20–50)
HGB BLD-MCNC: 15.1 G/DL (ref 12–16)
LDLC SERPL CALC-MCNC: 139.4 MG/DL (ref 63–159)
MCH RBC QN AUTO: 29.2 PG (ref 27–31)
MCHC RBC AUTO-ENTMCNC: 30.3 G/DL (ref 32–36)
MCV RBC AUTO: 96 FL (ref 82–98)
NONHDLC SERPL-MCNC: 176 MG/DL
PLATELET # BLD AUTO: 235 K/UL (ref 150–450)
PMV BLD AUTO: 10.7 FL (ref 9.2–12.9)
POTASSIUM SERPL-SCNC: 4 MMOL/L (ref 3.5–5.1)
PROT SERPL-MCNC: 7.3 G/DL (ref 6–8.4)
RBC # BLD AUTO: 5.18 M/UL (ref 4–5.4)
SODIUM SERPL-SCNC: 145 MMOL/L (ref 136–145)
TRIGL SERPL-MCNC: 183 MG/DL (ref 30–150)
TSH SERPL DL<=0.005 MIU/L-ACNC: 3.5 UIU/ML (ref 0.4–4)
WBC # BLD AUTO: 10.9 K/UL (ref 3.9–12.7)

## 2022-09-28 PROCEDURE — 85027 COMPLETE CBC AUTOMATED: CPT | Performed by: FAMILY MEDICINE

## 2022-09-28 PROCEDURE — 36415 COLL VENOUS BLD VENIPUNCTURE: CPT | Mod: PN | Performed by: FAMILY MEDICINE

## 2022-09-28 PROCEDURE — 99999 PR PBB SHADOW E&M-EST. PATIENT-LVL IV: CPT | Mod: PBBFAC,,, | Performed by: FAMILY MEDICINE

## 2022-09-28 PROCEDURE — 3074F SYST BP LT 130 MM HG: CPT | Mod: CPTII,S$GLB,, | Performed by: FAMILY MEDICINE

## 2022-09-28 PROCEDURE — 3288F PR FALLS RISK ASSESSMENT DOCUMENTED: ICD-10-PCS | Mod: CPTII,S$GLB,, | Performed by: FAMILY MEDICINE

## 2022-09-28 PROCEDURE — 80053 COMPREHEN METABOLIC PANEL: CPT | Performed by: FAMILY MEDICINE

## 2022-09-28 PROCEDURE — 1160F PR REVIEW ALL MEDS BY PRESCRIBER/CLIN PHARMACIST DOCUMENTED: ICD-10-PCS | Mod: CPTII,S$GLB,, | Performed by: FAMILY MEDICINE

## 2022-09-28 PROCEDURE — 84443 ASSAY THYROID STIM HORMONE: CPT | Performed by: FAMILY MEDICINE

## 2022-09-28 PROCEDURE — 80061 LIPID PANEL: CPT | Performed by: FAMILY MEDICINE

## 2022-09-28 PROCEDURE — 83036 HEMOGLOBIN GLYCOSYLATED A1C: CPT | Performed by: FAMILY MEDICINE

## 2022-09-28 PROCEDURE — 1126F PR PAIN SEVERITY QUANTIFIED, NO PAIN PRESENT: ICD-10-PCS | Mod: CPTII,S$GLB,, | Performed by: FAMILY MEDICINE

## 2022-09-28 PROCEDURE — 99215 OFFICE O/P EST HI 40 MIN: CPT | Mod: S$GLB,,, | Performed by: FAMILY MEDICINE

## 2022-09-28 PROCEDURE — 3288F FALL RISK ASSESSMENT DOCD: CPT | Mod: CPTII,S$GLB,, | Performed by: FAMILY MEDICINE

## 2022-09-28 PROCEDURE — 1101F PT FALLS ASSESS-DOCD LE1/YR: CPT | Mod: CPTII,S$GLB,, | Performed by: FAMILY MEDICINE

## 2022-09-28 PROCEDURE — 1101F PR PT FALLS ASSESS DOC 0-1 FALLS W/OUT INJ PAST YR: ICD-10-PCS | Mod: CPTII,S$GLB,, | Performed by: FAMILY MEDICINE

## 2022-09-28 PROCEDURE — 1159F MED LIST DOCD IN RCRD: CPT | Mod: CPTII,S$GLB,, | Performed by: FAMILY MEDICINE

## 2022-09-28 PROCEDURE — 3078F DIAST BP <80 MM HG: CPT | Mod: CPTII,S$GLB,, | Performed by: FAMILY MEDICINE

## 2022-09-28 PROCEDURE — 99215 PR OFFICE/OUTPT VISIT, EST, LEVL V, 40-54 MIN: ICD-10-PCS | Mod: S$GLB,,, | Performed by: FAMILY MEDICINE

## 2022-09-28 PROCEDURE — 3078F PR MOST RECENT DIASTOLIC BLOOD PRESSURE < 80 MM HG: ICD-10-PCS | Mod: CPTII,S$GLB,, | Performed by: FAMILY MEDICINE

## 2022-09-28 PROCEDURE — 1126F AMNT PAIN NOTED NONE PRSNT: CPT | Mod: CPTII,S$GLB,, | Performed by: FAMILY MEDICINE

## 2022-09-28 PROCEDURE — 1160F RVW MEDS BY RX/DR IN RCRD: CPT | Mod: CPTII,S$GLB,, | Performed by: FAMILY MEDICINE

## 2022-09-28 PROCEDURE — 99999 PR PBB SHADOW E&M-EST. PATIENT-LVL IV: ICD-10-PCS | Mod: PBBFAC,,, | Performed by: FAMILY MEDICINE

## 2022-09-28 PROCEDURE — 3074F PR MOST RECENT SYSTOLIC BLOOD PRESSURE < 130 MM HG: ICD-10-PCS | Mod: CPTII,S$GLB,, | Performed by: FAMILY MEDICINE

## 2022-09-28 PROCEDURE — 1159F PR MEDICATION LIST DOCUMENTED IN MEDICAL RECORD: ICD-10-PCS | Mod: CPTII,S$GLB,, | Performed by: FAMILY MEDICINE

## 2022-09-28 RX ORDER — LOSARTAN POTASSIUM 50 MG/1
50 TABLET ORAL DAILY
Qty: 90 TABLET | Refills: 3 | Status: SHIPPED | OUTPATIENT
Start: 2022-09-28 | End: 2023-01-18

## 2022-09-28 RX ORDER — OMEPRAZOLE 20 MG/1
20 CAPSULE, DELAYED RELEASE ORAL DAILY
Qty: 90 CAPSULE | Refills: 3 | Status: SHIPPED | OUTPATIENT
Start: 2022-09-28 | End: 2023-04-18 | Stop reason: SDUPTHER

## 2022-09-28 RX ORDER — FUROSEMIDE 20 MG/1
20 TABLET ORAL DAILY
Qty: 90 TABLET | Refills: 3 | Status: SHIPPED | OUTPATIENT
Start: 2022-09-28 | End: 2023-10-28

## 2022-09-28 RX ORDER — KETOCONAZOLE 20 MG/ML
SHAMPOO, SUSPENSION TOPICAL
Qty: 120 ML | Refills: 11 | Status: SHIPPED | OUTPATIENT
Start: 2022-09-28 | End: 2023-10-05

## 2022-09-28 RX ORDER — KETOCONAZOLE 20 MG/G
CREAM TOPICAL DAILY
Qty: 60 G | Refills: 5 | Status: SHIPPED | OUTPATIENT
Start: 2022-09-28 | End: 2023-10-05

## 2022-09-28 NOTE — TELEPHONE ENCOUNTER
----- Message from Janie Garcia sent at 9/28/2022 10:05 AM CDT -----  Dr. Mikey Tolbert is requesting pt have a f/u appt. Please assist in scheduling.    Thanks

## 2022-09-28 NOTE — PROGRESS NOTES
"/78 (BP Location: Right arm, Patient Position: Sitting, BP Method: Medium (Manual))   Pulse 86   Ht 4' 9" (1.448 m)   Wt 69 kg (152 lb 1.9 oz)   SpO2 97%   BMI 32.92 kg/m²     Chief Complaint: [facility]        Vera Epifanio Bain is a 77 y.o. female here for    HPI    Annual Exam.  Health maintenance reviewed with pt in detail inc screening studies such as lab studies and vaccines    Patient queried and denies any further complaints    SURGICAL AND MEDICAL HISTORY: updated and reviewed.  ALLERGIES updated and reviewed.  Review of patient's allergies indicates:  No Known Allergies  CURRENT OUTPATIENT MEDICATIONS updated and reviewed    Current Outpatient Medications:     albuterol (PROVENTIL) 2.5 mg /3 mL (0.083 %) nebulizer solution, Take 3 mLs (2.5 mg total) by nebulization every 6 (six) hours as needed for Wheezing. Rescue, Disp: 90 mL, Rfl: 3    carboxymethylcellulose sodium (REFRESH TEARS OPHT), Apply to eye. Use as needed, Disp: , Rfl:     ergocalciferol, vitamin D2, (VITAMIN D ORAL), Take 1,000 Units by mouth once daily. Hold 7 days prior to surgery, Disp: , Rfl:     fluticasone furoate-vilanteroL (BREO ELLIPTA) 100-25 mcg/dose diskus inhaler, Inhale 1 puff into the lungs once daily. Controller, Disp: 60 each, Rfl: 11    hydrocortisone 2.5 % cream, Do not apply morning of surgery, Disp: , Rfl:     LORazepam (ATIVAN) 1 MG tablet, TAKE ONE TABLET BY MOUTH EVERY DAY AT BEDTIME AS NEEDED FOR ANXIETY OR sleep, Disp: , Rfl:     predniSONE (DELTASONE) 2.5 MG tablet, take 1 tablet daily with food for arthritis stiffness and inflammation, Disp: , Rfl:     tiotropium bromide (SPIRIVA RESPIMAT) 1.25 mcg/actuation inhaler, Inhale 2 puffs into the lungs once daily. Controller, Disp: 4 g, Rfl: 11    traMADoL (ULTRAM) 50 mg tablet, Take 50 mg by mouth every 6 (six) hours as needed. Take as needed, Disp: , Rfl:     traZODone (DESYREL) 50 MG tablet, Take 1 tablet (50 mg total) by mouth every evening., " Disp: 30 tablet, Rfl: 11    furosemide (LASIX) 20 MG tablet, Take 1 tablet (20 mg total) by mouth once daily., Disp: 90 tablet, Rfl: 3    ketoconazole (NIZORAL) 2 % cream, Apply topically once daily., Disp: 60 g, Rfl: 5    ketoconazole (NIZORAL) 2 % shampoo, Shampoo, leave on 5 min then wash off and may use reg soap or shampoo. Use daily x 1 wk then 2x wkly as needed, Disp: 120 mL, Rfl: 11    KEVZARA 200 mg/1.14 mL PnIj, Inject 200 mg into the skin every 14 (fourteen) days. Per  1 week before surgery. Gets from [facility], Disp: , Rfl:     losartan (COZAAR) 50 MG tablet, Take 1 tablet (50 mg total) by mouth once daily., Disp: 90 tablet, Rfl: 3    omeprazole (PRILOSEC) 20 MG capsule, Take 1 capsule (20 mg total) by mouth once daily., Disp: 90 capsule, Rfl: 3    sulfamethoxazole-trimethoprim 400-80mg (BACTRIM,SEPTRA) 400-80 mg per tablet, Take 1 tablet by mouth 2 (two) times daily., Disp: , Rfl:     Review of Systems   Constitutional:  Negative for activity change, appetite change, chills, diaphoresis, fatigue, fever and unexpected weight change.   HENT:  Negative for congestion, ear discharge, ear pain, facial swelling, hearing loss, nosebleeds, postnasal drip, rhinorrhea, sinus pressure, sneezing, sore throat, tinnitus, trouble swallowing and voice change.    Eyes:  Negative for photophobia, pain, discharge, redness, itching and visual disturbance.   Respiratory:  Negative for cough, chest tightness, shortness of breath and wheezing.    Cardiovascular:  Negative for chest pain, palpitations and leg swelling.   Gastrointestinal:  Negative for abdominal distention, abdominal pain, anal bleeding, blood in stool, constipation, diarrhea, nausea, rectal pain and vomiting.   Endocrine: Negative for cold intolerance, heat intolerance, polydipsia, polyphagia and polyuria.   Genitourinary:  Negative for difficulty urinating, dysuria and flank pain.   Musculoskeletal:  Negative for arthralgias, back pain, joint  "swelling, myalgias and neck pain.   Skin:  Negative for rash.   Neurological:  Negative for dizziness, tremors, seizures, syncope, speech difficulty, weakness, light-headedness, numbness and headaches.   Psychiatric/Behavioral:  Negative for behavioral problems, confusion, decreased concentration, dysphoric mood, sleep disturbance and suicidal ideas. The patient is not nervous/anxious and is not hyperactive.      /78 (BP Location: Right arm, Patient Position: Sitting, BP Method: Medium (Manual))   Pulse 86   Ht 4' 9" (1.448 m)   Wt 69 kg (152 lb 1.9 oz)   SpO2 97%   BMI 32.92 kg/m²   Physical Exam  Vitals and nursing note reviewed.   Constitutional:       General: She is not in acute distress.     Appearance: Normal appearance. She is well-developed. She is not ill-appearing or toxic-appearing.   HENT:      Head: Normocephalic and atraumatic.      Right Ear: Tympanic membrane, ear canal and external ear normal.      Left Ear: Tympanic membrane, ear canal and external ear normal.      Nose: Nose normal.      Mouth/Throat:      Lips: Pink.      Mouth: Mucous membranes are moist.      Pharynx: No oropharyngeal exudate or posterior oropharyngeal erythema.   Eyes:      General: No scleral icterus.        Right eye: No discharge.         Left eye: No discharge.      Extraocular Movements: Extraocular movements intact.      Conjunctiva/sclera: Conjunctivae normal.   Cardiovascular:      Rate and Rhythm: Normal rate and regular rhythm.      Pulses: Normal pulses.      Heart sounds: Normal heart sounds. No murmur heard.  Pulmonary:      Effort: Pulmonary effort is normal. No respiratory distress.      Breath sounds: Normal breath sounds. No wheezing or rales.   Abdominal:      General: Bowel sounds are normal. There is no distension.      Palpations: Abdomen is soft. There is no mass.      Tenderness: There is no abdominal tenderness. There is no right CVA tenderness, left CVA tenderness, guarding or rebound.      " Hernia: No hernia is present.   Musculoskeletal:      Cervical back: Normal range of motion and neck supple. No rigidity or tenderness.   Lymphadenopathy:      Cervical: No cervical adenopathy.   Skin:     General: Skin is warm and dry.   Neurological:      General: No focal deficit present.      Mental Status: She is alert. Mental status is at baseline.   Psychiatric:         Mood and Affect: Mood normal.         Behavior: Behavior normal. Behavior is cooperative.     Francy was seen today for establish care.    Diagnoses and all orders for this visit:    Establishing care with new doctor, encounter for    History of stroke    Mild cognitive impairment    Chronic obstructive asthma    Asthma-COPD overlap syndrome  -     X-Ray Chest PA And Lateral; Future    Essential hypertension    Rheumatoid arthritis involving multiple sites with positive rheumatoid factor    Current use of steroid medication    Class 1 obesity due to excess calories with serious comorbidity and body mass index (BMI) of 31.0 to 31.9 in adult    Gastroesophageal reflux disease without esophagitis    Osteopenia of multiple sites    Physical deconditioning    Impaired functional mobility, balance, gait, and endurance    Osteoporosis screening  -     DXA Bone Density Spine And Hip; Future    Asymptomatic menopausal state  -     DXA Bone Density Spine And Hip; Future    Primary hypertension  -     losartan (COZAAR) 50 MG tablet; Take 1 tablet (50 mg total) by mouth once daily.    Reactive airway disease without asthma    General medical exam  -     CBC Without Differential; Future  -     Comprehensive Metabolic Panel; Future  -     Lipid Panel; Future  -     TSH; Future  -     Hemoglobin A1C; Future    Other general symptoms and signs  -     TSH; Future    Abnormal finding of blood chemistry, unspecified  -     CBC Without Differential; Future  -     Lipid Panel; Future    SOB (shortness of breath)  -     X-Ray Chest PA And Lateral; Future    Other  orders  -     furosemide (LASIX) 20 MG tablet; Take 1 tablet (20 mg total) by mouth once daily.  -     omeprazole (PRILOSEC) 20 MG capsule; Take 1 capsule (20 mg total) by mouth once daily.  -     ketoconazole (NIZORAL) 2 % shampoo; Shampoo, leave on 5 min then wash off and may use reg soap or shampoo. Use daily x 1 wk then 2x wkly as needed  -     ketoconazole (NIZORAL) 2 % cream; Apply topically once daily.    Meds discussed in detail.  Side effects discussed of each.  Each diagnosis discussed with patient in detail.  Consider physical therapy.    Follow-up with specialists as indicated     Blood pressure under good control         Time spent in the evaluation and management of this patient exceeded 60min and greater than 50% of this time was in face-to-face education regarding diagnoses, medications, plan, and follow-up.      Mikey Tolbert MD        ===========================================

## 2022-09-29 ENCOUNTER — TELEPHONE (OUTPATIENT)
Dept: ORTHOPEDICS | Facility: CLINIC | Age: 77
End: 2022-09-29
Payer: MEDICARE

## 2022-09-29 ENCOUNTER — HOSPITAL ENCOUNTER (OUTPATIENT)
Dept: RADIOLOGY | Facility: HOSPITAL | Age: 77
Discharge: HOME | End: 2022-09-29
Attending: FAMILY MEDICINE
Payer: MEDICARE

## 2022-09-29 DIAGNOSIS — R06.02 SOB (SHORTNESS OF BREATH): ICD-10-CM

## 2022-09-29 DIAGNOSIS — J44.89 ASTHMA-COPD OVERLAP SYNDROME: ICD-10-CM

## 2022-09-29 PROCEDURE — 71046 XR CHEST PA AND LATERAL: ICD-10-PCS | Mod: 26,,, | Performed by: RADIOLOGY

## 2022-09-29 PROCEDURE — 71046 X-RAY EXAM CHEST 2 VIEWS: CPT | Mod: TC,PN

## 2022-09-29 PROCEDURE — 71046 X-RAY EXAM CHEST 2 VIEWS: CPT | Mod: 26,,, | Performed by: RADIOLOGY

## 2022-09-29 NOTE — TELEPHONE ENCOUNTER
----- Message from Elsie Portillo sent at 9/29/2022 11:01 AM CDT -----  Regarding: Appointment  Contact: [phone]  Calling in regards to scheduling an appointment for right elbow pain, mid morning no Wednesday. Patient can do an audio virtual visit if possible.   No answer  left a message to call me back that's her surgery elbow

## 2022-09-29 NOTE — TELEPHONE ENCOUNTER
We spoke   her right elbow has been bothering her for a while  she had not fallen, has not been ill   informed that Dr Robertson last day here is tomorrow  he will gladly see her at [facility]  she just needs her PCP to give her an external  referral to Dr NAVARRO at [facility] I gave her their phone & fax #

## 2022-09-30 ENCOUNTER — TELEPHONE (OUTPATIENT)
Dept: PRIMARY CARE CLINIC | Facility: CLINIC | Age: 77
End: 2022-09-30
Payer: MEDICARE

## 2022-09-30 NOTE — TELEPHONE ENCOUNTER
----- Message from Mikey Tolbert MD sent at 9/30/2022  7:55 AM CDT -----  Contact: bala [phone]  Should be printed now.  Please send.  Thank you  ----- Message -----  From: Candice Pearson MA  Sent: 9/29/2022   1:21 PM CDT  To: Mikey Tolbert MD    Tio HERRERACorinne Patel is her orthopedic surgeon.  She'll need an external referral to continue to see him.   ----- Message -----  From: Mikey Tolbert MD  Sent: 9/29/2022   1:02 PM CDT  To: Tomasz Jensen Staff    I do not recall what this is about  ----- Message -----  From: Flip Del Cid  Sent: 9/29/2022  12:30 PM CDT  To: Tomasz Jensen Staff    Pt  informed that Dr Robertson last day here is tomorrow  he will gladly see her at [facility]  she just needs her PCP to give her an external  referral to Dr NAVARRO at [facility]. Phone number [phone]      Please call and advise, Thanks

## 2022-10-01 ENCOUNTER — HOSPITAL ENCOUNTER (OUTPATIENT)
Dept: RADIOLOGY | Facility: OTHER | Age: 77
Discharge: HOME | End: 2022-10-01
Attending: NURSE PRACTITIONER
Payer: MEDICARE

## 2022-10-01 DIAGNOSIS — N28.1 COMPLEX RENAL CYST: ICD-10-CM

## 2022-10-01 PROCEDURE — 76770 US RETROPERITONEAL COMPLETE: ICD-10-PCS | Mod: 26,,, | Performed by: RADIOLOGY

## 2022-10-01 PROCEDURE — 76770 US EXAM ABDO BACK WALL COMP: CPT | Mod: 26,,, | Performed by: RADIOLOGY

## 2022-10-01 PROCEDURE — 76770 US EXAM ABDO BACK WALL COMP: CPT | Mod: TC

## 2022-10-03 ENCOUNTER — TELEPHONE (OUTPATIENT)
Dept: SPORTS MEDICINE | Facility: CLINIC | Age: 77
End: 2022-10-03
Payer: MEDICARE

## 2022-10-03 DIAGNOSIS — N28.1 COMPLEX RENAL CYST: Primary | ICD-10-CM

## 2022-10-03 NOTE — TELEPHONE ENCOUNTER
Called and spoke to patient,  she was informed if she came early for her 11:30am appointment that she may have a bit of a wait.  Patient rescheduled her 11:30a m appointment to 8:15am.

## 2022-10-03 NOTE — TELEPHONE ENCOUNTER
----- Message from Susana Corrigan sent at 10/3/2022 12:41 PM CDT -----  Regarding: RE: Earlier Time for Appt Tomorrow  Contact: @[phone]  Yes, that's fine. Just let her know she may have to wait in case we aren't able to take her early since we are fully booked around that time.    ----- Message -----  From: Lorenza Morgan MA  Sent: 10/3/2022  12:21 PM CDT  To: Susana Corrigan  Subject: FW: Earlier Time for Appt Tomorrow               Called and spoke to patient.  She has another appointment in the area on tomorrow at 9 am and wanted to know if it was okay for her to arrive after she is finished with that appointment.    Lorenza  ----- Message -----  From: Michelle Galloway  Sent: 10/3/2022  12:09 PM CDT  To: Amanda NAVARRO Staff  Subject: Earlier Time for Appt Tomorrow                   Pt requesting a call back to see if she can be seen tomorrow at 10:30 instead of 11:30.  Please call to discuss further.

## 2022-10-04 ENCOUNTER — OFFICE VISIT (OUTPATIENT)
Dept: SPORTS MEDICINE | Facility: CLINIC | Age: 77
End: 2022-10-04
Payer: MEDICARE

## 2022-10-04 VITALS
BODY MASS INDEX: 32.79 KG/M2 | DIASTOLIC BLOOD PRESSURE: 78 MMHG | SYSTOLIC BLOOD PRESSURE: 149 MMHG | WEIGHT: 152 LBS | HEIGHT: 57 IN

## 2022-10-04 DIAGNOSIS — M25.551 GREATER TROCHANTERIC PAIN SYNDROME OF RIGHT LOWER EXTREMITY: ICD-10-CM

## 2022-10-04 DIAGNOSIS — R03.0 ELEVATED BLOOD PRESSURE READING: ICD-10-CM

## 2022-10-04 DIAGNOSIS — M25.551 RIGHT HIP PAIN: Primary | ICD-10-CM

## 2022-10-04 DIAGNOSIS — M16.11 PRIMARY OSTEOARTHRITIS OF RIGHT HIP: ICD-10-CM

## 2022-10-04 PROCEDURE — 1160F PR REVIEW ALL MEDS BY PRESCRIBER/CLIN PHARMACIST DOCUMENTED: ICD-10-PCS | Mod: CPTII,S$GLB,, | Performed by: STUDENT IN AN ORGANIZED HEALTH CARE EDUCATION/TRAINING PROGRAM

## 2022-10-04 PROCEDURE — 99999 PR PBB SHADOW E&M-EST. PATIENT-LVL III: CPT | Mod: PBBFAC,,, | Performed by: STUDENT IN AN ORGANIZED HEALTH CARE EDUCATION/TRAINING PROGRAM

## 2022-10-04 PROCEDURE — 1101F PR PT FALLS ASSESS DOC 0-1 FALLS W/OUT INJ PAST YR: ICD-10-PCS | Mod: CPTII,S$GLB,, | Performed by: STUDENT IN AN ORGANIZED HEALTH CARE EDUCATION/TRAINING PROGRAM

## 2022-10-04 PROCEDURE — 3288F PR FALLS RISK ASSESSMENT DOCUMENTED: ICD-10-PCS | Mod: CPTII,S$GLB,, | Performed by: STUDENT IN AN ORGANIZED HEALTH CARE EDUCATION/TRAINING PROGRAM

## 2022-10-04 PROCEDURE — 99213 PR OFFICE/OUTPT VISIT, EST, LEVL III, 20-29 MIN: ICD-10-PCS | Mod: S$GLB,,, | Performed by: STUDENT IN AN ORGANIZED HEALTH CARE EDUCATION/TRAINING PROGRAM

## 2022-10-04 PROCEDURE — 3078F PR MOST RECENT DIASTOLIC BLOOD PRESSURE < 80 MM HG: ICD-10-PCS | Mod: CPTII,S$GLB,, | Performed by: STUDENT IN AN ORGANIZED HEALTH CARE EDUCATION/TRAINING PROGRAM

## 2022-10-04 PROCEDURE — 3077F SYST BP >= 140 MM HG: CPT | Mod: CPTII,S$GLB,, | Performed by: STUDENT IN AN ORGANIZED HEALTH CARE EDUCATION/TRAINING PROGRAM

## 2022-10-04 PROCEDURE — 99213 OFFICE O/P EST LOW 20 MIN: CPT | Mod: S$GLB,,, | Performed by: STUDENT IN AN ORGANIZED HEALTH CARE EDUCATION/TRAINING PROGRAM

## 2022-10-04 PROCEDURE — 3077F PR MOST RECENT SYSTOLIC BLOOD PRESSURE >= 140 MM HG: ICD-10-PCS | Mod: CPTII,S$GLB,, | Performed by: STUDENT IN AN ORGANIZED HEALTH CARE EDUCATION/TRAINING PROGRAM

## 2022-10-04 PROCEDURE — 1159F MED LIST DOCD IN RCRD: CPT | Mod: CPTII,S$GLB,, | Performed by: STUDENT IN AN ORGANIZED HEALTH CARE EDUCATION/TRAINING PROGRAM

## 2022-10-04 PROCEDURE — 99999 PR PBB SHADOW E&M-EST. PATIENT-LVL III: ICD-10-PCS | Mod: PBBFAC,,, | Performed by: STUDENT IN AN ORGANIZED HEALTH CARE EDUCATION/TRAINING PROGRAM

## 2022-10-04 PROCEDURE — 1159F PR MEDICATION LIST DOCUMENTED IN MEDICAL RECORD: ICD-10-PCS | Mod: CPTII,S$GLB,, | Performed by: STUDENT IN AN ORGANIZED HEALTH CARE EDUCATION/TRAINING PROGRAM

## 2022-10-04 PROCEDURE — 1125F AMNT PAIN NOTED PAIN PRSNT: CPT | Mod: CPTII,S$GLB,, | Performed by: STUDENT IN AN ORGANIZED HEALTH CARE EDUCATION/TRAINING PROGRAM

## 2022-10-04 PROCEDURE — 1160F RVW MEDS BY RX/DR IN RCRD: CPT | Mod: CPTII,S$GLB,, | Performed by: STUDENT IN AN ORGANIZED HEALTH CARE EDUCATION/TRAINING PROGRAM

## 2022-10-04 PROCEDURE — 1125F PR PAIN SEVERITY QUANTIFIED, PAIN PRESENT: ICD-10-PCS | Mod: CPTII,S$GLB,, | Performed by: STUDENT IN AN ORGANIZED HEALTH CARE EDUCATION/TRAINING PROGRAM

## 2022-10-04 PROCEDURE — 1101F PT FALLS ASSESS-DOCD LE1/YR: CPT | Mod: CPTII,S$GLB,, | Performed by: STUDENT IN AN ORGANIZED HEALTH CARE EDUCATION/TRAINING PROGRAM

## 2022-10-04 PROCEDURE — 3078F DIAST BP <80 MM HG: CPT | Mod: CPTII,S$GLB,, | Performed by: STUDENT IN AN ORGANIZED HEALTH CARE EDUCATION/TRAINING PROGRAM

## 2022-10-04 PROCEDURE — 3288F FALL RISK ASSESSMENT DOCD: CPT | Mod: CPTII,S$GLB,, | Performed by: STUDENT IN AN ORGANIZED HEALTH CARE EDUCATION/TRAINING PROGRAM

## 2022-10-04 NOTE — PROGRESS NOTES
CC: right hip pain    77 y.o. Female presents today for follow up evaluation of her right hip pain. Pt reports some improvement since last visit. Pt reports she had relief with fPT and naproxen. Pt states that her hip pain is worse this morning, and states it is 5/10 today. Pt localizes pain to posterolateral hip. Pt denies mechanical symptoms. Pt denies numbness/tingling.     Attempted treatments: fPT, naproxen  Pain score: 5/10  History of trauma/injury: none since last visit  Affecting ADLs: yes, today; but states improved overall     REVIEW OF SYSTEMS:   Constitution: Patient denies fever or chills.  Eyes: Patient denies eye pain or vision changes.  HEENT: Patient denies ear pain, sore throat, or nasal discharge.  CVS: Patient denies chest pain.  Lungs: Patient reports shortness of breath and  cough.  Skin: Patient reports skin rash and itching.    Musculoskeletal: Patient denies recent falls. See HPI.  Psych: Patient reports anxiety.    PAST MEDICAL HISTORY:   Past Medical History:   Diagnosis Date    Affective disorder     Hypertension     Renal cyst, acquired, right 4/14/2021    Rheumatoid arthritis        MEDICATIONS:     Current Outpatient Medications:     albuterol (PROVENTIL) 2.5 mg /3 mL (0.083 %) nebulizer solution, Take 3 mLs (2.5 mg total) by nebulization every 6 (six) hours as needed for Wheezing. Rescue, Disp: 90 mL, Rfl: 3    carboxymethylcellulose sodium (REFRESH TEARS OPHT), Apply to eye. Use as needed, Disp: , Rfl:     ergocalciferol, vitamin D2, (VITAMIN D ORAL), Take 1,000 Units by mouth once daily. Hold 7 days prior to surgery, Disp: , Rfl:     fluticasone furoate-vilanteroL (BREO ELLIPTA) 100-25 mcg/dose diskus inhaler, Inhale 1 puff into the lungs once daily. Controller, Disp: 60 each, Rfl: 11    furosemide (LASIX) 20 MG tablet, Take 1 tablet (20 mg total) by mouth once daily., Disp: 90 tablet, Rfl: 3    hydrocortisone 2.5 % cream, Do not apply morning of surgery, Disp: , Rfl:      "ketoconazole (NIZORAL) 2 % cream, Apply topically once daily., Disp: 60 g, Rfl: 5    ketoconazole (NIZORAL) 2 % shampoo, Shampoo, leave on 5 min then wash off and may use reg soap or shampoo. Use daily x 1 wk then 2x wkly as needed, Disp: 120 mL, Rfl: 11    KEVZARA 200 mg/1.14 mL PnIj, Inject 200 mg into the skin every 14 (fourteen) days. Per  1 week before surgery. Gets from [facility], Disp: , Rfl:     LORazepam (ATIVAN) 1 MG tablet, TAKE ONE TABLET BY MOUTH EVERY DAY AT BEDTIME AS NEEDED FOR ANXIETY OR sleep, Disp: , Rfl:     losartan (COZAAR) 50 MG tablet, Take 1 tablet (50 mg total) by mouth once daily., Disp: 90 tablet, Rfl: 3    omeprazole (PRILOSEC) 20 MG capsule, Take 1 capsule (20 mg total) by mouth once daily., Disp: 90 capsule, Rfl: 3    predniSONE (DELTASONE) 2.5 MG tablet, take 1 tablet daily with food for arthritis stiffness and inflammation, Disp: , Rfl:     sulfamethoxazole-trimethoprim 400-80mg (BACTRIM,SEPTRA) 400-80 mg per tablet, Take 1 tablet by mouth 2 (two) times daily., Disp: , Rfl:     tiotropium bromide (SPIRIVA RESPIMAT) 1.25 mcg/actuation inhaler, Inhale 2 puffs into the lungs once daily. Controller, Disp: 4 g, Rfl: 11    traMADoL (ULTRAM) 50 mg tablet, Take 50 mg by mouth every 6 (six) hours as needed. Take as needed, Disp: , Rfl:     traZODone (DESYREL) 50 MG tablet, Take 1 tablet (50 mg total) by mouth every evening., Disp: 30 tablet, Rfl: 11    ALLERGIES:   Review of patient's allergies indicates:  No Known Allergies     PHYSICAL EXAMINATION:  BP (!) 149/78   Ht 4' 9" (1.448 m)   Wt 68.9 kg (152 lb)   BMI 32.89 kg/m²   Vitals signs and nursing note have been reviewed.    General: In no acute distress, well developed, well nourished, no diaphoresis  Eyes: EOM full and smooth, no eye redness or discharge  HENT: normocephalic and atraumatic, neck supple, trachea midline, no nasal discharge  Cardiovascular: no LE edema  Lungs: respirations non-labored, no conversational " dyspnea   Neuro: AAOx3, CN2-12 grossly intact  Skin: No rashes, warm and dry  Psychiatric: cooperative, pleasant, mood and affect appropriate for age    Right Hip:   Gait: slightly antalgic     Inspection/Palpation:   -Deformities      TTP:  +Greater trochanter  +Abductors near GT insertion  -ASIS  -AIIS      ROM:    Flexion & extension WNL & sym B/L  -Log roll B/L      Functional:    +AMY   -FADIR   -Snapping ext     Strength/Functional:  + 4+/5 in glut med / abductors with pain against resistance    ASSESSMENT:      ICD-10-CM ICD-9-CM   1. Right hip pain  M25.551 719.45   2. Greater trochanteric pain syndrome of right lower extremity  M25.551 719.45   3. Primary osteoarthritis of right hip  M16.11 715.15   4. Elevated blood pressure reading  R03.0 796.2         PLAN:  Formal physical therapy.  Pain relief options such as corticosteroid injection to the right hip were discussed with patient.  Patient states she will think on trying this option.  It was explained to patient that we should wait 2 weeks after her flu shot to do this injection.  Follow-up in 6 weeks.    Future planning includes - continue formal physical therapy, possible corticosteroid injection    All questions were answered to the best of my ability and all concerns were addressed at this time.    Follow up in 6 week(s) for above, or sooner if needed.      This note is dictated using the M*Modal Fluency Direct word recognition program. There are word recognition mistakes that are occasionally missed on review.

## 2022-10-07 ENCOUNTER — TELEPHONE (OUTPATIENT)
Dept: PRIMARY CARE CLINIC | Facility: CLINIC | Age: 77
End: 2022-10-07
Payer: MEDICARE

## 2022-10-07 NOTE — TELEPHONE ENCOUNTER
----- Message from Pam Kang sent at 10/7/2022  1:18 PM CDT -----  Contact: Pt [phone]  Patient would like to get a referral.  Referral to what specialty:  Orthopedics  Does the patient want the referral with a specific physician:  Dr Patel  Is the specialist an [facility] or non-[facility] physician: [facility]  Reason (be specific):  Elbow   Does the patient already have the specialty clinic appointment scheduled:  surgery complete  If yes, what date is the appointment scheduled:     Is the insurance listed in Epic correct? (this is important for a referral):  yes  Advised patient that once provider approves this either a nurse or  will return their call?:   Would the patient like a call back, or a response through their [facility] portal?: Call back  Comments:     is no longer with [facility], procedure was done last week before he went to [place].    Please call and advise.    Thank You    Dr Patel's #  [phone]

## 2022-10-11 ENCOUNTER — TELEPHONE (OUTPATIENT)
Dept: PRIMARY CARE CLINIC | Facility: CLINIC | Age: 77
End: 2022-10-11
Payer: MEDICARE

## 2022-10-11 NOTE — TELEPHONE ENCOUNTER
----- Message from Claudia Savage sent at 10/11/2022  1:23 PM CDT -----  Contact: JAYDEN COBOS [[id number]]@ [phone]  2TESTRESULTS    Type: Test Results labs    What test was performed? Labs    Who ordered the test? Dr Tolbert    When and where were the test performed? [facility]    Would you like response via [employer]: Call back     Comments:

## 2022-10-11 NOTE — TELEPHONE ENCOUNTER
----- Message from Claudia Savage sent at 10/11/2022  1:21 PM CDT -----  Contact: JAYDEN COBOS [[id number]]@ [phone]  Checking to see if you requested her referral to Orthopedics to Dr. Patel?

## 2022-10-11 NOTE — TELEPHONE ENCOUNTER
Spoke with pt to give message regarding labs. Pt also requests that her referral for Dr. Patel is faxed over again

## 2022-10-12 ENCOUNTER — TELEPHONE (OUTPATIENT)
Dept: ORTHOPEDICS | Facility: CLINIC | Age: 77
End: 2022-10-12
Payer: MEDICARE

## 2022-10-12 NOTE — TELEPHONE ENCOUNTER
----- Message from Indira Gr RN sent at 10/11/2022  6:00 PM CDT -----  Regarding: FW: Pt Advice  Contact: JAYDEN COBOS [[id number]]    ----- Message -----  From: Durga Ray  Sent: 10/11/2022   1:34 PM CDT  To: Jorge Kinsey Staff  Subject: Pt Advice                                        Name of Who is Calling: JAYDEN COBOS [[id number]]      What is the request in detail: Would like to speak with staff (Noa), no other information provided. Please advise      Can the clinic reply by [facility]: no      What Number to Call Back if not in [facility]: [phone]

## 2022-10-28 ENCOUNTER — HOSPITAL ENCOUNTER (OUTPATIENT)
Dept: RADIOLOGY | Facility: OTHER | Age: 77
Discharge: HOME | End: 2022-10-28
Attending: FAMILY MEDICINE
Payer: MEDICARE

## 2022-10-28 DIAGNOSIS — Z78.0 ASYMPTOMATIC MENOPAUSAL STATE: ICD-10-CM

## 2022-10-28 DIAGNOSIS — Z13.820 OSTEOPOROSIS SCREENING: ICD-10-CM

## 2022-10-28 PROCEDURE — 77080 DEXA BONE DENSITY SPINE HIP: ICD-10-PCS | Mod: 26,,, | Performed by: RADIOLOGY

## 2022-10-28 PROCEDURE — 77080 DXA BONE DENSITY AXIAL: CPT | Mod: TC

## 2022-10-28 PROCEDURE — 77080 DXA BONE DENSITY AXIAL: CPT | Mod: 26,,, | Performed by: RADIOLOGY

## 2022-11-01 ENCOUNTER — OFFICE VISIT (OUTPATIENT)
Dept: NEUROLOGY | Facility: CLINIC | Age: 77
End: 2022-11-01
Payer: MEDICARE

## 2022-11-01 VITALS
SYSTOLIC BLOOD PRESSURE: 146 MMHG | BODY MASS INDEX: 33.72 KG/M2 | HEIGHT: 57 IN | WEIGHT: 156.31 LBS | DIASTOLIC BLOOD PRESSURE: 82 MMHG | HEART RATE: 94 BPM

## 2022-11-01 DIAGNOSIS — G47.00 INSOMNIA, UNSPECIFIED TYPE: ICD-10-CM

## 2022-11-01 DIAGNOSIS — R41.89 COGNITIVE IMPAIRMENT: Primary | ICD-10-CM

## 2022-11-01 PROCEDURE — 3077F SYST BP >= 140 MM HG: CPT | Mod: HCNC,CPTII,S$GLB, | Performed by: PSYCHIATRY & NEUROLOGY

## 2022-11-01 PROCEDURE — 1101F PR PT FALLS ASSESS DOC 0-1 FALLS W/OUT INJ PAST YR: ICD-10-PCS | Mod: HCNC,CPTII,S$GLB, | Performed by: PSYCHIATRY & NEUROLOGY

## 2022-11-01 PROCEDURE — 99999 PR PBB SHADOW E&M-EST. PATIENT-LVL III: ICD-10-PCS | Mod: PBBFAC,HCNC,, | Performed by: PSYCHIATRY & NEUROLOGY

## 2022-11-01 PROCEDURE — 3288F FALL RISK ASSESSMENT DOCD: CPT | Mod: HCNC,CPTII,S$GLB, | Performed by: PSYCHIATRY & NEUROLOGY

## 2022-11-01 PROCEDURE — 1126F PR PAIN SEVERITY QUANTIFIED, NO PAIN PRESENT: ICD-10-PCS | Mod: HCNC,CPTII,S$GLB, | Performed by: PSYCHIATRY & NEUROLOGY

## 2022-11-01 PROCEDURE — 99213 PR OFFICE/OUTPT VISIT, EST, LEVL III, 20-29 MIN: ICD-10-PCS | Mod: HCNC,S$GLB,, | Performed by: PSYCHIATRY & NEUROLOGY

## 2022-11-01 PROCEDURE — 99213 OFFICE O/P EST LOW 20 MIN: CPT | Mod: HCNC,S$GLB,, | Performed by: PSYCHIATRY & NEUROLOGY

## 2022-11-01 PROCEDURE — 1101F PT FALLS ASSESS-DOCD LE1/YR: CPT | Mod: HCNC,CPTII,S$GLB, | Performed by: PSYCHIATRY & NEUROLOGY

## 2022-11-01 PROCEDURE — 3077F PR MOST RECENT SYSTOLIC BLOOD PRESSURE >= 140 MM HG: ICD-10-PCS | Mod: HCNC,CPTII,S$GLB, | Performed by: PSYCHIATRY & NEUROLOGY

## 2022-11-01 PROCEDURE — 3079F PR MOST RECENT DIASTOLIC BLOOD PRESSURE 80-89 MM HG: ICD-10-PCS | Mod: HCNC,CPTII,S$GLB, | Performed by: PSYCHIATRY & NEUROLOGY

## 2022-11-01 PROCEDURE — 3288F PR FALLS RISK ASSESSMENT DOCUMENTED: ICD-10-PCS | Mod: HCNC,CPTII,S$GLB, | Performed by: PSYCHIATRY & NEUROLOGY

## 2022-11-01 PROCEDURE — 99999 PR PBB SHADOW E&M-EST. PATIENT-LVL III: CPT | Mod: PBBFAC,HCNC,, | Performed by: PSYCHIATRY & NEUROLOGY

## 2022-11-01 PROCEDURE — 1126F AMNT PAIN NOTED NONE PRSNT: CPT | Mod: HCNC,CPTII,S$GLB, | Performed by: PSYCHIATRY & NEUROLOGY

## 2022-11-01 PROCEDURE — 3079F DIAST BP 80-89 MM HG: CPT | Mod: HCNC,CPTII,S$GLB, | Performed by: PSYCHIATRY & NEUROLOGY

## 2022-11-01 RX ORDER — TRAZODONE HYDROCHLORIDE 50 MG/1
50 TABLET ORAL NIGHTLY
Qty: 30 TABLET | Refills: 11 | Status: SHIPPED | OUTPATIENT
Start: 2022-11-01 | End: 2022-12-12

## 2022-11-01 RX ORDER — HYDROXYZINE HYDROCHLORIDE 25 MG/1
25-50 TABLET, FILM COATED ORAL NIGHTLY PRN
COMMUNITY
Start: 2022-07-28 | End: 2022-12-12 | Stop reason: ALTCHOICE
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
## 2022-11-02 ENCOUNTER — TELEPHONE (OUTPATIENT)
Dept: NEUROLOGY | Facility: CLINIC | Age: 77
End: 2022-11-02
Payer: MEDICARE

## 2022-11-02 NOTE — TELEPHONE ENCOUNTER
----- Message from Abby Chino sent at 11/2/2022  1:05 PM CDT -----  Regarding: Patient advice  Contact: Pt [phone]  Patient called stating she has metal in her body except head neck and shoulder and is concerned about the up coming MRI scheduled for 11/18 please call to discuss further

## 2022-11-09 NOTE — TELEPHONE ENCOUNTER
LOV 06/09/2022  Rx change to mail order.    Consent (Nose)/Introductory Paragraph: LOWER NOSE: The rationale for Mohs was explained to the patient and consent was obtained. The risks and benefits of Mohs surgery were discussed in detail. Specifically, the risks of swelling/bruising, scar, infection, bleeding, prolonged wound healing, incomplete removal/recurrence, allergy to anesthesia, nerve injury, numbness, contour/shape change, black/swollen eye (if near eye) were addressed. Also discussed numbness, congestion, contour/shape change, flap/graft possible need for multistage repair. Prior to the procedure, the treatment site was clearly identified and confirmed by the patient with either a mirror or photograph. All questions answered. All components of Universal Protocol/PAUSE Rule completed.

## 2022-11-15 ENCOUNTER — OFFICE VISIT (OUTPATIENT)
Dept: SPORTS MEDICINE | Facility: CLINIC | Age: 77
End: 2022-11-15
Payer: MEDICARE

## 2022-11-15 VITALS
HEIGHT: 57 IN | WEIGHT: 158.06 LBS | BODY MASS INDEX: 34.1 KG/M2 | SYSTOLIC BLOOD PRESSURE: 187 MMHG | DIASTOLIC BLOOD PRESSURE: 89 MMHG

## 2022-11-15 DIAGNOSIS — M25.551 GREATER TROCHANTERIC PAIN SYNDROME OF RIGHT LOWER EXTREMITY: ICD-10-CM

## 2022-11-15 DIAGNOSIS — M25.551 RIGHT HIP PAIN: Primary | ICD-10-CM

## 2022-11-15 DIAGNOSIS — M16.11 PRIMARY OSTEOARTHRITIS OF RIGHT HIP: ICD-10-CM

## 2022-11-15 PROCEDURE — 1125F PR PAIN SEVERITY QUANTIFIED, PAIN PRESENT: ICD-10-PCS | Mod: CPTII,S$GLB,, | Performed by: STUDENT IN AN ORGANIZED HEALTH CARE EDUCATION/TRAINING PROGRAM

## 2022-11-15 PROCEDURE — 1160F PR REVIEW ALL MEDS BY PRESCRIBER/CLIN PHARMACIST DOCUMENTED: ICD-10-PCS | Mod: CPTII,S$GLB,, | Performed by: STUDENT IN AN ORGANIZED HEALTH CARE EDUCATION/TRAINING PROGRAM

## 2022-11-15 PROCEDURE — 1160F RVW MEDS BY RX/DR IN RCRD: CPT | Mod: CPTII,S$GLB,, | Performed by: STUDENT IN AN ORGANIZED HEALTH CARE EDUCATION/TRAINING PROGRAM

## 2022-11-15 PROCEDURE — 99999 PR PBB SHADOW E&M-EST. PATIENT-LVL III: ICD-10-PCS | Mod: PBBFAC,,, | Performed by: STUDENT IN AN ORGANIZED HEALTH CARE EDUCATION/TRAINING PROGRAM

## 2022-11-15 PROCEDURE — 3079F DIAST BP 80-89 MM HG: CPT | Mod: CPTII,S$GLB,, | Performed by: STUDENT IN AN ORGANIZED HEALTH CARE EDUCATION/TRAINING PROGRAM

## 2022-11-15 PROCEDURE — 3077F PR MOST RECENT SYSTOLIC BLOOD PRESSURE >= 140 MM HG: ICD-10-PCS | Mod: CPTII,S$GLB,, | Performed by: STUDENT IN AN ORGANIZED HEALTH CARE EDUCATION/TRAINING PROGRAM

## 2022-11-15 PROCEDURE — 3079F PR MOST RECENT DIASTOLIC BLOOD PRESSURE 80-89 MM HG: ICD-10-PCS | Mod: CPTII,S$GLB,, | Performed by: STUDENT IN AN ORGANIZED HEALTH CARE EDUCATION/TRAINING PROGRAM

## 2022-11-15 PROCEDURE — 1159F PR MEDICATION LIST DOCUMENTED IN MEDICAL RECORD: ICD-10-PCS | Mod: CPTII,S$GLB,, | Performed by: STUDENT IN AN ORGANIZED HEALTH CARE EDUCATION/TRAINING PROGRAM

## 2022-11-15 PROCEDURE — 99213 PR OFFICE/OUTPT VISIT, EST, LEVL III, 20-29 MIN: ICD-10-PCS | Mod: S$GLB,,, | Performed by: STUDENT IN AN ORGANIZED HEALTH CARE EDUCATION/TRAINING PROGRAM

## 2022-11-15 PROCEDURE — 99213 OFFICE O/P EST LOW 20 MIN: CPT | Mod: S$GLB,,, | Performed by: STUDENT IN AN ORGANIZED HEALTH CARE EDUCATION/TRAINING PROGRAM

## 2022-11-15 PROCEDURE — 1159F MED LIST DOCD IN RCRD: CPT | Mod: CPTII,S$GLB,, | Performed by: STUDENT IN AN ORGANIZED HEALTH CARE EDUCATION/TRAINING PROGRAM

## 2022-11-15 PROCEDURE — 1125F AMNT PAIN NOTED PAIN PRSNT: CPT | Mod: CPTII,S$GLB,, | Performed by: STUDENT IN AN ORGANIZED HEALTH CARE EDUCATION/TRAINING PROGRAM

## 2022-11-15 PROCEDURE — 3077F SYST BP >= 140 MM HG: CPT | Mod: CPTII,S$GLB,, | Performed by: STUDENT IN AN ORGANIZED HEALTH CARE EDUCATION/TRAINING PROGRAM

## 2022-11-15 PROCEDURE — 99999 PR PBB SHADOW E&M-EST. PATIENT-LVL III: CPT | Mod: PBBFAC,,, | Performed by: STUDENT IN AN ORGANIZED HEALTH CARE EDUCATION/TRAINING PROGRAM

## 2022-11-15 NOTE — PROGRESS NOTES
CC: right hip pain    77 y.o. Female presents today for follow up evaluation of her right hip pain. Pt reports pain has somewhat improved since last visit. Pt reports pain is 4/10 today. Pt localizes pain to posterolateral hip. Pt denies mechanical symptoms. Pt denies numbness/tingling.     Attempted treatments: fPT (~1x/wk to 2x/month; reports financial concerns)  Pain score: 4/10  History of trauma/injury: none since last visit  Affecting ADLs: yes, but improving     REVIEW OF SYSTEMS:   Constitution: Patient denies fever or chills.  Eyes: Patient denies eye pain or vision changes.  HEENT: Patient denies ear pain, sore throat, or nasal discharge.  CVS: Patient denies chest pain.  Lungs: Patient denies shortness of breath. Pt reports cough.   Skin: Patient reports severe skin rash on back.   Musculoskeletal: Patient denies recent falls. See HPI.  Psych: Patient denies any current anxiety or nervousness.    PAST MEDICAL HISTORY:   Past Medical History:   Diagnosis Date    Affective disorder     Hypertension     Renal cyst, acquired, right 4/14/2021    Rheumatoid arthritis        MEDICATIONS:     Current Outpatient Medications:     carboxymethylcellulose sodium (REFRESH TEARS OPHT), Apply to eye. Use as needed, Disp: , Rfl:     ergocalciferol, vitamin D2, (VITAMIN D ORAL), Take 1,000 Units by mouth once daily. Hold 7 days prior to surgery, Disp: , Rfl:     fluticasone furoate-vilanteroL (BREO ELLIPTA) 100-25 mcg/dose diskus inhaler, Inhale 1 puff into the lungs once daily. Controller, Disp: 60 each, Rfl: 11    hydrocortisone 2.5 % cream, Do not apply morning of surgery, Disp: , Rfl:     hydrOXYzine HCL (ATARAX) 25 MG tablet, Take 25-50 mg by mouth nightly as needed for Itching., Disp: , Rfl:     ketoconazole (NIZORAL) 2 % cream, Apply topically once daily., Disp: 60 g, Rfl: 5    ketoconazole (NIZORAL) 2 % shampoo, Shampoo, leave on 5 min then wash off and may use reg soap or shampoo. Use daily x 1 wk then 2x wkly as  "needed, Disp: 120 mL, Rfl: 11    KEVZARA 200 mg/1.14 mL PnIj, Inject 200 mg into the skin every 14 (fourteen) days. Per  1 week before surgery. Gets from [facility], Disp: , Rfl:     LORazepam (ATIVAN) 1 MG tablet, TAKE ONE TABLET BY MOUTH EVERY DAY AT BEDTIME AS NEEDED FOR ANXIETY OR sleep, Disp: , Rfl:     losartan (COZAAR) 50 MG tablet, Take 1 tablet (50 mg total) by mouth once daily., Disp: 90 tablet, Rfl: 3    omeprazole (PRILOSEC) 20 MG capsule, Take 1 capsule (20 mg total) by mouth once daily., Disp: 90 capsule, Rfl: 3    predniSONE (DELTASONE) 2.5 MG tablet, take 1 tablet daily with food for arthritis stiffness and inflammation, Disp: , Rfl:     tiotropium bromide (SPIRIVA RESPIMAT) 1.25 mcg/actuation inhaler, Inhale 2 puffs into the lungs once daily. Controller, Disp: 4 g, Rfl: 11    traMADoL (ULTRAM) 50 mg tablet, Take 50 mg by mouth every 6 (six) hours as needed. Take as needed, Disp: , Rfl:     traZODone (DESYREL) 50 MG tablet, Take 1 tablet (50 mg total) by mouth every evening., Disp: 30 tablet, Rfl: 11    albuterol (PROVENTIL) 2.5 mg /3 mL (0.083 %) nebulizer solution, Take 3 mLs (2.5 mg total) by nebulization every 6 (six) hours as needed for Wheezing. Rescue, Disp: 90 mL, Rfl: 3    furosemide (LASIX) 20 MG tablet, Take 1 tablet (20 mg total) by mouth once daily., Disp: 90 tablet, Rfl: 3    sulfamethoxazole-trimethoprim 400-80mg (BACTRIM,SEPTRA) 400-80 mg per tablet, Take 1 tablet by mouth 2 (two) times daily., Disp: , Rfl:     ALLERGIES:   Review of patient's allergies indicates:  No Known Allergies     PHYSICAL EXAMINATION:  BP (!) 187/89   Ht 4' 9" (1.448 m)   Wt 71.7 kg (158 lb 1.1 oz)   BMI 34.21 kg/m²   Vitals signs and nursing note have been reviewed.    General: In no acute distress, well developed, well nourished, no diaphoresis  Eyes: EOM full and smooth, no eye redness or discharge  HENT: normocephalic and atraumatic, neck supple, trachea midline, no nasal " discharge  Cardiovascular: no LE edema  Lungs: respirations non-labored, no conversational dyspnea   Neuro: AAOx3, CN2-12 grossly intact  Skin: No rashes, warm and dry  Psychiatric: cooperative, pleasant, mood and affect appropriate for age    Right Hip:   Gait: slightly antalgic     Inspection/Palpation:   -Deformities      TTP:  +Greater trochanter  +Abductors near GT insertion  -ASIS  -AIIS      ROM:    Flexion & extension WNL & sym B/L  -Log roll B/L      Functional:    +AMY   +FADIR   +Log roll   -Snapping ext     Strength/Functional:  + 4+/5 in glut med / abductors with pain against resistance    ASSESSMENT:      ICD-10-CM ICD-9-CM   1. Right hip pain  M25.551 719.45   2. Primary osteoarthritis of right hip  M16.11 715.15   3. Greater trochanteric pain syndrome of right lower extremity  M25.551 719.45         PLAN:  Patient making improvement with formal physical therapy.  She will continue to go to formal physical therapy when she can.  Emphasis was placed on making sure patient does her home exercise program, especially on the days when she can not go to formal physical therapy.  Patient will contact our clinic back she wants to try an injection, which at that point we will move forward with an intra-articular injection.    Future planning includes - continue therapy and exercise program    All questions were answered to the best of my ability and all concerns were addressed at this time.    Follow up p.r.n.      This note is dictated using the M*Modal Fluency Direct word recognition program. There are word recognition mistakes that are occasionally missed on review.

## 2022-11-18 ENCOUNTER — PES CALL (OUTPATIENT)
Dept: ADMINISTRATIVE | Facility: CLINIC | Age: 77
End: 2022-11-18
Payer: MEDICARE

## 2022-11-18 ENCOUNTER — HOSPITAL ENCOUNTER (OUTPATIENT)
Dept: RADIOLOGY | Facility: HOSPITAL | Age: 77
Discharge: HOME | End: 2022-11-18
Attending: PSYCHIATRY & NEUROLOGY
Payer: MEDICARE

## 2022-11-18 DIAGNOSIS — R41.3 OTHER AMNESIA: ICD-10-CM

## 2022-11-18 PROCEDURE — 70551 MRI BRAIN STEM W/O DYE: CPT | Mod: TC

## 2022-11-18 PROCEDURE — 70551 MRI BRAIN WITHOUT CONTRAST: ICD-10-PCS | Mod: 26,,, | Performed by: RADIOLOGY

## 2022-11-18 PROCEDURE — 70551 MRI BRAIN STEM W/O DYE: CPT | Mod: 26,,, | Performed by: RADIOLOGY

## 2022-11-28 ENCOUNTER — PES CALL (OUTPATIENT)
Dept: ADMINISTRATIVE | Facility: CLINIC | Age: 77
End: 2022-11-28
Payer: MEDICARE

## 2022-11-28 NOTE — PROGRESS NOTES
"[facility] Note    Chief Complaint      Chief Complaint   Patient presents with    Health Risk Assessment       History of Present Illness      Francy Bain is a 77 y.o. female who presents today for   Chief Complaint   Patient presents with    Health Risk Assessment         HPI   Patient presents to clinic for her annual health risk assessment. She is feeling well today. She denies any SOB, chest pain, N/V, unintentional weight loss, loss of appetite, fatigue, diarrhea, constipation. She is active daily and remains independent with ADL's.   We discussed her endurance level for exercise/walking/daily chores. She states she does not exercise and spends most days watching television at home. I discussed importance of daily exercise in order to increase her endurance level for activities, even for walking. She is slowly losing strength of lower extremities and endurance for daily activities by sitting in a chair all day and watching television. Patient verbalizes understanding and states she will be looking into a "[employer]" exercise program near where she lives.  Review of Systems   Constitutional: Negative.    HENT: Negative.     Eyes: Negative.    Respiratory: Negative.     Cardiovascular: Negative.    Gastrointestinal: Negative.    Genitourinary: Negative.    Musculoskeletal: Negative.    Skin: Negative.    Neurological: Negative.    Endo/Heme/Allergies: Negative.    Psychiatric/Behavioral: Negative.     All 12 systems otherwise negative.     Family History:  family history includes Arthritis in her maternal grandmother and mother; Heart disease in her sister; Hyperlipidemia in her sister; Hypertension in her mother.   Family history was reviewed with patient.     Medications:  Outpatient Encounter Medications as of 11/29/2022   Medication Sig Dispense Refill    carboxymethylcellulose sodium (REFRESH TEARS OPHT) Apply to eye. Use as needed      ergocalciferol, vitamin D2, " (VITAMIN D ORAL) Take 1,000 Units by mouth once daily. Hold 7 days prior to surgery      fluticasone furoate-vilanteroL (BREO ELLIPTA) 100-25 mcg/dose diskus inhaler Inhale 1 puff into the lungs once daily. Controller 60 each 11    furosemide (LASIX) 20 MG tablet Take 1 tablet (20 mg total) by mouth once daily. 90 tablet 3    hydrocortisone 2.5 % cream Do not apply morning of surgery      hydrOXYzine HCL (ATARAX) 25 MG tablet Take 25-50 mg by mouth nightly as needed for Itching.      ketoconazole (NIZORAL) 2 % cream Apply topically once daily. 60 g 5    ketoconazole (NIZORAL) 2 % shampoo Shampoo, leave on 5 min then wash off and may use reg soap or shampoo. Use daily x 1 wk then 2x wkly as needed 120 mL 11    KEVZARA 200 mg/1.14 mL PnIj Inject 200 mg into the skin every 14 (fourteen) days. Per -hold 1 week before surgery. Gets from [facility]      LORazepam (ATIVAN) 1 MG tablet TAKE ONE TABLET BY MOUTH EVERY DAY AT BEDTIME AS NEEDED FOR ANXIETY OR sleep      losartan (COZAAR) 50 MG tablet Take 1 tablet (50 mg total) by mouth once daily. 90 tablet 3    omeprazole (PRILOSEC) 20 MG capsule Take 1 capsule (20 mg total) by mouth once daily. 90 capsule 3    predniSONE (DELTASONE) 2.5 MG tablet take 1 tablet daily with food for arthritis stiffness and inflammation      tiotropium bromide (SPIRIVA RESPIMAT) 1.25 mcg/actuation inhaler Inhale 2 puffs into the lungs once daily. Controller 4 g 11    traMADoL (ULTRAM) 50 mg tablet Take 50 mg by mouth every 6 (six) hours as needed. Take as needed      traZODone (DESYREL) 50 MG tablet Take 1 tablet (50 mg total) by mouth every evening. 30 tablet 11    albuterol (PROVENTIL) 2.5 mg /3 mL (0.083 %) nebulizer solution Take 3 mLs (2.5 mg total) by nebulization every 6 (six) hours as needed for Wheezing. Rescue 90 mL 3    sulfamethoxazole-trimethoprim 400-80mg (BACTRIM,SEPTRA) 400-80 mg per tablet Take 1 tablet by mouth 2 (two) times daily.       No facility-administered encounter  "medications on file as of 11/29/2022.     Review for Opioid Screening: Pt does  have Rx for Opioids    Review for Substance Use Disorders: Patient is not at risk for substance abuse.        Allergies:  Review of patient's allergies indicates:  No Known Allergies    Health Maintenance:  Health Maintenance   Topic Date Due    TETANUS VACCINE  11/30/2022 (Originally 5/7/1963)    Lipid Panel  09/28/2027    DEXA Scan  10/28/2032    Hepatitis C Screening  Completed     Health Maintenance Topics with due status: Not Due       Topic Last Completion Date    Lipid Panel 09/28/2022    DEXA Scan 10/28/2022       Physical Exam      Vital Signs  Temp: 97.9 °F (36.6 °C)  Temp src: Oral  Pulse: 88  Resp: 20  SpO2: 98 %  BP: 130/70  BP Location: Right arm  Patient Position: Sitting  Pain Score: 0-No pain  Pain Loc: Hip (arthritis)  Height and Weight  Height: 4' 9" (144.8 cm)  Weight: 70.7 kg (155 lb 13.8 oz)  BSA (Calculated - sq m): 1.69 sq meters  BMI (Calculated): 33.7  Weight in (lb) to have BMI = 25: 115.3]    Physical Exam  Vitals reviewed.   Constitutional:       Appearance: Normal appearance. She is obese.   HENT:      Head: Normocephalic and atraumatic.      Nose: Nose normal.      Mouth/Throat:      Mouth: Mucous membranes are moist.      Pharynx: Oropharynx is clear.   Eyes:      Extraocular Movements: Extraocular movements intact.      Conjunctiva/sclera: Conjunctivae normal.      Pupils: Pupils are equal, round, and reactive to light.   Cardiovascular:      Rate and Rhythm: Normal rate and regular rhythm.      Pulses: Normal pulses.      Heart sounds: Normal heart sounds.   Pulmonary:      Effort: Pulmonary effort is normal.      Breath sounds: Normal breath sounds.   Abdominal:      General: Abdomen is flat. Bowel sounds are normal.      Palpations: Abdomen is soft.   Musculoskeletal:         General: Normal range of motion.      Cervical back: Normal range of motion and neck supple.   Skin:     General: Skin is warm " and dry.      Capillary Refill: Capillary refill takes less than 2 seconds.   Neurological:      General: No focal deficit present.      Mental Status: She is alert and oriented to person, place, and time. Mental status is at baseline.   Psychiatric:         Mood and Affect: Mood normal.         Behavior: Behavior normal.         Thought Content: Thought content normal.         Judgment: Judgment normal.          Assessment/Plan     Francy Bain is a 77 y.o.female with:    Abnormality of gait and mobility    Other reduced mobility    Encounter for preventive health examination    As above, continue current medications and maintain follow up with specialists.  Return to clinic as needed.    I spent 41 minutes on the day of this encounter for preparing, evaluating, examining, treating, and discussing plan of care with this patient.  Greater than 50% of this time was spent face to face with patient.  All questions were answered to patient's satisfaction.         Sonja Hendrickson, TEN-C  [facility]                    I offered to discuss advanced care planning, including how to pick a person who would make decisions for you if you were unable to make them for yourself, called a health care power of , and what kind of decisions you might make such as use of life sustaining treatments such as ventilators and tube feeding when faced with a life limiting illness recorded on a living will that they will need to know. (How you want to be cared for as you near the end of your natural life)     X Patient is interested in learning more about how to make advanced directives.  I provided them paperwork and offered to discuss this with them.

## 2022-11-29 ENCOUNTER — PATIENT MESSAGE (OUTPATIENT)
Dept: PHARMACY | Facility: CLINIC | Age: 77
End: 2022-11-29
Payer: MEDICARE

## 2022-11-29 ENCOUNTER — OFFICE VISIT (OUTPATIENT)
Dept: PRIMARY CARE CLINIC | Facility: CLINIC | Age: 77
End: 2022-11-29
Payer: MEDICARE

## 2022-11-29 VITALS
TEMPERATURE: 98 F | BODY MASS INDEX: 33.63 KG/M2 | DIASTOLIC BLOOD PRESSURE: 70 MMHG | SYSTOLIC BLOOD PRESSURE: 130 MMHG | WEIGHT: 155.88 LBS | OXYGEN SATURATION: 98 % | RESPIRATION RATE: 20 BRPM | HEART RATE: 88 BPM | HEIGHT: 57 IN

## 2022-11-29 DIAGNOSIS — R26.9 ABNORMALITY OF GAIT AND MOBILITY: Primary | ICD-10-CM

## 2022-11-29 DIAGNOSIS — Z00.00 ENCOUNTER FOR PREVENTIVE HEALTH EXAMINATION: ICD-10-CM

## 2022-11-29 DIAGNOSIS — Z74.09 OTHER REDUCED MOBILITY: ICD-10-CM

## 2022-11-29 PROCEDURE — 1126F AMNT PAIN NOTED NONE PRSNT: CPT | Mod: CPTII,S$GLB,, | Performed by: NURSE PRACTITIONER

## 2022-11-29 PROCEDURE — 1159F PR MEDICATION LIST DOCUMENTED IN MEDICAL RECORD: ICD-10-PCS | Mod: CPTII,S$GLB,, | Performed by: NURSE PRACTITIONER

## 2022-11-29 PROCEDURE — 1159F MED LIST DOCD IN RCRD: CPT | Mod: CPTII,S$GLB,, | Performed by: NURSE PRACTITIONER

## 2022-11-29 PROCEDURE — 1170F FXNL STATUS ASSESSED: CPT | Mod: CPTII,S$GLB,, | Performed by: NURSE PRACTITIONER

## 2022-11-29 PROCEDURE — G0402 PR WELCOME MEDICARE PREVENTIVE VISIT NEW ENROLLEE: ICD-10-PCS | Mod: S$GLB,,, | Performed by: NURSE PRACTITIONER

## 2022-11-29 PROCEDURE — 1101F PT FALLS ASSESS-DOCD LE1/YR: CPT | Mod: CPTII,S$GLB,, | Performed by: NURSE PRACTITIONER

## 2022-11-29 PROCEDURE — 1160F PR REVIEW ALL MEDS BY PRESCRIBER/CLIN PHARMACIST DOCUMENTED: ICD-10-PCS | Mod: CPTII,S$GLB,, | Performed by: NURSE PRACTITIONER

## 2022-11-29 PROCEDURE — 3075F SYST BP GE 130 - 139MM HG: CPT | Mod: CPTII,S$GLB,, | Performed by: NURSE PRACTITIONER

## 2022-11-29 PROCEDURE — 1101F PR PT FALLS ASSESS DOC 0-1 FALLS W/OUT INJ PAST YR: ICD-10-PCS | Mod: CPTII,S$GLB,, | Performed by: NURSE PRACTITIONER

## 2022-11-29 PROCEDURE — 3075F PR MOST RECENT SYSTOLIC BLOOD PRESS GE 130-139MM HG: ICD-10-PCS | Mod: CPTII,S$GLB,, | Performed by: NURSE PRACTITIONER

## 2022-11-29 PROCEDURE — 99999 PR PBB SHADOW E&M-EST. PATIENT-LVL V: ICD-10-PCS | Mod: PBBFAC,,, | Performed by: NURSE PRACTITIONER

## 2022-11-29 PROCEDURE — 1126F PR PAIN SEVERITY QUANTIFIED, NO PAIN PRESENT: ICD-10-PCS | Mod: CPTII,S$GLB,, | Performed by: NURSE PRACTITIONER

## 2022-11-29 PROCEDURE — 3078F DIAST BP <80 MM HG: CPT | Mod: CPTII,S$GLB,, | Performed by: NURSE PRACTITIONER

## 2022-11-29 PROCEDURE — 3288F FALL RISK ASSESSMENT DOCD: CPT | Mod: CPTII,S$GLB,, | Performed by: NURSE PRACTITIONER

## 2022-11-29 PROCEDURE — 1160F RVW MEDS BY RX/DR IN RCRD: CPT | Mod: CPTII,S$GLB,, | Performed by: NURSE PRACTITIONER

## 2022-11-29 PROCEDURE — 99999 PR PBB SHADOW E&M-EST. PATIENT-LVL V: CPT | Mod: PBBFAC,,, | Performed by: NURSE PRACTITIONER

## 2022-11-29 PROCEDURE — 3288F PR FALLS RISK ASSESSMENT DOCUMENTED: ICD-10-PCS | Mod: CPTII,S$GLB,, | Performed by: NURSE PRACTITIONER

## 2022-11-29 PROCEDURE — 3078F PR MOST RECENT DIASTOLIC BLOOD PRESSURE < 80 MM HG: ICD-10-PCS | Mod: CPTII,S$GLB,, | Performed by: NURSE PRACTITIONER

## 2022-11-29 PROCEDURE — 1170F PR FUNCTIONAL STATUS ASSESSED: ICD-10-PCS | Mod: CPTII,S$GLB,, | Performed by: NURSE PRACTITIONER

## 2022-11-29 PROCEDURE — G0402 INITIAL PREVENTIVE EXAM: HCPCS | Mod: S$GLB,,, | Performed by: NURSE PRACTITIONER

## 2022-11-29 NOTE — PATIENT INSTRUCTIONS
Counseling and Referral of Other Preventative  (Italic type indicates deductible and co-insurance are waived)    Patient Name: Francy Bain  Today's Date: 11/29/2022    Health Maintenance       Date Due Completion Date    TETANUS VACCINE 11/30/2022 (Originally 5/7/1963) ---    Shingles Vaccine (1 of 2) 12/01/2022 (Originally 5/7/1964) ---    COVID-19 Vaccine (4 - Booster for Moderna series) 12/01/2022 (Originally 12/30/2021) 11/4/2021    Pneumococcal Vaccines (Age 65+) (2 - PPSV23 if available, else PCV20) 12/01/2022 (Originally 6/12/2020) 6/12/2019    Lipid Panel 09/28/2027 9/28/2022    DEXA Scan 10/28/2032 10/28/2022        No orders of the defined types were placed in this encounter.    The following information is provided to all patients.  This information is to help you find resources for any of the problems found today that may be affecting your health:                Living healthy guide: www.[facility].[place].gov      Understanding Diabetes: www.diabetes.org      Eating healthy: www.cdc.gov/healthyweight      CDC home safety checklist: www.cdc.gov/steadi/patient.html      Agency on Aging: www.goea.[place].gov      Alcoholics anonymous (AA): www.aa.org      Physical Activity: www.yaneth.nih.gov/av0torw      Tobacco use: www.quitwithusla.org

## 2022-12-12 ENCOUNTER — OFFICE VISIT (OUTPATIENT)
Dept: NEUROLOGY | Facility: CLINIC | Age: 77
End: 2022-12-12
Payer: MEDICARE

## 2022-12-12 VITALS
DIASTOLIC BLOOD PRESSURE: 84 MMHG | HEIGHT: 57 IN | HEART RATE: 80 BPM | WEIGHT: 156.44 LBS | BODY MASS INDEX: 33.75 KG/M2 | SYSTOLIC BLOOD PRESSURE: 149 MMHG

## 2022-12-12 DIAGNOSIS — I67.9 CEREBROVASCULAR DISEASE, UNSPECIFIED: Primary | ICD-10-CM

## 2022-12-12 DIAGNOSIS — G47.00 INSOMNIA, UNSPECIFIED TYPE: ICD-10-CM

## 2022-12-12 DIAGNOSIS — R90.82 WHITE MATTER DISEASE: ICD-10-CM

## 2022-12-12 PROCEDURE — 3288F FALL RISK ASSESSMENT DOCD: CPT | Mod: HCNC,CPTII,S$GLB, | Performed by: PSYCHIATRY & NEUROLOGY

## 2022-12-12 PROCEDURE — 1126F AMNT PAIN NOTED NONE PRSNT: CPT | Mod: HCNC,CPTII,S$GLB, | Performed by: PSYCHIATRY & NEUROLOGY

## 2022-12-12 PROCEDURE — 1101F PT FALLS ASSESS-DOCD LE1/YR: CPT | Mod: HCNC,CPTII,S$GLB, | Performed by: PSYCHIATRY & NEUROLOGY

## 2022-12-12 PROCEDURE — 1126F PR PAIN SEVERITY QUANTIFIED, NO PAIN PRESENT: ICD-10-PCS | Mod: HCNC,CPTII,S$GLB, | Performed by: PSYCHIATRY & NEUROLOGY

## 2022-12-12 PROCEDURE — 3079F DIAST BP 80-89 MM HG: CPT | Mod: HCNC,CPTII,S$GLB, | Performed by: PSYCHIATRY & NEUROLOGY

## 2022-12-12 PROCEDURE — 99215 OFFICE O/P EST HI 40 MIN: CPT | Mod: HCNC,S$GLB,, | Performed by: PSYCHIATRY & NEUROLOGY

## 2022-12-12 PROCEDURE — 99999 PR PBB SHADOW E&M-EST. PATIENT-LVL IV: ICD-10-PCS | Mod: PBBFAC,HCNC,, | Performed by: PSYCHIATRY & NEUROLOGY

## 2022-12-12 PROCEDURE — 99215 PR OFFICE/OUTPT VISIT, EST, LEVL V, 40-54 MIN: ICD-10-PCS | Mod: HCNC,S$GLB,, | Performed by: PSYCHIATRY & NEUROLOGY

## 2022-12-12 PROCEDURE — 3077F SYST BP >= 140 MM HG: CPT | Mod: HCNC,CPTII,S$GLB, | Performed by: PSYCHIATRY & NEUROLOGY

## 2022-12-12 PROCEDURE — 99999 PR PBB SHADOW E&M-EST. PATIENT-LVL IV: CPT | Mod: PBBFAC,HCNC,, | Performed by: PSYCHIATRY & NEUROLOGY

## 2022-12-12 PROCEDURE — 3077F PR MOST RECENT SYSTOLIC BLOOD PRESSURE >= 140 MM HG: ICD-10-PCS | Mod: HCNC,CPTII,S$GLB, | Performed by: PSYCHIATRY & NEUROLOGY

## 2022-12-12 PROCEDURE — 1101F PR PT FALLS ASSESS DOC 0-1 FALLS W/OUT INJ PAST YR: ICD-10-PCS | Mod: HCNC,CPTII,S$GLB, | Performed by: PSYCHIATRY & NEUROLOGY

## 2022-12-12 PROCEDURE — 3079F PR MOST RECENT DIASTOLIC BLOOD PRESSURE 80-89 MM HG: ICD-10-PCS | Mod: HCNC,CPTII,S$GLB, | Performed by: PSYCHIATRY & NEUROLOGY

## 2022-12-12 PROCEDURE — 3288F PR FALLS RISK ASSESSMENT DOCUMENTED: ICD-10-PCS | Mod: HCNC,CPTII,S$GLB, | Performed by: PSYCHIATRY & NEUROLOGY

## 2022-12-12 RX ORDER — TRIAMCINOLONE ACETONIDE 1 MG/G
CREAM TOPICAL
COMMUNITY
Start: 2022-06-24 | End: 2024-01-09

## 2022-12-12 RX ORDER — DESLORATADINE 5 MG/1
5 TABLET ORAL DAILY
COMMUNITY
Start: 2022-08-19

## 2022-12-12 RX ORDER — ERGOCALCIFEROL 1.25 MG/1
50000 CAPSULE ORAL
COMMUNITY
Start: 2022-10-11 | End: 2023-05-04 | Stop reason: SDUPTHER
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
## 2022-12-14 ENCOUNTER — TELEPHONE (OUTPATIENT)
Dept: NEUROLOGY | Facility: CLINIC | Age: 77
End: 2022-12-14
Payer: MEDICARE

## 2022-12-15 NOTE — TELEPHONE ENCOUNTER
----- Message from Janine Hatch sent at 12/12/2022  1:08 PM CST -----  JAYDEN COBOS calling regarding needing the notes from today's appt mailed out to her home, call back [phone]    Pt stated that [facility] will fax over a request for a prescription for the  suvorexant (BELSOMRA) 10 mg Tab because the Pharmacy that it was sent to did not have the supplies    PT stated that the test that the Doctor requested was approved by [facility] as will

## 2022-12-21 ENCOUNTER — TELEPHONE (OUTPATIENT)
Dept: NEUROLOGY | Facility: CLINIC | Age: 77
End: 2022-12-21
Payer: MEDICARE

## 2022-12-22 NOTE — TELEPHONE ENCOUNTER
ZOLTANM for pt informing that we have not received fax regarding pt's belsomra. Provided fax number [phone] to have paperwork refaxed to us. Encouraged pt to call with any follow-up questions or concerns.

## 2022-12-22 NOTE — TELEPHONE ENCOUNTER
Order confirmed  Your order ID is [id number].    What's next?  Send a specimen kit to [facility]  Track order status  Download results when ready

## 2022-12-27 DIAGNOSIS — G47.00 INSOMNIA, UNSPECIFIED TYPE: ICD-10-CM

## 2022-12-27 NOTE — TELEPHONE ENCOUNTER
----- Message from Lindsey Jarrett sent at 12/27/2022 12:10 PM CST -----  Regarding: refill  Contact: [phone]  PT is requesting a call in regards to her RX suvorexant (BELSOMRA) 10 mg Tab. PT states pharmacy stated her Rx was denied by Dr. Wallace. Please call to discuss further.    [facility] - [place], [place] - [address]  [address]  [facility] [address]  Phone: [phone] Fax: [phone]

## 2022-12-27 NOTE — TELEPHONE ENCOUNTER
Last Rx refill-----12/12/22    Pharmacy--------- [facility] - [place], [place] - [address]  [address]  [facility] [address]  Phone: [phone] Fax: [phone]

## 2022-12-28 NOTE — TELEPHONE ENCOUNTER
Called and spoke with Mrs Bain.  Patient asked to send her Rx refill to [facility]  instead.  Mrs Bain also ask for another copy of her AVS mailed to her home from her last office visit.

## 2022-12-28 NOTE — TELEPHONE ENCOUNTER
----- Message from Lindsey Osorio sent at 12/28/2022 10:34 AM CST -----  Regarding: Meds  Contact: [phone]  Pt calling in regards to the following meds suvorexant (BELSOMRA) 10 mg Tab. Pt stated she went to pharmacy to  meds and was informed that Dr. Wallace denied meds. Please call and adv @ [phone]

## 2023-01-15 ENCOUNTER — NURSE TRIAGE (OUTPATIENT)
Dept: ADMINISTRATIVE | Facility: CLINIC | Age: 78
End: 2023-01-15
Payer: MEDICARE

## 2023-01-16 NOTE — TELEPHONE ENCOUNTER
170/88 duplicate call. Patient is calling to report a change in B/P and asking if she can now change her disposition. Patient still has a headache and vision is blurred which she states is not a new symptom for her. I have advised patient there is no change in her disposition.    Reason for Disposition   Caller has already spoken with another triager or PCP AND has further questions AND triager able to answer questions.    Additional Information   Negative: Caller is angry or rude (e.g., hangs up, verbally abusive, yelling)   Negative: Caller hangs up   Negative: Caller has already spoken with the PCP and has no further questions.   Negative: Caller has already spoken with another triager and has no further questions.    Protocols used: No Contact or Duplicate Contact Call-A-

## 2023-01-16 NOTE — TELEPHONE ENCOUNTER
Patient BP has been high throughout the day and she has attempted to get it to go down by taking an extra dose of her BP medication. Most recent reading is 189/86. She does have a headache and blurry vision. States she is home alone. Care advice is to go to the ED now or if symptoms worsen call 911. She states she has trouble driving at night and has no one the take her. Asks if she can wait to go to [facility]. Advised patient to call 911; urged to seek immediate medical attention.     Reason for Disposition   [1] Systolic BP  >= 160 OR Diastolic >= 100 AND [2] cardiac or neurologic symptoms (e.g., chest pain, difficulty breathing, unsteady gait, blurred vision)    Additional Information   Negative: Difficult to awaken or acting confused (e.g., disoriented, slurred speech)   Negative: SEVERE difficulty breathing (e.g., struggling for each breath, speaks in single words)   Negative: [1] Weakness of the face, arm or leg on one side of the body AND [2] new-onset   Negative: [1] Numbness (i.e., loss of sensation) of the face, arm or leg on one side of the body AND [2] new-onset   Negative: [1] Chest pain lasts > 5 minutes AND [2] history of heart disease (i.e., heart attack, bypass surgery, angina, angioplasty, CHF)   Negative: [1] Chest pain AND [2] took nitrogylcerin AND [3] pain was not relieved   Negative: Sounds like a life-threatening emergency to the triager    Protocols used: Blood Pressure - High-A-

## 2023-01-18 ENCOUNTER — OFFICE VISIT (OUTPATIENT)
Dept: PRIMARY CARE CLINIC | Facility: CLINIC | Age: 78
End: 2023-01-18
Payer: MEDICARE

## 2023-01-18 VITALS
SYSTOLIC BLOOD PRESSURE: 155 MMHG | TEMPERATURE: 98 F | OXYGEN SATURATION: 99 % | DIASTOLIC BLOOD PRESSURE: 80 MMHG | HEART RATE: 78 BPM | WEIGHT: 155.88 LBS | HEIGHT: 57 IN | RESPIRATION RATE: 17 BRPM | BODY MASS INDEX: 33.63 KG/M2

## 2023-01-18 DIAGNOSIS — I10 BENIGN ESSENTIAL HTN: Primary | ICD-10-CM

## 2023-01-18 DIAGNOSIS — R60.9 EDEMA, UNSPECIFIED TYPE: ICD-10-CM

## 2023-01-18 DIAGNOSIS — Z86.73 HISTORY OF STROKE: ICD-10-CM

## 2023-01-18 DIAGNOSIS — R05.9 COUGH, UNSPECIFIED TYPE: ICD-10-CM

## 2023-01-18 PROCEDURE — 3288F FALL RISK ASSESSMENT DOCD: CPT | Mod: HCNC,CPTII,S$GLB, | Performed by: INTERNAL MEDICINE

## 2023-01-18 PROCEDURE — 3077F PR MOST RECENT SYSTOLIC BLOOD PRESSURE >= 140 MM HG: ICD-10-PCS | Mod: HCNC,CPTII,S$GLB, | Performed by: INTERNAL MEDICINE

## 2023-01-18 PROCEDURE — 1101F PR PT FALLS ASSESS DOC 0-1 FALLS W/OUT INJ PAST YR: ICD-10-PCS | Mod: HCNC,CPTII,S$GLB, | Performed by: INTERNAL MEDICINE

## 2023-01-18 PROCEDURE — 1159F MED LIST DOCD IN RCRD: CPT | Mod: HCNC,CPTII,S$GLB, | Performed by: INTERNAL MEDICINE

## 2023-01-18 PROCEDURE — 99999 PR PBB SHADOW E&M-EST. PATIENT-LVL V: ICD-10-PCS | Mod: PBBFAC,HCNC,, | Performed by: INTERNAL MEDICINE

## 2023-01-18 PROCEDURE — 1160F RVW MEDS BY RX/DR IN RCRD: CPT | Mod: HCNC,CPTII,S$GLB, | Performed by: INTERNAL MEDICINE

## 2023-01-18 PROCEDURE — 1125F PR PAIN SEVERITY QUANTIFIED, PAIN PRESENT: ICD-10-PCS | Mod: HCNC,CPTII,S$GLB, | Performed by: INTERNAL MEDICINE

## 2023-01-18 PROCEDURE — 99999 PR PBB SHADOW E&M-EST. PATIENT-LVL V: CPT | Mod: PBBFAC,HCNC,, | Performed by: INTERNAL MEDICINE

## 2023-01-18 PROCEDURE — 1160F PR REVIEW ALL MEDS BY PRESCRIBER/CLIN PHARMACIST DOCUMENTED: ICD-10-PCS | Mod: HCNC,CPTII,S$GLB, | Performed by: INTERNAL MEDICINE

## 2023-01-18 PROCEDURE — 99214 PR OFFICE/OUTPT VISIT, EST, LEVL IV, 30-39 MIN: ICD-10-PCS | Mod: HCNC,S$GLB,, | Performed by: INTERNAL MEDICINE

## 2023-01-18 PROCEDURE — 1159F PR MEDICATION LIST DOCUMENTED IN MEDICAL RECORD: ICD-10-PCS | Mod: HCNC,CPTII,S$GLB, | Performed by: INTERNAL MEDICINE

## 2023-01-18 PROCEDURE — 1125F AMNT PAIN NOTED PAIN PRSNT: CPT | Mod: HCNC,CPTII,S$GLB, | Performed by: INTERNAL MEDICINE

## 2023-01-18 PROCEDURE — 3079F PR MOST RECENT DIASTOLIC BLOOD PRESSURE 80-89 MM HG: ICD-10-PCS | Mod: HCNC,CPTII,S$GLB, | Performed by: INTERNAL MEDICINE

## 2023-01-18 PROCEDURE — 3077F SYST BP >= 140 MM HG: CPT | Mod: HCNC,CPTII,S$GLB, | Performed by: INTERNAL MEDICINE

## 2023-01-18 PROCEDURE — 99214 OFFICE O/P EST MOD 30 MIN: CPT | Mod: HCNC,S$GLB,, | Performed by: INTERNAL MEDICINE

## 2023-01-18 PROCEDURE — 3079F DIAST BP 80-89 MM HG: CPT | Mod: HCNC,CPTII,S$GLB, | Performed by: INTERNAL MEDICINE

## 2023-01-18 PROCEDURE — 1101F PT FALLS ASSESS-DOCD LE1/YR: CPT | Mod: HCNC,CPTII,S$GLB, | Performed by: INTERNAL MEDICINE

## 2023-01-18 PROCEDURE — 3288F PR FALLS RISK ASSESSMENT DOCUMENTED: ICD-10-PCS | Mod: HCNC,CPTII,S$GLB, | Performed by: INTERNAL MEDICINE

## 2023-01-18 RX ORDER — LOSARTAN POTASSIUM 100 MG/1
100 TABLET ORAL DAILY
Qty: 90 TABLET | Refills: 3 | Status: SHIPPED | OUTPATIENT
Start: 2023-01-18 | End: 2023-02-15 | Stop reason: SDUPTHER

## 2023-01-18 NOTE — PROGRESS NOTES
Subjective:      Patient ID: Francy Bain is a 77 y.o. female.    Chief Complaint: Fatigue and Hypertension    HTN: Presents for elevated BP. Office BP is 155/80 . Home readings show elevated BP up to 170's systolics and 80-90's diastolic. She has been noting headaches and nausea over the weekend which is what prompted her to take her blood pressure reading at home. No CP/palpitations.     Edema with increased cough: She has been having increased bilateral edema along with increased cough since last night. She also has dyspnea with exertion at times as well. She has not been compliant with her inhalers and correct usage of her COPD regimen was reviewed today. She continues on lasix qday. Obtain CXR today.     Denies any chest pain, shortness of breath, nausea vomiting constipation diarrhea, blood in stool, heartburn    Review of Systems   Constitutional:  Negative for chills, fever and weight loss.   HENT:  Negative for congestion, ear pain and sore throat.    Eyes:  Negative for double vision.   Respiratory:  Negative for cough and shortness of breath.    Cardiovascular:  Negative for chest pain, palpitations and leg swelling.   Gastrointestinal:  Positive for nausea. Negative for abdominal pain, heartburn and vomiting.   Skin:  Negative for rash.   Neurological:  Positive for headaches. Negative for dizziness and tingling.   Psychiatric/Behavioral:  Negative for depression.        Current Outpatient Medications:     albuterol (PROVENTIL) 2.5 mg /3 mL (0.083 %) nebulizer solution, Take 3 mLs (2.5 mg total) by nebulization every 6 (six) hours as needed for Wheezing. Rescue, Disp: 90 mL, Rfl: 3    carboxymethylcellulose sodium (REFRESH TEARS OPHT), Apply to eye. Use as needed, Disp: , Rfl:     desloratadine (CLARINEX) 5 mg tablet, Take 5 mg by mouth once daily., Disp: , Rfl:     ergocalciferol, vitamin D2, (VITAMIN D ORAL), Take 1,000 Units by mouth once daily. Hold 7 days prior to surgery, Disp: , Rfl:      fluticasone furoate-vilanteroL (BREO ELLIPTA) 100-25 mcg/dose diskus inhaler, Inhale 1 puff into the lungs once daily. Controller, Disp: 60 each, Rfl: 11    furosemide (LASIX) 20 MG tablet, Take 1 tablet (20 mg total) by mouth once daily., Disp: 90 tablet, Rfl: 3    hydrocortisone 2.5 % cream, Do not apply morning of surgery, Disp: , Rfl:     ketoconazole (NIZORAL) 2 % cream, Apply topically once daily., Disp: 60 g, Rfl: 5    ketoconazole (NIZORAL) 2 % shampoo, Shampoo, leave on 5 min then wash off and may use reg soap or shampoo. Use daily x 1 wk then 2x wkly as needed, Disp: 120 mL, Rfl: 11    KEVZARA 200 mg/1.14 mL PnIj, Inject 200 mg into the skin every 14 (fourteen) days. Per  1 week before surgery. Gets from [facility], Disp: , Rfl:     LORazepam (ATIVAN) 1 MG tablet, TAKE ONE TABLET BY MOUTH EVERY DAY AT BEDTIME AS NEEDED FOR ANXIETY OR sleep, Disp: , Rfl:     omeprazole (PRILOSEC) 20 MG capsule, Take 1 capsule (20 mg total) by mouth once daily., Disp: 90 capsule, Rfl: 3    predniSONE (DELTASONE) 2.5 MG tablet, take 1 tablet daily with food for arthritis stiffness and inflammation, Disp: , Rfl:     sulfamethoxazole-trimethoprim 400-80mg (BACTRIM,SEPTRA) 400-80 mg per tablet, Take 1 tablet by mouth 2 (two) times daily., Disp: , Rfl:     suvorexant (BELSOMRA) 10 mg Tab, Take 10 mg by mouth nightly., Disp: 30 tablet, Rfl: 2    tiotropium bromide (SPIRIVA RESPIMAT) 1.25 mcg/actuation inhaler, Inhale 2 puffs into the lungs once daily. Controller, Disp: 4 g, Rfl: 11    traMADoL (ULTRAM) 50 mg tablet, Take 50 mg by mouth every 6 (six) hours as needed. Take as needed, Disp: , Rfl:     triamcinolone acetonide 0.1% (KENALOG) 0.1 % cream, Apply twice daily to itchy skin up to 2 weeks/month as needed., Disp: , Rfl:     VITAMIN D2 1,250 mcg (50,000 unit) capsule, Take 50,000 Units by mouth every 7 days., Disp: , Rfl:     diphth,pertus,acell,,tetanus (BOOSTRIX TDAP) 2.5-8-5 Lf-mcg-Lf/0.5mL Syrg injection,  Inject into the muscle. (Patient not taking: Reported on 1/18/2023), Disp: 0.5 mL, Rfl: 0    losartan (COZAAR) 100 MG tablet, Take 1 tablet (100 mg total) by mouth once daily., Disp: 90 tablet, Rfl: 3    Lab Results   Component Value Date    HGBA1C 5.4 09/28/2022    HGBA1C 5.1 10/14/2020     No results found for: MICALBCREAT  Lab Results   Component Value Date    LDLCALC 139.4 09/28/2022    LDLCALC 127.6 10/14/2020    CHOL 232 (H) 09/28/2022    HDL 56 09/28/2022    TRIG 183 (H) 09/28/2022       Lab Results   Component Value Date     09/28/2022    K 4.0 09/28/2022     09/28/2022    CO2 22 (L) 09/28/2022    GLU 67 (L) 09/28/2022    BUN 13 09/28/2022    CREATININE 0.9 09/28/2022    CALCIUM 9.7 09/28/2022    PROT 7.3 09/28/2022    ALBUMIN 3.8 09/28/2022    BILITOT 0.5 09/28/2022    ALKPHOS 133 09/28/2022    AST 20 09/28/2022    ALT 20 09/28/2022    ANIONGAP 15 09/28/2022    ESTGFRAFRICA >60.0 11/23/2021    EGFRNONAA >60.0 11/23/2021    WBC 10.90 09/28/2022    HGB 15.1 09/28/2022    HGB 15.6 11/23/2021    HCT 49.9 (H) 09/28/2022    MCV 96 09/28/2022     09/28/2022    TSH 3.500 09/28/2022    HEPCAB Negative 10/14/2020       Lab Results   Component Value Date    OUMWQPSQ56KS 67 10/14/2020    BQNKVKNE86 417 11/23/2021         Past Medical History:   Diagnosis Date    Affective disorder     Hypertension     Renal cyst, acquired, right 4/14/2021    Rheumatoid arthritis      Past Surgical History:   Procedure Laterality Date    ANKLE FUSION Bilateral     ELBOW ARTHROPLASTY      EYE SURGERY Right 03/2017    cataract    JOINT REPLACEMENT      bilateral knee replacement; bilateral elbow and wrist replacement    KNEE ARTHROPLASTY      OPEN REDUCTION AND INTERNAL FIXATION (ORIF) OF FRACTURE OF ULNA Right 8/17/2021    Procedure: ORIF, FRACTURE, ULNA  AND MASSIVE ALLOGRAFT;  Surgeon: Tio Patel MD;  Location: [facility] OR [address] [place] [address];  Service: Orthopedics;  Laterality: Right;    REVISION OF TOTAL ARTHROPLASTY OF  "ELBOW Right 8/17/2021    Procedure: REVISION, TOTAL ARTHROPLASTY, ELBOW;  Surgeon: Tio Patel MD;  Location: [facility] OR [address] [place] [address];  Service: Orthopedics;  Laterality: Right;     Social History     Social History Narrative    Live in an apartment- no stairs     Family History   Problem Relation Age of Onset    Hypertension Mother     Arthritis Mother     Stroke Mother     Heart disease Sister     Hyperlipidemia Sister     Arthritis Maternal Grandmother     Heart attack Neg Hx      Vitals:    01/18/23 1100 01/18/23 1111   BP: (!) 142/82 (!) 155/80   Pulse: 78    Resp: 17    Temp: 98 °F (36.7 °C)    SpO2: 99%    Weight: 70.7 kg (155 lb 13.8 oz)    Height: 4' 9" (1.448 m)    PainSc:   4      Objective:   Physical Exam  Vitals reviewed.   Constitutional:       Appearance: Normal appearance.   HENT:      Head: Normocephalic.   Eyes:      Pupils: Pupils are equal, round, and reactive to light.   Cardiovascular:      Rate and Rhythm: Normal rate.      Pulses: Normal pulses.      Heart sounds: Normal heart sounds.   Pulmonary:      Effort: Pulmonary effort is normal.      Breath sounds: Normal breath sounds.   Abdominal:      General: Bowel sounds are normal.      Palpations: Abdomen is soft.   Musculoskeletal:         General: Normal range of motion.   Skin:     General: Skin is warm.   Neurological:      General: No focal deficit present.      Mental Status: She is alert. Mental status is at baseline.   Psychiatric:         Mood and Affect: Mood normal.     Assessment:     1. Benign essential HTN    2. History of stroke    3. Cough, unspecified type    4. Edema, unspecified type      Plan:     Orders Placed This Encounter    X-Ray Chest PA And Lateral    losartan (COZAAR) 100 MG tablet       There are no Patient Instructions on file for this visit.  Tests to Keep You Healthy    Last Blood Pressure <= 139/89 (1/18/2023): NO                            "

## 2023-01-19 ENCOUNTER — TELEPHONE (OUTPATIENT)
Dept: NEUROLOGY | Facility: CLINIC | Age: 78
End: 2023-01-19
Payer: MEDICARE

## 2023-01-20 ENCOUNTER — HOSPITAL ENCOUNTER (OUTPATIENT)
Dept: RADIOLOGY | Facility: HOSPITAL | Age: 78
Discharge: HOME | End: 2023-01-20
Attending: INTERNAL MEDICINE
Payer: MEDICARE

## 2023-01-20 DIAGNOSIS — R05.9 COUGH, UNSPECIFIED TYPE: ICD-10-CM

## 2023-01-20 DIAGNOSIS — R60.9 EDEMA, UNSPECIFIED TYPE: ICD-10-CM

## 2023-01-20 PROCEDURE — 71046 XR CHEST PA AND LATERAL: ICD-10-PCS | Mod: 26,HCNC,, | Performed by: RADIOLOGY

## 2023-01-20 PROCEDURE — 71046 X-RAY EXAM CHEST 2 VIEWS: CPT | Mod: TC,HCNC,PN

## 2023-01-20 PROCEDURE — 71046 X-RAY EXAM CHEST 2 VIEWS: CPT | Mod: 26,HCNC,, | Performed by: RADIOLOGY

## 2023-01-20 NOTE — TELEPHONE ENCOUNTER
----- Message from Darius Tremayne Brown sent at 1/19/2023  3:30 PM CST -----  Regarding: pt advice  Contact: pt @ [phone]  Test Results    Who Called: Francy Bain    Name of Test (Lab/Mammo/Etc): DNA Test     Date of Test: a month and half ago     Ordering Provider: Dr. Wallace    Where the test was performed: [facility]    Would the patient rather a call back or a response via [facility]? Call Back     Best Call Back Number: [phone]    Additional Information:  I have scheduled the pt for a F/U on 02/14/2023, pt has advised she has a surgery scheduled for 02/17/2023 at [facility] and wanting Dr. Wallace's advice in regards to this.

## 2023-01-24 ENCOUNTER — TELEPHONE (OUTPATIENT)
Dept: PRIMARY CARE CLINIC | Facility: CLINIC | Age: 78
End: 2023-01-24
Payer: MEDICARE

## 2023-01-24 NOTE — TELEPHONE ENCOUNTER
----- Message from Julia Neal sent at 1/24/2023  3:10 PM CST -----  Regarding: Test Results  Contact: Patient  Type:  Test Results    Who Called: Patient   Name of Test (Lab/Mammo/Etc): XR CHEST   Date of Test: 01/20/2023   Ordering Provider:  Salvador   Where the test was performed: [facility]   Would the patient rather a call back or a response via [facility]? Call back   Best Call Back Number:  [phone]   Additional Information:  Pt is requesting a call back to discuss test results from xray completed please assist

## 2023-02-07 DIAGNOSIS — Z00.00 ENCOUNTER FOR MEDICARE ANNUAL WELLNESS EXAM: ICD-10-CM

## 2023-02-09 DIAGNOSIS — Z00.00 ENCOUNTER FOR MEDICARE ANNUAL WELLNESS EXAM: ICD-10-CM

## 2023-02-13 ENCOUNTER — TELEPHONE (OUTPATIENT)
Dept: NEUROLOGY | Facility: CLINIC | Age: 78
End: 2023-02-13
Payer: MEDICARE

## 2023-02-14 ENCOUNTER — TELEPHONE (OUTPATIENT)
Dept: NEUROLOGY | Facility: CLINIC | Age: 78
End: 2023-02-14
Payer: MEDICARE

## 2023-02-14 NOTE — TELEPHONE ENCOUNTER
----- Message from Melvi Cohen sent at 2/14/2023 11:26 AM CST -----  Contact: Pt  The patient is calling to get rescheduled for their appt.  The patient would like a call back at your earliest convenience.        Confirmed contact below:   Contact Name:Francy Bain  Phone Number: [phone]

## 2023-02-14 NOTE — TELEPHONE ENCOUNTER
----- Message from Abby Chino sent at 2/13/2023 11:46 AM CST -----  Regarding: Appt  Contact: Pt [phone]  Patient called to request earlier appt time if possible around 8 am Please call to discuss further

## 2023-02-15 ENCOUNTER — TELEPHONE (OUTPATIENT)
Dept: NEUROLOGY | Facility: CLINIC | Age: 78
End: 2023-02-15
Payer: MEDICARE

## 2023-02-15 ENCOUNTER — LAB VISIT (OUTPATIENT)
Dept: LAB | Facility: HOSPITAL | Age: 78
End: 2023-02-15
Attending: FAMILY MEDICINE
Payer: MEDICARE

## 2023-02-15 ENCOUNTER — OFFICE VISIT (OUTPATIENT)
Dept: PRIMARY CARE CLINIC | Facility: CLINIC | Age: 78
End: 2023-02-15
Payer: MEDICARE

## 2023-02-15 VITALS
SYSTOLIC BLOOD PRESSURE: 114 MMHG | HEART RATE: 78 BPM | BODY MASS INDEX: 34.71 KG/M2 | OXYGEN SATURATION: 97 % | WEIGHT: 154.31 LBS | DIASTOLIC BLOOD PRESSURE: 72 MMHG | TEMPERATURE: 99 F | HEIGHT: 56 IN

## 2023-02-15 DIAGNOSIS — I10 BENIGN ESSENTIAL HTN: ICD-10-CM

## 2023-02-15 DIAGNOSIS — Z01.810 PREOP CARDIOVASCULAR EXAM: ICD-10-CM

## 2023-02-15 DIAGNOSIS — R60.9 EDEMA, UNSPECIFIED TYPE: ICD-10-CM

## 2023-02-15 DIAGNOSIS — G31.84 MILD COGNITIVE IMPAIRMENT: ICD-10-CM

## 2023-02-15 DIAGNOSIS — I10 PRIMARY HYPERTENSION: Primary | ICD-10-CM

## 2023-02-15 LAB
ANION GAP SERPL CALC-SCNC: 9 MMOL/L (ref 8–16)
BUN SERPL-MCNC: 9 MG/DL (ref 8–23)
CALCIUM SERPL-MCNC: 9.5 MG/DL (ref 8.7–10.5)
CHLORIDE SERPL-SCNC: 103 MMOL/L (ref 95–110)
CO2 SERPL-SCNC: 30 MMOL/L (ref 23–29)
CREAT SERPL-MCNC: 0.8 MG/DL (ref 0.5–1.4)
ERYTHROCYTE [DISTWIDTH] IN BLOOD BY AUTOMATED COUNT: 13.8 % (ref 11.5–14.5)
EST. GFR  (NO RACE VARIABLE): >60 ML/MIN/1.73 M^2
GLUCOSE SERPL-MCNC: 75 MG/DL (ref 70–110)
HCT VFR BLD AUTO: 48.1 % (ref 37–48.5)
HGB BLD-MCNC: 14.9 G/DL (ref 12–16)
INR PPP: 1 (ref 0.8–1.2)
MCH RBC QN AUTO: 28.3 PG (ref 27–31)
MCHC RBC AUTO-ENTMCNC: 31 G/DL (ref 32–36)
MCV RBC AUTO: 91 FL (ref 82–98)
PLATELET # BLD AUTO: 247 K/UL (ref 150–450)
PMV BLD AUTO: 11.9 FL (ref 9.2–12.9)
POTASSIUM SERPL-SCNC: 3.3 MMOL/L (ref 3.5–5.1)
PROTHROMBIN TIME: 10.6 SEC (ref 9–12.5)
RBC # BLD AUTO: 5.27 M/UL (ref 4–5.4)
SODIUM SERPL-SCNC: 142 MMOL/L (ref 136–145)
WBC # BLD AUTO: 7.87 K/UL (ref 3.9–12.7)

## 2023-02-15 PROCEDURE — 93010 ELECTROCARDIOGRAM REPORT: CPT | Mod: HCNC,S$GLB,, | Performed by: INTERNAL MEDICINE

## 2023-02-15 PROCEDURE — 1101F PR PT FALLS ASSESS DOC 0-1 FALLS W/OUT INJ PAST YR: ICD-10-PCS | Mod: HCNC,CPTII,S$GLB, | Performed by: FAMILY MEDICINE

## 2023-02-15 PROCEDURE — 3074F SYST BP LT 130 MM HG: CPT | Mod: HCNC,CPTII,S$GLB, | Performed by: FAMILY MEDICINE

## 2023-02-15 PROCEDURE — 1101F PT FALLS ASSESS-DOCD LE1/YR: CPT | Mod: HCNC,CPTII,S$GLB, | Performed by: FAMILY MEDICINE

## 2023-02-15 PROCEDURE — 99999 PR PBB SHADOW E&M-EST. PATIENT-LVL V: ICD-10-PCS | Mod: PBBFAC,HCNC,, | Performed by: FAMILY MEDICINE

## 2023-02-15 PROCEDURE — 1159F PR MEDICATION LIST DOCUMENTED IN MEDICAL RECORD: ICD-10-PCS | Mod: HCNC,CPTII,S$GLB, | Performed by: FAMILY MEDICINE

## 2023-02-15 PROCEDURE — 85027 COMPLETE CBC AUTOMATED: CPT | Mod: HCNC | Performed by: FAMILY MEDICINE

## 2023-02-15 PROCEDURE — 99999 PR PBB SHADOW E&M-EST. PATIENT-LVL V: CPT | Mod: PBBFAC,HCNC,, | Performed by: FAMILY MEDICINE

## 2023-02-15 PROCEDURE — 99214 OFFICE O/P EST MOD 30 MIN: CPT | Mod: HCNC,S$GLB,, | Performed by: FAMILY MEDICINE

## 2023-02-15 PROCEDURE — 80048 BASIC METABOLIC PNL TOTAL CA: CPT | Mod: HCNC | Performed by: FAMILY MEDICINE

## 2023-02-15 PROCEDURE — 1125F AMNT PAIN NOTED PAIN PRSNT: CPT | Mod: HCNC,CPTII,S$GLB, | Performed by: FAMILY MEDICINE

## 2023-02-15 PROCEDURE — 3078F PR MOST RECENT DIASTOLIC BLOOD PRESSURE < 80 MM HG: ICD-10-PCS | Mod: HCNC,CPTII,S$GLB, | Performed by: FAMILY MEDICINE

## 2023-02-15 PROCEDURE — 3078F DIAST BP <80 MM HG: CPT | Mod: HCNC,CPTII,S$GLB, | Performed by: FAMILY MEDICINE

## 2023-02-15 PROCEDURE — 93005 ELECTROCARDIOGRAM TRACING: CPT | Mod: HCNC,S$GLB,, | Performed by: FAMILY MEDICINE

## 2023-02-15 PROCEDURE — 36415 COLL VENOUS BLD VENIPUNCTURE: CPT | Mod: HCNC,PN | Performed by: FAMILY MEDICINE

## 2023-02-15 PROCEDURE — 1160F RVW MEDS BY RX/DR IN RCRD: CPT | Mod: HCNC,CPTII,S$GLB, | Performed by: FAMILY MEDICINE

## 2023-02-15 PROCEDURE — 3288F FALL RISK ASSESSMENT DOCD: CPT | Mod: HCNC,CPTII,S$GLB, | Performed by: FAMILY MEDICINE

## 2023-02-15 PROCEDURE — 1125F PR PAIN SEVERITY QUANTIFIED, PAIN PRESENT: ICD-10-PCS | Mod: HCNC,CPTII,S$GLB, | Performed by: FAMILY MEDICINE

## 2023-02-15 PROCEDURE — 1159F MED LIST DOCD IN RCRD: CPT | Mod: HCNC,CPTII,S$GLB, | Performed by: FAMILY MEDICINE

## 2023-02-15 PROCEDURE — 93010 EKG 12-LEAD: ICD-10-PCS | Mod: HCNC,S$GLB,, | Performed by: INTERNAL MEDICINE

## 2023-02-15 PROCEDURE — 3074F PR MOST RECENT SYSTOLIC BLOOD PRESSURE < 130 MM HG: ICD-10-PCS | Mod: HCNC,CPTII,S$GLB, | Performed by: FAMILY MEDICINE

## 2023-02-15 PROCEDURE — 93005 EKG 12-LEAD: ICD-10-PCS | Mod: HCNC,S$GLB,, | Performed by: FAMILY MEDICINE

## 2023-02-15 PROCEDURE — 3288F PR FALLS RISK ASSESSMENT DOCUMENTED: ICD-10-PCS | Mod: HCNC,CPTII,S$GLB, | Performed by: FAMILY MEDICINE

## 2023-02-15 PROCEDURE — 1160F PR REVIEW ALL MEDS BY PRESCRIBER/CLIN PHARMACIST DOCUMENTED: ICD-10-PCS | Mod: HCNC,CPTII,S$GLB, | Performed by: FAMILY MEDICINE

## 2023-02-15 PROCEDURE — 85610 PROTHROMBIN TIME: CPT | Mod: HCNC | Performed by: FAMILY MEDICINE

## 2023-02-15 PROCEDURE — 99214 PR OFFICE/OUTPT VISIT, EST, LEVL IV, 30-39 MIN: ICD-10-PCS | Mod: HCNC,S$GLB,, | Performed by: FAMILY MEDICINE

## 2023-02-15 RX ORDER — LOSARTAN POTASSIUM 100 MG/1
100 TABLET ORAL DAILY
Qty: 90 TABLET | Refills: 3 | Status: SHIPPED | OUTPATIENT
Start: 2023-02-15 | End: 2023-05-22 | Stop reason: ALTCHOICE

## 2023-02-15 NOTE — TELEPHONE ENCOUNTER
----- Message from Kristen Ma sent at 2/15/2023 10:47 AM CST -----  Regarding: Pt call back  Pt returning missed call to r/s her appt. Pt asked if she could be scheduled next available appt for 8am. Pt also asked if she could be scheduled in March since she will be having surgery Friday.     Call back: [phone]

## 2023-02-16 ENCOUNTER — TELEPHONE (OUTPATIENT)
Dept: PRIMARY CARE CLINIC | Facility: CLINIC | Age: 78
End: 2023-02-16
Payer: MEDICARE

## 2023-02-16 ENCOUNTER — PATIENT MESSAGE (OUTPATIENT)
Dept: PRIMARY CARE CLINIC | Facility: CLINIC | Age: 78
End: 2023-02-16
Payer: MEDICARE

## 2023-02-16 NOTE — TELEPHONE ENCOUNTER
Returned call to patient who states she spoke with Dr. Tolbert and he told her he arrives in the office at 6:00 AM and can review her lab results then. Advised he has not put any review comments yet but I will send him this message so he can review them and we will call her today.

## 2023-02-16 NOTE — PROGRESS NOTES
"    /72 (BP Location: Left arm, Patient Position: Sitting, BP Method: Medium (Manual))   Pulse 78   Temp 98.8 °F (37.1 °C) (Oral)   Ht 4' 8" (1.422 m)   Wt 70 kg (154 lb 5.2 oz)   SpO2 97%   BMI 34.60 kg/m²       ===========    Chief Complaint:  Preop        Francy Bain is a 77 y.o. female here for        Preop evaluation     Asymptomatic             Patient queried and denies any further complaints    SURGICAL AND MEDICAL HISTORY: updated and reviewed.  ALLERGIES updated and reviewed.  Review of patient's allergies indicates:  No Known Allergies  CURRENT OUTPATIENT MEDICATIONS updated and reviewed    Current Outpatient Medications:     albuterol (PROVENTIL) 2.5 mg /3 mL (0.083 %) nebulizer solution, Take 3 mLs (2.5 mg total) by nebulization every 6 (six) hours as needed for Wheezing. Rescue, Disp: 90 mL, Rfl: 3    carboxymethylcellulose sodium (REFRESH TEARS OPHT), Apply to eye. Use as needed, Disp: , Rfl:     desloratadine (CLARINEX) 5 mg tablet, Take 5 mg by mouth once daily., Disp: , Rfl:     fluticasone furoate-vilanteroL (BREO ELLIPTA) 100-25 mcg/dose diskus inhaler, Inhale 1 puff into the lungs once daily. Controller, Disp: 60 each, Rfl: 11    furosemide (LASIX) 20 MG tablet, Take 1 tablet (20 mg total) by mouth once daily., Disp: 90 tablet, Rfl: 3    hydrocortisone 2.5 % cream, Do not apply morning of surgery, Disp: , Rfl:     ketoconazole (NIZORAL) 2 % cream, Apply topically once daily., Disp: 60 g, Rfl: 5    ketoconazole (NIZORAL) 2 % shampoo, Shampoo, leave on 5 min then wash off and may use reg soap or shampoo. Use daily x 1 wk then 2x wkly as needed, Disp: 120 mL, Rfl: 11    LORazepam (ATIVAN) 1 MG tablet, TAKE ONE TABLET BY MOUTH EVERY DAY AT BEDTIME AS NEEDED FOR ANXIETY OR sleep, Disp: , Rfl:     omeprazole (PRILOSEC) 20 MG capsule, Take 1 capsule (20 mg total) by mouth once daily., Disp: 90 capsule, Rfl: 3    predniSONE (DELTASONE) 2.5 MG tablet, take 1 tablet daily with " food for arthritis stiffness and inflammation, Disp: , Rfl:     suvorexant (BELSOMRA) 10 mg Tab, Take 10 mg by mouth nightly., Disp: 30 tablet, Rfl: 2    traMADoL (ULTRAM) 50 mg tablet, Take 50 mg by mouth every 6 (six) hours as needed. Take as needed, Disp: , Rfl:     triamcinolone acetonide 0.1% (KENALOG) 0.1 % cream, Apply twice daily to itchy skin up to 2 weeks/month as needed., Disp: , Rfl:     VITAMIN D2 1,250 mcg (50,000 unit) capsule, Take 50,000 Units by mouth every 7 days., Disp: , Rfl:     KEVZARA 200 mg/1.14 mL PnIj, Inject 200 mg into the skin every 14 (fourteen) days. Per  1 week before surgery. Gets from [facility], Disp: , Rfl:     losartan (COZAAR) 100 MG tablet, Take 1 tablet (100 mg total) by mouth once daily., Disp: 90 tablet, Rfl: 3    Review of Systems   Constitutional:  Negative for activity change, appetite change, chills, diaphoresis, fatigue, fever and unexpected weight change.   HENT:  Positive for ear pain. Negative for congestion, ear discharge, facial swelling, hearing loss, nosebleeds, postnasal drip, rhinorrhea, sinus pressure, sneezing, sore throat, tinnitus, trouble swallowing and voice change.    Eyes:  Negative for photophobia, pain, discharge, redness, itching and visual disturbance.   Respiratory:  Negative for cough, chest tightness, shortness of breath and wheezing.    Cardiovascular:  Negative for chest pain, palpitations and leg swelling.   Gastrointestinal:  Negative for abdominal distention, abdominal pain, anal bleeding, blood in stool, constipation, diarrhea, nausea, rectal pain and vomiting.   Endocrine: Negative for cold intolerance, heat intolerance, polydipsia, polyphagia and polyuria.   Genitourinary:  Negative for difficulty urinating, dysuria and flank pain.   Musculoskeletal:  Negative for arthralgias, back pain, joint swelling, myalgias and neck pain.   Skin:  Negative for rash.   Neurological:  Negative for dizziness, tremors, seizures, syncope,  "speech difficulty, weakness, light-headedness, numbness and headaches.   Psychiatric/Behavioral:  Negative for behavioral problems, confusion, decreased concentration, dysphoric mood, sleep disturbance and suicidal ideas. The patient is not nervous/anxious and is not hyperactive.      /72 (BP Location: Left arm, Patient Position: Sitting, BP Method: Medium (Manual))   Pulse 78   Temp 98.8 °F (37.1 °C) (Oral)   Ht 4' 8" (1.422 m)   Wt 70 kg (154 lb 5.2 oz)   SpO2 97%   BMI 34.60 kg/m²   Physical Exam  Vitals and nursing note reviewed.   Constitutional:       General: She is not in acute distress.     Appearance: Normal appearance. She is well-developed. She is not ill-appearing, toxic-appearing or diaphoretic.   HENT:      Head: Normocephalic and atraumatic.      Right Ear: Tympanic membrane, ear canal and external ear normal.      Left Ear: Tympanic membrane, ear canal and external ear normal.      Nose: Nose normal.      Mouth/Throat:      Lips: Pink.      Mouth: Mucous membranes are moist.      Pharynx: No oropharyngeal exudate or posterior oropharyngeal erythema.   Eyes:      General: No scleral icterus.        Right eye: No discharge.         Left eye: No discharge.      Extraocular Movements: Extraocular movements intact.      Conjunctiva/sclera: Conjunctivae normal.   Cardiovascular:      Rate and Rhythm: Normal rate and regular rhythm.      Pulses: Normal pulses.      Heart sounds: Normal heart sounds. No murmur heard.  Pulmonary:      Effort: Pulmonary effort is normal. No respiratory distress.      Breath sounds: Normal breath sounds. No wheezing or rales.   Abdominal:      General: Bowel sounds are normal. There is no distension.      Palpations: Abdomen is soft. There is no mass.      Tenderness: There is no abdominal tenderness. There is no right CVA tenderness, left CVA tenderness, guarding or rebound.      Hernia: No hernia is present.   Musculoskeletal:      Cervical back: Normal range of " motion and neck supple. No rigidity or tenderness.   Lymphadenopathy:      Cervical: No cervical adenopathy.   Skin:     General: Skin is warm and dry.   Neurological:      General: No focal deficit present.      Mental Status: She is alert. Mental status is at baseline.   Psychiatric:         Mood and Affect: Mood normal.         Behavior: Behavior normal. Behavior is cooperative.       Francy was seen today for hypertension, otalgia and shortness of breath.    Diagnoses and all orders for this visit:    Primary hypertension  -     losartan (COZAAR) 100 MG tablet; Take 1 tablet (100 mg total) by mouth once daily.    Benign essential HTN    Preop cardiovascular exam  -     SCHEDULED EKG 12-LEAD (to Muse); Future  -     CBC Without Differential; Future  -     Basic Metabolic Panel; Future  -     Protime-INR; Future  -     EKG 12-lead; Future  -     EKG 12-lead    Edema, unspecified type  -     Protime-INR; Future    Mild cognitive impairment    EKG reviewed.  Normal sinus rhythm.    Chest x-ray from last month reviewed.  Labs reviewed           All lab results over past 2 years available reviewed inc labs and any cardiology or radiology studies  Any new prescription medications gone over in detail including reason for taking the medication, the general mechanism of action, most common possible side effects and possible costs, etcetera.    Patient may proceed with surgery up to and including general anesthesia at low cardiovascular risk       Mikey Tolbert MD

## 2023-02-16 NOTE — TELEPHONE ENCOUNTER
----- Message from Azeb Meraz sent at 2/16/2023 10:57 AM CST -----  Contact: [phone]  Pt said she would like a call back about her lab results that she took yesterday at [facility]

## 2023-02-16 NOTE — TELEPHONE ENCOUNTER
----- Message from Demi Schwartz sent at 2/16/2023  1:48 PM CST -----  Contact: Pt [phone]  Type: Test Results Labs    What test was performed? Labs    When and where were the test performed? 2/15/23

## 2023-02-16 NOTE — TELEPHONE ENCOUNTER
"Pt advised of results notes below and advised that the pre-op clearance was sent & confirmed receipt.     "No concerning findings.  Increase the potassium in your diet today. Maybe just a couple of bananas or oranges today before you have to be without food prior to the procedure     See prior messagessent to you today     Take care     Dictated"  "

## 2023-02-20 ENCOUNTER — EXTERNAL HOSPITAL ADMISSION (OUTPATIENT)
Dept: ADMINISTRATIVE | Facility: CLINIC | Age: 78
End: 2023-02-20
Payer: MEDICARE

## 2023-02-20 ENCOUNTER — PATIENT MESSAGE (OUTPATIENT)
Dept: ADMINISTRATIVE | Facility: CLINIC | Age: 78
End: 2023-02-20
Payer: MEDICARE

## 2023-02-20 ENCOUNTER — PATIENT OUTREACH (OUTPATIENT)
Dept: ADMINISTRATIVE | Facility: CLINIC | Age: 78
End: 2023-02-20
Payer: MEDICARE

## 2023-02-20 NOTE — PROGRESS NOTES
C3 nurse attempted to contact Francy Bain for a TCC post hospital discharge follow up call. No answer. Left voicemail with callback information. The patient does not have a scheduled HOSFU appointment. Message sent to PCP staff for assistance with scheduling visit with patient.

## 2023-02-20 NOTE — PROGRESS NOTES
C3 nurse attempted to contact Francy Bain for a TCC post hospital discharge follow up call. No answer. Left voicemail with callback information. The patient does not have a scheduled HOSFU appointment. Message previously sent to PCP staff for assistance with scheduling visit with patient.

## 2023-02-21 NOTE — PROGRESS NOTES
C3 nurse spoke with Francy Bain for a TCC post hospital discharge follow up call. The patient does not have a scheduled HOSFU appointment with Dr. Mikey Tolbert (PCP) within 5-7 days post hospital discharge date 2/17/23. C3 nurse was unable to schedule HOSFU appointment in [facility].    Message sent to PCP staff requesting they contact patient and schedule follow up appointment.       Patient taking Norco liquid-15mL Q 6 Hours with very little, if any pain relief. During our conversation she stated that she had taken the Norco 1 hour ago, but still rating pain 10/10. She was not pleased by her care at [facility] and does not wish to go back there if she does not have to. She is unsure if a follow up appointment was scheduled with them, but would prefer to see her PCP ASAP, if possible. She is staying with her sister, therefore, call her Mobile number for contact. I did advise her that if the pain medication does not control the pain, or if it worsens, that she needs to go to the ER, and she voiced understanding.

## 2023-02-22 ENCOUNTER — TELEPHONE (OUTPATIENT)
Dept: PRIMARY CARE CLINIC | Facility: CLINIC | Age: 78
End: 2023-02-22
Payer: MEDICARE

## 2023-02-22 NOTE — TELEPHONE ENCOUNTER
Spoke with the pt, advised that she needs to contact her surgeon to let them know she is in severe pain even with the medication. Explained to the pt that she needs to contact the surgeon, bc if this is related to a complication from the surgery then the surgeon needs to follow up.

## 2023-02-22 NOTE — TELEPHONE ENCOUNTER
Spoke to patient and informed of scheduled hospital follow up on 2/24/2023 at [facility] on Friday afternoon.  Patient stated she did not want an appointment she wants something stronger for pain.  Patient informed to reach out to the surgeon or she can keep the appointment on Friday.  Appointment canceled for Friday.

## 2023-02-22 NOTE — TELEPHONE ENCOUNTER
----- Message from Devi Adams sent at 2/22/2023  8:18 AM CST -----  Contact: [phone]  Pt is calling she states she spoke with the on call nurse yesterday and they told her to get in touch with her PCP she has surgery Friday on her wrist and she states her fiingers are hurting and the pain medicxation is not helping her please give return call ASAP

## 2023-03-01 ENCOUNTER — TELEPHONE (OUTPATIENT)
Dept: NEUROLOGY | Facility: CLINIC | Age: 78
End: 2023-03-01
Payer: MEDICARE

## 2023-03-01 NOTE — TELEPHONE ENCOUNTER
----- Message from Vandana Miranda MA sent at 2/16/2023  1:48 PM CST -----  Regarding: FW: results  Contact: [phone] or [phone]    ----- Message -----  From: Jame Richard  Sent: 2/10/2023   3:09 PM CST  To: [facility] Staff  Subject: results                                          Pt is calling to get results from genetic testing; please call to advise. Thank you.

## 2023-03-02 ENCOUNTER — TELEPHONE (OUTPATIENT)
Dept: NEUROLOGY | Facility: CLINIC | Age: 78
End: 2023-03-02
Payer: MEDICARE

## 2023-03-02 ENCOUNTER — OFFICE VISIT (OUTPATIENT)
Dept: PRIMARY CARE CLINIC | Facility: CLINIC | Age: 78
End: 2023-03-02
Payer: MEDICARE

## 2023-03-02 VITALS
TEMPERATURE: 98 F | OXYGEN SATURATION: 99 % | DIASTOLIC BLOOD PRESSURE: 72 MMHG | BODY MASS INDEX: 33.22 KG/M2 | WEIGHT: 147.69 LBS | SYSTOLIC BLOOD PRESSURE: 130 MMHG | HEIGHT: 56 IN | HEART RATE: 84 BPM

## 2023-03-02 DIAGNOSIS — Z09 HOSPITAL DISCHARGE FOLLOW-UP: Primary | ICD-10-CM

## 2023-03-02 DIAGNOSIS — H40.1131 PRIMARY OPEN ANGLE GLAUCOMA (POAG) OF BOTH EYES, MILD STAGE: ICD-10-CM

## 2023-03-02 DIAGNOSIS — M05.79 RHEUMATOID ARTHRITIS INVOLVING MULTIPLE SITES WITH POSITIVE RHEUMATOID FACTOR: ICD-10-CM

## 2023-03-02 DIAGNOSIS — K21.9 GASTROESOPHAGEAL REFLUX DISEASE WITHOUT ESOPHAGITIS: ICD-10-CM

## 2023-03-02 DIAGNOSIS — I70.0 AORTIC ATHEROSCLEROSIS: ICD-10-CM

## 2023-03-02 DIAGNOSIS — M06.9 RHEUMATOID ARTHRITIS, INVOLVING UNSPECIFIED SITE, UNSPECIFIED WHETHER RHEUMATOID FACTOR PRESENT: ICD-10-CM

## 2023-03-02 DIAGNOSIS — Z86.73 HISTORY OF STROKE: ICD-10-CM

## 2023-03-02 DIAGNOSIS — G31.84 MILD COGNITIVE IMPAIRMENT: ICD-10-CM

## 2023-03-02 DIAGNOSIS — F33.42 RECURRENT MAJOR DEPRESSIVE DISORDER, IN FULL REMISSION: ICD-10-CM

## 2023-03-02 DIAGNOSIS — M85.89 OSTEOPENIA OF MULTIPLE SITES: ICD-10-CM

## 2023-03-02 DIAGNOSIS — L41.0 PITYRIASIS LICHENOIDES ET VARIOLIFORMIS ACUTA: ICD-10-CM

## 2023-03-02 DIAGNOSIS — F41.8 SITUATIONAL ANXIETY: ICD-10-CM

## 2023-03-02 DIAGNOSIS — E66.09 CLASS 1 OBESITY DUE TO EXCESS CALORIES WITH SERIOUS COMORBIDITY AND BODY MASS INDEX (BMI) OF 33.0 TO 33.9 IN ADULT: ICD-10-CM

## 2023-03-02 DIAGNOSIS — J44.89 CHRONIC OBSTRUCTIVE ASTHMA: ICD-10-CM

## 2023-03-02 DIAGNOSIS — D84.821 IMMUNOCOMPROMISED STATE DUE TO DRUG THERAPY: ICD-10-CM

## 2023-03-02 DIAGNOSIS — Z74.09 IMPAIRED FUNCTIONAL MOBILITY, BALANCE, GAIT, AND ENDURANCE: ICD-10-CM

## 2023-03-02 DIAGNOSIS — Z79.52 ON CORTICOSTEROID THERAPY: ICD-10-CM

## 2023-03-02 DIAGNOSIS — Z79.899 IMMUNOCOMPROMISED STATE DUE TO DRUG THERAPY: ICD-10-CM

## 2023-03-02 DIAGNOSIS — J44.89 ASTHMA-COPD OVERLAP SYNDROME: ICD-10-CM

## 2023-03-02 DIAGNOSIS — Z96.621 HISTORY OF RIGHT ELBOW REPLACEMENT: ICD-10-CM

## 2023-03-02 PROCEDURE — 3078F PR MOST RECENT DIASTOLIC BLOOD PRESSURE < 80 MM HG: ICD-10-PCS | Mod: HCNC,CPTII,S$GLB, | Performed by: FAMILY MEDICINE

## 2023-03-02 PROCEDURE — 3288F PR FALLS RISK ASSESSMENT DOCUMENTED: ICD-10-PCS | Mod: HCNC,CPTII,S$GLB, | Performed by: FAMILY MEDICINE

## 2023-03-02 PROCEDURE — 1101F PT FALLS ASSESS-DOCD LE1/YR: CPT | Mod: HCNC,CPTII,S$GLB, | Performed by: FAMILY MEDICINE

## 2023-03-02 PROCEDURE — 3288F FALL RISK ASSESSMENT DOCD: CPT | Mod: HCNC,CPTII,S$GLB, | Performed by: FAMILY MEDICINE

## 2023-03-02 PROCEDURE — 99214 PR OFFICE/OUTPT VISIT, EST, LEVL IV, 30-39 MIN: ICD-10-PCS | Mod: HCNC,S$GLB,, | Performed by: FAMILY MEDICINE

## 2023-03-02 PROCEDURE — 1101F PR PT FALLS ASSESS DOC 0-1 FALLS W/OUT INJ PAST YR: ICD-10-PCS | Mod: HCNC,CPTII,S$GLB, | Performed by: FAMILY MEDICINE

## 2023-03-02 PROCEDURE — 3075F SYST BP GE 130 - 139MM HG: CPT | Mod: HCNC,CPTII,S$GLB, | Performed by: FAMILY MEDICINE

## 2023-03-02 PROCEDURE — 99999 PR PBB SHADOW E&M-EST. PATIENT-LVL V: CPT | Mod: PBBFAC,HCNC,, | Performed by: FAMILY MEDICINE

## 2023-03-02 PROCEDURE — 1125F PR PAIN SEVERITY QUANTIFIED, PAIN PRESENT: ICD-10-PCS | Mod: HCNC,CPTII,S$GLB, | Performed by: FAMILY MEDICINE

## 2023-03-02 PROCEDURE — 1125F AMNT PAIN NOTED PAIN PRSNT: CPT | Mod: HCNC,CPTII,S$GLB, | Performed by: FAMILY MEDICINE

## 2023-03-02 PROCEDURE — 3075F PR MOST RECENT SYSTOLIC BLOOD PRESS GE 130-139MM HG: ICD-10-PCS | Mod: HCNC,CPTII,S$GLB, | Performed by: FAMILY MEDICINE

## 2023-03-02 PROCEDURE — 99214 OFFICE O/P EST MOD 30 MIN: CPT | Mod: HCNC,S$GLB,, | Performed by: FAMILY MEDICINE

## 2023-03-02 PROCEDURE — 1159F PR MEDICATION LIST DOCUMENTED IN MEDICAL RECORD: ICD-10-PCS | Mod: HCNC,CPTII,S$GLB, | Performed by: FAMILY MEDICINE

## 2023-03-02 PROCEDURE — 1160F RVW MEDS BY RX/DR IN RCRD: CPT | Mod: HCNC,CPTII,S$GLB, | Performed by: FAMILY MEDICINE

## 2023-03-02 PROCEDURE — 99499 UNLISTED E&M SERVICE: CPT | Mod: HCNC,S$GLB,, | Performed by: FAMILY MEDICINE

## 2023-03-02 PROCEDURE — 1159F MED LIST DOCD IN RCRD: CPT | Mod: HCNC,CPTII,S$GLB, | Performed by: FAMILY MEDICINE

## 2023-03-02 PROCEDURE — 99999 PR PBB SHADOW E&M-EST. PATIENT-LVL V: ICD-10-PCS | Mod: PBBFAC,HCNC,, | Performed by: FAMILY MEDICINE

## 2023-03-02 PROCEDURE — 3078F DIAST BP <80 MM HG: CPT | Mod: HCNC,CPTII,S$GLB, | Performed by: FAMILY MEDICINE

## 2023-03-02 PROCEDURE — 1160F PR REVIEW ALL MEDS BY PRESCRIBER/CLIN PHARMACIST DOCUMENTED: ICD-10-PCS | Mod: HCNC,CPTII,S$GLB, | Performed by: FAMILY MEDICINE

## 2023-03-03 ENCOUNTER — TELEPHONE (OUTPATIENT)
Dept: NEUROLOGY | Facility: CLINIC | Age: 78
End: 2023-03-03
Payer: MEDICARE

## 2023-03-03 NOTE — TELEPHONE ENCOUNTER
----- Message from Yanet Hagen CMA sent at 3/2/2023  9:59 AM CST -----  Regarding: Please call  Contact: [phone]  Pt states she should have an appt on 3/7/23 for results, Rubina call pt.    Thank you

## 2023-03-03 NOTE — TELEPHONE ENCOUNTER
----- Message from [place] MA sent at 3/3/2023  1:18 PM CST -----  Contact: t [phone]  Patient is calling to schedule appt. She states she thought it was for March 7th.  The patient would like to be reached at [phone]

## 2023-03-06 ENCOUNTER — TELEPHONE (OUTPATIENT)
Dept: NEUROLOGY | Facility: CLINIC | Age: 78
End: 2023-03-06
Payer: MEDICARE

## 2023-03-06 PROBLEM — Z79.52 ON CORTICOSTEROID THERAPY: Status: ACTIVE | Noted: 2023-03-06

## 2023-03-06 PROBLEM — I70.0 AORTIC ATHEROSCLEROSIS: Status: ACTIVE | Noted: 2023-03-06

## 2023-03-06 PROBLEM — D84.9 IMMUNOCOMPROMISED STATE: Status: RESOLVED | Noted: 2022-06-09 | Resolved: 2023-03-06

## 2023-03-06 PROBLEM — Z79.52 CURRENT USE OF STEROID MEDICATION: Status: RESOLVED | Noted: 2021-04-08 | Resolved: 2023-03-06

## 2023-03-06 PROBLEM — D84.821 IMMUNOCOMPROMISED STATE DUE TO DRUG THERAPY: Status: ACTIVE | Noted: 2023-03-06

## 2023-03-06 PROBLEM — Z79.899 IMMUNOCOMPROMISED STATE DUE TO DRUG THERAPY: Status: ACTIVE | Noted: 2023-03-06

## 2023-03-06 PROBLEM — F33.42 RECURRENT MAJOR DEPRESSIVE DISORDER, IN FULL REMISSION: Status: ACTIVE | Noted: 2023-03-06

## 2023-03-06 PROBLEM — F41.8 SITUATIONAL ANXIETY: Status: ACTIVE | Noted: 2023-03-06

## 2023-03-06 NOTE — PROGRESS NOTES
"    /72 (BP Location: Right arm, Patient Position: Sitting, BP Method: Medium (Manual))   Pulse 84   Temp 98.2 °F (36.8 °C) (Oral)   Ht 4' 8" (1.422 m)   Wt 67 kg (147 lb 11.3 oz)   SpO2 99%   BMI 33.12 kg/m²       ===========    Chief Complaint: No chief complaint on file.        Francy Bain is a 77 y.o. female here for    [facility]    Hospital discharge follow-up    Status post surgery    No discharge summary present.      Patient had some significant pain postop and had problems with her pain medication that was IV.  She will discuss with surgery with her follow-up but her pain is much better now with Tylenol     Here with family member     No new complaints.    Patient queried and denies any further complaints    SURGICAL AND MEDICAL HISTORY: updated and reviewed.  ALLERGIES updated and reviewed.  Review of patient's allergies indicates:  No Known Allergies  CURRENT OUTPATIENT MEDICATIONS updated and reviewed    Current Outpatient Medications:     ascorbic acid, vitamin C, (VITAMIN C) 100 MG tablet, Take 100 mg by mouth once daily., Disp: , Rfl:     carboxymethylcellulose sodium (REFRESH TEARS OPHT), Apply to eye. Use as needed, Disp: , Rfl:     desloratadine (CLARINEX) 5 mg tablet, Take 5 mg by mouth once daily., Disp: , Rfl:     fluticasone furoate-vilanteroL (BREO ELLIPTA) 100-25 mcg/dose diskus inhaler, Inhale 1 puff into the lungs once daily. Controller, Disp: 60 each, Rfl: 11    furosemide (LASIX) 20 MG tablet, Take 1 tablet (20 mg total) by mouth once daily., Disp: 90 tablet, Rfl: 3    hydrocortisone 2.5 % cream, Do not apply morning of surgery, Disp: , Rfl:     ketoconazole (NIZORAL) 2 % cream, Apply topically once daily., Disp: 60 g, Rfl: 5    ketoconazole (NIZORAL) 2 % shampoo, Shampoo, leave on 5 min then wash off and may use reg soap or shampoo. Use daily x 1 wk then 2x wkly as needed, Disp: 120 mL, Rfl: 11    KEVZARA 200 mg/1.14 mL PnIj, Inject 200 mg into the skin every 14 " (fourteen) days. Per  1 week before surgery. Gets from [facility], Disp: , Rfl:     LORazepam (ATIVAN) 1 MG tablet, TAKE ONE TABLET BY MOUTH EVERY DAY AT BEDTIME AS NEEDED FOR ANXIETY OR sleep, Disp: , Rfl:     losartan (COZAAR) 100 MG tablet, Take 1 tablet (100 mg total) by mouth once daily., Disp: 90 tablet, Rfl: 3    omeprazole (PRILOSEC) 20 MG capsule, Take 1 capsule (20 mg total) by mouth once daily., Disp: 90 capsule, Rfl: 3    predniSONE (DELTASONE) 2.5 MG tablet, take 1 tablet daily with food for arthritis stiffness and inflammation, Disp: , Rfl:     suvorexant (BELSOMRA) 10 mg Tab, Take 10 mg by mouth nightly., Disp: 30 tablet, Rfl: 2    traMADoL (ULTRAM) 50 mg tablet, Take 50 mg by mouth every 6 (six) hours as needed. Take as needed, Disp: , Rfl:     triamcinolone acetonide 0.1% (KENALOG) 0.1 % cream, Apply twice daily to itchy skin up to 2 weeks/month as needed., Disp: , Rfl:     VITAMIN D2 1,250 mcg (50,000 unit) capsule, Take 50,000 Units by mouth every 7 days., Disp: , Rfl:     albuterol (PROVENTIL) 2.5 mg /3 mL (0.083 %) nebulizer solution, Take 3 mLs (2.5 mg total) by nebulization every 6 (six) hours as needed for Wheezing. Rescue, Disp: 90 mL, Rfl: 3    Review of Systems   Constitutional:  Negative for activity change, appetite change, chills, diaphoresis, fatigue, fever and unexpected weight change.   HENT:  Negative for congestion, ear discharge, ear pain, facial swelling, hearing loss, nosebleeds, postnasal drip, rhinorrhea, sinus pressure, sneezing, sore throat, tinnitus, trouble swallowing and voice change.    Eyes:  Negative for photophobia, pain, discharge, redness, itching and visual disturbance.   Respiratory:  Negative for cough, chest tightness, shortness of breath and wheezing.    Cardiovascular:  Negative for chest pain, palpitations and leg swelling.   Gastrointestinal:  Negative for abdominal distention, abdominal pain, anal bleeding, blood in stool, constipation, diarrhea,  "nausea, rectal pain and vomiting.   Endocrine: Negative for cold intolerance, heat intolerance, polydipsia, polyphagia and polyuria.   Genitourinary:  Negative for difficulty urinating, dysuria and flank pain.   Musculoskeletal:  Negative for arthralgias, back pain, joint swelling, myalgias and neck pain.   Skin:  Negative for rash.   Neurological:  Negative for dizziness, tremors, seizures, syncope, speech difficulty, weakness, light-headedness, numbness and headaches.   Psychiatric/Behavioral:  Negative for behavioral problems, confusion, decreased concentration, dysphoric mood, sleep disturbance and suicidal ideas. The patient is not nervous/anxious and is not hyperactive.      /72 (BP Location: Right arm, Patient Position: Sitting, BP Method: Medium (Manual))   Pulse 84   Temp 98.2 °F (36.8 °C) (Oral)   Ht 4' 8" (1.422 m)   Wt 67 kg (147 lb 11.3 oz)   SpO2 99%   BMI 33.12 kg/m²   Physical Exam  Vitals and nursing note reviewed.   Constitutional:       General: She is not in acute distress.     Appearance: Normal appearance. She is well-developed. She is not ill-appearing or toxic-appearing.   HENT:      Head: Normocephalic and atraumatic.      Right Ear: Tympanic membrane, ear canal and external ear normal.      Left Ear: Tympanic membrane, ear canal and external ear normal.      Nose: Nose normal.      Mouth/Throat:      Lips: Pink.      Mouth: Mucous membranes are moist.      Pharynx: No oropharyngeal exudate or posterior oropharyngeal erythema.   Eyes:      General: No scleral icterus.        Right eye: No discharge.         Left eye: No discharge.      Extraocular Movements: Extraocular movements intact.      Conjunctiva/sclera: Conjunctivae normal.   Cardiovascular:      Rate and Rhythm: Normal rate and regular rhythm.      Pulses: Normal pulses.      Heart sounds: Normal heart sounds. No murmur heard.  Pulmonary:      Effort: Pulmonary effort is normal. No respiratory distress.      Breath " sounds: Normal breath sounds. No wheezing or rales.   Abdominal:      General: Bowel sounds are normal. There is no distension.      Palpations: Abdomen is soft. There is no mass.      Tenderness: There is no abdominal tenderness. There is no right CVA tenderness, left CVA tenderness, guarding or rebound.      Hernia: No hernia is present.   Musculoskeletal:      Cervical back: Normal range of motion and neck supple. No rigidity or tenderness.   Lymphadenopathy:      Cervical: No cervical adenopathy.   Skin:     General: Skin is warm and dry.   Neurological:      General: No focal deficit present.      Mental Status: She is alert. Mental status is at baseline.   Psychiatric:         Mood and Affect: Mood normal.         Behavior: Behavior normal. Behavior is cooperative.       Diagnoses and all orders for this visit:    Hospital discharge follow-up    Pityriasis lichenoides et varioliformis acuta  -     Ambulatory referral/consult to Rheumatology; Future    Rheumatoid arthritis, involving unspecified site, unspecified whether rheumatoid factor present  -     Ambulatory referral/consult to Rheumatology; Future    Mild cognitive impairment    History of stroke    Chronic obstructive asthma    Asthma-COPD overlap syndrome    Class 1 obesity due to excess calories with serious comorbidity and body mass index (BMI) of 33.0 to 33.9 in adult    Gastroesophageal reflux disease without esophagitis    Osteopenia of multiple sites    History of right elbow replacement    Impaired functional mobility, balance, gait, and endurance    Immunocompromised state due to drug therapy    Aortic atherosclerosis    Recurrent major depressive disorder, in full remission    Situational anxiety    On corticosteroid therapy    Primary open angle glaucoma (POAG) of both eyes, mild stage    Rheumatoid arthritis involving multiple sites with positive rheumatoid factor        Medications reviewed in detail with patient.  All diagnoses reviewed  with patient in detail    Get hospital discharge summary and any radiology reports     Follow-up in 3 months       All lab results over past 2 years available reviewed inc labs and any cardiology or radiology studies  Any new prescription medications gone over in detail including reason for taking the medication, the general mechanism of action, most common possible side effects and possible costs, etcetera.  Mikey Tolbert MD    Est pt  Time spent in the evaluation and management of this patient exceeded 30min and greater than 50% of this time was in face-to-face time with the patient on day of the clinic visit.   This includes face-to-face time and not face-to-face time preparing to see the patient (eg, review of tests), obtaining and/or reviewing separately obtained history, documenting clinical information in the electronic or other health record, independently interpreting results and communicating results to the patient/family/caregiver, or care coordinator.   Total time including the following:  -preparing to see the patient (eg, obtaining and/or reviewing old records such as primary care notes, specialist notes, hospital notes, review of laboratory tests, radiographic and/or cardiology studies)  --placing orders and/or reviewing other physician's orders which can include medications, laboratory studies, radiographic studies, procedures, referrals etcetera   --communicating with the patient and/or family member/caregiver regarding a detailed explanation of the treatment/care plan  --documentation of the visit in the electronic health record  --communicating with referral staff, nursing staff about patient and need for arranging studies, follow-up, referras, etc.     A dictation device was used to produce this document. Use of such devices sometimes results in grammatical errors or replacement of words that sound similarly.

## 2023-03-06 NOTE — TELEPHONE ENCOUNTER
Called patient back in regards of her message on trying to schedule an appointment, I left a message in regards of her message.

## 2023-03-06 NOTE — TELEPHONE ENCOUNTER
----- Message from Tremontana Chevalier sent at 3/6/2023  9:29 AM CST -----  Regarding: appt  #Patient is calling again to schedule a f/u appt. She states she thought it was for March 7th, see that Dr. Wallace has no nilson for this week - pls cll pt @ [phone]

## 2023-03-07 RX ORDER — HYDROXYZINE HYDROCHLORIDE 25 MG/1
TABLET, FILM COATED ORAL
Qty: 90 TABLET | Refills: 0 | OUTPATIENT
Start: 2023-03-07

## 2023-03-17 ENCOUNTER — TELEPHONE (OUTPATIENT)
Dept: NEUROLOGY | Facility: CLINIC | Age: 78
End: 2023-03-17
Payer: MEDICARE

## 2023-03-17 NOTE — TELEPHONE ENCOUNTER
----- Message from Indira Gr RN sent at 3/13/2023  3:24 PM CDT -----  Hi friend,    Will you tell me if she had genetic testing and if so how do I tell?  Is [employer] entered in Media after results are pulled from Data base?    Thanks,    Indira

## 2023-03-20 ENCOUNTER — TELEPHONE (OUTPATIENT)
Dept: NEUROLOGY | Facility: CLINIC | Age: 78
End: 2023-03-20
Payer: MEDICARE

## 2023-03-20 NOTE — TELEPHONE ENCOUNTER
LVM offering pt appt with new memory specialist since Dr Wallace has left [place]. Pt has not yet received [employer] results

## 2023-03-21 ENCOUNTER — TELEPHONE (OUTPATIENT)
Dept: NEUROLOGY | Facility: CLINIC | Age: 78
End: 2023-03-21
Payer: MEDICARE

## 2023-03-21 NOTE — TELEPHONE ENCOUNTER
Addressed in separate encounter by Indira Shine who left voicemail offering appointment with Dr. Murphy to discuss [employer] results.

## 2023-03-21 NOTE — TELEPHONE ENCOUNTER
----- Message from Abby Chino sent at 3/21/2023 11:49 AM CDT -----  Regarding: results  Contact: Pt [phone]  Patient requesting a call back to discuss results of genetic test Please call to discuss

## 2023-03-21 NOTE — TELEPHONE ENCOUNTER
----- Message from Abby Chino sent at 3/21/2023 11:49 AM CDT -----  Regarding: results  Contact: Pt [phone]  Patient requesting a call back to discuss results of genetic test Please call to discuss

## 2023-03-22 ENCOUNTER — HOSPITAL ENCOUNTER (OUTPATIENT)
Dept: RADIOLOGY | Facility: OTHER | Age: 78
Discharge: HOME | End: 2023-03-22
Attending: NURSE PRACTITIONER
Payer: MEDICARE

## 2023-03-22 DIAGNOSIS — N28.1 COMPLEX RENAL CYST: ICD-10-CM

## 2023-03-22 PROCEDURE — 76770 US RETROPERITONEAL COMPLETE: ICD-10-PCS | Mod: 26,HCNC,, | Performed by: RADIOLOGY

## 2023-03-22 PROCEDURE — 76770 US EXAM ABDO BACK WALL COMP: CPT | Mod: TC,HCNC

## 2023-03-22 PROCEDURE — 76770 US EXAM ABDO BACK WALL COMP: CPT | Mod: 26,HCNC,, | Performed by: RADIOLOGY

## 2023-03-22 NOTE — TELEPHONE ENCOUNTER
I tried to call patient to try and set up virtual appointment with  so he can go ahead and talk to her about the gentic results that were done when she was established under . I went ahead and left a message for the patient will go ahead and try to give another call a little later.

## 2023-04-18 ENCOUNTER — OFFICE VISIT (OUTPATIENT)
Dept: UROLOGY | Facility: CLINIC | Age: 78
End: 2023-04-18
Payer: MEDICARE

## 2023-04-18 VITALS — DIASTOLIC BLOOD PRESSURE: 62 MMHG | SYSTOLIC BLOOD PRESSURE: 140 MMHG | HEART RATE: 89 BPM

## 2023-04-18 DIAGNOSIS — N28.1 COMPLEX RENAL CYST: Primary | ICD-10-CM

## 2023-04-18 PROCEDURE — 1101F PT FALLS ASSESS-DOCD LE1/YR: CPT | Mod: HCNC,CPTII,S$GLB, | Performed by: NURSE PRACTITIONER

## 2023-04-18 PROCEDURE — 1126F PR PAIN SEVERITY QUANTIFIED, NO PAIN PRESENT: ICD-10-PCS | Mod: HCNC,CPTII,S$GLB, | Performed by: NURSE PRACTITIONER

## 2023-04-18 PROCEDURE — 1159F PR MEDICATION LIST DOCUMENTED IN MEDICAL RECORD: ICD-10-PCS | Mod: HCNC,CPTII,S$GLB, | Performed by: NURSE PRACTITIONER

## 2023-04-18 PROCEDURE — 1126F AMNT PAIN NOTED NONE PRSNT: CPT | Mod: HCNC,CPTII,S$GLB, | Performed by: NURSE PRACTITIONER

## 2023-04-18 PROCEDURE — 1160F PR REVIEW ALL MEDS BY PRESCRIBER/CLIN PHARMACIST DOCUMENTED: ICD-10-PCS | Mod: HCNC,CPTII,S$GLB, | Performed by: NURSE PRACTITIONER

## 2023-04-18 PROCEDURE — 1159F MED LIST DOCD IN RCRD: CPT | Mod: HCNC,CPTII,S$GLB, | Performed by: NURSE PRACTITIONER

## 2023-04-18 PROCEDURE — 3078F DIAST BP <80 MM HG: CPT | Mod: HCNC,CPTII,S$GLB, | Performed by: NURSE PRACTITIONER

## 2023-04-18 PROCEDURE — 3078F PR MOST RECENT DIASTOLIC BLOOD PRESSURE < 80 MM HG: ICD-10-PCS | Mod: HCNC,CPTII,S$GLB, | Performed by: NURSE PRACTITIONER

## 2023-04-18 PROCEDURE — 99213 OFFICE O/P EST LOW 20 MIN: CPT | Mod: HCNC,S$GLB,, | Performed by: NURSE PRACTITIONER

## 2023-04-18 PROCEDURE — 3077F PR MOST RECENT SYSTOLIC BLOOD PRESSURE >= 140 MM HG: ICD-10-PCS | Mod: HCNC,CPTII,S$GLB, | Performed by: NURSE PRACTITIONER

## 2023-04-18 PROCEDURE — 99213 PR OFFICE/OUTPT VISIT, EST, LEVL III, 20-29 MIN: ICD-10-PCS | Mod: HCNC,S$GLB,, | Performed by: NURSE PRACTITIONER

## 2023-04-18 PROCEDURE — 3288F PR FALLS RISK ASSESSMENT DOCUMENTED: ICD-10-PCS | Mod: HCNC,CPTII,S$GLB, | Performed by: NURSE PRACTITIONER

## 2023-04-18 PROCEDURE — 3077F SYST BP >= 140 MM HG: CPT | Mod: HCNC,CPTII,S$GLB, | Performed by: NURSE PRACTITIONER

## 2023-04-18 PROCEDURE — 1160F RVW MEDS BY RX/DR IN RCRD: CPT | Mod: HCNC,CPTII,S$GLB, | Performed by: NURSE PRACTITIONER

## 2023-04-18 PROCEDURE — 1101F PR PT FALLS ASSESS DOC 0-1 FALLS W/OUT INJ PAST YR: ICD-10-PCS | Mod: HCNC,CPTII,S$GLB, | Performed by: NURSE PRACTITIONER

## 2023-04-18 PROCEDURE — 3288F FALL RISK ASSESSMENT DOCD: CPT | Mod: HCNC,CPTII,S$GLB, | Performed by: NURSE PRACTITIONER

## 2023-04-18 RX ORDER — LEVOCETIRIZINE DIHYDROCHLORIDE 5 MG/1
5 TABLET, FILM COATED ORAL NIGHTLY
COMMUNITY

## 2023-04-18 NOTE — TELEPHONE ENCOUNTER
----- Message from Marcelle Osorio sent at 4/18/2023  2:02 PM CDT -----  Regarding: Refill  Contact: Gertrudis @ [phone]  Gertrudis from [facility] pharmacy is requesting a refill for pt. Asking for a call back.     omeprazole (PRILOSEC) 20 MG capsule

## 2023-04-18 NOTE — PROGRESS NOTES
Subjective:      Francy Bain is a 77 y.o. female who returns today regarding her complex renal cyst.     The patient has a known minimally complex right renal cyst. Returns today with UJLISSA.    Denies bothersome urinary symptoms. She denies dysuria, frequency, gross hematuria, flank pain, N/V and fever/chills.     The following portions of the patient's history were reviewed and updated as appropriate: allergies, current medications, past family history, past medical history, past social history, past surgical history and problem list.    Review of Systems  Constitutional: no fever or chills  ENT: no nasal congestion or sore throat  Respiratory: no cough or shortness of breath  Cardiovascular: no chest pain or palpitations  Gastrointestinal: no nausea or vomiting, tolerating diet  Genitourinary: as per HPI  Hematologic/Lymphatic: no easy bruising or lymphadenopathy  Musculoskeletal: no arthralgias or myalgias  Neurological: no seizures or tremors  Behavioral/Psych: no auditory or visual hallucinations     Objective:   Vital Signs:  Vitals:    04/18/23 1022   BP: (!) 140/62   Pulse: 89     Physical Exam   General: alert and oriented, no acute distress  Head: normocephalic, atraumatic  Neck: supple, normal ROM  Respiratory: Symmetric expansion, non-labored breathing  Cardiovascular: regular rate and rhythm  Abdomen: soft, non tender, non distended  Pelvic: deferred  Skin: normal coloration and turgor, no rashes, no suspicious skin lesions noted  Neuro: alert and oriented x3, no gross deficits  Psych: normal judgment and insight, normal mood/affect, and non-anxious    Lab Review   Urinalysis demonstrates negative for all components  Lab Results   Component Value Date    WBC 7.87 02/15/2023    HGB 14.9 02/15/2023    HCT 48.1 02/15/2023    MCV 91 02/15/2023     02/15/2023     Lab Results   Component Value Date    CREATININE 0.8 02/15/2023    BUN 9 02/15/2023       Imaging   (all images personally  "reviewed; agree with report below)  JULISSA- "FINDINGS:  Right kidney: The right kidney measures 8.1 cm. No cortical thinning. No loss of corticomedullary distinction. Resistive index measures 0.76.  Right upper pole cyst with a thin internal septation measures 2.2 cm (previously 2.1 cm).  Stone in the mid kidney measures 0.3 cm.  No hydronephrosis.  Left kidney: The left kidney measures 8.4 cm. No cortical thinning. No loss of corticomedullary distinction. Resistive index measures 0.71.  Cyst in the upper pole measures 0.7 cm.  No renal stone. No hydronephrosis.  The bladder is partially distended at the time of scanning and has an unremarkable appearance.  Impression:  No acute process seen.  Stable thinly septated right renal cyst which can be followed with ultrasound.  Right nephrolithiasis."  Assessment:     1. Complex renal cyst      Plan:   Diagnoses and all orders for this visit:    Complex renal cyst  -     POCT URINE DIPSTICK WITHOUT MICROSCOPE  -     US Retroperitoneal Complete; Future    Plan:  --Stable complex renal cyst  --Possible punctate R renal stone  --Recommend general stone preventive measures, to include increased hydration, low Na diet, and increased citrus intake  --Follow up annually with JULISSA for monitoring    "

## 2023-04-19 NOTE — TELEPHONE ENCOUNTER
No new care gaps identified.  [facility] Embedded Care Gaps. Reference number: [id number]. 4/19/2023   11:37:34 AM ANKURT

## 2023-04-19 NOTE — TELEPHONE ENCOUNTER
----- Message from Rui Barrientos sent at 4/19/2023 11:18 AM CDT -----  Contact: [phone]  Requesting an RX refill or new RX.  Is this a refill or new RX: refill  RX name and strength (copy/paste from chart):  losartan (COZAAR) 100 MG tablet  Is this a 30 day or 90 day RX: 90  Pharmacy name and phone # (copy/paste from chart):      [facility] - [place], [place] - [address]  [address] [address]  Phone: [phone] Fax: [phone]      The doctors have asked that we provide their patients with the following 2 reminders -- prescription refills can take up to 72 hours, and a friendly reminder that in the future you can use your [facility] account to request refills: yes

## 2023-04-20 RX ORDER — OMEPRAZOLE 20 MG/1
20 CAPSULE, DELAYED RELEASE ORAL DAILY
Qty: 90 CAPSULE | Refills: 3 | Status: SHIPPED | OUTPATIENT
Start: 2023-04-20 | End: 2024-01-09 | Stop reason: SDUPTHER

## 2023-05-02 ENCOUNTER — TELEPHONE (OUTPATIENT)
Dept: PRIMARY CARE CLINIC | Facility: CLINIC | Age: 78
End: 2023-05-02
Payer: MEDICARE

## 2023-05-02 NOTE — TELEPHONE ENCOUNTER
----- Message from Amelia Linares sent at 5/2/2023  1:55 PM CDT -----  Contact: Francy [phone]  Patient needs call back. She states the increase Rx for losartan (COZAAR) 100 MG tablet upsets her stomach and would like to go back to the 50mg. Please call to advis

## 2023-05-04 DIAGNOSIS — I10 PRIMARY HYPERTENSION: ICD-10-CM

## 2023-05-04 DIAGNOSIS — M05.79 RHEUMATOID ARTHRITIS INVOLVING MULTIPLE SITES WITH POSITIVE RHEUMATOID FACTOR: Primary | ICD-10-CM

## 2023-05-04 RX ORDER — LOSARTAN POTASSIUM 100 MG/1
100 TABLET ORAL DAILY
Qty: 90 TABLET | Refills: 3 | Status: CANCELLED | OUTPATIENT
Start: 2023-05-04 | End: 2024-05-03

## 2023-05-04 NOTE — TELEPHONE ENCOUNTER
----- Message from Yee Riosneena sent at 5/4/2023  3:52 PM CDT -----  Contact: Patient @ [phone]  Requesting an RX refill or new RX.  Is this a refill or new RX:   RX name and strength VITAMIN D2 1,250 mcg (50,000 unit) capsule  Is this a 30 day or 90 day RX: 90  Pharmacy name and phone #[facility] [address]  The doctors have asked that we provide their patients with the following 2 reminders -- prescription refills can take up to 72 hours, and a friendly reminder that in the future you can use your [facility] account to request refills: yes    Requesting an RX refill or new RX.  Is this a refill or new RX:   RX name and strength losartan (COZAAR) 100 MG tablet  Is this a 30 day or 90 day RX: 90  Pharmacy name and phone #[facility] - [address]  The doctors have asked that we provide their patients with the following 2 reminders -- prescription refills can take up to 72 hours, and a friendly reminder that in the future you can use your [facility] account to request refills: yes    Comment: Patent would like to go back to 50 mg

## 2023-05-04 NOTE — TELEPHONE ENCOUNTER
No care due was identified.  [facility] Embedded Care Due Messages. Reference number: [id number].   5/04/2023 4:11:46 PM CDT

## 2023-05-05 RX ORDER — ERGOCALCIFEROL 1.25 MG/1
50000 CAPSULE ORAL
Qty: 12 CAPSULE | Refills: 3 | Status: SHIPPED | OUTPATIENT
Start: 2023-05-05 | End: 2024-01-09 | Stop reason: SDUPTHER

## 2023-05-05 RX ORDER — LOSARTAN POTASSIUM 50 MG/1
50 TABLET ORAL DAILY
Qty: 90 TABLET | Refills: 1 | Status: SHIPPED | OUTPATIENT
Start: 2023-05-05 | End: 2023-05-23 | Stop reason: SDUPTHER

## 2023-05-05 NOTE — TELEPHONE ENCOUNTER
Spoke with the patient, she advised that when she takes the Losartan 100 mg she becomes nauseated , weak, gets stomach cramps, and has cold sweats. She advised that she had some old Losartan 50 mg tablets that she started taking 1 in the AM & 1 in the PM with no issue. When she ran out, she began splitting the 100 mg in half to take 1/2 in the AM & 1/2 in the PM, but the pt reports having the same reaction. Rx pended as Losartan 50 mg.   She is completely out on the 50 mg and can not tolerate the 100 mg.     LOV 03/02/23

## 2023-05-05 NOTE — TELEPHONE ENCOUNTER
----- Message from Vivian Garcia sent at 5/5/2023  2:42 PM CDT -----  Contact: [phone]  Requesting an RX refill or new RX.  Is this a refill or new RX: refill  RX name and strength   VITAMIN D2 1,250 mcg (50,000 unit) capsule  Is this a 30 day or 90 day RX:   Pharmacy name and phone #   [facility] - [address]   Phone:  [phone]  Fax:  [phone]  The doctors have asked that we provide their patients with the following 2 reminders -- prescription refills can take up to 72 hours, and a friendly reminder that in the future you can use your [facility] account to request refills: yes    Requesting an RX refill or new RX.  Is this a refill or new RX: refill  RX name and strength   losartan (COZAAR) 50 MG tablet 90 tablet   Is this a 30 day or 90 day RX:   Pharmacy name and phone #   [facility] - [address]   Phone:  [phone]  Fax:  [phone]    Patient needs call back. She states the increase Rx for losartan (COZAAR) 100 MG tablet upsets her stomach and would like to go back to the 50mg. Please call to advise. Patient would like a call back and she is out of her medication and it has been over a week without her medication and it is coming to the weekend.

## 2023-05-15 ENCOUNTER — TELEPHONE (OUTPATIENT)
Dept: ADMINISTRATIVE | Facility: CLINIC | Age: 78
End: 2023-05-15
Payer: MEDICARE

## 2023-05-22 ENCOUNTER — CLINICAL SUPPORT (OUTPATIENT)
Dept: PRIMARY CARE CLINIC | Facility: CLINIC | Age: 78
End: 2023-05-22
Payer: MEDICARE

## 2023-05-22 ENCOUNTER — TELEPHONE (OUTPATIENT)
Dept: NEUROLOGY | Facility: CLINIC | Age: 78
End: 2023-05-22
Payer: MEDICARE

## 2023-05-22 ENCOUNTER — PES CALL (OUTPATIENT)
Dept: ADMINISTRATIVE | Facility: CLINIC | Age: 78
End: 2023-05-22
Payer: MEDICARE

## 2023-05-22 VITALS — DIASTOLIC BLOOD PRESSURE: 70 MMHG | OXYGEN SATURATION: 97 % | SYSTOLIC BLOOD PRESSURE: 116 MMHG | HEART RATE: 83 BPM

## 2023-05-22 DIAGNOSIS — I10 PRIMARY HYPERTENSION: ICD-10-CM

## 2023-05-22 PROCEDURE — 99999 PR PBB SHADOW E&M-EST. PATIENT-LVL III: ICD-10-PCS | Mod: PBBFAC,HCNC,,

## 2023-05-22 PROCEDURE — 99999 PR PBB SHADOW E&M-EST. PATIENT-LVL III: CPT | Mod: PBBFAC,HCNC,,

## 2023-05-22 NOTE — TELEPHONE ENCOUNTER
----- Message from Osman Goetz sent at 5/22/2023  1:45 PM CDT -----  Regarding: adv  Contact: @[phone]  Pt calling in regards to see why appt was canceled on 6/7... states she has been trying to get that appt to discuss results was a former pt of dr martinez ... pls call and adv@[phone] ... would like anyone that can just give her those results

## 2023-05-23 DIAGNOSIS — I10 PRIMARY HYPERTENSION: ICD-10-CM

## 2023-05-23 RX ORDER — LOSARTAN POTASSIUM 50 MG/1
50 TABLET ORAL 2 TIMES DAILY
Qty: 180 TABLET | Refills: 0 | Status: CANCELLED | OUTPATIENT
Start: 2023-05-23 | End: 2023-08-21

## 2023-05-23 RX ORDER — LOSARTAN POTASSIUM 50 MG/1
50 TABLET ORAL 2 TIMES DAILY
Qty: 180 TABLET | Refills: 3 | Status: SHIPPED | OUTPATIENT
Start: 2023-05-23 | End: 2024-01-09 | Stop reason: SDUPTHER

## 2023-05-23 NOTE — PROGRESS NOTES
Patient present in clinic today for a Nurse BP Check.   Medications & Allergy List reviewed.  Medication list reviewed. Pt advise that the Losartan 50 mg was sent incorrectly as daily, but she is taking twice daily for a total of 100 mg daily. She reports the 100 mg taken whole or broken in half makes her nauseated & vomit. Corrected Rx pended.     BP Right Arm - 116/70  Pulse - 83  SpO2 - 97 %    Encounter being routed to Dr. Tolbert for review.  Advised the patient that they will be contacted regarding any medication changes or if further appointments are required.

## 2023-05-25 ENCOUNTER — TELEPHONE (OUTPATIENT)
Dept: NEUROLOGY | Facility: CLINIC | Age: 78
End: 2023-05-25
Payer: MEDICARE

## 2023-05-25 NOTE — TELEPHONE ENCOUNTER
----- Message from Genevieve Waite sent at 5/25/2023  3:00 PM CDT -----  Regarding: Results  Contact: pt [phone]  Pt called to schedule appt to see doctor to talk about test results. Pls call

## 2023-05-25 NOTE — TELEPHONE ENCOUNTER
----- Message from Simon Martinez sent at 5/25/2023  4:52 PM CDT -----  Contact: [phone]  JAYDEN COBOS calling regarding Appointment Access  (message) for #Pt would like to know if her appt for tomorrow can be a virtual visit due to doctor not wanting her drive asking for a call back soon as possible.

## 2023-05-26 ENCOUNTER — TELEPHONE (OUTPATIENT)
Dept: NEUROLOGY | Facility: CLINIC | Age: 78
End: 2023-05-26
Payer: MEDICARE

## 2023-05-26 ENCOUNTER — OFFICE VISIT (OUTPATIENT)
Dept: NEUROLOGY | Facility: CLINIC | Age: 78
End: 2023-05-26
Payer: MEDICARE

## 2023-05-26 VITALS
DIASTOLIC BLOOD PRESSURE: 77 MMHG | WEIGHT: 147 LBS | BODY MASS INDEX: 33.07 KG/M2 | SYSTOLIC BLOOD PRESSURE: 130 MMHG | HEART RATE: 93 BPM | HEIGHT: 56 IN

## 2023-05-26 DIAGNOSIS — R90.82 WHITE MATTER DISEASE: ICD-10-CM

## 2023-05-26 DIAGNOSIS — R29.818 SUSPECTED SLEEP APNEA: ICD-10-CM

## 2023-05-26 DIAGNOSIS — R41.89 COGNITIVE IMPAIRMENT: Primary | ICD-10-CM

## 2023-05-26 DIAGNOSIS — E53.8 B12 DEFICIENCY: ICD-10-CM

## 2023-05-26 PROCEDURE — 99999 PR PBB SHADOW E&M-EST. PATIENT-LVL III: CPT | Mod: PBBFAC,HCNC,, | Performed by: STUDENT IN AN ORGANIZED HEALTH CARE EDUCATION/TRAINING PROGRAM

## 2023-05-26 PROCEDURE — 3075F SYST BP GE 130 - 139MM HG: CPT | Mod: HCNC,CPTII,S$GLB, | Performed by: STUDENT IN AN ORGANIZED HEALTH CARE EDUCATION/TRAINING PROGRAM

## 2023-05-26 PROCEDURE — 99215 OFFICE O/P EST HI 40 MIN: CPT | Mod: HCNC,S$GLB,, | Performed by: STUDENT IN AN ORGANIZED HEALTH CARE EDUCATION/TRAINING PROGRAM

## 2023-05-26 PROCEDURE — 3078F DIAST BP <80 MM HG: CPT | Mod: HCNC,CPTII,S$GLB, | Performed by: STUDENT IN AN ORGANIZED HEALTH CARE EDUCATION/TRAINING PROGRAM

## 2023-05-26 PROCEDURE — 1159F PR MEDICATION LIST DOCUMENTED IN MEDICAL RECORD: ICD-10-PCS | Mod: HCNC,CPTII,S$GLB, | Performed by: STUDENT IN AN ORGANIZED HEALTH CARE EDUCATION/TRAINING PROGRAM

## 2023-05-26 PROCEDURE — 3288F FALL RISK ASSESSMENT DOCD: CPT | Mod: HCNC,CPTII,S$GLB, | Performed by: STUDENT IN AN ORGANIZED HEALTH CARE EDUCATION/TRAINING PROGRAM

## 2023-05-26 PROCEDURE — 3078F PR MOST RECENT DIASTOLIC BLOOD PRESSURE < 80 MM HG: ICD-10-PCS | Mod: HCNC,CPTII,S$GLB, | Performed by: STUDENT IN AN ORGANIZED HEALTH CARE EDUCATION/TRAINING PROGRAM

## 2023-05-26 PROCEDURE — 3288F PR FALLS RISK ASSESSMENT DOCUMENTED: ICD-10-PCS | Mod: HCNC,CPTII,S$GLB, | Performed by: STUDENT IN AN ORGANIZED HEALTH CARE EDUCATION/TRAINING PROGRAM

## 2023-05-26 PROCEDURE — 3075F PR MOST RECENT SYSTOLIC BLOOD PRESS GE 130-139MM HG: ICD-10-PCS | Mod: HCNC,CPTII,S$GLB, | Performed by: STUDENT IN AN ORGANIZED HEALTH CARE EDUCATION/TRAINING PROGRAM

## 2023-05-26 PROCEDURE — 1101F PT FALLS ASSESS-DOCD LE1/YR: CPT | Mod: HCNC,CPTII,S$GLB, | Performed by: STUDENT IN AN ORGANIZED HEALTH CARE EDUCATION/TRAINING PROGRAM

## 2023-05-26 PROCEDURE — 1159F MED LIST DOCD IN RCRD: CPT | Mod: HCNC,CPTII,S$GLB, | Performed by: STUDENT IN AN ORGANIZED HEALTH CARE EDUCATION/TRAINING PROGRAM

## 2023-05-26 PROCEDURE — 99215 PR OFFICE/OUTPT VISIT, EST, LEVL V, 40-54 MIN: ICD-10-PCS | Mod: HCNC,S$GLB,, | Performed by: STUDENT IN AN ORGANIZED HEALTH CARE EDUCATION/TRAINING PROGRAM

## 2023-05-26 PROCEDURE — 1101F PR PT FALLS ASSESS DOC 0-1 FALLS W/OUT INJ PAST YR: ICD-10-PCS | Mod: HCNC,CPTII,S$GLB, | Performed by: STUDENT IN AN ORGANIZED HEALTH CARE EDUCATION/TRAINING PROGRAM

## 2023-05-26 PROCEDURE — 99999 PR PBB SHADOW E&M-EST. PATIENT-LVL III: ICD-10-PCS | Mod: PBBFAC,HCNC,, | Performed by: STUDENT IN AN ORGANIZED HEALTH CARE EDUCATION/TRAINING PROGRAM

## 2023-05-26 PROCEDURE — 1126F AMNT PAIN NOTED NONE PRSNT: CPT | Mod: HCNC,CPTII,S$GLB, | Performed by: STUDENT IN AN ORGANIZED HEALTH CARE EDUCATION/TRAINING PROGRAM

## 2023-05-26 PROCEDURE — 1126F PR PAIN SEVERITY QUANTIFIED, NO PAIN PRESENT: ICD-10-PCS | Mod: HCNC,CPTII,S$GLB, | Performed by: STUDENT IN AN ORGANIZED HEALTH CARE EDUCATION/TRAINING PROGRAM

## 2023-05-26 NOTE — TELEPHONE ENCOUNTER
Spoke to pt in clinic after appointment with Dr. Bui, she states she does not want to do any further testing in regards to cognitive impairment after getting her results. If she needs anything further or decides she wants to do more testing, she will inform office.

## 2023-05-26 NOTE — TELEPHONE ENCOUNTER
----- Message from Orly Lundy sent at 5/26/2023  8:34 AM CDT -----  Regarding: Pt Advice / Missed Call  Contact:  [phone]  Pt returning missed call from the provider clinic. Please Call

## 2023-05-26 NOTE — PROGRESS NOTES
Name: Francy Bain  MRN: [id number]   CSN: [id number]      Date: 05/26/2023      Chief Complaint: Memory/Genetic Results    Prior History Per Dr. Wallace:   Ms. Bain is a 77 y.o. female with a history of MCI, severe rheumatoid arthritis, COPD, HTN, osteopenia who presents to the [facility] due to memory deficits. Ms. Bain has experienced persistent trouble with memory since late 2015. There has been little to no progression since she was last seen in clinic in September 2017. MMSE score is 26/30 with points lost on attention (-2), delayed recall (-1), and writing (-1).  MRI brain performed 11/1/22 notable for severe white matter disease with minimal volume loss and sparing of MTL. Will get [facility] testing to assess further.     History of Present Illness (HPI):  Ms. Bain presents as a new patient to me to receive genetic testing results and discuss her MRI findings. She was last seen by Dr. Wallace and was in the process of being scheduled for a virtual visit with Dr. Murphy. She has not noticed any major progression in her cognitive decline. It was recommended to her that she stop any anticholinergic medications such as Hydroxyzine and Benedryl which she was using for sleep. She is now on Belsomra and is sleeping well. She was referred to sleep clinic given concern for LINA. She has not followed through with this referral and asked that a new referral be placed. As for her genetic testing, based on notes it looks that CADASIL was being ruled out and her results were negative.She has been tried on Namenda in the past but has not taken Donepezil. She'd like to continue to hold off on this for now.     Current Mvmt Medications:    Prior Mvmt Medication Trials:  Namenda 5mg    Past Medical History: The patient  has a past medical history of Affective disorder, Hypertension, Renal cyst, acquired, right (4/14/2021), and Rheumatoid arthritis.    Relevant Surgical History:   Past Surgical  History:   Procedure Laterality Date    ANKLE FUSION Bilateral     ELBOW ARTHROPLASTY      EYE SURGERY Right 03/2017    cataract    JOINT REPLACEMENT      bilateral knee replacement; bilateral elbow and wrist replacement    KNEE ARTHROPLASTY      OPEN REDUCTION AND INTERNAL FIXATION (ORIF) OF FRACTURE OF ULNA Right 8/17/2021    Procedure: ORIF, FRACTURE, ULNA  AND MASSIVE ALLOGRAFT;  Surgeon: Tio Patel MD;  Location: [address];  Service: Orthopedics;  Laterality: Right;    REVISION OF TOTAL ARTHROPLASTY OF ELBOW Right 8/17/2021    Procedure: REVISION, TOTAL ARTHROPLASTY, ELBOW;  Surgeon: Tio Patel MD;  Location: [address];  Service: Orthopedics;  Laterality: Right;       Social History: The patient  reports that she has never smoked. She has never used smokeless tobacco. She reports that she does not drink alcohol and does not use drugs.    Family History: Their family history includes Arthritis in her maternal grandmother and mother; Heart disease in her sister; Hyperlipidemia in her sister; Hypertension in her mother; Stroke in her mother.    Allergies: Patient has no known allergies.     Meds:   Current Outpatient Medications on File Prior to Visit   Medication Sig Dispense Refill    albuterol (PROVENTIL) 2.5 mg /3 mL (0.083 %) nebulizer solution Take 3 mLs (2.5 mg total) by nebulization every 6 (six) hours as needed for Wheezing. Rescue (Patient not taking: Reported on 5/22/2023) 90 mL 3    ascorbic acid, vitamin C, (VITAMIN C) 100 MG tablet Take 100 mg by mouth once daily.      carboxymethylcellulose sodium (REFRESH TEARS OPHT) Apply to eye. Use as needed      desloratadine (CLARINEX) 5 mg tablet Take 5 mg by mouth once daily.      fluticasone furoate-vilanteroL (BREO ELLIPTA) 100-25 mcg/dose diskus inhaler Inhale 1 puff into the lungs once daily. Controller 60 each 11    furosemide (LASIX) 20 MG tablet Take 1 tablet (20 mg total) by mouth once daily. 90 tablet 3    hydrocortisone  "2.5 % cream Do not apply morning of surgery      ketoconazole (NIZORAL) 2 % cream Apply topically once daily. 60 g 5    ketoconazole (NIZORAL) 2 % shampoo Shampoo, leave on 5 min then wash off and may use reg soap or shampoo. Use daily x 1 wk then 2x wkly as needed 120 mL 11    KEVZARA 200 mg/1.14 mL PnIj Inject 200 mg into the skin every 14 (fourteen) days. Per -marii 1 week before surgery. Gets from [facility]      levocetirizine (XYZAL) 5 MG tablet Take 5 mg by mouth every evening.      LORazepam (ATIVAN) 1 MG tablet TAKE ONE TABLET BY MOUTH EVERY DAY AT BEDTIME AS NEEDED FOR ANXIETY OR sleep      losartan (COZAAR) 50 MG tablet Take 1 tablet (50 mg total) by mouth 2 (two) times a day. 180 tablet 3    omeprazole (PRILOSEC) 20 MG capsule Take 1 capsule (20 mg total) by mouth once daily. 90 capsule 3    predniSONE (DELTASONE) 2.5 MG tablet take 1 tablet daily with food for arthritis stiffness and inflammation      traMADoL (ULTRAM) 50 mg tablet Take 50 mg by mouth every 6 (six) hours as needed. Take as needed - Cold sweats      triamcinolone acetonide 0.1% (KENALOG) 0.1 % cream Apply twice daily to itchy skin up to 2 weeks/month as needed.      VITAMIN D2 1,250 mcg (50,000 unit) capsule Take 1 capsule (50,000 Units total) by mouth every 7 days. 12 capsule 3     No current facility-administered medications on file prior to visit.       Exam:  /77   Pulse 93   Ht 4' 8" (1.422 m)   Wt 66.7 kg (147 lb)   BMI 32.96 kg/m²     Constitutional  Well-developed, well-nourished, appears stated age   Cardiovascular  No LE edema bilaterally   Neurological    * Mental status  MOCA = not done during today's visit 26/30 at last visit      - Orientation  Oriented to conversation     - Memory   Intact recent and remote     - Attention/concentration  Attentive, vigilant during exam     - Language  Intact to conversation.     - Fund of knowledge  Aware of current events     - Executive  Well-organized thoughts     - Other  "    * Cranial nerves       - CN II  Pupils equal, visual fields full to confrontation     - CN III, IV, VI  Extraocular movements full, normal pursuits and saccades         - CN VII  Face strong and symmetric bilaterally             - CN XI  SCM and trapezius 5/5 bilaterally       * Motor  Muscle bulk normal, strength 5/5 throughout - she has severe arthritic changes in her hands bilaterally        * Coordination  No dysmetria with finger-to-nose    * Gait  See below.   * Deep tendon reflexes  2+ and symmetric throughout   * Specialized movement exam Gen: normal  Speech: normal  Tremor: none  Bradykinesia: limited due to arthritic changes  Tone: paratonia     Medical Record Review:  Labs, imaging and prior notes reviewed independently.     [facility] Genetic Testing for Hereditary Cerebral Small Vessel Disease - NEGATIVE  B12 417    Diagnoses:          1. Cognitive impairment        2. Suspected sleep apnea  Ambulatory referral/consult to Sleep Disorders      3. White matter disease        4. B12 deficiency            Assessment:      Plan:  - For cognitive decline, likely multifactorial - she has some red flags for LINA for which I have placed a sleep clinic referral. Suggested she avoid anti-cholinergic medications. Suggest she work on improving her mental health by adding exercise or hobbies to her day to day.   - We briefly went over her genetic testing which was negative for CADASIL. She does not take a baby aspirin due to it upsetting her stomach. She has not tried coated baby aspirin.   - Her vitamin B12 is <450, suggested she begin supplementation with 1000mcg daily.    She would prefer not to follow-up unless worsening of her cognition. She also spoke with Dr. Murphy's MA and she elected to have no further workup at this time for her memory decline.     Total time: 65 minutes spent on the encounter, which includes face to face time and non-face to face time preparing to see the patient (eg, review of tests),  Obtaining and/or reviewing separately obtained history, Documenting clinical information in the electronic or other health record, Independently interpreting results (not separately reported) and communicating results to the patient/family/caregiver, or Care coordination (not separately reported).     Lilia Bui MD  Division of Movement and Memory Disorders  [facility]  [phone]

## 2023-06-13 ENCOUNTER — OFFICE VISIT (OUTPATIENT)
Dept: RHEUMATOLOGY | Facility: CLINIC | Age: 78
End: 2023-06-13
Payer: MEDICARE

## 2023-06-13 VITALS
HEIGHT: 59 IN | SYSTOLIC BLOOD PRESSURE: 149 MMHG | WEIGHT: 148.38 LBS | BODY MASS INDEX: 29.91 KG/M2 | DIASTOLIC BLOOD PRESSURE: 76 MMHG | HEART RATE: 73 BPM

## 2023-06-13 DIAGNOSIS — Z79.52 ON CORTICOSTEROID THERAPY: ICD-10-CM

## 2023-06-13 DIAGNOSIS — Z79.899 IMMUNOSUPPRESSION DUE TO DRUG THERAPY: ICD-10-CM

## 2023-06-13 DIAGNOSIS — M05.9 RHEUMATOID ARTHRITIS WITH POSITIVE RHEUMATOID FACTOR, INVOLVING UNSPECIFIED SITE: ICD-10-CM

## 2023-06-13 DIAGNOSIS — D84.821 IMMUNOSUPPRESSION DUE TO DRUG THERAPY: ICD-10-CM

## 2023-06-13 PROCEDURE — 99999 PR PBB SHADOW E&M-EST. PATIENT-LVL V: ICD-10-PCS | Mod: PBBFAC,GC,, | Performed by: INTERNAL MEDICINE

## 2023-06-13 PROCEDURE — 1101F PT FALLS ASSESS-DOCD LE1/YR: CPT | Mod: CPTII,GC,S$GLB, | Performed by: INTERNAL MEDICINE

## 2023-06-13 PROCEDURE — 3288F PR FALLS RISK ASSESSMENT DOCUMENTED: ICD-10-PCS | Mod: CPTII,GC,S$GLB, | Performed by: INTERNAL MEDICINE

## 2023-06-13 PROCEDURE — 1160F RVW MEDS BY RX/DR IN RCRD: CPT | Mod: CPTII,GC,S$GLB, | Performed by: INTERNAL MEDICINE

## 2023-06-13 PROCEDURE — 1101F PR PT FALLS ASSESS DOC 0-1 FALLS W/OUT INJ PAST YR: ICD-10-PCS | Mod: CPTII,GC,S$GLB, | Performed by: INTERNAL MEDICINE

## 2023-06-13 PROCEDURE — 3077F SYST BP >= 140 MM HG: CPT | Mod: CPTII,GC,S$GLB, | Performed by: INTERNAL MEDICINE

## 2023-06-13 PROCEDURE — 3288F FALL RISK ASSESSMENT DOCD: CPT | Mod: CPTII,GC,S$GLB, | Performed by: INTERNAL MEDICINE

## 2023-06-13 PROCEDURE — 3078F PR MOST RECENT DIASTOLIC BLOOD PRESSURE < 80 MM HG: ICD-10-PCS | Mod: CPTII,GC,S$GLB, | Performed by: INTERNAL MEDICINE

## 2023-06-13 PROCEDURE — 99204 PR OFFICE/OUTPT VISIT, NEW, LEVL IV, 45-59 MIN: ICD-10-PCS | Mod: GC,S$GLB,, | Performed by: INTERNAL MEDICINE

## 2023-06-13 PROCEDURE — 1159F PR MEDICATION LIST DOCUMENTED IN MEDICAL RECORD: ICD-10-PCS | Mod: CPTII,GC,S$GLB, | Performed by: INTERNAL MEDICINE

## 2023-06-13 PROCEDURE — 99999 PR PBB SHADOW E&M-EST. PATIENT-LVL V: CPT | Mod: PBBFAC,GC,, | Performed by: INTERNAL MEDICINE

## 2023-06-13 PROCEDURE — 1160F PR REVIEW ALL MEDS BY PRESCRIBER/CLIN PHARMACIST DOCUMENTED: ICD-10-PCS | Mod: CPTII,GC,S$GLB, | Performed by: INTERNAL MEDICINE

## 2023-06-13 PROCEDURE — 1125F AMNT PAIN NOTED PAIN PRSNT: CPT | Mod: CPTII,GC,S$GLB, | Performed by: INTERNAL MEDICINE

## 2023-06-13 PROCEDURE — 1159F MED LIST DOCD IN RCRD: CPT | Mod: CPTII,GC,S$GLB, | Performed by: INTERNAL MEDICINE

## 2023-06-13 PROCEDURE — 99204 OFFICE O/P NEW MOD 45 MIN: CPT | Mod: GC,S$GLB,, | Performed by: INTERNAL MEDICINE

## 2023-06-13 PROCEDURE — 3078F DIAST BP <80 MM HG: CPT | Mod: CPTII,GC,S$GLB, | Performed by: INTERNAL MEDICINE

## 2023-06-13 PROCEDURE — 1125F PR PAIN SEVERITY QUANTIFIED, PAIN PRESENT: ICD-10-PCS | Mod: CPTII,GC,S$GLB, | Performed by: INTERNAL MEDICINE

## 2023-06-13 PROCEDURE — 3077F PR MOST RECENT SYSTOLIC BLOOD PRESSURE >= 140 MM HG: ICD-10-PCS | Mod: CPTII,GC,S$GLB, | Performed by: INTERNAL MEDICINE

## 2023-06-13 ASSESSMENT — ROUTINE ASSESSMENT OF PATIENT INDEX DATA (RAPID3)
TOTAL RAPID3 SCORE: 4.78
PATIENT GLOBAL ASSESSMENT SCORE: 5.5
MDHAQ FUNCTION SCORE: 1
FATIGUE SCORE: 5
WHEN YOU AWAKENED IN THE MORNING OVER THE LAST WEEK, PLEASE INDICATE THE AMOUNT OF TIME IT TAKES UNTIL YOU ARE AS LIMBER AS YOU WILL BE FOR THE DAY: 10 MIN
AM STIFFNESS SCORE: 1, YES
PSYCHOLOGICAL DISTRESS SCORE: 3.3
PAIN SCORE: 5.5

## 2023-06-13 NOTE — PROGRESS NOTES
Subjective:      Patient ID: Francy Bain is a 78 y.o. female.    Chief Complaint: RA    Initial Presentation  78 year old female with osteopenia, COPD/Asthma overlap, GERD, RA coming in for follow up. Previously seen by Dr. Watson.     Was being treated for RA and diagnosed 1977. Upper extremities were in pain. Initially treated with gold shots, was on another medication after that and then was not treated for about 12 years.    Currently on prednisone 2.5, Kevzara and has been on it for about a year and a half.    Has pain currently in her elbows which is off and on. Had surgery on her wrist in Feb so stopped Kevzara in Feb and March. Kevzara restarted in April which improved. Has been having elbow pain for the past month    Overall feels like RA is under control        Rheumatology ROS  (-) Fevers (-) weight loss (-) Fatigue (+) morning stiffness- 10 minutes  (-) Headaches (-)  vision changes or loss of vision (-) jaw claudication (-) hx of eye inflammation (-)  photophobia, (-) dry eyes (-) dry mouth (-) Rash, (-) photosensitivity, (-) alopecia, (-) mucosal ulcers, (-) Raynaud's  phenomenon, (-) SQ nodules, (-) sense of skin tightening in hands, face or  torso, (-)  Hx pleurisy, (-) pleuritic chest pain (-) lung fibrosis, (-) hemoptysis, (-) DVT or PE (-) Chest pains, (-) Hx pericarditis (-) Abdominal pain, (-) nausea, (-) vomiting, (-) diarrhea, (-) constipation, (-) melena,  (-) bloody diarrhea/UC/Crohns(-) dysphagia (-) GERD/Reflux (-) Hematuria, proteinuria, (-) renal failure (-) Focal weakness,(-) trouble combing hair or getting out of chairs  (-) History of Low WBC, low platelets, anemia (-)pregnancy losses/pre term deliveries/pregnancy complications (-) genital ulcers (-) numbness tingling    History  Social:no alcohol, drug use, smoking   Family: Mother- unknown arthritis  Surgical: R elbow replacement, L wrist surgery , bilateral knee replacement   Immunosuppressants: Humira,  "Joecarlos, ?methotrexate        Objective:   BP (!) 149/76   Pulse 73   Ht 4' 11" (1.499 m)   Wt 67.3 kg (148 lb 5.9 oz)   BMI 29.97 kg/m²   Physical Exam   Constitutional: She is oriented to person, place, and time. She appears well-developed and well-nourished. No distress.   HENT:   Head: Normocephalic and atraumatic.   Eyes: Conjunctivae are normal. No scleral icterus.   Cardiovascular: Normal rate and normal heart sounds.   Pulmonary/Chest: Effort normal and breath sounds normal.   Abdominal: Soft. Bowel sounds are normal. There is no abdominal tenderness.   Musculoskeletal:         General: Deformity (ulnar deviation of bilateral hands, deformities of fingers of right and left hands, elbow deformities bilaterally) present. Normal range of motion.   Lymphadenopathy:     She has no cervical adenopathy.   Neurological: She is alert and oriented to person, place, and time.   Skin: Skin is warm and dry. No rash noted.   Vitals reviewed.     6/13/2023   Tender (FAGAN-28) 4 / 28    Swollen (FAGAN-28) 0 / 28    Provider Global --   Patient Global --   ESR --   CRP --   FAGAN-28 (ESR) --   FAGAN-28 (CRP) --   CDAI Score --        Assessment:     1. Rheumatoid arthritis with positive rheumatoid factor, involving unspecified site    2. On corticosteroid therapy    3. Immunosuppression due to drug therapy          Plan:     Problem List Items Addressed This Visit          Endocrine    On corticosteroid therapy     Other Visit Diagnoses       Rheumatoid arthritis with positive rheumatoid factor, involving unspecified site        Relevant Orders    CBC Auto Differential    Comprehensive Metabolic Panel    Sedimentation rate    C-Reactive Protein    HIV 1/2 Ag/Ab (4th Gen)    Hepatitis B surface antigen    HBcAB    Hepatitis B surface antibody    Hepatitis C antibody    Hepatitis A antibody, IgG    Strongyloides IgG Antibodies    Quantiferon Gold TB    RPR    Varicella zoster antibody, IgG    XR Arthritis Survey    X-Ray Chest PA " And Lateral    Immunosuppression due to drug therapy              78 year old female with osteopenia, COPD/Asthma overlap, GERD, RA coming in for establishing care of RA    1. Rheumatoid arthritis with positive rheumatoid factor, involving unspecified site   Patient has been treated for RA most recently by Dr. Watson with prednisone 2.5 mg and Kevzara. She has positive RF. No notes in the system regarding her treatment history thus far  -arthritis survey, chest xray  -CBC, CMP, ESR, CRP  -Pre-DMARD labs  -record release form for previous rheumatology notes     2. On corticosteroid therapy   On prednisone 2.5 mg. DEXA done last year  -DEXA per PCP     3. Immunosuppression due to drug therapy   Currently on Kevzara for RA. History of Humira, MTX per chart review  -continue Kevzara, will refill once monitoring labs are back     This patient was examined with Dr. Ghosh. Plan discussed with the patient. Return to clinic in 3 months.

## 2023-06-14 NOTE — PROGRESS NOTES
Patient seen and examined with fellow.  All elements of history, physical exam and medical decision making independently confirmed by me.  Patient of Dr. Moya who treated her with Kevara.  Will evaluate for disease activity and drug toxicity with labs and x-rays. See note for details.

## 2023-06-15 ENCOUNTER — HOSPITAL ENCOUNTER (OUTPATIENT)
Dept: RADIOLOGY | Facility: HOSPITAL | Age: 78
Discharge: HOME | End: 2023-06-15
Attending: INTERNAL MEDICINE
Payer: MEDICARE

## 2023-06-15 DIAGNOSIS — M05.9 RHEUMATOID ARTHRITIS WITH POSITIVE RHEUMATOID FACTOR, INVOLVING UNSPECIFIED SITE: ICD-10-CM

## 2023-06-15 PROCEDURE — 77077 XR ARTHRITIS SURVEY: ICD-10-PCS | Mod: 26,,, | Performed by: RADIOLOGY

## 2023-06-15 PROCEDURE — 71046 XR CHEST PA AND LATERAL: ICD-10-PCS | Mod: 26,,, | Performed by: RADIOLOGY

## 2023-06-15 PROCEDURE — 71046 X-RAY EXAM CHEST 2 VIEWS: CPT | Mod: TC,PN

## 2023-06-15 PROCEDURE — 77077 JOINT SURVEY SINGLE VIEW: CPT | Mod: 26,,, | Performed by: RADIOLOGY

## 2023-06-15 PROCEDURE — 71046 X-RAY EXAM CHEST 2 VIEWS: CPT | Mod: 26,,, | Performed by: RADIOLOGY

## 2023-06-15 PROCEDURE — 77077 JOINT SURVEY SINGLE VIEW: CPT | Mod: TC,PN

## 2023-06-15 RX ORDER — PREDNISONE 2.5 MG/1
TABLET ORAL
Qty: 90 TABLET | Refills: 1 | Status: ACTIVE | OUTPATIENT
Start: 2023-06-15 | End: 2023-10-17

## 2023-06-15 RX ORDER — SARILUMAB 200 MG/1.14ML
200 INJECTION, SOLUTION SUBCUTANEOUS
Qty: 2 PEN | Refills: 2 | Status: ACTIVE | OUTPATIENT
Start: 2023-06-15 | End: 2023-07-12 | Stop reason: SDUPTHER

## 2023-06-16 ENCOUNTER — PATIENT MESSAGE (OUTPATIENT)
Dept: RHEUMATOLOGY | Facility: CLINIC | Age: 78
End: 2023-06-16
Payer: MEDICARE

## 2023-06-23 ENCOUNTER — TELEPHONE (OUTPATIENT)
Dept: PHARMACY | Facility: CLINIC | Age: 78
End: 2023-06-23
Payer: MEDICARE

## 2023-06-23 NOTE — TELEPHONE ENCOUNTER
Hello, this is Nimisha Dick, clinical pharmacist with [facility] that is part of your care team.  We have begun working on your prescription that your doctor has sent us. Our next steps include:     Working with your insurance company to obtain approval for your medication  Working with you to ensure your medication is affordable     We will be calling you along the way with updates on your medication but if you have any concerns or receive information that you would like to discuss please reach us at [phone].    Welcome call outcome: Left voicemail

## 2023-06-26 ENCOUNTER — SPECIALTY PHARMACY (OUTPATIENT)
Dept: PHARMACY | Facility: CLINIC | Age: 78
End: 2023-06-26
Payer: MEDICARE

## 2023-07-05 NOTE — TELEPHONE ENCOUNTER
Received a fax from [facility] that the pt is requesting to fill Kevzara with their pharmacy. Called pt to confirm and send [facility] a message to send a new rx to [facility]. No answer from pt, LVM.

## 2023-07-06 DIAGNOSIS — M05.9 RHEUMATOID ARTHRITIS WITH POSITIVE RHEUMATOID FACTOR, INVOLVING UNSPECIFIED SITE: Primary | ICD-10-CM

## 2023-07-06 RX ORDER — SARILUMAB 200 MG/1.14ML
200 INJECTION, SOLUTION SUBCUTANEOUS
Qty: 2 PEN | Refills: 2 | Status: CANCELLED | OUTPATIENT
Start: 2023-07-06 | End: 2023-10-04

## 2023-07-10 ENCOUNTER — PATIENT MESSAGE (OUTPATIENT)
Dept: PHARMACY | Facility: CLINIC | Age: 78
End: 2023-07-10
Payer: MEDICARE

## 2023-07-10 RX ORDER — SARILUMAB 200 MG/1.14ML
200 INJECTION, SOLUTION SUBCUTANEOUS
Qty: 2 PEN | Refills: 2 | Status: CANCELLED | OUTPATIENT
Start: 2023-07-10 | End: 2023-10-08

## 2023-07-10 RX ORDER — HYDROXYZINE HYDROCHLORIDE 25 MG/1
25 TABLET, FILM COATED ORAL 3 TIMES DAILY PRN
COMMUNITY
End: 2023-07-10 | Stop reason: SDUPTHER

## 2023-07-10 NOTE — TELEPHONE ENCOUNTER
----- Message from Ganga Bui sent at 7/10/2023  8:56 AM CDT -----  Regarding: PA  Contact: Lexy diallo [facility] [phone]  [facility] RX:KEVZARA 200 mg/1.14 mL PnIj. Pt is currently out of medication and requesting a refill. Pharmacy would like an PA      [facility] - [place], [place] - [address]  [address] [address]  Phone: [phone] Fax: [phone]

## 2023-07-10 NOTE — TELEPHONE ENCOUNTER
Outgoing call to pt to let her know that I sent [facility] a message to send the Kevzara to [facility]. Received another message from [facility] for an e-script. Already released order to [facility] before received message. Will send another msg to [facility]/closeout

## 2023-07-10 NOTE — TELEPHONE ENCOUNTER
Lov 3/2/23  Lwe 6/9/22  Prescription request sent in by [facility] however medication was not on current med list. Please advise.

## 2023-07-11 RX ORDER — HYDROXYZINE HYDROCHLORIDE 25 MG/1
25 TABLET, FILM COATED ORAL 3 TIMES DAILY PRN
Qty: 90 TABLET | Refills: 2 | Status: SHIPPED | OUTPATIENT
Start: 2023-07-11 | End: 2024-01-09 | Stop reason: SDUPTHER

## 2023-07-12 RX ORDER — SARILUMAB 200 MG/1.14ML
200 INJECTION, SOLUTION SUBCUTANEOUS
Qty: 2 PEN | Refills: 2 | Status: SHIPPED | OUTPATIENT
Start: 2023-07-12 | End: 2023-11-15 | Stop reason: SDUPTHER

## 2023-07-12 RX ORDER — SARILUMAB 200 MG/1.14ML
200 INJECTION, SOLUTION SUBCUTANEOUS
Qty: 2 PEN | Refills: 2 | Status: CANCELLED | OUTPATIENT
Start: 2023-07-12 | End: 2023-10-10

## 2023-07-13 ENCOUNTER — TELEPHONE (OUTPATIENT)
Dept: PRIMARY CARE CLINIC | Facility: CLINIC | Age: 78
End: 2023-07-13
Payer: MEDICARE

## 2023-07-13 NOTE — TELEPHONE ENCOUNTER
----- Message from Claudia Hussein sent at 7/13/2023  2:19 PM CDT -----  Contact: JAYDEN COBOSTER [[id number]]@ [phone]  [email or web address]     Patient is calling for Medical Advice regarding:    How long has patient had these symptoms:Her feet and legs are swelling and she's bloating    Pharmacy name and phone#:[facility]   Phone: [phone] Fax: [phone]    Would like response via [employer]:  Call Back    Comments: She would like you to call in an Rx to assist with this. It is beginning to get painful. It started on Saturday pass. She want your opinion prior to scheduling.

## 2023-08-04 ENCOUNTER — TELEPHONE (OUTPATIENT)
Dept: RHEUMATOLOGY | Facility: CLINIC | Age: 78
End: 2023-08-04
Payer: MEDICARE

## 2023-08-10 ENCOUNTER — TELEPHONE (OUTPATIENT)
Dept: RHEUMATOLOGY | Facility: CLINIC | Age: 78
End: 2023-08-10
Payer: MEDICARE

## 2023-08-25 ENCOUNTER — TELEPHONE (OUTPATIENT)
Dept: RHEUMATOLOGY | Facility: CLINIC | Age: 78
End: 2023-08-25
Payer: MEDICARE

## 2023-09-06 ENCOUNTER — LAB VISIT (OUTPATIENT)
Dept: LAB | Facility: HOSPITAL | Age: 78
End: 2023-09-06
Attending: FAMILY MEDICINE
Payer: MEDICARE

## 2023-09-06 ENCOUNTER — OFFICE VISIT (OUTPATIENT)
Dept: PRIMARY CARE CLINIC | Facility: CLINIC | Age: 78
End: 2023-09-06
Payer: MEDICARE

## 2023-09-06 VITALS
BODY MASS INDEX: 30.67 KG/M2 | OXYGEN SATURATION: 99 % | TEMPERATURE: 98 F | HEART RATE: 75 BPM | HEIGHT: 59 IN | DIASTOLIC BLOOD PRESSURE: 70 MMHG | WEIGHT: 152.13 LBS | SYSTOLIC BLOOD PRESSURE: 126 MMHG

## 2023-09-06 DIAGNOSIS — R79.9 ABNORMAL FINDING OF BLOOD CHEMISTRY, UNSPECIFIED: ICD-10-CM

## 2023-09-06 DIAGNOSIS — I70.0 AORTIC ATHEROSCLEROSIS: ICD-10-CM

## 2023-09-06 DIAGNOSIS — Z00.00 ROUTINE MEDICAL EXAM: ICD-10-CM

## 2023-09-06 DIAGNOSIS — M85.89 OSTEOPENIA OF MULTIPLE SITES: ICD-10-CM

## 2023-09-06 DIAGNOSIS — E66.09 CLASS 1 OBESITY DUE TO EXCESS CALORIES WITH SERIOUS COMORBIDITY AND BODY MASS INDEX (BMI) OF 30.0 TO 30.9 IN ADULT: ICD-10-CM

## 2023-09-06 DIAGNOSIS — M05.79 RHEUMATOID ARTHRITIS INVOLVING MULTIPLE SITES WITH POSITIVE RHEUMATOID FACTOR: Primary | ICD-10-CM

## 2023-09-06 DIAGNOSIS — Z79.52 ON CORTICOSTEROID THERAPY: ICD-10-CM

## 2023-09-06 DIAGNOSIS — J44.89 CHRONIC OBSTRUCTIVE ASTHMA: ICD-10-CM

## 2023-09-06 DIAGNOSIS — J44.89 ASTHMA-COPD OVERLAP SYNDROME: ICD-10-CM

## 2023-09-06 DIAGNOSIS — D84.821 IMMUNODEFICIENCY DUE TO TREATMENT WITH IMMUNOSUPPRESSIVE MEDICATION: ICD-10-CM

## 2023-09-06 DIAGNOSIS — Z86.73 HISTORY OF STROKE: ICD-10-CM

## 2023-09-06 DIAGNOSIS — F33.42 RECURRENT MAJOR DEPRESSIVE DISORDER, IN FULL REMISSION: ICD-10-CM

## 2023-09-06 DIAGNOSIS — Z79.899 IMMUNODEFICIENCY DUE TO TREATMENT WITH IMMUNOSUPPRESSIVE MEDICATION: ICD-10-CM

## 2023-09-06 LAB
ESTIMATED AVG GLUCOSE: 103 MG/DL (ref 68–131)
HBA1C MFR BLD: 5.2 % (ref 4–5.6)

## 2023-09-06 PROCEDURE — 99999 PR PBB SHADOW E&M-EST. PATIENT-LVL V: ICD-10-PCS | Mod: PBBFAC,HCNC,, | Performed by: FAMILY MEDICINE

## 2023-09-06 PROCEDURE — 1101F PT FALLS ASSESS-DOCD LE1/YR: CPT | Mod: HCNC,CPTII,S$GLB, | Performed by: FAMILY MEDICINE

## 2023-09-06 PROCEDURE — 83036 HEMOGLOBIN GLYCOSYLATED A1C: CPT | Mod: HCNC | Performed by: FAMILY MEDICINE

## 2023-09-06 PROCEDURE — 99214 OFFICE O/P EST MOD 30 MIN: CPT | Mod: HCNC,S$GLB,, | Performed by: FAMILY MEDICINE

## 2023-09-06 PROCEDURE — 3074F SYST BP LT 130 MM HG: CPT | Mod: HCNC,CPTII,S$GLB, | Performed by: FAMILY MEDICINE

## 2023-09-06 PROCEDURE — 99999 PR PBB SHADOW E&M-EST. PATIENT-LVL V: CPT | Mod: PBBFAC,HCNC,, | Performed by: FAMILY MEDICINE

## 2023-09-06 PROCEDURE — 1126F PR PAIN SEVERITY QUANTIFIED, NO PAIN PRESENT: ICD-10-PCS | Mod: HCNC,CPTII,S$GLB, | Performed by: FAMILY MEDICINE

## 2023-09-06 PROCEDURE — 1159F PR MEDICATION LIST DOCUMENTED IN MEDICAL RECORD: ICD-10-PCS | Mod: HCNC,CPTII,S$GLB, | Performed by: FAMILY MEDICINE

## 2023-09-06 PROCEDURE — 3078F DIAST BP <80 MM HG: CPT | Mod: HCNC,CPTII,S$GLB, | Performed by: FAMILY MEDICINE

## 2023-09-06 PROCEDURE — 1126F AMNT PAIN NOTED NONE PRSNT: CPT | Mod: HCNC,CPTII,S$GLB, | Performed by: FAMILY MEDICINE

## 2023-09-06 PROCEDURE — 1160F RVW MEDS BY RX/DR IN RCRD: CPT | Mod: HCNC,CPTII,S$GLB, | Performed by: FAMILY MEDICINE

## 2023-09-06 PROCEDURE — 3288F PR FALLS RISK ASSESSMENT DOCUMENTED: ICD-10-PCS | Mod: HCNC,CPTII,S$GLB, | Performed by: FAMILY MEDICINE

## 2023-09-06 PROCEDURE — 36415 COLL VENOUS BLD VENIPUNCTURE: CPT | Mod: HCNC,PN | Performed by: FAMILY MEDICINE

## 2023-09-06 PROCEDURE — 1160F PR REVIEW ALL MEDS BY PRESCRIBER/CLIN PHARMACIST DOCUMENTED: ICD-10-PCS | Mod: HCNC,CPTII,S$GLB, | Performed by: FAMILY MEDICINE

## 2023-09-06 PROCEDURE — 3078F PR MOST RECENT DIASTOLIC BLOOD PRESSURE < 80 MM HG: ICD-10-PCS | Mod: HCNC,CPTII,S$GLB, | Performed by: FAMILY MEDICINE

## 2023-09-06 PROCEDURE — 1159F MED LIST DOCD IN RCRD: CPT | Mod: HCNC,CPTII,S$GLB, | Performed by: FAMILY MEDICINE

## 2023-09-06 PROCEDURE — 1101F PR PT FALLS ASSESS DOC 0-1 FALLS W/OUT INJ PAST YR: ICD-10-PCS | Mod: HCNC,CPTII,S$GLB, | Performed by: FAMILY MEDICINE

## 2023-09-06 PROCEDURE — 3074F PR MOST RECENT SYSTOLIC BLOOD PRESSURE < 130 MM HG: ICD-10-PCS | Mod: HCNC,CPTII,S$GLB, | Performed by: FAMILY MEDICINE

## 2023-09-06 PROCEDURE — 3288F FALL RISK ASSESSMENT DOCD: CPT | Mod: HCNC,CPTII,S$GLB, | Performed by: FAMILY MEDICINE

## 2023-09-06 PROCEDURE — 99214 PR OFFICE/OUTPT VISIT, EST, LEVL IV, 30-39 MIN: ICD-10-PCS | Mod: HCNC,S$GLB,, | Performed by: FAMILY MEDICINE

## 2023-09-06 NOTE — PROGRESS NOTES
"    /70 (BP Location: Right arm, Patient Position: Sitting, BP Method: Medium (Manual))   Pulse 75   Temp 98 °F (36.7 °C) (Oral)   Ht 4' 11" (1.499 m)   Wt 69 kg (152 lb 1.9 oz)   SpO2 99%   BMI 30.72 kg/m²       ===========    Chief Complaint: No chief complaint on file.          \[facility]\""    Francy Bain is a 78 y.o. female     here with a family member to discuss Kezvara treatment that she gets with Rheumatology..  She gets a 200 mg injection every 14 days.  She is here because she stated to her rheumatologist that she wanted to do this once a month instead of once every 14 days--not because of side effects but because of cost.  She tells me that a rheumatologist reports that her primary care would have to be the 1 to decrease the dose of this medication.    Annual Wellness/Preventative Exam.  Health maintenance reviewed with patient in detail inc any recent labs and studies and needs for future screening labs.  Age-appropriate vaccines and other age-appropriate screening studies reviewed with patient in detail.  Sleep health reviewed with patient.  Skin health regarding possible skin cancer screening reviewed with patient.  General regularity of bowel movements and urinations reviewed with patient including any possibility of urine leakage.  Vision screening reviewed with patient.      Patient queried and denies any further complaints    Patient has MEDICAL HISTORY OF  Patient Active Problem List   Diagnosis    Rheumatoid arthritis involving multiple sites    Cognitive impairment    Glaucoma    Infected olecranon bursa    Osteopenia of multiple sites    History of stroke    Physical deconditioning    History of elbow replacement    Class 1 obesity due to excess calories with serious comorbidity and body mass index (BMI) of 30.0 to 30.9 in adult    Impaired functional mobility, balance, gait, and endurance    Elbow joint replacement status, right    GERD (gastroesophageal reflux disease)    " Renal cyst, acquired, right    Chronic obstructive asthma    Asthma-COPD overlap syndrome    History of arthroplasty of both wrists    Right hip pain    Immunocompromised state due to drug therapy    Aortic atherosclerosis    Recurrent major depressive disorder, in full remission    Situational anxiety    On corticosteroid therapy    Suspected sleep apnea    White matter disease    B12 deficiency    Immunodeficiency due to treatment with immunosuppressive medication       SURGICAL AND MEDICAL HISTORY: updated and reviewed.  Past Surgical History:   Procedure Laterality Date    ANKLE FUSION Bilateral     ELBOW ARTHROPLASTY      EYE SURGERY Right 03/2017    cataract    JOINT REPLACEMENT      bilateral knee replacement; bilateral elbow and wrist replacement    KNEE ARTHROPLASTY      OPEN REDUCTION AND INTERNAL FIXATION (ORIF) OF FRACTURE OF ULNA Right 8/17/2021    Procedure: ORIF, FRACTURE, ULNA  AND MASSIVE ALLOGRAFT;  Surgeon: Tio Patel MD;  Location: [facility] OR [address] [place] [address];  Service: Orthopedics;  Laterality: Right;    REVISION OF TOTAL ARTHROPLASTY OF ELBOW Right 8/17/2021    Procedure: REVISION, TOTAL ARTHROPLASTY, ELBOW;  Surgeon: Tio Patel MD;  Location: [facility] OR [address] [place] [address];  Service: Orthopedics;  Laterality: Right;     ALLERGIES updated and reviewed.  Review of patient's allergies indicates:  No Known Allergies    CURRENT OUTPATIENT MEDICATIONS updated and reviewed    Current Outpatient Medications:     ascorbic acid, vitamin C, (VITAMIN C) 100 MG tablet, Take 100 mg by mouth once daily., Disp: , Rfl:     carboxymethylcellulose sodium (REFRESH TEARS OPHT), Apply to eye. Use as needed, Disp: , Rfl:     desloratadine (CLARINEX) 5 mg tablet, Take 5 mg by mouth once daily., Disp: , Rfl:     fluticasone furoate-vilanteroL (BREO ELLIPTA) 100-25 mcg/dose diskus inhaler, Inhale 1 puff into the lungs once daily. Controller, Disp: 60 each, Rfl: 11    furosemide (LASIX) 20 MG tablet, Take 1 tablet (20 mg total) by  mouth once daily., Disp: 90 tablet, Rfl: 3    hydrocortisone 2.5 % cream, Do not apply morning of surgery, Disp: , Rfl:     hydrOXYzine HCL (ATARAX) 25 MG tablet, Take 1 tablet (25 mg total) by mouth 3 (three) times daily as needed for Anxiety., Disp: 90 tablet, Rfl: 2    ketoconazole (NIZORAL) 2 % cream, Apply topically once daily., Disp: 60 g, Rfl: 5    ketoconazole (NIZORAL) 2 % shampoo, Shampoo, leave on 5 min then wash off and may use reg soap or shampoo. Use daily x 1 wk then 2x wkly as needed, Disp: 120 mL, Rfl: 11    KEVZARA 200 mg/1.14 mL PnIj, Inject 1.14 mLs (200 mg total) into the skin every 14 (fourteen) days., Disp: 2 pen , Rfl: 2    levocetirizine (XYZAL) 5 MG tablet, Take 5 mg by mouth every evening., Disp: , Rfl:     LORazepam (ATIVAN) 1 MG tablet, TAKE ONE TABLET BY MOUTH EVERY DAY AT BEDTIME AS NEEDED FOR ANXIETY OR sleep, Disp: , Rfl:     losartan (COZAAR) 50 MG tablet, Take 1 tablet (50 mg total) by mouth 2 (two) times a day., Disp: 180 tablet, Rfl: 3    omeprazole (PRILOSEC) 20 MG capsule, Take 1 capsule (20 mg total) by mouth once daily., Disp: 90 capsule, Rfl: 3    predniSONE (DELTASONE) 2.5 MG tablet, take 1 tablet daily with food for arthritis stiffness and inflammation, Disp: 90 tablet, Rfl: 1    traMADoL (ULTRAM) 50 mg tablet, Take 50 mg by mouth every 6 (six) hours as needed. Take as needed - Cold sweats, Disp: , Rfl:     triamcinolone acetonide 0.1% (KENALOG) 0.1 % cream, Apply twice daily to itchy skin up to 2 weeks/month as needed., Disp: , Rfl:     VITAMIN D2 1,250 mcg (50,000 unit) capsule, Take 1 capsule (50,000 Units total) by mouth every 7 days., Disp: 12 capsule, Rfl: 3    albuterol (PROVENTIL) 2.5 mg /3 mL (0.083 %) nebulizer solution, Take 3 mLs (2.5 mg total) by nebulization every 6 (six) hours as needed for Wheezing. Rescue (Patient not taking: Reported on 5/22/2023), Disp: 90 mL, Rfl: 3    Review of Systems   Constitutional:  Negative for activity change, appetite change,  "chills, diaphoresis, fatigue, fever and unexpected weight change.   HENT:  Negative for congestion, ear discharge, ear pain, facial swelling, hearing loss, nosebleeds, postnasal drip, rhinorrhea, sinus pressure, sneezing, sore throat, tinnitus, trouble swallowing and voice change.    Eyes:  Negative for photophobia, pain, discharge, redness, itching and visual disturbance.   Respiratory:  Negative for cough, chest tightness, shortness of breath and wheezing.    Cardiovascular:  Negative for chest pain, palpitations and leg swelling.   Gastrointestinal:  Negative for abdominal distention, abdominal pain, anal bleeding, blood in stool, constipation, diarrhea, nausea, rectal pain and vomiting.   Endocrine: Negative for cold intolerance, heat intolerance, polydipsia, polyphagia and polyuria.   Genitourinary:  Negative for difficulty urinating, dysuria and flank pain.   Musculoskeletal:  Negative for arthralgias, back pain, joint swelling, myalgias and neck pain.   Skin:  Negative for rash.   Neurological:  Negative for dizziness, tremors, seizures, syncope, speech difficulty, weakness, light-headedness, numbness and headaches.   Psychiatric/Behavioral:  Negative for behavioral problems, confusion, decreased concentration, dysphoric mood, sleep disturbance and suicidal ideas. The patient is not nervous/anxious and is not hyperactive.        /70 (BP Location: Right arm, Patient Position: Sitting, BP Method: Medium (Manual))   Pulse 75   Temp 98 °F (36.7 °C) (Oral)   Ht 4' 11" (1.499 m)   Wt 69 kg (152 lb 1.9 oz)   SpO2 99%   BMI 30.72 kg/m²   Physical Exam  Vitals and nursing note reviewed.   Constitutional:       General: She is not in acute distress.     Appearance: Normal appearance. She is well-developed. She is not ill-appearing or toxic-appearing.   HENT:      Head: Normocephalic and atraumatic.      Right Ear: Tympanic membrane, ear canal and external ear normal.      Left Ear: Tympanic membrane, ear " canal and external ear normal.      Nose: Nose normal.      Mouth/Throat:      Lips: Pink.      Mouth: Mucous membranes are moist.      Pharynx: No oropharyngeal exudate or posterior oropharyngeal erythema.   Eyes:      General: No scleral icterus.        Right eye: No discharge.         Left eye: No discharge.      Extraocular Movements: Extraocular movements intact.      Conjunctiva/sclera: Conjunctivae normal.   Cardiovascular:      Rate and Rhythm: Normal rate and regular rhythm.      Pulses: Normal pulses.      Heart sounds: Normal heart sounds. No murmur heard.  Pulmonary:      Effort: Pulmonary effort is normal. No respiratory distress.      Breath sounds: Normal breath sounds. No wheezing or rales.   Abdominal:      General: Bowel sounds are normal. There is no distension.      Palpations: Abdomen is soft. There is no mass.      Tenderness: There is no abdominal tenderness. There is no right CVA tenderness, left CVA tenderness, guarding or rebound.      Hernia: No hernia is present.   Musculoskeletal:      Cervical back: Normal range of motion and neck supple. No rigidity or tenderness.   Lymphadenopathy:      Cervical: No cervical adenopathy.   Skin:     General: Skin is warm and dry.   Neurological:      General: No focal deficit present.      Mental Status: She is alert. Mental status is at baseline.   Psychiatric:         Mood and Affect: Mood normal.         Behavior: Behavior normal. Behavior is cooperative.         ASSESSMENT/PLAN  Diagnoses and all orders for this visit:    Rheumatoid arthritis involving multiple sites with positive rheumatoid factor    Routine medical exam  -     Hemoglobin A1C; Future    Abnormal finding of blood chemistry, unspecified  -     Hemoglobin A1C; Future    History of stroke    Recurrent major depressive disorder, in full remission    Aortic atherosclerosis    Asthma-COPD overlap syndrome    Chronic obstructive asthma    Class 1 obesity due to excess calories with  serious comorbidity and body mass index (BMI) of 30.0 to 30.9 in adult    On corticosteroid therapy    Immunodeficiency due to treatment with immunosuppressive medication    Osteopenia of multiple sites        I declined any change of the Kevzara dosing.  She is not having side effects.  I will defer this to her and Rheumatology.      Blood pressure under good control.  Continue losartan 50 b.i.d..  Chronic obstructive asthma.  Continue Breo Ellipta.  Continue p.r.n. albuterol.    Follow-up in 6 months.    All lab results over past 2 years available reviewed inc labs and any cardiology or radiology studies.  Any new prescription medications gone over in detail including reason for taking the medication, the general mechanism of action, most common possible side effects and possible costs, etcetera.    Chronic conditions updated. Other than changes or additions as above, cont current medications and maintain follow-up with specialists if indicated.     Mikey Tolbert MD  A dictation device was used to produce this document. Use of such devices sometimes results in grammatical errors or replacement of words that sound similarly.

## 2023-09-07 ENCOUNTER — TELEPHONE (OUTPATIENT)
Dept: PRIMARY CARE CLINIC | Facility: CLINIC | Age: 78
End: 2023-09-07
Payer: MEDICARE

## 2023-09-07 NOTE — TELEPHONE ENCOUNTER
----- Message from Mikey Tolbert MD sent at 9/7/2023  6:19 AM CDT -----  Patient does not use my [facility].  Hemoglobin A1c is normal.  Follow-up 6 months.

## 2023-09-08 ENCOUNTER — TELEPHONE (OUTPATIENT)
Dept: PRIMARY CARE CLINIC | Facility: CLINIC | Age: 78
End: 2023-09-08
Payer: MEDICARE

## 2023-09-08 PROBLEM — Z79.899 IMMUNODEFICIENCY DUE TO TREATMENT WITH IMMUNOSUPPRESSIVE MEDICATION: Status: ACTIVE | Noted: 2023-09-08

## 2023-09-08 PROBLEM — T45.1X5A IMMUNODEFICIENCY DUE TO TREATMENT WITH IMMUNOSUPPRESSIVE MEDICATION: Status: ACTIVE | Noted: 2023-09-08

## 2023-09-08 PROBLEM — D84.821 IMMUNODEFICIENCY DUE TO TREATMENT WITH IMMUNOSUPPRESSIVE MEDICATION: Status: ACTIVE | Noted: 2023-09-08

## 2023-09-08 PROBLEM — Z79.60 IMMUNODEFICIENCY DUE TO TREATMENT WITH IMMUNOSUPPRESSIVE MEDICATION: Status: ACTIVE | Noted: 2023-09-08

## 2023-09-25 ENCOUNTER — TELEPHONE (OUTPATIENT)
Dept: PRIMARY CARE CLINIC | Facility: CLINIC | Age: 78
End: 2023-09-25
Payer: MEDICARE

## 2023-09-25 NOTE — TELEPHONE ENCOUNTER
----- Message from Adithya Arreguin sent at 9/25/2023 10:46 AM CDT -----  Contact: [phone] @ patient  Good morning patient would like a call back to discuss getting her PB med changed and patient is out of med. Please give patient a call back [phone]

## 2023-09-25 NOTE — TELEPHONE ENCOUNTER
----- Message from Mayi Ramirez sent at 9/25/2023  2:39 PM CDT -----  Contact: P [phone]  Requesting an RX refill or new RX.  Is this a refill or new RX: Refill  RX name and strength losartan (COZAAR) 50 MG tablet():      Pharmacy name and phone [facility] - [address] [address]  Phone: [phone] Fax: [phone]       Patient state pharmacy told her that she is out of the Rx. And she has to contact her PCP

## 2023-09-25 NOTE — TELEPHONE ENCOUNTER
Spoke to the pharmacy and confirmed they had the prescription.     Spoke to the patient and confirmed that the pharmacy will refill her prescription. Also confirmed she had refills for her hydroxyzine

## 2023-10-05 RX ORDER — KETOCONAZOLE 20 MG/ML
SHAMPOO, SUSPENSION TOPICAL
Qty: 120 ML | Refills: 5 | Status: SHIPPED | OUTPATIENT
Start: 2023-10-05 | End: 2024-01-09 | Stop reason: SDUPTHER

## 2023-10-05 RX ORDER — KETOCONAZOLE 20 MG/G
1 CREAM TOPICAL
Qty: 60 G | Refills: 5 | Status: SHIPPED | OUTPATIENT
Start: 2023-10-05 | End: 2024-01-09 | Stop reason: SDUPTHER

## 2023-10-05 NOTE — TELEPHONE ENCOUNTER
LOV 9/6/23  Annual 6/9/22 [place]  [facility] 3/7/24    Patient is requesting a refill for ketoconazole 2% shampoo and cream

## 2023-10-17 RX ORDER — PREDNISONE 2.5 MG/1
TABLET ORAL
Qty: 90 TABLET | Refills: 0 | Status: SHIPPED | OUTPATIENT
Start: 2023-10-17 | End: 2024-01-09 | Stop reason: SDUPTHER

## 2023-10-17 NOTE — TELEPHONE ENCOUNTER
No care due was identified.  [facility] Embedded Care Due Messages. Reference number: [id number].   10/17/2023 10:52:36 AM CDT

## 2023-10-17 NOTE — TELEPHONE ENCOUNTER
Left a voicemail for the patient. Dr. Tolbert was able to refill her prednisone 2.5mg. She did not make it to her appointment with rheumatology. Dr. Tolbert wants her to be seen with them as soon as possible. I provided the telephone number to the department for her to call and schedule an appointment.

## 2023-10-27 NOTE — TELEPHONE ENCOUNTER
No care due was identified.  [facility] Embedded Care Due Messages. Reference number: [id number].   10/27/2023 1:24:52 PM CDT

## 2023-10-28 RX ORDER — FUROSEMIDE 20 MG/1
TABLET ORAL
Qty: 90 TABLET | Refills: 2 | Status: SHIPPED | OUTPATIENT
Start: 2023-10-28 | End: 2024-01-09 | Stop reason: SDUPTHER

## 2023-10-28 NOTE — TELEPHONE ENCOUNTER
Refill Decision Note   Francy Bain  is requesting a refill authorization.  Brief Assessment and Rationale for Refill:  Approve     Medication Therapy Plan:       Medication Reconciliation Completed: No   Comments:     No Care Gaps recommended.     Note composed:9:06 AM 10/28/2023

## 2023-11-15 DIAGNOSIS — M05.9 RHEUMATOID ARTHRITIS WITH POSITIVE RHEUMATOID FACTOR, INVOLVING UNSPECIFIED SITE: Primary | ICD-10-CM

## 2023-11-15 RX ORDER — SARILUMAB 200 MG/1.14ML
200 INJECTION, SOLUTION SUBCUTANEOUS
Qty: 2 PEN | Refills: 2 | Status: SHIPPED | OUTPATIENT
Start: 2023-11-15 | End: 2024-02-13

## 2023-11-17 ENCOUNTER — TELEPHONE (OUTPATIENT)
Dept: PRIMARY CARE CLINIC | Facility: CLINIC | Age: 78
End: 2023-11-17
Payer: MEDICARE

## 2023-11-17 NOTE — TELEPHONE ENCOUNTER
----- Message from Devi Adams sent at 11/17/2023 11:44 AM CST -----  Contact: [phone]  Vickie with [facility] is calling in regards to a new prescription she states the pt is requesting Nystatin topical cream to be sent to [facility] please advise     Fax [phone]

## 2023-11-17 NOTE — TELEPHONE ENCOUNTER
Left a voicemail for the patient. We received a refill request for nystatin topical cream. Dr. Tolbert is requiring the patient to have an appointment before he prescribed this medication. Calling to schedule an appointment.

## 2023-11-17 NOTE — TELEPHONE ENCOUNTER
LOV 9/6/23  Annual   RTC     Patient is requesting a prescription for nystatin topical cream. I called the patient to ask why she is requesting this and am awaiting a response back.

## 2023-11-20 ENCOUNTER — PATIENT MESSAGE (OUTPATIENT)
Dept: RHEUMATOLOGY | Facility: CLINIC | Age: 78
End: 2023-11-20
Payer: MEDICARE

## 2023-11-20 RX ORDER — SARILUMAB 200 MG/1.14ML
INJECTION, SOLUTION SUBCUTANEOUS
OUTPATIENT
Start: 2023-11-20

## 2023-12-14 RX ORDER — TRAMADOL HYDROCHLORIDE 50 MG/1
50 TABLET ORAL EVERY 6 HOURS PRN
OUTPATIENT
Start: 2023-12-14

## 2023-12-14 RX ORDER — TRAZODONE HYDROCHLORIDE 50 MG/1
50 TABLET ORAL NIGHTLY PRN
Qty: 30 TABLET | Refills: 5 | Status: SHIPPED | OUTPATIENT
Start: 2023-12-14 | End: 2024-12-13

## 2023-12-14 NOTE — TELEPHONE ENCOUNTER
----- Message from Sonja Farrell sent at 12/14/2023  1:15 PM CST -----  Contact: [phone]  Requesting an RX refill or new RX.  Is this a refill or new RX: Refill 1  RX name and strength (copy/paste from chart):  traMADoL (ULTRAM) 50 mg tablet  Is this a 30 day or 90 day RX:   Pharmacy name and phone # (copy/paste from chart):  [address]  Phone: [phone]   Fax: [phone]      Requesting an RX refill or new RX.  Is this a refill or new RX: Refill 2  RX name and strength (copy/paste from chart):  traZODone (DESYREL) 50 MG tablet  Is this a 30 day or 90 day RX:   Pharmacy name and phone # (copy/paste from chart):  [address]  Phone: [phone]   Fax: [phone]        The doctors have asked that we provide their patients with the following 2 reminders -- prescription refills can take up to 72 hours, and a friendly reminder that in the future you can use your [facility] account to request refills:

## 2023-12-14 NOTE — TELEPHONE ENCOUNTER
LOV /Annual 9/6/23  RT 3/7/24    Patient is requesting a refill for tramadol 50 mg and a prescription for trazodone 50 mg. I do not see trazodone on her current medication list.

## 2023-12-14 NOTE — TELEPHONE ENCOUNTER
Left a voicemail for the patient. Dr. Tolbert was able to refill her trazodone but did not refill her tramadol. He can refer her to pain management if she would like

## 2023-12-14 NOTE — TELEPHONE ENCOUNTER
No care due was identified.  [facility] Embedded Care Due Messages. Reference number: [id number].   12/14/2023 1:55:16 PM CST

## 2023-12-18 ENCOUNTER — TELEPHONE (OUTPATIENT)
Dept: PRIMARY CARE CLINIC | Facility: CLINIC | Age: 78
End: 2023-12-18
Payer: MEDICARE

## 2023-12-18 NOTE — TELEPHONE ENCOUNTER
----- Message from May Rebollar MA sent at 12/18/2023  1:54 PM CST -----  Patient is returning a phone call.    Who left a message for the patient: Staff    Does patient know what this is regarding: N/A    Would you like a call back, or a response through your [facility] portal?: Francy at [phone]    Comments:

## 2023-12-18 NOTE — TELEPHONE ENCOUNTER
Returning patient call. Called on 12/14/23 to inform her Dr. Tolbert was able to refill her trazodone but cannot refill her tramadol. He would refer her to pain management if she was interested.

## 2023-12-19 ENCOUNTER — TELEPHONE (OUTPATIENT)
Dept: PRIMARY CARE CLINIC | Facility: CLINIC | Age: 78
End: 2023-12-19
Payer: MEDICARE

## 2023-12-19 NOTE — TELEPHONE ENCOUNTER
----- Message from Adithya Arreguin sent at 12/19/2023 12:02 PM CST -----  Contact: [phone]@patient  Good afternoon patient would like a call back to discuss getting a refill on a cream that is not on her med likst. Please give patient a call back   [phone]

## 2023-12-19 NOTE — TELEPHONE ENCOUNTER
LOV /Annual 9/6/23  RTC 1/9/24    Patient is requesting a refill for nystatin topical cream. Patient was informed she needed an office visit before she can receive this medication. Appointment scheduled.Patient is also interested in the referral to pain management since the tramadol was denied.

## 2024-01-09 ENCOUNTER — OFFICE VISIT (OUTPATIENT)
Dept: PRIMARY CARE CLINIC | Facility: CLINIC | Age: 79
End: 2024-01-09
Payer: MEDICARE

## 2024-01-09 VITALS
WEIGHT: 149.5 LBS | OXYGEN SATURATION: 99 % | HEART RATE: 81 BPM | DIASTOLIC BLOOD PRESSURE: 70 MMHG | BODY MASS INDEX: 30.14 KG/M2 | HEIGHT: 59 IN | SYSTOLIC BLOOD PRESSURE: 128 MMHG

## 2024-01-09 DIAGNOSIS — F41.8 SITUATIONAL ANXIETY: ICD-10-CM

## 2024-01-09 DIAGNOSIS — R35.0 URINARY FREQUENCY: Primary | ICD-10-CM

## 2024-01-09 DIAGNOSIS — J44.9 CHRONIC OBSTRUCTIVE PULMONARY DISEASE, UNSPECIFIED COPD TYPE: ICD-10-CM

## 2024-01-09 DIAGNOSIS — M05.79 RHEUMATOID ARTHRITIS INVOLVING MULTIPLE SITES WITH POSITIVE RHEUMATOID FACTOR: ICD-10-CM

## 2024-01-09 DIAGNOSIS — I70.0 AORTIC ATHEROSCLEROSIS: ICD-10-CM

## 2024-01-09 DIAGNOSIS — Z79.899 IMMUNODEFICIENCY DUE TO TREATMENT WITH IMMUNOSUPPRESSIVE MEDICATION: ICD-10-CM

## 2024-01-09 DIAGNOSIS — B35.4 TINEA CORPORIS: ICD-10-CM

## 2024-01-09 DIAGNOSIS — Z86.73 HISTORY OF STROKE: ICD-10-CM

## 2024-01-09 DIAGNOSIS — D84.821 IMMUNODEFICIENCY DUE TO TREATMENT WITH IMMUNOSUPPRESSIVE MEDICATION: ICD-10-CM

## 2024-01-09 DIAGNOSIS — I10 PRIMARY HYPERTENSION: ICD-10-CM

## 2024-01-09 DIAGNOSIS — Z79.52 ON CORTICOSTEROID THERAPY: ICD-10-CM

## 2024-01-09 DIAGNOSIS — E66.09 CLASS 1 OBESITY DUE TO EXCESS CALORIES WITH SERIOUS COMORBIDITY AND BODY MASS INDEX (BMI) OF 30.0 TO 30.9 IN ADULT: ICD-10-CM

## 2024-01-09 DIAGNOSIS — J44.89 CHRONIC OBSTRUCTIVE ASTHMA: ICD-10-CM

## 2024-01-09 DIAGNOSIS — K21.9 GASTROESOPHAGEAL REFLUX DISEASE WITHOUT ESOPHAGITIS: ICD-10-CM

## 2024-01-09 DIAGNOSIS — D84.821 IMMUNOCOMPROMISED STATE DUE TO DRUG THERAPY: ICD-10-CM

## 2024-01-09 DIAGNOSIS — F33.42 RECURRENT MAJOR DEPRESSIVE DISORDER, IN FULL REMISSION: ICD-10-CM

## 2024-01-09 DIAGNOSIS — Z79.899 IMMUNOCOMPROMISED STATE DUE TO DRUG THERAPY: ICD-10-CM

## 2024-01-09 LAB
BILIRUB UR QL STRIP: NEGATIVE
CLARITY UR REFRACT.AUTO: ABNORMAL
COLOR UR AUTO: YELLOW
GLUCOSE UR QL STRIP: NEGATIVE
HGB UR QL STRIP: NEGATIVE
KETONES UR QL STRIP: NEGATIVE
LEUKOCYTE ESTERASE UR QL STRIP: NEGATIVE
NITRITE UR QL STRIP: NEGATIVE
PH UR STRIP: 6 [PH] (ref 5–8)
PROT UR QL STRIP: NEGATIVE
SP GR UR STRIP: 1.02 (ref 1–1.03)
URN SPEC COLLECT METH UR: ABNORMAL

## 2024-01-09 PROCEDURE — 3288F FALL RISK ASSESSMENT DOCD: CPT | Mod: HCNC,CPTII,S$GLB, | Performed by: FAMILY MEDICINE

## 2024-01-09 PROCEDURE — 3074F SYST BP LT 130 MM HG: CPT | Mod: HCNC,CPTII,S$GLB, | Performed by: FAMILY MEDICINE

## 2024-01-09 PROCEDURE — 99214 OFFICE O/P EST MOD 30 MIN: CPT | Mod: HCNC,S$GLB,, | Performed by: FAMILY MEDICINE

## 2024-01-09 PROCEDURE — 3078F DIAST BP <80 MM HG: CPT | Mod: HCNC,CPTII,S$GLB, | Performed by: FAMILY MEDICINE

## 2024-01-09 PROCEDURE — 1101F PT FALLS ASSESS-DOCD LE1/YR: CPT | Mod: HCNC,CPTII,S$GLB, | Performed by: FAMILY MEDICINE

## 2024-01-09 PROCEDURE — 1160F RVW MEDS BY RX/DR IN RCRD: CPT | Mod: HCNC,CPTII,S$GLB, | Performed by: FAMILY MEDICINE

## 2024-01-09 PROCEDURE — 81003 URINALYSIS AUTO W/O SCOPE: CPT | Mod: HCNC | Performed by: FAMILY MEDICINE

## 2024-01-09 PROCEDURE — 1125F AMNT PAIN NOTED PAIN PRSNT: CPT | Mod: HCNC,CPTII,S$GLB, | Performed by: FAMILY MEDICINE

## 2024-01-09 PROCEDURE — 99999 PR PBB SHADOW E&M-EST. PATIENT-LVL III: CPT | Mod: PBBFAC,HCNC,, | Performed by: FAMILY MEDICINE

## 2024-01-09 PROCEDURE — 1159F MED LIST DOCD IN RCRD: CPT | Mod: HCNC,CPTII,S$GLB, | Performed by: FAMILY MEDICINE

## 2024-01-09 RX ORDER — PREDNISONE 2.5 MG/1
TABLET ORAL
Qty: 90 TABLET | Refills: 3 | Status: SHIPPED | OUTPATIENT
Start: 2024-01-09 | End: 2024-01-09 | Stop reason: SDUPTHER

## 2024-01-09 RX ORDER — HYDROXYZINE HYDROCHLORIDE 25 MG/1
25 TABLET, FILM COATED ORAL 3 TIMES DAILY PRN
Qty: 90 TABLET | Refills: 3 | Status: SHIPPED | OUTPATIENT
Start: 2024-01-09 | End: 2024-01-09 | Stop reason: SDUPTHER

## 2024-01-09 RX ORDER — ERGOCALCIFEROL 1.25 MG/1
50000 CAPSULE ORAL
Qty: 12 CAPSULE | Refills: 3 | Status: SHIPPED | OUTPATIENT
Start: 2024-01-09 | End: 2025-01-08

## 2024-01-09 RX ORDER — KETOCONAZOLE 20 MG/G
1 CREAM TOPICAL DAILY
Qty: 60 G | Refills: 5 | Status: SHIPPED | OUTPATIENT
Start: 2024-01-09

## 2024-01-09 RX ORDER — KETOCONAZOLE 20 MG/ML
SHAMPOO, SUSPENSION TOPICAL
Qty: 120 ML | Refills: 5 | Status: SHIPPED | OUTPATIENT
Start: 2024-01-09

## 2024-01-09 RX ORDER — HYDROXYZINE HYDROCHLORIDE 25 MG/1
25 TABLET, FILM COATED ORAL 3 TIMES DAILY PRN
Qty: 90 TABLET | Refills: 3 | Status: SHIPPED | OUTPATIENT
Start: 2024-01-09

## 2024-01-09 RX ORDER — KETOCONAZOLE 20 MG/G
1 CREAM TOPICAL DAILY
Qty: 60 G | Refills: 5 | Status: SHIPPED | OUTPATIENT
Start: 2024-01-09 | End: 2024-01-09 | Stop reason: SDUPTHER

## 2024-01-09 RX ORDER — LOSARTAN POTASSIUM 50 MG/1
50 TABLET ORAL 2 TIMES DAILY
Qty: 180 TABLET | Refills: 3 | Status: SHIPPED | OUTPATIENT
Start: 2024-01-09 | End: 2024-01-09 | Stop reason: SDUPTHER

## 2024-01-09 RX ORDER — KETOCONAZOLE 20 MG/ML
SHAMPOO, SUSPENSION TOPICAL
Qty: 120 ML | Refills: 5 | Status: SHIPPED | OUTPATIENT
Start: 2024-01-09 | End: 2024-01-09 | Stop reason: SDUPTHER

## 2024-01-09 RX ORDER — PREDNISONE 2.5 MG/1
TABLET ORAL
Qty: 90 TABLET | Refills: 3 | Status: SHIPPED | OUTPATIENT
Start: 2024-01-09

## 2024-01-09 RX ORDER — FUROSEMIDE 20 MG/1
TABLET ORAL
Qty: 90 TABLET | Refills: 3 | Status: SHIPPED | OUTPATIENT
Start: 2024-01-09

## 2024-01-09 RX ORDER — RSV VACC, PREF A AND PREF B/PF 120MCG/0.5
VIAL (EA) INTRAMUSCULAR
COMMUNITY
Start: 2023-10-05

## 2024-01-09 RX ORDER — FUROSEMIDE 20 MG/1
TABLET ORAL
Qty: 90 TABLET | Refills: 3 | Status: SHIPPED | OUTPATIENT
Start: 2024-01-09 | End: 2024-01-09 | Stop reason: SDUPTHER

## 2024-01-09 RX ORDER — ERGOCALCIFEROL 1.25 MG/1
50000 CAPSULE ORAL
Qty: 12 CAPSULE | Refills: 3 | Status: SHIPPED | OUTPATIENT
Start: 2024-01-09 | End: 2024-01-09 | Stop reason: SDUPTHER

## 2024-01-09 RX ORDER — OMEPRAZOLE 20 MG/1
20 CAPSULE, DELAYED RELEASE ORAL DAILY
Qty: 90 CAPSULE | Refills: 3 | Status: SHIPPED | OUTPATIENT
Start: 2024-01-09 | End: 2024-01-09 | Stop reason: SDUPTHER

## 2024-01-09 RX ORDER — LOSARTAN POTASSIUM 50 MG/1
50 TABLET ORAL 2 TIMES DAILY
Qty: 180 TABLET | Refills: 3 | Status: SHIPPED | OUTPATIENT
Start: 2024-01-09 | End: 2024-02-26

## 2024-01-09 RX ORDER — OMEPRAZOLE 20 MG/1
20 CAPSULE, DELAYED RELEASE ORAL DAILY
Qty: 90 CAPSULE | Refills: 3 | Status: SHIPPED | OUTPATIENT
Start: 2024-01-09

## 2024-01-10 ENCOUNTER — TELEPHONE (OUTPATIENT)
Dept: PRIMARY CARE CLINIC | Facility: CLINIC | Age: 79
End: 2024-01-10
Payer: MEDICARE

## 2024-01-10 PROBLEM — B35.4 TINEA CORPORIS: Status: ACTIVE | Noted: 2024-01-10

## 2024-01-10 PROBLEM — J44.9 CHRONIC OBSTRUCTIVE PULMONARY DISEASE, UNSPECIFIED COPD TYPE: Status: ACTIVE | Noted: 2021-06-04

## 2024-01-10 NOTE — TELEPHONE ENCOUNTER
----- Message from Melvi Garcia sent at 1/10/2024  2:12 PM CST -----  Contact: Self  [phone]  Patient is returning a phone call.    Who left a message for the patient: nurse    Does patient know what this is regarding:  results ?     Would you like a call back, or a response through your [facility] portal?:   call back    Comments:

## 2024-01-10 NOTE — TELEPHONE ENCOUNTER
----- Message from Wilda Clark sent at 1/10/2024 12:23 PM CST -----  Contact: [phone] Patient  Patient is returning a phone call.  Who left a message for the patient: missed call  Does patient know what this is regarding:  Pt thinks it is for results for Urine  Would you like a call back, or a response through your [facility] portal?:   Call Back Please. Thank you  Comments:

## 2024-01-10 NOTE — TELEPHONE ENCOUNTER
Attempted to return the patient's call about going over her urinalysis. Could not leave a voicemail, the mailbox is not setup.

## 2024-01-10 NOTE — PROGRESS NOTES
"    /70 (BP Location: Left arm, Patient Position: Sitting, BP Method: Medium (Manual))   Pulse 81   Ht 4' 11" (1.499 m)   Wt 67.8 kg (149 lb 7.6 oz)   SpO2 99%   BMI 30.19 kg/m²       ===========    Chief Complaint: Medication Refill and Urinary Frequency          Medication Refill  Pertinent negatives include no abdominal pain, arthralgias, chest pain, chills, congestion, coughing, diaphoresis, fatigue, fever, headaches, joint swelling, myalgias, nausea, neck pain, numbness, rash, sore throat, vomiting or weakness.   Urinary Frequency   Associated symptoms include frequency. Pertinent negatives include no chills, flank pain, nausea, vomiting, constipation or rash.       Francy Bain is a 78 y.o. female     here for  Urinary frequency for several weeks. No fevers or chills, no flank pain, no nvd.    Also states she needs a new rheumatologist since hers left. She states she saw a fellow and has been in touch to try to get back in but has not; states she would prefer to see someone else next time. Asks if we can help facilitate appt.    Patient queried and denies any further complaints      Patient Active Problem List   Diagnosis    Rheumatoid arthritis involving multiple sites    Cognitive impairment    Glaucoma    Infected olecranon bursa    Osteopenia of multiple sites    History of stroke    Physical deconditioning    History of elbow replacement    Class 1 obesity due to excess calories with serious comorbidity and body mass index (BMI) of 30.0 to 30.9 in adult    Impaired functional mobility, balance, gait, and endurance    Elbow joint replacement status, right    GERD (gastroesophageal reflux disease)    Renal cyst, acquired, right    Chronic obstructive asthma    Chronic obstructive pulmonary disease, unspecified COPD type    History of arthroplasty of both wrists    Right hip pain    Immunocompromised state due to drug therapy    Aortic atherosclerosis    Recurrent major depressive " disorder, in full remission    Situational anxiety    On corticosteroid therapy    Suspected sleep apnea    White matter disease    B12 deficiency    Immunodeficiency due to treatment with immunosuppressive medication    Tinea corporis       SURGICAL AND MEDICAL HISTORY: updated and reviewed.  Past Surgical History:   Procedure Laterality Date    ANKLE FUSION Bilateral     ELBOW ARTHROPLASTY      EYE SURGERY Right 03/2017    cataract    JOINT REPLACEMENT      bilateral knee replacement; bilateral elbow and wrist replacement    KNEE ARTHROPLASTY      OPEN REDUCTION AND INTERNAL FIXATION (ORIF) OF FRACTURE OF ULNA Right 8/17/2021    Procedure: ORIF, FRACTURE, ULNA  AND MASSIVE ALLOGRAFT;  Surgeon: Tio Patel MD;  Location: [address];  Service: Orthopedics;  Laterality: Right;    REVISION OF TOTAL ARTHROPLASTY OF ELBOW Right 8/17/2021    Procedure: REVISION, TOTAL ARTHROPLASTY, ELBOW;  Surgeon: Tio Patel MD;  Location: [facility] OR [address] [place] [address];  Service: Orthopedics;  Laterality: Right;     ALLERGIES updated and reviewed.  Review of patient's allergies indicates:  No Known Allergies    CURRENT OUTPATIENT MEDICATIONS updated and reviewed    Current Outpatient Medications:     ascorbic acid, vitamin C, (VITAMIN C) 100 MG tablet, Take 100 mg by mouth once daily., Disp: , Rfl:     carboxymethylcellulose sodium (REFRESH TEARS OPHT), Apply to eye. Use as needed, Disp: , Rfl:     desloratadine (CLARINEX) 5 mg tablet, Take 5 mg by mouth once daily., Disp: , Rfl:     fluticasone furoate-vilanteroL (BREO ELLIPTA) 100-25 mcg/dose diskus inhaler, Inhale 1 puff into the lungs once daily. Controller, Disp: 60 each, Rfl: 11    hydrocortisone 2.5 % cream, Do not apply morning of surgery, Disp: , Rfl:     KEVZARA 200 mg/1.14 mL PnIj, Inject 1.14 mLs (200 mg total) into the skin every 14 (fourteen) days., Disp: 2 pen , Rfl: 2    levocetirizine (XYZAL) 5 MG tablet, Take 5 mg by mouth every evening., Disp: , Rfl:     LORazepam  (ATIVAN) 1 MG tablet, TAKE ONE TABLET BY MOUTH EVERY DAY AT BEDTIME AS NEEDED FOR ANXIETY OR sleep, Disp: , Rfl:     traZODone (DESYREL) 50 MG tablet, Take 1 tablet (50 mg total) by mouth nightly as needed for Insomnia., Disp: 30 tablet, Rfl: 5    ABRYSVO 120 mcg/0.5 mL SolR vaccine, , Disp: , Rfl:     albuterol (PROVENTIL) 2.5 mg /3 mL (0.083 %) nebulizer solution, Take 3 mLs (2.5 mg total) by nebulization every 6 (six) hours as needed for Wheezing. Rescue (Patient not taking: Reported on 5/22/2023), Disp: 90 mL, Rfl: 3    furosemide (LASIX) 20 MG tablet, Take 1 tablet (20 mg total) by mouth once daily. FOR FLUID, Disp: 90 tablet, Rfl: 3    hydrOXYzine HCL (ATARAX) 25 MG tablet, Take 1 tablet (25 mg total) by mouth 3 (three) times daily as needed for Anxiety., Disp: 90 tablet, Rfl: 3    ketoconazole (NIZORAL) 2 % cream, Apply 1 application  topically once daily. Apply to affected area, Disp: 60 g, Rfl: 5    ketoconazole (NIZORAL) 2 % shampoo, Shampoo, leave on 5 min then wash off and may use reg soap or shampoo. Use daily x 1 wk then TWICE A WEEK as needed, Disp: 120 mL, Rfl: 5    losartan (COZAAR) 50 MG tablet, Take 1 tablet (50 mg total) by mouth 2 (two) times a day., Disp: 180 tablet, Rfl: 3    omeprazole (PRILOSEC) 20 MG capsule, Take 1 capsule (20 mg total) by mouth once daily., Disp: 90 capsule, Rfl: 3    predniSONE (DELTASONE) 2.5 MG tablet, One po daily, Disp: 90 tablet, Rfl: 3    VITAMIN D2 1,250 mcg (50,000 unit) capsule, Take 1 capsule (50,000 Units total) by mouth every 7 days., Disp: 12 capsule, Rfl: 3    Review of Systems   Constitutional:  Negative for activity change, appetite change, chills, diaphoresis, fatigue, fever and unexpected weight change.   HENT:  Negative for congestion, ear discharge, ear pain, facial swelling, hearing loss, nosebleeds, postnasal drip, rhinorrhea, sinus pressure, sneezing, sore throat, tinnitus, trouble swallowing and voice change.    Eyes:  Negative for photophobia,  "pain, discharge, redness, itching and visual disturbance.   Respiratory:  Negative for cough, chest tightness, shortness of breath and wheezing.    Cardiovascular:  Negative for chest pain, palpitations and leg swelling.   Gastrointestinal:  Negative for abdominal distention, abdominal pain, anal bleeding, blood in stool, constipation, diarrhea, nausea, rectal pain and vomiting.   Endocrine: Negative for cold intolerance, heat intolerance, polydipsia, polyphagia and polyuria.   Genitourinary:  Positive for frequency. Negative for difficulty urinating, dysuria and flank pain.   Musculoskeletal:  Negative for arthralgias, back pain, joint swelling, myalgias and neck pain.   Skin:  Negative for rash.   Neurological:  Negative for dizziness, tremors, seizures, syncope, speech difficulty, weakness, light-headedness, numbness and headaches.   Psychiatric/Behavioral:  Negative for behavioral problems, confusion, decreased concentration, dysphoric mood, sleep disturbance and suicidal ideas. The patient is not nervous/anxious and is not hyperactive.        /70 (BP Location: Left arm, Patient Position: Sitting, BP Method: Medium (Manual))   Pulse 81   Ht 4' 11" (1.499 m)   Wt 67.8 kg (149 lb 7.6 oz)   SpO2 99%   BMI 30.19 kg/m²   Physical Exam  Vitals and nursing note reviewed.   Constitutional:       General: She is not in acute distress.     Appearance: Normal appearance. She is well-developed. She is not ill-appearing or toxic-appearing.   HENT:      Head: Normocephalic and atraumatic.      Right Ear: Tympanic membrane, ear canal and external ear normal.      Left Ear: Tympanic membrane, ear canal and external ear normal.      Nose: Nose normal.      Mouth/Throat:      Lips: Pink.      Mouth: Mucous membranes are moist.      Pharynx: No oropharyngeal exudate or posterior oropharyngeal erythema.   Eyes:      General: No scleral icterus.        Right eye: No discharge.         Left eye: No discharge.      " Extraocular Movements: Extraocular movements intact.      Conjunctiva/sclera: Conjunctivae normal.   Cardiovascular:      Rate and Rhythm: Normal rate and regular rhythm.      Pulses: Normal pulses.      Heart sounds: Normal heart sounds. No murmur heard.  Pulmonary:      Effort: Pulmonary effort is normal. No respiratory distress.      Breath sounds: Normal breath sounds. No wheezing or rales.   Abdominal:      General: Bowel sounds are normal. There is no distension.      Palpations: Abdomen is soft. There is no mass.      Tenderness: There is no abdominal tenderness. There is no right CVA tenderness, left CVA tenderness, guarding or rebound.      Hernia: No hernia is present.   Musculoskeletal:      Cervical back: Normal range of motion and neck supple. No rigidity or tenderness.   Lymphadenopathy:      Cervical: No cervical adenopathy.   Skin:     General: Skin is warm and dry.   Neurological:      General: No focal deficit present.      Mental Status: She is alert. Mental status is at baseline.   Psychiatric:         Mood and Affect: Mood normal.         Behavior: Behavior normal. Behavior is cooperative.         ASSESSMENT/PLAN    1. Urinary frequency  -     Urinalysis, Reflex to Urine Culture Urine, Clean Catch    2. Primary hypertension  -     Discontinue: losartan (COZAAR) 50 MG tablet; Take 1 tablet (50 mg total) by mouth 2 (two) times a day.  Dispense: 180 tablet; Refill: 3  -     losartan (COZAAR) 50 MG tablet; Take 1 tablet (50 mg total) by mouth 2 (two) times a day.  Dispense: 180 tablet; Refill: 3    3. Rheumatoid arthritis involving multiple sites with positive rheumatoid factor  Assessment & Plan:  On chronic prednisone.  Needs follow-up with Rheumatology.      Orders:  -     Discontinue: VITAMIN D2 1,250 mcg (50,000 unit) capsule; Take 1 capsule (50,000 Units total) by mouth every 7 days.  Dispense: 12 capsule; Refill: 3  -     VITAMIN D2 1,250 mcg (50,000 unit) capsule; Take 1 capsule (50,000  Units total) by mouth every 7 days.  Dispense: 12 capsule; Refill: 3    4. Immunodeficiency due to treatment with immunosuppressive medication    5. Recurrent major depressive disorder, in full remission    6. Aortic atherosclerosis    7. Chronic obstructive pulmonary disease, unspecified COPD type  Assessment & Plan:  Stable.  Monitor.      8. History of stroke    9. Situational anxiety  Assessment & Plan:  Continue hydroxyzine as needed.  consider SSRI      10. Chronic obstructive asthma  Overview:  No post available on PFTs unfortunately, they do show obstruction.  Recommend repeat  Has hx of asthma that has not been treated with maintenance meds  Start ICS/LABA  CT shows improving PNA (which fits in with clinical picture a week or so ago) with GGOs and interstitial edema  No desat on walk      11. On corticosteroid therapy    12. Immunocompromised state due to drug therapy    13. Class 1 obesity due to excess calories with serious comorbidity and body mass index (BMI) of 30.0 to 30.9 in adult  Assessment & Plan:  Weight loss by calorie restriction and exercise.  Monitor.      14. Tinea corporis  Assessment & Plan:  Continue ketoconazole topical      15. Gastroesophageal reflux disease without esophagitis  Assessment & Plan:  GERD hygiene.  continue PPI.  Monitor.      Other orders  -     Discontinue: ketoconazole (NIZORAL) 2 % shampoo; Shampoo, leave on 5 min then wash off and may use reg soap or shampoo. Use daily x 1 wk then TWICE A WEEK as needed  Dispense: 120 mL; Refill: 5  -     Discontinue: ketoconazole (NIZORAL) 2 % cream; Apply 1 application  topically once daily. Apply to affected area  Dispense: 60 g; Refill: 5  -     Discontinue: furosemide (LASIX) 20 MG tablet; Take 1 tablet (20 mg total) by mouth once daily. FOR FLUID  Dispense: 90 tablet; Refill: 3  -     Discontinue: hydrOXYzine HCL (ATARAX) 25 MG tablet; Take 1 tablet (25 mg total) by mouth 3 (three) times daily as needed for Anxiety.   Dispense: 90 tablet; Refill: 3  -     Discontinue: ketoconazole (NIZORAL) 2 % cream; Apply 1 application  topically once daily. Apply to affected area  Dispense: 60 g; Refill: 5  -     Discontinue: omeprazole (PRILOSEC) 20 MG capsule; Take 1 capsule (20 mg total) by mouth once daily.  Dispense: 90 capsule; Refill: 3  -     Discontinue: predniSONE (DELTASONE) 2.5 MG tablet; One po daily  Dispense: 90 tablet; Refill: 3  -     furosemide (LASIX) 20 MG tablet; Take 1 tablet (20 mg total) by mouth once daily. FOR FLUID  Dispense: 90 tablet; Refill: 3  -     predniSONE (DELTASONE) 2.5 MG tablet; One po daily  Dispense: 90 tablet; Refill: 3  -     omeprazole (PRILOSEC) 20 MG capsule; Take 1 capsule (20 mg total) by mouth once daily.  Dispense: 90 capsule; Refill: 3  -     hydrOXYzine HCL (ATARAX) 25 MG tablet; Take 1 tablet (25 mg total) by mouth 3 (three) times daily as needed for Anxiety.  Dispense: 90 tablet; Refill: 3  -     ketoconazole (NIZORAL) 2 % cream; Apply 1 application  topically once daily. Apply to affected area  Dispense: 60 g; Refill: 5  -     ketoconazole (NIZORAL) 2 % shampoo; Shampoo, leave on 5 min then wash off and may use reg soap or shampoo. Use daily x 1 wk then TWICE A WEEK as needed  Dispense: 120 mL; Refill: 5              Most recent some lab results reviewed with patient.  Any new prescription medications gone over in detail including reason for taking the medication, most common possible side effects and possible costs, etcetera.    Chronic conditions updated. Other than changes or additions as above, cont current medications and maintain follow-up with specialists if indicated.     Mikey Tolbert MD  A dictation device was used to produce this document. Use of such devices sometimes results in grammatical errors or replacement of words that sound similarly.

## 2024-01-11 ENCOUNTER — TELEPHONE (OUTPATIENT)
Dept: PRIMARY CARE CLINIC | Facility: CLINIC | Age: 79
End: 2024-01-11
Payer: MEDICARE

## 2024-01-11 DIAGNOSIS — N32.81 OVERACTIVE BLADDER: Primary | ICD-10-CM

## 2024-01-11 NOTE — TELEPHONE ENCOUNTER
The patient states she is still urinating frequently. She would like to know what medication she can take for this.

## 2024-01-11 NOTE — TELEPHONE ENCOUNTER
----- Message from Sonja Farrell sent at 1/11/2024  1:05 PM CST -----  Contact: [phone]  Patient called, requested a courtesy call from Dr Tolbert's nurse in regards needing to find out what medication she is suppose to take for urine. Patient stated that she still go to the bathroom a lot. Thank you.

## 2024-01-12 NOTE — TELEPHONE ENCOUNTER
Attempted to call the patient. Dr. Tolbert was able to see her message about urinating frequently. He says she was just seen earlier this week and was negative for a UTI. He would like her to follow-up with urology. The urology department will contact her to schedule an appointment. Could not leave a voicemail because the mailbox was not setup.

## 2024-01-17 ENCOUNTER — TELEPHONE (OUTPATIENT)
Dept: UROLOGY | Facility: CLINIC | Age: 79
End: 2024-01-17
Payer: MEDICARE

## 2024-01-17 NOTE — TELEPHONE ENCOUNTER
was not able to leave message tried calling pt back voicemail not set up         ----- Message from Herminia Mcclelland sent at 1/16/2024 11:19 AM CST -----  Regarding: Missed call  Type:  Patient Returning Call         Who Called:  PT         Who Left Message for Patient: n/a         Does the patient know what this is regarding?: yes         Would the patient rather a call back or a response via [facility]?  Call back          Best Call Back Number:[phone]         Additional Information:next available is 2/21

## 2024-01-19 ENCOUNTER — TELEPHONE (OUTPATIENT)
Dept: PRIMARY CARE CLINIC | Facility: CLINIC | Age: 79
End: 2024-01-19
Payer: MEDICARE

## 2024-01-19 NOTE — TELEPHONE ENCOUNTER
Pt said at LOV 1/9/24 she was supposed to receive Nystain to go underneath her breast   Nizoral cream was order      Please Advised

## 2024-01-19 NOTE — TELEPHONE ENCOUNTER
----- Message from Nancy Mc sent at 1/19/2024 10:30 AM CST -----  Type: General Call Back     Name of Caller:JAYDEN COBOSTER [[id number]]  Symptoms: ketoconazole (NIZORAL) 2 % cream   Would the patient rather a call back or a response via [facility]? Call back  Best Call Back Number: [phone]  Additional Information: Patient indicates the provider called in a cream for her. Patient indicates she was given a script for  ketoconazole (NIZORAL) 2 % cream and was supposed to receive Nystain. Patient indicates she would like to speak with the nurse in the providers office in regards to getting the correct cream sent in for her. Patient indicates the cream is supposed to go underneath her breast. Patient indicates she would like a call back as soon as possible.Patient indicates she would like it to be sent to the same pharmacy if possible.

## 2024-01-22 ENCOUNTER — TELEPHONE (OUTPATIENT)
Dept: PRIMARY CARE CLINIC | Facility: CLINIC | Age: 79
End: 2024-01-22
Payer: MEDICARE

## 2024-01-22 NOTE — TELEPHONE ENCOUNTER
Attempted to call the patient back. Could not leave a voicemail because the voicemail was not setup.

## 2024-01-22 NOTE — TELEPHONE ENCOUNTER
----- Message from Mal Pro sent at 1/22/2024  9:08 AM CST -----  Contact: [phone]  Patient is returning a phone call.  Who left a message for the patient: Selene Prieto  Does patient know what this is regarding:  appt   Would you like a call back, or a response through your [facility] portal?:   call back   Comments:

## 2024-01-22 NOTE — TELEPHONE ENCOUNTER
Spoke to the patient. Dr. Tolbert would like her to try the ketocanzole and if she does not see any improvement in 3-4 weeks then he will call in the previous prescription. Patient gave a verbal understanding.

## 2024-01-22 NOTE — TELEPHONE ENCOUNTER
----- Message from Melvi Garcia sent at 1/22/2024  9:13 AM CST -----  Contact: Self [phone]  Patient is returning a phone call.    Who left a message for the patient: MA    Does patient know what this is regarding:  cream    Would you like a call back, or a response through your [facility] portal?:  call back    Comments:

## 2024-02-14 NOTE — PROGRESS NOTES
"  Subjective:       Francy Bain is a 78 y.o. female who is an established patient with Lloyd NP, though new to me was seen  for evaluation of OAB.      Last saws  NP 4/2023 for minimally complex R renal cyst. Recommended for annual surveillance (ordered, due 4/2024). Denied LUTS at that time.    Returns now with c/o OAB - mainly urinary frequency and nocturia q2h (x 4). Present x 2 months - was on diuretic but stopped this. +urgency with UUI - can be full bladder, occurs 2-3x weekly. UUI worse at night. Denies dysuria.  Strong stream. Denies SOHA, hematuria. +LE edema, worse in PM. No known LINA though +snoring. Wakes up SOB. +glaucoma, cognitive impairment.    Significant HTN noted today. Denies HA, blurry vision.    PVR (bladder scan) today - 10cc       The following portions of the patient's history were reviewed and updated as appropriate: allergies, current medications, past family history, past medical history, past social history, past surgical history and problem list.    Review of Systems  12 point review of systems completed. Pertinent positive and negatives listed in HPI        Objective:    Vitals: BP (!) 212/93 (BP Location: Left arm, Patient Position: Sitting, BP Method: Large (Automatic))   Pulse 75   Resp 16   Ht 4' 11" (1.499 m)   Wt 67 kg (147 lb 9.6 oz)   SpO2 96%   BMI 29.81 kg/m²     Physical Exam   General: well developed, well nourished in no acute distress  Head: normocephalic, atraumatic  Neck: no obvious enlargement of thyroid  HEENT: EOMI, mucus membranes moist, sclera anicteric, no hearing impairment  Lungs: symmetric expansion, non-labored breathing  Neuro: alert and oriented x 3, no gross deficits  Psych: normal judgment and insight, normal mood/affect and non-anxious  Genitourinary:   deferred      Lab Review   Urine analysis today in clinic shows positive for leukocytes, red blood cells     Lab Results   Component Value Date    WBC 8.75 06/15/2023    HGB 14.8 " 06/15/2023    HCT 48.5 06/15/2023    MCV 92 06/15/2023     06/15/2023     Lab Results   Component Value Date    CREATININE 0.8 06/15/2023    BUN 17 06/15/2023         Imaging  Images and reports were personally reviewed by me and discussed with patient  JULISSA reviewed       Assessment/Plan:      1. Nocturia    - Discussed multifactorial issue   - Take diuretic in AM   - +LE edema - will make more nocturia   - Concern for LINA - known snoring, waking SOB. Consider sleep study. F/u PCP.   - Avoid ACh due to age, cognitive issues, glaucoma, etc. Avoid Myrbetriq due to HTN.   - Trial Gemtesa for urgency symptoms     2. Overactive bladder    - +urgency/UUI   - Trial Gemtesa   - UCx today     3. Complex renal cyst    - Due for surveillance in 2 months, will schedule     Encouraged close f/u of HTN with PCP.     Follow up in 2 mths with JULISSA

## 2024-02-21 ENCOUNTER — OFFICE VISIT (OUTPATIENT)
Dept: UROLOGY | Facility: CLINIC | Age: 79
End: 2024-02-21
Attending: UROLOGY
Payer: MEDICARE

## 2024-02-21 ENCOUNTER — TELEPHONE (OUTPATIENT)
Dept: UROLOGY | Facility: CLINIC | Age: 79
End: 2024-02-21
Payer: MEDICARE

## 2024-02-21 VITALS
DIASTOLIC BLOOD PRESSURE: 93 MMHG | WEIGHT: 147.63 LBS | RESPIRATION RATE: 16 BRPM | BODY MASS INDEX: 29.76 KG/M2 | OXYGEN SATURATION: 96 % | HEART RATE: 75 BPM | HEIGHT: 59 IN | SYSTOLIC BLOOD PRESSURE: 212 MMHG

## 2024-02-21 DIAGNOSIS — N28.1 COMPLEX RENAL CYST: ICD-10-CM

## 2024-02-21 DIAGNOSIS — N32.81 OVERACTIVE BLADDER: ICD-10-CM

## 2024-02-21 DIAGNOSIS — R35.1 NOCTURIA: Primary | ICD-10-CM

## 2024-02-21 PROCEDURE — 99214 OFFICE O/P EST MOD 30 MIN: CPT | Mod: HCNC,S$GLB,, | Performed by: UROLOGY

## 2024-02-21 PROCEDURE — 3080F DIAST BP >= 90 MM HG: CPT | Mod: HCNC,CPTII,S$GLB, | Performed by: UROLOGY

## 2024-02-21 PROCEDURE — 3288F FALL RISK ASSESSMENT DOCD: CPT | Mod: HCNC,CPTII,S$GLB, | Performed by: UROLOGY

## 2024-02-21 PROCEDURE — 1160F RVW MEDS BY RX/DR IN RCRD: CPT | Mod: HCNC,CPTII,S$GLB, | Performed by: UROLOGY

## 2024-02-21 PROCEDURE — 3077F SYST BP >= 140 MM HG: CPT | Mod: HCNC,CPTII,S$GLB, | Performed by: UROLOGY

## 2024-02-21 PROCEDURE — 87086 URINE CULTURE/COLONY COUNT: CPT | Mod: HCNC | Performed by: UROLOGY

## 2024-02-21 PROCEDURE — 1159F MED LIST DOCD IN RCRD: CPT | Mod: HCNC,CPTII,S$GLB, | Performed by: UROLOGY

## 2024-02-21 PROCEDURE — 1101F PT FALLS ASSESS-DOCD LE1/YR: CPT | Mod: HCNC,CPTII,S$GLB, | Performed by: UROLOGY

## 2024-02-21 PROCEDURE — 1126F AMNT PAIN NOTED NONE PRSNT: CPT | Mod: HCNC,CPTII,S$GLB, | Performed by: UROLOGY

## 2024-02-21 RX ORDER — VIBEGRON 75 MG/1
75 TABLET, FILM COATED ORAL DAILY
Qty: 30 TABLET | Refills: 11 | Status: SHIPPED | OUTPATIENT
Start: 2024-02-21 | End: 2024-02-23 | Stop reason: SDUPTHER

## 2024-02-21 NOTE — TELEPHONE ENCOUNTER
----- Message from Lexy Lang sent at 2/21/2024 10:47 AM CST -----  Regarding: medication  Contact: [phone]  Pt requesting a refill   Medication name   Pharmacy       [facility] - [place], [place] - [address]  [address]  [facility] [address]  Phone: [phone] Fax: [phone]       Pt does not know name of medication please call to discuss Further

## 2024-02-22 ENCOUNTER — TELEPHONE (OUTPATIENT)
Dept: UROLOGY | Facility: CLINIC | Age: 79
End: 2024-02-22
Payer: MEDICARE

## 2024-02-22 ENCOUNTER — TELEPHONE (OUTPATIENT)
Dept: PRIMARY CARE CLINIC | Facility: CLINIC | Age: 79
End: 2024-02-22
Payer: MEDICARE

## 2024-02-22 LAB
BACTERIA UR CULT: NORMAL
BACTERIA UR CULT: NORMAL

## 2024-02-22 NOTE — TELEPHONE ENCOUNTER
tt pt told her her meds was called in to pharmacy       ----- Message from Janine Hatch sent at 2/22/2024  1:05 PM CST -----  JAYDEN COBOS calling regarding Patient Advice for needing to get a summery from the appt on yesterday to be mailed out to the PT and also wanted to check on the request to have her medication  vibegron (GEMTESA) 75 mg Tab sent to [facility]. PT stated she called in about the medication yesterday and did not hear back to inform, call back to inform if done, [phone]       [facility] - [place], [place] - [address]  [address] [facility] [address]  Phone: [phone] Fax: [phone]

## 2024-02-22 NOTE — TELEPHONE ENCOUNTER
----- Message from Sonjagalen Farrell sent at 2/22/2024  2:58 PM CST -----  Contact: [phone]  Symptoms: Dizziness, High Blood Pressure - Caller Reports  Outcome: Schedule an urgent appointment (within 4 hours) or talk to a nurse or provider within 30 minutes.  Reason: Started within the past 3 days    The caller rejected this outcome    Patient stated that her blood pressure has been high the past days, offered to be seen but patient is requesting for PCP to call something to the pharmacy. Please call and advise.thank you.

## 2024-02-22 NOTE — TELEPHONE ENCOUNTER
Spoke to the patient. The patient states her blood pressure was high yesterday and she was feeling dizzy. She was seen by a urology provider yesterday. She said she took her blood pressure today and it was around 155/81. She did not feel dizzy anymore. I offered to schedule her an appointment with another provider since Dr. Tolbert is out of the office on Fridays. She declined an appointment with another provider, states she only wants to be seen by Dr. Tlobert. An appointment was made for her for 2/26/24.

## 2024-02-23 ENCOUNTER — TELEPHONE (OUTPATIENT)
Dept: UROLOGY | Facility: CLINIC | Age: 79
End: 2024-02-23
Payer: MEDICARE

## 2024-02-23 RX ORDER — VIBEGRON 75 MG/1
75 TABLET, FILM COATED ORAL DAILY
Qty: 30 TABLET | Refills: 11 | Status: SHIPPED | OUTPATIENT
Start: 2024-02-23

## 2024-02-23 NOTE — TELEPHONE ENCOUNTER
Patient notified that prescription was sent to updated pharmacy per her request by the provider.  CRISTINO, YASIR

## 2024-02-23 NOTE — TELEPHONE ENCOUNTER
Patient contacted and informed of below message from provider. Patient acknowledged and verbalized understanding.  YASIR GREGG    ----- Message from Pam Chang MD sent at 2/23/2024  7:03 AM CST -----  Please inform pt that the urine culture was negative for infection. No need for antibiotics at this time. Thanks.

## 2024-02-23 NOTE — TELEPHONE ENCOUNTER
----- Message from Devora Osorio LPN sent at 2/23/2024  9:54 AM CST -----  Good Morning Dr. Chang,  I spoke with patient she stated that the pharmacy on [facility] did not have Gemtesa and asked if you can send the script to [facility] - [place], [place] - [address]  [address] [facility] [address]  Phone: [phone] Fax: [phone]  Hours: Not open 24 hours    Thank you,   Devora

## 2024-02-26 ENCOUNTER — OFFICE VISIT (OUTPATIENT)
Dept: PRIMARY CARE CLINIC | Facility: CLINIC | Age: 79
End: 2024-02-26
Payer: MEDICARE

## 2024-02-26 ENCOUNTER — TELEPHONE (OUTPATIENT)
Dept: PRIMARY CARE CLINIC | Facility: CLINIC | Age: 79
End: 2024-02-26
Payer: MEDICARE

## 2024-02-26 DIAGNOSIS — I10 PRIMARY HYPERTENSION: ICD-10-CM

## 2024-02-26 DIAGNOSIS — I95.1 ORTHOSTATIC HYPOTENSION: Primary | ICD-10-CM

## 2024-02-26 PROCEDURE — 1160F RVW MEDS BY RX/DR IN RCRD: CPT | Mod: HCNC,CPTII,S$GLB, | Performed by: FAMILY MEDICINE

## 2024-02-26 PROCEDURE — 3074F SYST BP LT 130 MM HG: CPT | Mod: HCNC,CPTII,S$GLB, | Performed by: FAMILY MEDICINE

## 2024-02-26 PROCEDURE — 3078F DIAST BP <80 MM HG: CPT | Mod: HCNC,CPTII,S$GLB, | Performed by: FAMILY MEDICINE

## 2024-02-26 PROCEDURE — 99999 PR PBB SHADOW E&M-EST. PATIENT-LVL V: CPT | Mod: PBBFAC,HCNC,, | Performed by: FAMILY MEDICINE

## 2024-02-26 PROCEDURE — 1159F MED LIST DOCD IN RCRD: CPT | Mod: HCNC,CPTII,S$GLB, | Performed by: FAMILY MEDICINE

## 2024-02-26 PROCEDURE — 1101F PT FALLS ASSESS-DOCD LE1/YR: CPT | Mod: HCNC,CPTII,S$GLB, | Performed by: FAMILY MEDICINE

## 2024-02-26 PROCEDURE — 1126F AMNT PAIN NOTED NONE PRSNT: CPT | Mod: HCNC,CPTII,S$GLB, | Performed by: FAMILY MEDICINE

## 2024-02-26 PROCEDURE — 3288F FALL RISK ASSESSMENT DOCD: CPT | Mod: HCNC,CPTII,S$GLB, | Performed by: FAMILY MEDICINE

## 2024-02-26 PROCEDURE — 99214 OFFICE O/P EST MOD 30 MIN: CPT | Mod: HCNC,S$GLB,, | Performed by: FAMILY MEDICINE

## 2024-02-26 RX ORDER — LOSARTAN POTASSIUM 50 MG/1
TABLET ORAL
Start: 2024-02-26 | End: 2024-06-18 | Stop reason: SDUPTHER

## 2024-02-26 RX ORDER — COVID-19 VACCINE, MRNA 50 UG/.5ML
INJECTION, SUSPENSION INTRAMUSCULAR
COMMUNITY
Start: 2023-10-30 | End: 2024-02-26

## 2024-02-26 NOTE — PATIENT INSTRUCTIONS
Hold losartan until Thursday morning Feb 29 then start taking only 1/2 pill daily (only once daily--dose will be 25mg daily total dose)  Hold lasix for at least three days

## 2024-02-26 NOTE — TELEPHONE ENCOUNTER
Spoke to the patient. The patient was requesting to be seen earlier than 11:15AM. Dr. Tolbert does not have any available appointments before 11:15AM. Patient is not willing to see another provider. I informed her if she felt like she needed to be seen before 11:15AM she could go to an urgent care or ED, patient refused. She wants to keep her appointment at 11:15AM.

## 2024-02-26 NOTE — TELEPHONE ENCOUNTER
----- Message from Melvi Garcia sent at 2/26/2024  8:04 AM CST -----  Contact: Self  [phone]  Would like to receive medical advice.    Would they like a call back or a response via [facility]:  call back    Additional information:  Pt is requesting a sooner appt time today, because of bp.

## 2024-02-28 VITALS
SYSTOLIC BLOOD PRESSURE: 98 MMHG | BODY MASS INDEX: 29.65 KG/M2 | HEART RATE: 88 BPM | HEIGHT: 59 IN | WEIGHT: 147.06 LBS | TEMPERATURE: 98 F | OXYGEN SATURATION: 99 % | DIASTOLIC BLOOD PRESSURE: 54 MMHG | RESPIRATION RATE: 18 BRPM

## 2024-02-28 NOTE — PROGRESS NOTES
"      BP (!) 98/54 (BP Location: Left arm, Patient Position: Standing, BP Method: Medium (Manual))   Pulse 88   Temp 98 °F (36.7 °C)   Resp 18   Ht 4' 11" (1.499 m)   Wt 66.7 kg (147 lb 0.8 oz)   SpO2 99%   BMI 29.70 kg/m²       ===========    Chief Complaint: No chief complaint on file.          HPI    Francy Bain is a 78 y.o. female     here for    Significant lightheadedness.  Had great difficulty walking from her car up here.  Feels like she is going to pass out at times.  Thinks her blood pressure is running too high at home.  No chest pain shortness for breath focal neurological weakness.  Patient queried and denies any further complaints      Patient Active Problem List   Diagnosis    Rheumatoid arthritis involving multiple sites    Primary hypertension    Cognitive impairment    Glaucoma    Infected olecranon bursa    Osteopenia of multiple sites    History of stroke    Physical deconditioning    History of elbow replacement    Class 1 obesity due to excess calories with serious comorbidity and body mass index (BMI) of 30.0 to 30.9 in adult    Impaired functional mobility, balance, gait, and endurance    Elbow joint replacement status, right    GERD (gastroesophageal reflux disease)    Renal cyst, acquired, right    Chronic obstructive asthma    Chronic obstructive pulmonary disease, unspecified COPD type    History of arthroplasty of both wrists    Right hip pain    Immunocompromised state due to drug therapy    Aortic atherosclerosis    Recurrent major depressive disorder, in full remission    Situational anxiety    On corticosteroid therapy    Suspected sleep apnea    White matter disease    B12 deficiency    Immunodeficiency due to treatment with immunosuppressive medication    Tinea corporis       SURGICAL AND MEDICAL HISTORY: updated and reviewed.  Past Surgical History:   Procedure Laterality Date    ANKLE FUSION Bilateral     ELBOW ARTHROPLASTY      EYE SURGERY Right 03/2017    " cataract    JOINT REPLACEMENT      bilateral knee replacement; bilateral elbow and wrist replacement    KNEE ARTHROPLASTY      OPEN REDUCTION AND INTERNAL FIXATION (ORIF) OF FRACTURE OF ULNA Right 8/17/2021    Procedure: ORIF, FRACTURE, ULNA  AND MASSIVE ALLOGRAFT;  Surgeon: Tio Patel MD;  Location: [address];  Service: Orthopedics;  Laterality: Right;    REVISION OF TOTAL ARTHROPLASTY OF ELBOW Right 8/17/2021    Procedure: REVISION, TOTAL ARTHROPLASTY, ELBOW;  Surgeon: Tio Patel MD;  Location: [facility] OR [address] [place] [address];  Service: Orthopedics;  Laterality: Right;     ALLERGIES updated and reviewed.  Review of patient's allergies indicates:  No Known Allergies    CURRENT OUTPATIENT MEDICATIONS updated and reviewed    Current Outpatient Medications:     ABRYSVO 120 mcg/0.5 mL SolR vaccine, , Disp: , Rfl:     ascorbic acid, vitamin C, (VITAMIN C) 100 MG tablet, Take 100 mg by mouth once daily., Disp: , Rfl:     carboxymethylcellulose sodium (REFRESH TEARS OPHT), Apply to eye. Use as needed, Disp: , Rfl:     desloratadine (CLARINEX) 5 mg tablet, Take 5 mg by mouth once daily., Disp: , Rfl:     fluticasone furoate-vilanteroL (BREO ELLIPTA) 100-25 mcg/dose diskus inhaler, Inhale 1 puff into the lungs once daily. Controller, Disp: 60 each, Rfl: 11    furosemide (LASIX) 20 MG tablet, Take 1 tablet (20 mg total) by mouth once daily. FOR FLUID, Disp: 90 tablet, Rfl: 3    hydrocortisone 2.5 % cream, Do not apply morning of surgery, Disp: , Rfl:     hydrOXYzine HCL (ATARAX) 25 MG tablet, Take 1 tablet (25 mg total) by mouth 3 (three) times daily as needed for Anxiety., Disp: 90 tablet, Rfl: 3    ketoconazole (NIZORAL) 2 % cream, Apply 1 application  topically once daily. Apply to affected area, Disp: 60 g, Rfl: 5    ketoconazole (NIZORAL) 2 % shampoo, Shampoo, leave on 5 min then wash off and may use reg soap or shampoo. Use daily x 1 wk then TWICE A WEEK as needed, Disp: 120 mL, Rfl: 5    levocetirizine (XYZAL) 5  MG tablet, Take 5 mg by mouth every evening., Disp: , Rfl:     LORazepam (ATIVAN) 1 MG tablet, TAKE ONE TABLET BY MOUTH EVERY DAY AT BEDTIME AS NEEDED FOR ANXIETY OR sleep, Disp: , Rfl:     omeprazole (PRILOSEC) 20 MG capsule, Take 1 capsule (20 mg total) by mouth once daily., Disp: 90 capsule, Rfl: 3    predniSONE (DELTASONE) 2.5 MG tablet, One po daily, Disp: 90 tablet, Rfl: 3    traZODone (DESYREL) 50 MG tablet, Take 1 tablet (50 mg total) by mouth nightly as needed for Insomnia., Disp: 30 tablet, Rfl: 5    vibegron (GEMTESA) 75 mg Tab, Take 75 mg by mouth once daily., Disp: 30 tablet, Rfl: 11    VITAMIN D2 1,250 mcg (50,000 unit) capsule, Take 1 capsule (50,000 Units total) by mouth every 7 days., Disp: 12 capsule, Rfl: 3    albuterol (PROVENTIL) 2.5 mg /3 mL (0.083 %) nebulizer solution, Take 3 mLs (2.5 mg total) by nebulization every 6 (six) hours as needed for Wheezing. Rescue (Patient not taking: Reported on 5/22/2023), Disp: 90 mL, Rfl: 3    losartan (COZAAR) 50 MG tablet, 1/2 tab daily, Disp: , Rfl:     Review of Systems   Constitutional:  Negative for activity change, appetite change, chills, diaphoresis, fatigue, fever and unexpected weight change.   HENT:  Negative for congestion, ear discharge, ear pain, facial swelling, hearing loss, nosebleeds, postnasal drip, rhinorrhea, sinus pressure, sneezing, sore throat, tinnitus, trouble swallowing and voice change.    Eyes:  Negative for photophobia, pain, discharge, redness, itching and visual disturbance.   Respiratory:  Negative for cough, chest tightness, shortness of breath and wheezing.    Cardiovascular:  Negative for chest pain, palpitations and leg swelling.   Gastrointestinal:  Negative for abdominal distention, abdominal pain, anal bleeding, blood in stool, constipation, diarrhea, nausea, rectal pain and vomiting.   Endocrine: Negative for cold intolerance, heat intolerance, polydipsia, polyphagia and polyuria.   Genitourinary:  Negative for  "difficulty urinating, dysuria and flank pain.   Musculoskeletal:  Negative for arthralgias, back pain, joint swelling, myalgias and neck pain.   Skin:  Negative for rash.   Neurological:  Negative for dizziness, tremors, seizures, syncope, speech difficulty, weakness, light-headedness, numbness and headaches.   Psychiatric/Behavioral:  Negative for behavioral problems, confusion, decreased concentration, dysphoric mood, sleep disturbance and suicidal ideas. The patient is not nervous/anxious and is not hyperactive.        BP (!) 98/54 (BP Location: Left arm, Patient Position: Standing, BP Method: Medium (Manual))   Pulse 88   Temp 98 °F (36.7 °C)   Resp 18   Ht 4' 11" (1.499 m)   Wt 66.7 kg (147 lb 0.8 oz)   SpO2 99%   BMI 29.70 kg/m²   Physical Exam  Vitals and nursing note reviewed.   Constitutional:       General: She is not in acute distress.     Appearance: Normal appearance. She is well-developed. She is not ill-appearing or toxic-appearing.   HENT:      Head: Normocephalic and atraumatic.      Right Ear: Tympanic membrane, ear canal and external ear normal.      Left Ear: Tympanic membrane, ear canal and external ear normal.      Nose: Nose normal.      Mouth/Throat:      Lips: Pink.      Mouth: Mucous membranes are moist.      Pharynx: No oropharyngeal exudate or posterior oropharyngeal erythema.   Eyes:      General: No scleral icterus.        Right eye: No discharge.         Left eye: No discharge.      Extraocular Movements: Extraocular movements intact.      Conjunctiva/sclera: Conjunctivae normal.   Cardiovascular:      Rate and Rhythm: Normal rate and regular rhythm.      Pulses: Normal pulses.      Heart sounds: Normal heart sounds. No murmur heard.  Pulmonary:      Effort: Pulmonary effort is normal. No respiratory distress.      Breath sounds: Normal breath sounds. No wheezing or rales.   Abdominal:      General: Bowel sounds are normal. There is no distension.      Palpations: Abdomen is " soft. There is no mass.      Tenderness: There is no abdominal tenderness. There is no right CVA tenderness, left CVA tenderness, guarding or rebound.      Hernia: No hernia is present.   Musculoskeletal:      Cervical back: Normal range of motion and neck supple. No rigidity or tenderness.   Lymphadenopathy:      Cervical: No cervical adenopathy.   Skin:     General: Skin is warm and dry.   Neurological:      General: No focal deficit present.      Mental Status: She is alert. Mental status is at baseline.   Psychiatric:         Mood and Affect: Mood normal.         Behavior: Behavior normal. Behavior is cooperative.         ASSESSMENT/PLAN    Diagnoses and all orders for this visit:    Orthostatic hypotension    Primary hypertension  -     losartan (COZAAR) 50 MG tablet; 1/2 tab daily    See above.    Patient very symptomatic with standing up.  Blood pressure values dropped tremendously systolic and diastolic.    Heart rate increased.      See below     Hold losartan until Thursday morning Feb 29 then start taking only 1/2 pill daily (only once daily--dose will be 25mg daily total dose)  Hold lasix for at least three days    See me again in about a week.    Most recent some lab results reviewed with patient.  Any new prescription medications gone over in detail including reason for taking the medication, most common possible side effects and possible costs, etcetera.    Chronic conditions updated. Other than changes or additions as above, cont current medications and maintain follow-up with specialists if indicated.     Mikey Tolbert MD  A dictation device was used to produce this document. Use of such devices sometimes results in grammatical errors or replacement of words that sound similarly.

## 2024-03-04 ENCOUNTER — TELEPHONE (OUTPATIENT)
Dept: UROLOGY | Facility: CLINIC | Age: 79
End: 2024-03-04
Payer: MEDICARE

## 2024-03-04 NOTE — TELEPHONE ENCOUNTER
Gemtesa was sent to [facility] per her request on 2/23/24. She should contact her pharmacy for updates on their progress of filling it.

## 2024-03-07 ENCOUNTER — OFFICE VISIT (OUTPATIENT)
Dept: PRIMARY CARE CLINIC | Facility: CLINIC | Age: 79
End: 2024-03-07
Payer: MEDICARE

## 2024-03-07 VITALS
HEART RATE: 83 BPM | HEIGHT: 59 IN | DIASTOLIC BLOOD PRESSURE: 82 MMHG | WEIGHT: 148.38 LBS | OXYGEN SATURATION: 99 % | SYSTOLIC BLOOD PRESSURE: 124 MMHG | BODY MASS INDEX: 29.91 KG/M2 | RESPIRATION RATE: 18 BRPM | TEMPERATURE: 98 F

## 2024-03-07 DIAGNOSIS — I10 PRIMARY HYPERTENSION: ICD-10-CM

## 2024-03-07 DIAGNOSIS — I95.1 ORTHOSTATIC HYPOTENSION: Primary | Chronic | ICD-10-CM

## 2024-03-07 DIAGNOSIS — R21 RASH: ICD-10-CM

## 2024-03-07 PROCEDURE — 99214 OFFICE O/P EST MOD 30 MIN: CPT | Mod: HCNC,S$GLB,, | Performed by: FAMILY MEDICINE

## 2024-03-07 PROCEDURE — 3079F DIAST BP 80-89 MM HG: CPT | Mod: HCNC,CPTII,S$GLB, | Performed by: FAMILY MEDICINE

## 2024-03-07 PROCEDURE — 1101F PT FALLS ASSESS-DOCD LE1/YR: CPT | Mod: HCNC,CPTII,S$GLB, | Performed by: FAMILY MEDICINE

## 2024-03-07 PROCEDURE — 1159F MED LIST DOCD IN RCRD: CPT | Mod: HCNC,CPTII,S$GLB, | Performed by: FAMILY MEDICINE

## 2024-03-07 PROCEDURE — 1126F AMNT PAIN NOTED NONE PRSNT: CPT | Mod: HCNC,CPTII,S$GLB, | Performed by: FAMILY MEDICINE

## 2024-03-07 PROCEDURE — 1160F RVW MEDS BY RX/DR IN RCRD: CPT | Mod: HCNC,CPTII,S$GLB, | Performed by: FAMILY MEDICINE

## 2024-03-07 PROCEDURE — 99999 PR PBB SHADOW E&M-EST. PATIENT-LVL IV: CPT | Mod: PBBFAC,HCNC,, | Performed by: FAMILY MEDICINE

## 2024-03-07 PROCEDURE — 3288F FALL RISK ASSESSMENT DOCD: CPT | Mod: HCNC,CPTII,S$GLB, | Performed by: FAMILY MEDICINE

## 2024-03-07 PROCEDURE — 3074F SYST BP LT 130 MM HG: CPT | Mod: HCNC,CPTII,S$GLB, | Performed by: FAMILY MEDICINE

## 2024-03-07 RX ORDER — NYSTATIN 100000 U/G
CREAM TOPICAL 2 TIMES DAILY
Qty: 30 G | Refills: 5 | Status: SHIPPED | OUTPATIENT
Start: 2024-03-07 | End: 2024-06-18 | Stop reason: SDUPTHER

## 2024-03-07 NOTE — PATIENT INSTRUCTIONS
Resume Lasix (furosemide) as needed --as written on bottle  Cont losartan at ONE-HALF tablet of the losartan 50mg tablet--total daily dose is 25mg    Call us when you need a new Rx

## 2024-03-11 NOTE — PROGRESS NOTES
"      /82 (BP Location: Left arm, Patient Position: Sitting)   Pulse 83   Temp 98 °F (36.7 °C)   Resp 18   Ht 4' 11" (1.499 m)   Wt 67.3 kg (148 lb 5.9 oz)   SpO2 99%   BMI 29.97 kg/m²       ===========    Chief Complaint: No chief complaint on file.          [facility]    Francy Bain is a 78 y.o. female     here for    Follow-up regarding orthostatic hypotension.  Is feeling much less lightheaded after adjustment of blood pressure medications.      Patient queried and denies any further complaints      Patient Active Problem List   Diagnosis    Rheumatoid arthritis involving multiple sites    Primary hypertension    Cognitive impairment    Glaucoma    Infected olecranon bursa    Osteopenia of multiple sites    History of stroke    Physical deconditioning    History of elbow replacement    Class 1 obesity due to excess calories with serious comorbidity and body mass index (BMI) of 30.0 to 30.9 in adult    Impaired functional mobility, balance, gait, and endurance    Elbow joint replacement status, right    GERD (gastroesophageal reflux disease)    Renal cyst, acquired, right    Chronic obstructive asthma    Chronic obstructive pulmonary disease, unspecified COPD type    History of arthroplasty of both wrists    Right hip pain    Immunocompromised state due to drug therapy    Aortic atherosclerosis    Recurrent major depressive disorder, in full remission    Situational anxiety    On corticosteroid therapy    Suspected sleep apnea    White matter disease    B12 deficiency    Immunodeficiency due to treatment with immunosuppressive medication    Tinea corporis       SURGICAL AND MEDICAL HISTORY: updated and reviewed.  Past Surgical History:   Procedure Laterality Date    ANKLE FUSION Bilateral     ELBOW ARTHROPLASTY      EYE SURGERY Right 03/2017    cataract    JOINT REPLACEMENT      bilateral knee replacement; bilateral elbow and wrist replacement    KNEE ARTHROPLASTY      OPEN REDUCTION AND " INTERNAL FIXATION (ORIF) OF FRACTURE OF ULNA Right 8/17/2021    Procedure: ORIF, FRACTURE, ULNA  AND MASSIVE ALLOGRAFT;  Surgeon: Tio Patel MD;  Location: [facility] OR [address] [place] [address];  Service: Orthopedics;  Laterality: Right;    REVISION OF TOTAL ARTHROPLASTY OF ELBOW Right 8/17/2021    Procedure: REVISION, TOTAL ARTHROPLASTY, ELBOW;  Surgeon: Tio Patel MD;  [facility];  Service: Orthopedics;  Laterality: Right;     ALLERGIES updated and reviewed.  Review of patient's allergies indicates:  No Known Allergies    CURRENT OUTPATIENT MEDICATIONS updated and reviewed    Current Outpatient Medications:     ABRYSVO 120 mcg/0.5 mL SolR vaccine, , Disp: , Rfl:     ascorbic acid, vitamin C, (VITAMIN C) 100 MG tablet, Take 100 mg by mouth once daily., Disp: , Rfl:     carboxymethylcellulose sodium (REFRESH TEARS OPHT), Apply to eye. Use as needed, Disp: , Rfl:     desloratadine (CLARINEX) 5 mg tablet, Take 5 mg by mouth once daily., Disp: , Rfl:     fluticasone furoate-vilanteroL (BREO ELLIPTA) 100-25 mcg/dose diskus inhaler, Inhale 1 puff into the lungs once daily. Controller, Disp: 60 each, Rfl: 11    furosemide (LASIX) 20 MG tablet, Take 1 tablet (20 mg total) by mouth once daily. FOR FLUID, Disp: 90 tablet, Rfl: 3    hydrOXYzine HCL (ATARAX) 25 MG tablet, Take 1 tablet (25 mg total) by mouth 3 (three) times daily as needed for Anxiety., Disp: 90 tablet, Rfl: 3    ketoconazole (NIZORAL) 2 % cream, Apply 1 application  topically once daily. Apply to affected area, Disp: 60 g, Rfl: 5    ketoconazole (NIZORAL) 2 % shampoo, Shampoo, leave on 5 min then wash off and may use reg soap or shampoo. Use daily x 1 wk then TWICE A WEEK as needed, Disp: 120 mL, Rfl: 5    levocetirizine (XYZAL) 5 MG tablet, Take 5 mg by mouth every evening., Disp: , Rfl:     LORazepam (ATIVAN) 1 MG tablet, TAKE ONE TABLET BY MOUTH EVERY DAY AT BEDTIME AS NEEDED FOR ANXIETY OR sleep, Disp: , Rfl:     losartan (COZAAR) 50 MG tablet, 1/2  tab daily, Disp: , Rfl:     omeprazole (PRILOSEC) 20 MG capsule, Take 1 capsule (20 mg total) by mouth once daily., Disp: 90 capsule, Rfl: 3    predniSONE (DELTASONE) 2.5 MG tablet, One po daily, Disp: 90 tablet, Rfl: 3    traZODone (DESYREL) 50 MG tablet, Take 1 tablet (50 mg total) by mouth nightly as needed for Insomnia., Disp: 30 tablet, Rfl: 5    vibegron (GEMTESA) 75 mg Tab, Take 75 mg by mouth once daily., Disp: 30 tablet, Rfl: 11    VITAMIN D2 1,250 mcg (50,000 unit) capsule, Take 1 capsule (50,000 Units total) by mouth every 7 days., Disp: 12 capsule, Rfl: 3    albuterol (PROVENTIL) 2.5 mg /3 mL (0.083 %) nebulizer solution, Take 3 mLs (2.5 mg total) by nebulization every 6 (six) hours as needed for Wheezing. Rescue (Patient not taking: Reported on 5/22/2023), Disp: 90 mL, Rfl: 3    hydrocortisone 2.5 % cream, Do not apply morning of surgery, Disp: , Rfl:     nystatin (MYCOSTATIN) cream, Apply topically 2 (two) times daily., Disp: 30 g, Rfl: 5    Review of Systems   Constitutional:  Negative for activity change, appetite change, chills, diaphoresis, fatigue, fever and unexpected weight change.   HENT:  Negative for congestion, ear discharge, ear pain, facial swelling, hearing loss, nosebleeds, postnasal drip, rhinorrhea, sinus pressure, sneezing, sore throat, tinnitus, trouble swallowing and voice change.    Eyes:  Negative for photophobia, pain, discharge, redness, itching and visual disturbance.   Respiratory:  Negative for cough, chest tightness, shortness of breath and wheezing.    Cardiovascular:  Negative for chest pain, palpitations and leg swelling.   Gastrointestinal:  Negative for abdominal distention, abdominal pain, anal bleeding, blood in stool, constipation, diarrhea, nausea, rectal pain and vomiting.   Endocrine: Negative for cold intolerance, heat intolerance, polydipsia, polyphagia and polyuria.   Genitourinary:  Negative for difficulty urinating, dysuria and flank pain.   Musculoskeletal:   "Negative for arthralgias, back pain, joint swelling, myalgias and neck pain.   Skin:  Negative for rash.   Neurological:  Negative for dizziness, tremors, seizures, syncope, speech difficulty, weakness, light-headedness, numbness and headaches.   Psychiatric/Behavioral:  Negative for behavioral problems, confusion, decreased concentration, dysphoric mood, sleep disturbance and suicidal ideas. The patient is not nervous/anxious and is not hyperactive.        /82 (BP Location: Left arm, Patient Position: Sitting)   Pulse 83   Temp 98 °F (36.7 °C)   Resp 18   Ht 4' 11" (1.499 m)   Wt 67.3 kg (148 lb 5.9 oz)   SpO2 99%   BMI 29.97 kg/m²   Physical Exam  Vitals and nursing note reviewed.   Constitutional:       General: She is not in acute distress.     Appearance: Normal appearance. She is well-developed. She is not ill-appearing or toxic-appearing.   HENT:      Head: Normocephalic and atraumatic.      Right Ear: Tympanic membrane, ear canal and external ear normal.      Left Ear: Tympanic membrane, ear canal and external ear normal.      Nose: Nose normal.      Mouth/Throat:      Lips: Pink.      Mouth: Mucous membranes are moist.      Pharynx: No oropharyngeal exudate or posterior oropharyngeal erythema.   Eyes:      General: No scleral icterus.        Right eye: No discharge.         Left eye: No discharge.      Extraocular Movements: Extraocular movements intact.      Conjunctiva/sclera: Conjunctivae normal.   Cardiovascular:      Rate and Rhythm: Normal rate and regular rhythm.      Pulses: Normal pulses.      Heart sounds: Normal heart sounds. No murmur heard.  Pulmonary:      Effort: Pulmonary effort is normal. No respiratory distress.      Breath sounds: Normal breath sounds. No wheezing or rales.   Abdominal:      General: Bowel sounds are normal. There is no distension.      Palpations: Abdomen is soft. There is no mass.      Tenderness: There is no abdominal tenderness. There is no right CVA " tenderness, left CVA tenderness, guarding or rebound.      Hernia: No hernia is present.   Musculoskeletal:      Cervical back: Normal range of motion and neck supple. No rigidity or tenderness.   Lymphadenopathy:      Cervical: No cervical adenopathy.   Skin:     General: Skin is warm and dry.   Neurological:      General: No focal deficit present.      Mental Status: She is alert. Mental status is at baseline.   Psychiatric:         Mood and Affect: Mood normal.         Behavior: Behavior normal. Behavior is cooperative.         ASSESSMENT/PLAN    1. Orthostatic hypotension  Comments:  Improving.  Monitor.  Stay hydrated.  See 2. Below  2. Primary hypertension  Continue losartan at 1/2 tablet of a 50 mg tablet 425 mg daily.  Follow-up in 3 months.  Monitor.    3. Rash    Other orders  -     nystatin (MYCOSTATIN) cream; Apply topically 2 (two) times daily.  Dispense: 30 g; Refill: 5              Most recent some lab results reviewed with patient.  Any new prescription medications gone over in detail including reason for taking the medication, most common possible side effects and possible costs, etcetera.    Chronic conditions updated. Other than changes or additions as above, cont current medications and maintain follow-up with specialists if indicated.     Mikey Tolbert MD  A dictation device was used to produce this document. Use of such devices sometimes results in grammatical errors or replacement of words that sound similarly.

## 2024-03-14 ENCOUNTER — TELEPHONE (OUTPATIENT)
Dept: RHEUMATOLOGY | Facility: CLINIC | Age: 79
End: 2024-03-14
Payer: MEDICARE

## 2024-03-14 NOTE — TELEPHONE ENCOUNTER
----- Message from Belinda Barreto sent at 3/14/2024 12:55 PM CDT -----  Regarding: Refill  Contact: Ansley at [phone]  Rx Refill/Request     Is this a Refill or New Rx:  refill    Rx Name and Strength:    KEVZARA 200 mg/1.14 mL Arcenio    Preferred Pharmacy with phone number:    [facility] - [place], [place] - [address]  [address] [address]  Phone: [phone] Fax: [phone]          Communication Preference: Ansley at [phone]    Additional Information: Ansley calling from [facility] pharmacy is calling for new script

## 2024-03-26 ENCOUNTER — TELEPHONE (OUTPATIENT)
Dept: RHEUMATOLOGY | Facility: CLINIC | Age: 79
End: 2024-03-26
Payer: MEDICARE

## 2024-03-26 NOTE — TELEPHONE ENCOUNTER
Called patient no voicemail set up. ----- Message from Carmen Figueroa DO sent at 3/26/2024 11:36 AM CDT -----  Patient has not had labs in 1 year. She needs standing labs now before I can refill her medications. Please call her and schedule.

## 2024-03-26 NOTE — TELEPHONE ENCOUNTER
Patient requesting refill of Kevzara. Have asked patient to get labs several times but has not done so. Last labs were in June 2023. Called patient today to try to speak with her but patient did not  and voicemail is full. Will not refill until labs are completed.

## 2024-04-01 ENCOUNTER — LAB VISIT (OUTPATIENT)
Dept: LAB | Facility: HOSPITAL | Age: 79
End: 2024-04-01
Payer: MEDICARE

## 2024-04-01 DIAGNOSIS — M05.9 RHEUMATOID ARTHRITIS WITH POSITIVE RHEUMATOID FACTOR, INVOLVING UNSPECIFIED SITE: ICD-10-CM

## 2024-04-01 LAB
ALBUMIN SERPL BCP-MCNC: 3.8 G/DL (ref 3.5–5.2)
ALP SERPL-CCNC: 106 U/L (ref 55–135)
ALT SERPL W/O P-5'-P-CCNC: 15 U/L (ref 10–44)
ANION GAP SERPL CALC-SCNC: 11 MMOL/L (ref 8–16)
AST SERPL-CCNC: 22 U/L (ref 10–40)
BASOPHILS # BLD AUTO: 0.03 K/UL (ref 0–0.2)
BASOPHILS NFR BLD: 0.5 % (ref 0–1.9)
BILIRUB SERPL-MCNC: 0.7 MG/DL (ref 0.1–1)
BUN SERPL-MCNC: 15 MG/DL (ref 8–23)
CALCIUM SERPL-MCNC: 9 MG/DL (ref 8.7–10.5)
CHLORIDE SERPL-SCNC: 108 MMOL/L (ref 95–110)
CO2 SERPL-SCNC: 22 MMOL/L (ref 23–29)
CREAT SERPL-MCNC: 0.8 MG/DL (ref 0.5–1.4)
CRP SERPL-MCNC: <0.3 MG/L (ref 0–8.2)
DIFFERENTIAL METHOD BLD: ABNORMAL
EOSINOPHIL # BLD AUTO: 0.2 K/UL (ref 0–0.5)
EOSINOPHIL NFR BLD: 2.6 % (ref 0–8)
ERYTHROCYTE [DISTWIDTH] IN BLOOD BY AUTOMATED COUNT: 13.8 % (ref 11.5–14.5)
ERYTHROCYTE [SEDIMENTATION RATE] IN BLOOD BY PHOTOMETRIC METHOD: 5 MM/HR (ref 0–36)
EST. GFR  (NO RACE VARIABLE): >60 ML/MIN/1.73 M^2
GLUCOSE SERPL-MCNC: 79 MG/DL (ref 70–110)
HCT VFR BLD AUTO: 49.6 % (ref 37–48.5)
HGB BLD-MCNC: 15.4 G/DL (ref 12–16)
IMM GRANULOCYTES # BLD AUTO: 0.01 K/UL (ref 0–0.04)
IMM GRANULOCYTES NFR BLD AUTO: 0.2 % (ref 0–0.5)
LYMPHOCYTES # BLD AUTO: 2.4 K/UL (ref 1–4.8)
LYMPHOCYTES NFR BLD: 37.2 % (ref 18–48)
MCH RBC QN AUTO: 28.6 PG (ref 27–31)
MCHC RBC AUTO-ENTMCNC: 31 G/DL (ref 32–36)
MCV RBC AUTO: 92 FL (ref 82–98)
MONOCYTES # BLD AUTO: 0.7 K/UL (ref 0.3–1)
MONOCYTES NFR BLD: 11.2 % (ref 4–15)
NEUTROPHILS # BLD AUTO: 3.2 K/UL (ref 1.8–7.7)
NEUTROPHILS NFR BLD: 48.3 % (ref 38–73)
NRBC BLD-RTO: 0 /100 WBC
PLATELET # BLD AUTO: 154 K/UL (ref 150–450)
PMV BLD AUTO: 11.3 FL (ref 9.2–12.9)
POTASSIUM SERPL-SCNC: 4.9 MMOL/L (ref 3.5–5.1)
PROT SERPL-MCNC: 7 G/DL (ref 6–8.4)
RBC # BLD AUTO: 5.38 M/UL (ref 4–5.4)
SODIUM SERPL-SCNC: 141 MMOL/L (ref 136–145)
WBC # BLD AUTO: 6.54 K/UL (ref 3.9–12.7)

## 2024-04-01 PROCEDURE — 86140 C-REACTIVE PROTEIN: CPT | Mod: HCNC | Performed by: INTERNAL MEDICINE

## 2024-04-01 PROCEDURE — 80053 COMPREHEN METABOLIC PANEL: CPT | Mod: HCNC | Performed by: INTERNAL MEDICINE

## 2024-04-01 PROCEDURE — 85025 COMPLETE CBC W/AUTO DIFF WBC: CPT | Mod: HCNC | Performed by: INTERNAL MEDICINE

## 2024-04-01 PROCEDURE — 85652 RBC SED RATE AUTOMATED: CPT | Mod: HCNC | Performed by: INTERNAL MEDICINE

## 2024-04-01 PROCEDURE — 36415 COLL VENOUS BLD VENIPUNCTURE: CPT | Mod: HCNC,PN | Performed by: INTERNAL MEDICINE

## 2024-04-09 RX ORDER — HYDROXYZINE HYDROCHLORIDE 25 MG/1
25 TABLET, FILM COATED ORAL
Qty: 90 TABLET | Refills: 11 | Status: SHIPPED | OUTPATIENT
Start: 2024-04-09

## 2024-04-11 ENCOUNTER — TELEPHONE (OUTPATIENT)
Dept: RHEUMATOLOGY | Facility: CLINIC | Age: 79
End: 2024-04-11
Payer: MEDICARE

## 2024-04-11 NOTE — TELEPHONE ENCOUNTER
Called patient to discuss labs. Has not received her Kevzara but would like to discuss other options for medications due to cost. She opted to keep appointment with Dr. Ham instead of switching back to Dr. Ghosh. Will not refill medications so that options can be discussed with new provider

## 2024-04-18 ENCOUNTER — OFFICE VISIT (OUTPATIENT)
Dept: RHEUMATOLOGY | Facility: CLINIC | Age: 79
End: 2024-04-18
Payer: MEDICARE

## 2024-04-18 VITALS
BODY MASS INDEX: 29.95 KG/M2 | HEART RATE: 89 BPM | SYSTOLIC BLOOD PRESSURE: 146 MMHG | HEIGHT: 59 IN | DIASTOLIC BLOOD PRESSURE: 84 MMHG | WEIGHT: 148.56 LBS

## 2024-04-18 DIAGNOSIS — D84.821 IMMUNODEFICIENCY DUE TO TREATMENT WITH IMMUNOSUPPRESSIVE MEDICATION: ICD-10-CM

## 2024-04-18 DIAGNOSIS — M05.79 RHEUMATOID ARTHRITIS INVOLVING MULTIPLE SITES WITH POSITIVE RHEUMATOID FACTOR: Primary | ICD-10-CM

## 2024-04-18 DIAGNOSIS — Z79.899 IMMUNODEFICIENCY DUE TO TREATMENT WITH IMMUNOSUPPRESSIVE MEDICATION: ICD-10-CM

## 2024-04-18 DIAGNOSIS — Z79.52 ON CORTICOSTEROID THERAPY: ICD-10-CM

## 2024-04-18 DIAGNOSIS — M15.9 PRIMARY OSTEOARTHRITIS INVOLVING MULTIPLE JOINTS: ICD-10-CM

## 2024-04-18 PROBLEM — M15.0 PRIMARY OSTEOARTHRITIS INVOLVING MULTIPLE JOINTS: Status: ACTIVE | Noted: 2024-04-18

## 2024-04-18 PROCEDURE — 99215 OFFICE O/P EST HI 40 MIN: CPT | Mod: HCNC,S$GLB,, | Performed by: INTERNAL MEDICINE

## 2024-04-18 PROCEDURE — 1160F RVW MEDS BY RX/DR IN RCRD: CPT | Mod: HCNC,CPTII,S$GLB, | Performed by: INTERNAL MEDICINE

## 2024-04-18 PROCEDURE — 3079F DIAST BP 80-89 MM HG: CPT | Mod: HCNC,CPTII,S$GLB, | Performed by: INTERNAL MEDICINE

## 2024-04-18 PROCEDURE — 1125F AMNT PAIN NOTED PAIN PRSNT: CPT | Mod: HCNC,CPTII,S$GLB, | Performed by: INTERNAL MEDICINE

## 2024-04-18 PROCEDURE — 99999 PR PBB SHADOW E&M-EST. PATIENT-LVL IV: CPT | Mod: PBBFAC,HCNC,, | Performed by: INTERNAL MEDICINE

## 2024-04-18 PROCEDURE — 1159F MED LIST DOCD IN RCRD: CPT | Mod: HCNC,CPTII,S$GLB, | Performed by: INTERNAL MEDICINE

## 2024-04-18 PROCEDURE — 3077F SYST BP >= 140 MM HG: CPT | Mod: HCNC,CPTII,S$GLB, | Performed by: INTERNAL MEDICINE

## 2024-04-18 RX ORDER — SARILUMAB 200 MG/1.14ML
200 INJECTION, SOLUTION SUBCUTANEOUS
Qty: 2 EACH | Refills: 6 | Status: SHIPPED | OUTPATIENT
Start: 2024-04-18 | End: 2024-04-25 | Stop reason: SDUPTHER

## 2024-04-21 NOTE — PROGRESS NOTES
History of present illness:  78-year-old female was diagnosed as having rheumatoid arthritis in 1976 she was originally followed by Dr. Allen until his FDC.  She then was followed by Dr. Watson.  She was seen 1 year ago by Gunner Figueroa/Augie.    Her arthritis started initially in her right elbow.  Then spread all over.  It is been constant.  She had previously been treated with methotrexate, gold, leflunomide, Plaquenil, sulfasalazine.  Most recently she has been on Kevzara.  It has been helping.  Her last shot was 2 weeks ago.  She has been on prednisone 2.5 mg every other day.    She has had no recent joint pain or swelling.  She has not felt a strong since she is cut down on the prednisone and is also lightheaded.  She has had night sweats but no fevers she is occasional rash under her breasts.  She has had some spots on her legs.  She has had no headache.  She is occasional conjunctivitis.  She has no oral ulcers.  She complains of dry eye and mouth.  She has no Raynaud's phenomena, pleurisy, chronic or bloody diarrhea, vaginal discharge or ulcers.  She has paresthesias in her feet.  She has no thrombophlebitis.  She denies other recent medical problems.      Physical examination:  Skin:  No rashes   ENT:  Decreased tears but adequate saliva.  No conjunctivitis or oral ulcers.    Chest: Clear to auscultation  Cardiac:  No murmurs, gallops, rubs   Abdomen:  No organomegaly or masses.  No tenderness to palpation   Extremities:  No pedal edema  Musculoskeletal:  She has no range of motion of the cervical spine   Shoulders have full range of motion without pain on range of motion.    She is postoperative changes in the elbows and wrists.  She has essentially no range of motion at these joints.  She has no synovitis.  Small joints of the hands have no synovitis.    Lumbar spine is unremarkable.    She has decreased abduction of the right hip.  She otherwise has full range of motion of both hips.    She is  postoperative changes in the knees.  She has no swelling.  The ankles are fused.  There is no swelling.  Small joints the feet are unremarkable.  Laboratory:  She would normal sed rate and CRP     Assessment:  No evidence of active rheumatoid arthritis     Plans:  Continue Kevzara as before.  Return in 6 months.

## 2024-04-25 DIAGNOSIS — M05.79 RHEUMATOID ARTHRITIS INVOLVING MULTIPLE SITES WITH POSITIVE RHEUMATOID FACTOR: Primary | ICD-10-CM

## 2024-04-25 RX ORDER — SARILUMAB 200 MG/1.14ML
200 INJECTION, SOLUTION SUBCUTANEOUS
Qty: 2 EACH | Refills: 6 | Status: SHIPPED | OUTPATIENT
Start: 2024-04-25

## 2024-05-29 ENCOUNTER — OFFICE VISIT (OUTPATIENT)
Dept: UROLOGY | Facility: CLINIC | Age: 79
End: 2024-05-29
Attending: UROLOGY
Payer: MEDICARE

## 2024-05-29 VITALS
HEART RATE: 84 BPM | HEIGHT: 59 IN | DIASTOLIC BLOOD PRESSURE: 79 MMHG | WEIGHT: 148.56 LBS | BODY MASS INDEX: 29.95 KG/M2 | SYSTOLIC BLOOD PRESSURE: 182 MMHG

## 2024-05-29 DIAGNOSIS — N28.1 COMPLEX RENAL CYST: ICD-10-CM

## 2024-05-29 DIAGNOSIS — R35.1 NOCTURIA: ICD-10-CM

## 2024-05-29 DIAGNOSIS — N32.81 OVERACTIVE BLADDER: Primary | ICD-10-CM

## 2024-05-29 PROCEDURE — 1101F PT FALLS ASSESS-DOCD LE1/YR: CPT | Mod: HCNC,CPTII,S$GLB, | Performed by: UROLOGY

## 2024-05-29 PROCEDURE — 1126F AMNT PAIN NOTED NONE PRSNT: CPT | Mod: HCNC,CPTII,S$GLB, | Performed by: UROLOGY

## 2024-05-29 PROCEDURE — 3078F DIAST BP <80 MM HG: CPT | Mod: HCNC,CPTII,S$GLB, | Performed by: UROLOGY

## 2024-05-29 PROCEDURE — 1159F MED LIST DOCD IN RCRD: CPT | Mod: HCNC,CPTII,S$GLB, | Performed by: UROLOGY

## 2024-05-29 PROCEDURE — 3077F SYST BP >= 140 MM HG: CPT | Mod: HCNC,CPTII,S$GLB, | Performed by: UROLOGY

## 2024-05-29 PROCEDURE — 3288F FALL RISK ASSESSMENT DOCD: CPT | Mod: HCNC,CPTII,S$GLB, | Performed by: UROLOGY

## 2024-05-29 PROCEDURE — 99214 OFFICE O/P EST MOD 30 MIN: CPT | Mod: HCNC,S$GLB,, | Performed by: UROLOGY

## 2024-05-29 PROCEDURE — 1160F RVW MEDS BY RX/DR IN RCRD: CPT | Mod: HCNC,CPTII,S$GLB, | Performed by: UROLOGY

## 2024-05-29 NOTE — PROGRESS NOTES
"  Subjective:       Francy Bain is a 79 y.o. female who is an established patient with [facility] NP, though new to me was seen  for evaluation of OAB.      Last saws  NP 4/2023 for minimally complex R renal cyst. Recommended for annual surveillance (ordered, due 4/2024). Denied LUTS at that time.    Returns now with c/o OAB - mainly urinary frequency and nocturia q2h (x 4). Present x 2 months - was on diuretic but stopped this. +urgency with UUI - can be full bladder, occurs 2-3x weekly. UUI worse at night. Denies dysuria.  Strong stream. Denies SOHA, hematuria. +LE edema, worse in PM. No known LINA though +snoring. Wakes up SOB. +glaucoma, cognitive impairment.    Significant HTN noted today. Denies HA, blurry vision.    PVR (bladder scan) today - 10cc    5/29/2024  Given Gemtesa at last visit. Less urgency and UI. Less frequency.   JULISSA for cyst surveillance not done (was cancelled in April due to severe weather).       The following portions of the patient's history were reviewed and updated as appropriate: allergies, current medications, past family history, past medical history, past social history, past surgical history and problem list.    Review of Systems  12 point review of systems completed. Pertinent positive and negatives listed in HPI        Objective:    Vitals: BP (!) 182/79   Pulse 84   Ht 4' 11" (1.499 m)   Wt 67.4 kg (148 lb 9.4 oz)   BMI 30.01 kg/m²     Physical Exam   General: well developed, well nourished in no acute distress  Head: normocephalic, atraumatic  Neck: no obvious enlargement of thyroid  HEENT: EOMI, mucus membranes moist, sclera anicteric, no hearing impairment  Lungs: symmetric expansion, non-labored breathing  Neuro: alert and oriented x 3, no gross deficits  Psych: normal judgment and insight, normal mood/affect and non-anxious  Genitourinary: deferred      Lab Review   Urine analysis today in clinic shows - neg    Lab Results   Component Value Date    WBC 6.54 " 04/01/2024    HGB 15.4 04/01/2024    HCT 49.6 (H) 04/01/2024    MCV 92 04/01/2024     04/01/2024     Lab Results   Component Value Date    CREATININE 0.8 04/01/2024    BUN 15 04/01/2024         Imaging  Images and reports were personally reviewed by me and discussed with patient  JULISSA reviewed       Assessment/Plan:      1. Nocturia    - Discussed multifactorial issue   - Take diuretic in AM   - +LE edema - will make more nocturia   - Concern for LINA - known snoring, waking SOB. Consider sleep study. F/u PCP.   - Avoid ACh due to age, cognitive issues, glaucoma, etc. Avoid Myrbetriq due to HTN.   - Trial Gemtesa for urgency symptoms - improving     2. Overactive bladder    - +urgency/UUI   - Trial Gemtesa - doing better   - UCx today     3. Complex renal cyst    - Due for surveillance, will reschedule     Encouraged close f/u of HTN with PCP.       Follow up in 6 mths with JULISSA

## 2024-06-18 ENCOUNTER — LAB VISIT (OUTPATIENT)
Dept: LAB | Facility: HOSPITAL | Age: 79
End: 2024-06-18
Attending: FAMILY MEDICINE
Payer: MEDICARE

## 2024-06-18 ENCOUNTER — OFFICE VISIT (OUTPATIENT)
Dept: PRIMARY CARE CLINIC | Facility: CLINIC | Age: 79
End: 2024-06-18
Payer: MEDICARE

## 2024-06-18 VITALS
BODY MASS INDEX: 29.82 KG/M2 | SYSTOLIC BLOOD PRESSURE: 154 MMHG | HEART RATE: 84 BPM | WEIGHT: 147.94 LBS | HEIGHT: 59 IN | DIASTOLIC BLOOD PRESSURE: 78 MMHG | OXYGEN SATURATION: 95 %

## 2024-06-18 DIAGNOSIS — M15.9 PRIMARY OSTEOARTHRITIS INVOLVING MULTIPLE JOINTS: ICD-10-CM

## 2024-06-18 DIAGNOSIS — R79.9 ABNORMAL FINDING OF BLOOD CHEMISTRY, UNSPECIFIED: ICD-10-CM

## 2024-06-18 DIAGNOSIS — I10 PRIMARY HYPERTENSION: ICD-10-CM

## 2024-06-18 DIAGNOSIS — Z00.00 GENERAL MEDICAL EXAM: Primary | ICD-10-CM

## 2024-06-18 DIAGNOSIS — R68.89 OTHER GENERAL SYMPTOMS AND SIGNS: ICD-10-CM

## 2024-06-18 DIAGNOSIS — R60.0 BILATERAL LOWER EXTREMITY EDEMA: ICD-10-CM

## 2024-06-18 DIAGNOSIS — Z00.00 GENERAL MEDICAL EXAM: ICD-10-CM

## 2024-06-18 DIAGNOSIS — M85.89 OSTEOPENIA OF MULTIPLE SITES: ICD-10-CM

## 2024-06-18 DIAGNOSIS — Z86.73 HISTORY OF STROKE: ICD-10-CM

## 2024-06-18 DIAGNOSIS — Z86.79 HISTORY OF ORTHOSTATIC HYPOTENSION: ICD-10-CM

## 2024-06-18 LAB
CHOLEST SERPL-MCNC: 206 MG/DL (ref 120–199)
CHOLEST/HDLC SERPL: 4.1 {RATIO} (ref 2–5)
ESTIMATED AVG GLUCOSE: 103 MG/DL (ref 68–131)
HBA1C MFR BLD: 5.2 % (ref 4–5.6)
HDLC SERPL-MCNC: 50 MG/DL (ref 40–75)
HDLC SERPL: 24.3 % (ref 20–50)
LDLC SERPL CALC-MCNC: 121 MG/DL (ref 63–159)
NONHDLC SERPL-MCNC: 156 MG/DL
TRIGL SERPL-MCNC: 175 MG/DL (ref 30–150)
TSH SERPL DL<=0.005 MIU/L-ACNC: 1.58 UIU/ML (ref 0.4–4)

## 2024-06-18 PROCEDURE — 99999 PR PBB SHADOW E&M-EST. PATIENT-LVL IV: CPT | Mod: PBBFAC,HCNC,, | Performed by: FAMILY MEDICINE

## 2024-06-18 PROCEDURE — 3288F FALL RISK ASSESSMENT DOCD: CPT | Mod: HCNC,CPTII,S$GLB, | Performed by: FAMILY MEDICINE

## 2024-06-18 PROCEDURE — 99397 PER PM REEVAL EST PAT 65+ YR: CPT | Mod: HCNC,S$GLB,, | Performed by: FAMILY MEDICINE

## 2024-06-18 PROCEDURE — 3077F SYST BP >= 140 MM HG: CPT | Mod: HCNC,CPTII,S$GLB, | Performed by: FAMILY MEDICINE

## 2024-06-18 PROCEDURE — 83036 HEMOGLOBIN GLYCOSYLATED A1C: CPT | Mod: HCNC | Performed by: FAMILY MEDICINE

## 2024-06-18 PROCEDURE — 1101F PT FALLS ASSESS-DOCD LE1/YR: CPT | Mod: HCNC,CPTII,S$GLB, | Performed by: FAMILY MEDICINE

## 2024-06-18 PROCEDURE — 1159F MED LIST DOCD IN RCRD: CPT | Mod: HCNC,CPTII,S$GLB, | Performed by: FAMILY MEDICINE

## 2024-06-18 PROCEDURE — 80061 LIPID PANEL: CPT | Mod: HCNC | Performed by: FAMILY MEDICINE

## 2024-06-18 PROCEDURE — 84443 ASSAY THYROID STIM HORMONE: CPT | Mod: HCNC | Performed by: FAMILY MEDICINE

## 2024-06-18 PROCEDURE — 1126F AMNT PAIN NOTED NONE PRSNT: CPT | Mod: HCNC,CPTII,S$GLB, | Performed by: FAMILY MEDICINE

## 2024-06-18 PROCEDURE — 1160F RVW MEDS BY RX/DR IN RCRD: CPT | Mod: HCNC,CPTII,S$GLB, | Performed by: FAMILY MEDICINE

## 2024-06-18 PROCEDURE — 3078F DIAST BP <80 MM HG: CPT | Mod: HCNC,CPTII,S$GLB, | Performed by: FAMILY MEDICINE

## 2024-06-18 PROCEDURE — 36415 COLL VENOUS BLD VENIPUNCTURE: CPT | Mod: HCNC,PN | Performed by: FAMILY MEDICINE

## 2024-06-18 RX ORDER — NYSTATIN 100000 U/G
CREAM TOPICAL 2 TIMES DAILY
Qty: 30 G | Refills: 5 | Status: SHIPPED | OUTPATIENT
Start: 2024-06-18

## 2024-06-18 RX ORDER — LOSARTAN POTASSIUM 50 MG/1
50 TABLET ORAL DAILY
Start: 2024-06-18

## 2024-06-18 RX ORDER — TRAZODONE HYDROCHLORIDE 50 MG/1
50 TABLET ORAL NIGHTLY PRN
Qty: 90 TABLET | Refills: 3 | Status: SHIPPED | OUTPATIENT
Start: 2024-06-18 | End: 2025-06-18

## 2024-06-18 NOTE — PROGRESS NOTES
"      BP (!) 154/78   Pulse 84   Ht 4' 11" (1.499 m)   Wt 67.1 kg (147 lb 14.9 oz)   SpO2 95%   BMI 29.88 kg/m²       ===========          HPI    Francy Bain is a 79 y.o. female     here for    Annual exam.    Health maintenance reviewed with patient in detail inc any recent labs and studies and needs for future screening labs.  Age-appropriate vaccines and other age-appropriate screening studies reviewed with patient in detail.  Sleep health reviewed with patient.  Skin health regarding possible skin cancer screening reviewed with patient.  General regularity of bowel movements and urinations reviewed with patient including any possibility of urine leakage.  Vision screening reviewed with patient.      Patient queried and denies any further complaints      Patient Active Problem List   Diagnosis    Rheumatoid arthritis involving multiple sites    Primary hypertension    Cognitive impairment    Glaucoma    Infected olecranon bursa    Osteopenia of multiple sites    History of stroke    Physical deconditioning    History of elbow replacement    Impaired functional mobility, balance, gait, and endurance    Elbow joint replacement status, right    GERD (gastroesophageal reflux disease)    Renal cyst, acquired, right    Chronic obstructive asthma    Chronic obstructive pulmonary disease, unspecified COPD type    History of arthroplasty of both wrists    Right hip pain    Immunocompromised state due to drug therapy    Aortic atherosclerosis    Recurrent major depressive disorder, in full remission    Situational anxiety    On corticosteroid therapy    Suspected sleep apnea    White matter disease    B12 deficiency    Immunodeficiency due to treatment with immunosuppressive medication    Tinea corporis    Primary osteoarthritis involving multiple joints    History of orthostatic hypotension    Bilateral lower extremity edema       SURGICAL AND MEDICAL HISTORY: updated and reviewed.  Past Surgical " History:   Procedure Laterality Date    ANKLE FUSION Bilateral     ELBOW ARTHROPLASTY      EYE SURGERY Right 03/2017    cataract    JOINT REPLACEMENT      bilateral knee replacement; bilateral elbow and wrist replacement    KNEE ARTHROPLASTY      OPEN REDUCTION AND INTERNAL FIXATION (ORIF) OF FRACTURE OF ULNA Right 8/17/2021    Procedure: ORIF, FRACTURE, ULNA  AND MASSIVE ALLOGRAFT;  Surgeon: Tio Patel MD;  Location: [address];  Service: Orthopedics;  Laterality: Right;    REVISION OF TOTAL ARTHROPLASTY OF ELBOW Right 8/17/2021    Procedure: REVISION, TOTAL ARTHROPLASTY, ELBOW;  Surgeon: Tio Patel MD;  Location: [address];  Service: Orthopedics;  Laterality: Right;     ALLERGIES updated and reviewed.  Review of patient's allergies indicates:  No Known Allergies    CURRENT OUTPATIENT MEDICATIONS updated and reviewed    Current Outpatient Medications:     ascorbic acid, vitamin C, (VITAMIN C) 100 MG tablet, Take 100 mg by mouth once daily., Disp: , Rfl:     desloratadine (CLARINEX) 5 mg tablet, Take 5 mg by mouth once daily., Disp: , Rfl:     fluticasone furoate-vilanteroL (BREO ELLIPTA) 100-25 mcg/dose diskus inhaler, Inhale 1 puff into the lungs once daily. Controller, Disp: 60 each, Rfl: 11    furosemide (LASIX) 20 MG tablet, Take 1 tablet (20 mg total) by mouth once daily. FOR FLUID, Disp: 90 tablet, Rfl: 3    hydrocortisone 2.5 % cream, Do not apply morning of surgery, Disp: , Rfl:     hydrOXYzine HCL (ATARAX) 25 MG tablet, TAKE 1 TABLET THREE TIMES DAILY AS NEEDED FOR ANXIETY, Disp: 90 tablet, Rfl: 11    ketoconazole (NIZORAL) 2 % cream, Apply 1 application  topically once daily. Apply to affected area, Disp: 60 g, Rfl: 5    levocetirizine (XYZAL) 5 MG tablet, Take 5 mg by mouth every evening., Disp: , Rfl:     LORazepam (ATIVAN) 1 MG tablet, TAKE ONE TABLET BY MOUTH EVERY DAY AT BEDTIME AS NEEDED FOR ANXIETY OR sleep, Disp: , Rfl:     omeprazole (PRILOSEC) 20 MG capsule, Take 1  capsule (20 mg total) by mouth once daily., Disp: 90 capsule, Rfl: 3    predniSONE (DELTASONE) 2.5 MG tablet, One po daily, Disp: 90 tablet, Rfl: 3    sarilumab (KEVZARA) 200 mg/1.14 mL Syrg, Inject 1.14 mLs (200 mg total) into the skin every 14 (fourteen) days., Disp: 2 each, Rfl: 6    vibegron (GEMTESA) 75 mg Tab, Take 75 mg by mouth once daily., Disp: 30 tablet, Rfl: 11    VITAMIN D2 1,250 mcg (50,000 unit) capsule, Take 1 capsule (50,000 Units total) by mouth every 7 days., Disp: 12 capsule, Rfl: 3    ABRYSVO 120 mcg/0.5 mL SolR vaccine, , Disp: , Rfl:     albuterol (PROVENTIL) 2.5 mg /3 mL (0.083 %) nebulizer solution, Take 3 mLs (2.5 mg total) by nebulization every 6 (six) hours as needed for Wheezing. Rescue (Patient not taking: Reported on 5/22/2023), Disp: 90 mL, Rfl: 3    carboxymethylcellulose sodium (REFRESH TEARS OPHT), Apply to eye. Use as needed, Disp: , Rfl:     losartan (COZAAR) 50 MG tablet, Take 1 tablet (50 mg total) by mouth once daily., Disp: , Rfl:     nystatin (MYCOSTATIN) cream, Apply topically 2 (two) times daily., Disp: 30 g, Rfl: 5    traZODone (DESYREL) 50 MG tablet, Take 1 tablet (50 mg total) by mouth nightly as needed for Insomnia., Disp: 90 tablet, Rfl: 3    Review of Systems   Constitutional:  Negative for activity change, appetite change, chills, diaphoresis, fatigue, fever and unexpected weight change.   HENT:  Negative for congestion, ear discharge, ear pain, facial swelling, hearing loss, nosebleeds, postnasal drip, rhinorrhea, sinus pressure, sneezing, sore throat, tinnitus, trouble swallowing and voice change.    Eyes:  Negative for photophobia, pain, discharge, redness, itching and visual disturbance.   Respiratory:  Negative for cough, chest tightness, shortness of breath and wheezing.    Cardiovascular:  Negative for chest pain, palpitations and leg swelling.   Gastrointestinal:  Negative for abdominal distention, abdominal pain, anal bleeding, blood in stool, constipation,  "diarrhea, nausea, rectal pain and vomiting.   Endocrine: Negative for cold intolerance, heat intolerance, polydipsia, polyphagia and polyuria.   Genitourinary:  Negative for difficulty urinating, dysuria and flank pain.   Musculoskeletal:  Negative for arthralgias, back pain, joint swelling, myalgias and neck pain.   Skin:  Negative for rash.   Neurological:  Negative for dizziness, tremors, seizures, syncope, speech difficulty, weakness, light-headedness, numbness and headaches.   Psychiatric/Behavioral:  Negative for behavioral problems, confusion, decreased concentration, dysphoric mood, sleep disturbance and suicidal ideas. The patient is not nervous/anxious and is not hyperactive.        BP (!) 154/78   Pulse 84   Ht 4' 11" (1.499 m)   Wt 67.1 kg (147 lb 14.9 oz)   SpO2 95%   BMI 29.88 kg/m²   Physical Exam  Vitals and nursing note reviewed.   Constitutional:       General: She is not in acute distress.     Appearance: Normal appearance. She is well-developed. She is not ill-appearing or toxic-appearing.   HENT:      Head: Normocephalic and atraumatic.      Right Ear: Tympanic membrane, ear canal and external ear normal.      Left Ear: Tympanic membrane, ear canal and external ear normal.      Nose: Nose normal.      Mouth/Throat:      Lips: Pink.      Mouth: Mucous membranes are moist.      Pharynx: No oropharyngeal exudate or posterior oropharyngeal erythema.   Eyes:      General: No scleral icterus.        Right eye: No discharge.         Left eye: No discharge.      Extraocular Movements: Extraocular movements intact.      Conjunctiva/sclera: Conjunctivae normal.   Cardiovascular:      Rate and Rhythm: Normal rate and regular rhythm.      Pulses: Normal pulses.      Heart sounds: Normal heart sounds. No murmur heard.  Pulmonary:      Effort: Pulmonary effort is normal. No respiratory distress.      Breath sounds: Normal breath sounds. No wheezing or rales.   Abdominal:      General: Bowel sounds are " normal. There is no distension.      Palpations: Abdomen is soft. There is no mass.      Tenderness: There is no abdominal tenderness. There is no right CVA tenderness, left CVA tenderness, guarding or rebound.      Hernia: No hernia is present.   Musculoskeletal:      Cervical back: Normal range of motion and neck supple. No rigidity or tenderness.   Lymphadenopathy:      Cervical: No cervical adenopathy.   Skin:     General: Skin is warm and dry.   Neurological:      General: No focal deficit present.      Mental Status: She is alert. Mental status is at baseline.   Psychiatric:         Mood and Affect: Mood normal.         Behavior: Behavior normal. Behavior is cooperative.         ASSESSMENT/PLAN    Francy was seen today for follow-up.    Diagnoses and all orders for this visit:    General medical exam  -     Lipid Panel; Future  -     TSH; Future  -     Hemoglobin A1C; Future    Primary hypertension  The following orders have not been finalized:  -     losartan (COZAAR) 50 MG tablet  Go back from 1/2 tab to whole tab for 50mg daily dosing and cont lasix 20mg. Watch for hypotension.  Abnormal finding of blood chemistry, unspecified  -     Lipid Panel; Future  -     Hemoglobin A1C; Future    Other general symptoms and signs  -     TSH; Future    Other orders  -     traZODone (DESYREL) 50 MG tablet; Take 1 tablet (50 mg total) by mouth nightly as needed for Insomnia.  -     nystatin (MYCOSTATIN) cream; Apply topically 2 (two) times daily.            Most recent some lab results reviewed with patient.  Any new prescription medications gone over in detail including reason for taking the medication, most common possible side effects and possible costs, etcetera.    Chronic conditions updated. Other than changes or additions as above, cont current medications and maintain follow-up with specialists if indicated.     Mikey Tolbert MD  A dictation device was used to produce this document. Use of such devices sometimes  results in grammatical errors or replacement of words that sound similarly.

## 2024-06-20 ENCOUNTER — TELEPHONE (OUTPATIENT)
Dept: PRIMARY CARE CLINIC | Facility: CLINIC | Age: 79
End: 2024-06-20
Payer: MEDICARE

## 2024-06-20 PROBLEM — Z86.79 HISTORY OF ORTHOSTATIC HYPOTENSION: Status: ACTIVE | Noted: 2024-06-20

## 2024-06-20 PROBLEM — R60.0 BILATERAL LOWER EXTREMITY EDEMA: Status: ACTIVE | Noted: 2024-06-20

## 2024-06-20 PROBLEM — E66.09 CLASS 1 OBESITY DUE TO EXCESS CALORIES WITH SERIOUS COMORBIDITY AND BODY MASS INDEX (BMI) OF 30.0 TO 30.9 IN ADULT: Status: RESOLVED | Noted: 2021-01-08 | Resolved: 2024-06-20

## 2024-06-20 PROBLEM — E66.811 CLASS 1 OBESITY DUE TO EXCESS CALORIES WITH SERIOUS COMORBIDITY AND BODY MASS INDEX (BMI) OF 30.0 TO 30.9 IN ADULT: Status: RESOLVED | Noted: 2021-01-08 | Resolved: 2024-06-20

## 2024-06-20 NOTE — TELEPHONE ENCOUNTER
----- Message from Mikey Tolbert MD sent at 6/20/2024  6:34 AM CDT -----  Triglycerides are a bit improved but still little bit high.  Follow a low carb diet.  HDL cholesterol is very good at 50.  Fifty and above is good.  LDL cholesterol is improved from 139 down to 121.  Thyroid test is normal.  No prediabetes.  Follow-up as previously discussed.

## 2024-06-25 ENCOUNTER — HOSPITAL ENCOUNTER (OUTPATIENT)
Dept: CARDIOLOGY | Facility: OTHER | Age: 79
Discharge: HOME | End: 2024-06-25
Attending: FAMILY MEDICINE
Payer: MEDICARE

## 2024-06-25 VITALS
BODY MASS INDEX: 29.64 KG/M2 | HEIGHT: 59 IN | WEIGHT: 147 LBS | SYSTOLIC BLOOD PRESSURE: 154 MMHG | DIASTOLIC BLOOD PRESSURE: 78 MMHG

## 2024-06-25 DIAGNOSIS — R60.0 BILATERAL LOWER EXTREMITY EDEMA: ICD-10-CM

## 2024-06-25 LAB
AORTIC ROOT ANNULUS: 2.14 CM
ASCENDING AORTA: 2.25 CM
AV INDEX (PROSTH): 0.7
AV MEAN GRADIENT: 3 MMHG
AV PEAK GRADIENT: 6 MMHG
AV VALVE AREA BY VELOCITY RATIO: 1.75 CM²
AV VALVE AREA: 1.75 CM²
AV VELOCITY RATIO: 0.69
BSA FOR ECHO PROCEDURE: 1.67 M2
CV ECHO LV RWT: 0.48 CM
DOP CALC AO PEAK VEL: 1.21 M/S
DOP CALC AO VTI: 27 CM
DOP CALC LVOT AREA: 2.5 CM2
DOP CALC LVOT DIAMETER: 1.79 CM
DOP CALC LVOT PEAK VEL: 0.84 M/S
DOP CALC LVOT STROKE VOLUME: 47.29 CM3
DOP CALCLVOT PEAK VEL VTI: 18.8 CM
E WAVE DECELERATION TIME: 285.01 MSEC
E/A RATIO: 0.82
E/E' RATIO: 9.25 M/S
ECHO LV POSTERIOR WALL: 0.79 CM (ref 0.6–1.1)
EJECTION FRACTION: 65 %
FRACTIONAL SHORTENING: 6 % (ref 28–44)
GLOBAL LONGITUIDAL STRAIN: 15 %
INTERVENTRICULAR SEPTUM: 1.18 CM (ref 0.6–1.1)
IVC DIAMETER: 1.72 CM
LA MAJOR: 3.97 CM
LA MINOR: 3.33 CM
LA WIDTH: 4 CM
LAAS-AP2: 11 CM2
LAAS-AP4: 11 CM2
LEFT ATRIUM AREA SYSTOLIC (APICAL 2 CHAMBER): 11.21 CM2
LEFT ATRIUM AREA SYSTOLIC (APICAL 4 CHAMBER): 11.8 CM2
LEFT ATRIUM SIZE: 2.72 CM
LEFT ATRIUM VOLUME INDEX MOD: 17.7 ML/M2
LEFT ATRIUM VOLUME INDEX: 20.7 ML/M2
LEFT ATRIUM VOLUME MOD: 28.73 CM3
LEFT ATRIUM VOLUME: 33.5 CM3
LEFT INTERNAL DIMENSION IN SYSTOLE: 3.08 CM (ref 2.1–4)
LEFT VENTRICLE DIASTOLIC VOLUME INDEX: 26.85 ML/M2
LEFT VENTRICLE DIASTOLIC VOLUME: 43.49 ML
LEFT VENTRICLE END SYSTOLIC VOLUME APICAL 2 CHAMBER: 28.16 ML
LEFT VENTRICLE END SYSTOLIC VOLUME APICAL 4 CHAMBER: 22.41 ML
LEFT VENTRICLE MASS INDEX: 57 G/M2
LEFT VENTRICLE SYSTOLIC VOLUME INDEX: 23 ML/M2
LEFT VENTRICLE SYSTOLIC VOLUME: 37.23 ML
LEFT VENTRICULAR INTERNAL DIMENSION IN DIASTOLE: 3.28 CM (ref 3.5–6)
LEFT VENTRICULAR MASS: 91.63 G
LV LATERAL E/E' RATIO: 8.22 M/S
LV SEPTAL E/E' RATIO: 10.57 M/S
LVED V (TEICH): 43.49 ML
LVES V (TEICH): 37.23 ML
LVOT MG: 1.69 MMHG
LVOT MV: 0.63 CM/S
MV PEAK A VEL: 0.9 M/S
MV PEAK E VEL: 0.74 M/S
MV STENOSIS PRESSURE HALF TIME: 82.65 MS
MV VALVE AREA P 1/2 METHOD: 2.66 CM2
OHS CV RV/LV RATIO: 0.54 CM
PISA TR MAX VEL: 2.29 M/S
PV MV: 0.4 M/S
PV PEAK GRADIENT: 1 MMHG
PV PEAK VELOCITY: 0.61 M/S
RA MAJOR: 3.4 CM
RA PRESSURE ESTIMATED: 3 MMHG
RA WIDTH: 2.5 CM
RIGHT VENTRICULAR END-DIASTOLIC DIMENSION: 1.76 CM
RV TB RVSP: 5 MMHG
RV TISSUE DOPPLER FREE WALL SYSTOLIC VELOCITY 1 (APICAL 4 CHAMBER VIEW): 14.21 CM/S
SINUS: 2.3 CM
STJ: 2.42 CM
TDI LATERAL: 0.09 M/S
TDI SEPTAL: 0.07 M/S
TDI: 0.08 M/S
TR MAX PG: 21 MMHG
TRICUSPID ANNULAR PLANE SYSTOLIC EXCURSION: 2 CM
TV REST PULMONARY ARTERY PRESSURE: 24 MMHG
Z-SCORE OF LEFT VENTRICULAR DIMENSION IN END DIASTOLE: -3.34
Z-SCORE OF LEFT VENTRICULAR DIMENSION IN END SYSTOLE: 0.64

## 2024-06-25 PROCEDURE — 93356 MYOCRD STRAIN IMG SPCKL TRCK: CPT | Mod: HCNC,,, | Performed by: INTERNAL MEDICINE

## 2024-06-25 PROCEDURE — 93306 TTE W/DOPPLER COMPLETE: CPT | Mod: HCNC

## 2024-06-25 PROCEDURE — 93306 TTE W/DOPPLER COMPLETE: CPT | Mod: 26,HCNC,, | Performed by: INTERNAL MEDICINE

## 2024-06-27 ENCOUNTER — TELEPHONE (OUTPATIENT)
Dept: PRIMARY CARE CLINIC | Facility: CLINIC | Age: 79
End: 2024-06-27
Payer: MEDICARE

## 2024-06-27 NOTE — TELEPHONE ENCOUNTER
Spoke to the patient and discussed the results of her echo. Patient verbalized understanding. Patient reminded of her nurse visit in July of 2024. Patient states she will be there.

## 2024-06-27 NOTE — TELEPHONE ENCOUNTER
----- Message from Mikey Tolbert MD sent at 6/27/2024  7:19 AM CDT -----   As per Cardiology, the 2D echo report shows a normal ejection fraction of 65%.  Normal is 55-65% so this is good.  There is moderate aortic valve sclerosis.  Follow-up in early January for nurse visit as  scheduled and with me in December as scheduled

## 2024-07-08 ENCOUNTER — TELEPHONE (OUTPATIENT)
Dept: PRIMARY CARE CLINIC | Facility: CLINIC | Age: 79
End: 2024-07-08
Payer: MEDICARE

## 2024-07-08 NOTE — TELEPHONE ENCOUNTER
----- Message from Emelyn Mckay MA sent at 7/8/2024  8:06 AM CDT -----  Name of Who is Calling:JAYDEN COBOS [[id number]]                What is the request in detail: Pt is requesting a call back to reschedule her nurse visit that is scheduled for tomorrow, no schedule coming up. Please assist.                Can the clinic reply by [facility]: No                What Number to Call Back if not in [facility]: [phone]

## 2024-07-08 NOTE — TELEPHONE ENCOUNTER
Spoke to the patient and rescheduled her nurse visit to today. Patient coming in for a blood pressure check.

## 2024-07-08 NOTE — TELEPHONE ENCOUNTER
----- Message from Kirby Parisi MA sent at 7/8/2024 10:28 AM CDT -----  Contact: bala@ [phone]  Pt  called                Pt is requesting a call back to get todays nurse visit  appt to be rescheduled on preferably thurs or Friday morning time.

## 2024-07-11 ENCOUNTER — CLINICAL SUPPORT (OUTPATIENT)
Dept: PRIMARY CARE CLINIC | Facility: CLINIC | Age: 79
End: 2024-07-11
Payer: MEDICARE

## 2024-07-11 ENCOUNTER — TELEPHONE (OUTPATIENT)
Dept: PRIMARY CARE CLINIC | Facility: CLINIC | Age: 79
End: 2024-07-11

## 2024-07-11 VITALS — SYSTOLIC BLOOD PRESSURE: 132 MMHG | HEART RATE: 76 BPM | DIASTOLIC BLOOD PRESSURE: 74 MMHG

## 2024-07-11 DIAGNOSIS — Z01.30 BLOOD PRESSURE CHECK: Primary | ICD-10-CM

## 2024-07-11 DIAGNOSIS — I10 PRIMARY HYPERTENSION: Primary | ICD-10-CM

## 2024-07-11 DIAGNOSIS — I10 PRIMARY HYPERTENSION: ICD-10-CM

## 2024-07-11 PROCEDURE — 99999 PR PBB SHADOW E&M-EST. PATIENT-LVL I: CPT | Mod: PBBFAC,HCNC,,

## 2024-07-11 PROCEDURE — 99211 OFF/OP EST MAY X REQ PHY/QHP: CPT | Mod: HCNC,S$GLB,, | Performed by: FAMILY MEDICINE

## 2024-07-11 NOTE — TELEPHONE ENCOUNTER
Patient contacted and informed about her nurse visit today no med changes keep appointment you have scheduled. Dr. Tolbert did enroll you in the digital hypertension program someone from the program will reach out to get you set up but just be mindful the pharmacist manages the medicine and he is not aware of any changes that they make and he is not always in agreement with the changes. The home monitors also give readings that are often much higher than what we get with appropriate les readings in clinic. Patient verbalized understanding and will wait to be contacted for set up.

## 2024-07-11 NOTE — TELEPHONE ENCOUNTER
----- Message from Mikey Tolbert MD sent at 7/11/2024  9:08 AM CDT -----  She can.  It has been ordered.  She must know, however, that the pharmacist manages her medicines and I am not aware of the changes until usually months later or at least not until the patient follows up with me in clinic; and that I am not always in agreement with the changes made.  The home monitors also give readings that are often much higher than what we get with appropriate manual readings in clinic.  But she can be in the program if she wishes.  Regarding her nurse visit today, no med changes now.  Would see me in 3 months.  ----- Message -----  From: Alie Tristan LPN  Sent: 7/11/2024   9:01 AM CDT  To: Mikey Tolbert MD    Patient would like to know if she can get in the digital hypertension program. Please advise

## 2024-07-11 NOTE — PROGRESS NOTES
F/U  BP check for Dr. Tolbert.  Patient  denies HA, dizziness, N/V, blood pressure 132/74 pulse 74.  ===============\    She can.  It has been ordered.  She must know, however, that the pharmacist manages her medicines and I am not aware of the changes until usually months later or at least not until the patient follows up with me in clinic; and that I am not always in agreement with the changes made.  The home monitors also give readings that are often much higher than what we get with appropriate manual readings in clinic.  But she can be in the program if she wishes.  Regarding her nurse visit today, no med changes now.  Would see me in 3 months.    Mikey Tolbert MD

## 2024-07-15 ENCOUNTER — TELEPHONE (OUTPATIENT)
Dept: PRIMARY CARE CLINIC | Facility: CLINIC | Age: 79
End: 2024-07-15
Payer: MEDICARE

## 2024-07-15 NOTE — TELEPHONE ENCOUNTER
----- Message from Devi Adams sent at 7/15/2024  2:12 PM CDT -----  Contact: [phone]  [email or web address]     Patient is calling for Medical Advice regarding:called digital medicine     How long has patient had these symptoms:    Pharmacy name and phone#:    Patient wants a call back or thru [facility]:call back     Comments:  Pt states she called digital medicine and they told her she should have filled out a form from you all please advise       Please advise patient replies from provider may take up to 48 hours.

## 2024-07-15 NOTE — TELEPHONE ENCOUNTER
----- Message from Kesha Crowley sent at 7/15/2024 11:07 AM CDT -----  Type:  Pt Advice    Who Called: Pt   Does the patient know what this is regarding?: did not receive phone call for digital medicine   Would the patient rather a call back or a response via [facility]? Call   Best Call Back Number: [phone]   Additional Information:

## 2024-07-16 ENCOUNTER — TELEPHONE (OUTPATIENT)
Dept: PRIMARY CARE CLINIC | Facility: CLINIC | Age: 79
End: 2024-07-16
Payer: MEDICARE

## 2024-07-16 NOTE — TELEPHONE ENCOUNTER
----- Message from Washington Leonard sent at 7/16/2024  2:47 PM CDT -----  Regarding: High BP  - Selene  Contact: Pt [phone]  Type: Returning a call    Who left a message? Selene     When did the practice call? Today    Does patient know what this is regarding: digital Device for high BP    Would the patient rather a call back or a response via [facility]? Call    Comments:

## 2024-07-16 NOTE — TELEPHONE ENCOUNTER
Do you know how the patient's enroll in the patient the digital hypertension program? I provided the telephone number for digital medicine but they are sending her back to us stating you need to order it.

## 2024-07-17 DIAGNOSIS — I10 PRIMARY HYPERTENSION: Primary | ICD-10-CM

## 2024-07-18 ENCOUNTER — TELEPHONE (OUTPATIENT)
Dept: PRIMARY CARE CLINIC | Facility: CLINIC | Age: 79
End: 2024-07-18
Payer: MEDICARE

## 2024-07-18 NOTE — TELEPHONE ENCOUNTER
Spoke to the patient. The patient spoke with the digital medicine program and they told her to contact her provider. I informed her I would research and get back to her.

## 2024-07-18 NOTE — TELEPHONE ENCOUNTER
----- Message from Melvi Garcia sent at 7/18/2024 10:33 AM CDT -----  Contact: Self [phone]  Patient is returning a phone call.    Who left a message for the patient: nurse    Does patient know what this is regarding:  digital bp device    Would you like a call back, or a response through your [facility] portal?:   call back    Comments:

## 2024-07-25 NOTE — PATIENT INSTRUCTIONS
1) avoid medications like benedryl, tylenol PM, aleve PM and hydroxyzine as they can worsen your memory  2) follow-up with sleep clinic to evaluate for sleep apnea  3) I will arrange a virtual visit with Dr. Murphy about genetic testing results  4) Begin B12 supplement (1000mcg - buy over the counter).    HPI:   Lillian Sauceda is a 67 year old female who presents for recheck of her diabetes. Patient’s FBS have been 120-130. Last visit with ophthalmologist was.last year  Pt has been checking her feet on a regular basis. Pt denies any tingling of the feet. Pt denies any issues with depression. Pt complains of itchy rash in the torso    Wt Readings from Last 6 Encounters:   07/25/24 189 lb 2 oz (85.8 kg)   07/22/24 187 lb 12.8 oz (85.2 kg)   05/17/24 186 lb (84.4 kg)   04/17/24 185 lb 2 oz (84 kg)   03/05/24 185 lb (83.9 kg)   12/07/23 187 lb (84.8 kg)     Body mass index is 34.59 kg/m².     Lab Results   Component Value Date    A1C 9.1 (H) 07/25/2024    A1C 10.1 (H) 02/23/2024    A1C 9.2 (A) 12/07/2023     Lab Results   Component Value Date    CHOLEST 131 07/25/2024    CHOLEST 146 02/23/2024    CHOLEST 133 03/21/2023     Lab Results   Component Value Date    HDL 47 07/25/2024    HDL 57 02/23/2024    HDL 51 03/21/2023     Lab Results   Component Value Date    LDL 68 07/25/2024    LDL 78 02/23/2024    LDL 62 03/21/2023     Lab Results   Component Value Date    TRIG 82 07/25/2024    TRIG 48 02/23/2024    TRIG 111 03/21/2023    TRIGLY 58 05/04/2015     Lab Results   Component Value Date    AST 26 07/25/2024    AST 25 02/23/2024    AST 35 07/18/2023     Lab Results   Component Value Date    ALT 31 07/25/2024    ALT 32 02/23/2024    ALT 68 (H) 07/18/2023     Malb/Cre Calc   Date Value Ref Range Status   07/25/2024 29.1 <=30.0 ug/mg Final     Comment:     <30 ug/mg creatinine       Normal     ug/mg creatinine   Microalbuminuria   >300 ug/mg creatinine      Albuminuria       02/23/2024 97.8 (H) <=30.0 ug/mg Final     Comment:     <30 ug/mg creatinine       Normal     ug/mg creatinine   Microalbuminuria   >300 ug/mg creatinine      Albuminuria       12/07/2023 105.6 (H) <=30.0 ug/mg Final     Comment:     <30 ug/mg creatinine       Normal     ug/mg creatinine   Microalbuminuria   >300 ug/mg creatinine       Albuminuria           Current Outpatient Medications   Medication Sig Dispense Refill    Fluocinonide 0.05 % External Solution Apply 1 Application topically nightly.      triamcinolone 0.1 % External Cream Apply topically 2 (two) times daily as needed. 60 g 3    Insulin Glargine, 2 Unit Dial, (TOUJEO MAX SOLOSTAR) 300 UNIT/ML Subcutaneous Solution Pen-injector Inject daily as directed up to TDD = 76 units 27 mL 3    METOPROLOL TARTRATE 50 MG Oral Tab Take 1 tablet by mouth twice daily 180 tablet 0    LISINOPRIL 10 MG Oral Tab Take 1 tablet by mouth once daily 90 tablet 0    NON FORMULARY       NON FORMULARY       Cholecalciferol (VITAMIN D3 OR) Take by mouth.      PEG 3350-KCl-Na Bicarb-NaCl 420 g Oral Recon Soln Take as directed by physician 4000 mL 0    Insulin Lispro (HUMALOG KWIKPEN) 200 UNIT/ML Subcutaneous Solution Pen-injector Inject 3x/day with meals as directed for up to TDD = 50 units 24 mL 3    traZODone 50 MG Oral Tab Take 1 tablet (50 mg total) by mouth nightly. 30 tablet 0    cetirizine 10 MG Oral Tab Take 1 tablet (10 mg total) by mouth daily.      Clobetasol Propionate 0.05 % External Shampoo WET HAIR, APPLY TO AFFECTED AREAS ON THE SCALP AND MASSAGE WELL. LET SIT FOR 2-3 MINUTES. RINSE WELL. USE IN THE SHOWER 2-3 TIMES A WEEK.      Insulin Pen Needle (PEN NEEDLES) 32G X 4 MM Does not apply Misc 1 each every 7 days. 6 each 0    CONTOUR NEXT TEST In Vitro Strip Test blood sugar 3 times daily 100 each 11    Melatonin 5 MG Oral Tab Take 1 tablet (5 mg total) by mouth nightly as needed (sleep). 30 tablet 0    Glucose Blood (RELION PRIME TEST) In Vitro Strip Test blood sugar four times daily; fasting and 2 hrs after every meal. Dx: E11.9 100 strip 0    ReliOn Lancet Devices 30G Does not apply Misc 1 lancet by Does not apply route 4 (four) times daily. 200 each 0    Ascorbic Acid (VITAMIN C) 1000 MG Oral Tab Take 1 tablet (1,000 mg total) by mouth daily.  0    Cyanocobalamin (VITAMIN B-12) 2500 MCG  Sublingual SL Tab Place under the tongue daily.  0    triamcinolone 55 MCG/ACT Nasal Aerosol 1 spray by Nasal route as needed.      Loperamide HCl (ANTI-DIARRHEAL OR) Take by mouth.        Past Medical History:    Allergic rhinitis    Anxiety state    Arthritis    Back pain    Body piercing    Calculus of kidney    Depression    DM type 2 (diabetes mellitus, type 2) (HCC)    Essential hypertension    Hemorrhoids    Nausea    Pain in joints    Psoriasis    Sleep apnea    Stress    Visual impairment    glasses      Past Surgical History:   Procedure Laterality Date    Breast biopsy  2018    Colonoscopy      Hernia surgery      Hysterectomy      lise, bso    Priscilla biopsy stereo nodule 1 site left (cpt=19081)      2018 benign     Priscilla localization wire 1 site left (cpt=19281)      2 sites, benign, can't remember when or where    Other      palate surgery for sleep apnea    Tonsillectomy        Social History:   Social History     Socioeconomic History    Marital status:    Tobacco Use    Smoking status: Former     Current packs/day: 0.00     Average packs/day: 1 pack/day for 30.0 years (30.0 ttl pk-yrs)     Types: Cigarettes     Start date: 1972     Quit date: 2002     Years since quittin.5    Smokeless tobacco: Never   Vaping Use    Vaping status: Never Used   Substance and Sexual Activity    Alcohol use: No    Drug use: No     Social Determinants of Health      [facility]    Housing Stability     Exercise: none.  Diet: doesn't watch     REVIEW OF SYSTEMS:   GENERAL HEALTH: feels well otherwise  SKIN: denies any unusual skin lesions or rashes  RESPIRATORY: denies shortness of breath with exertion  CARDIOVASCULAR: denies chest pain on exertion  GI: denies abdominal pain and denies heartburn  NEURO: denies headaches    EXAM:   /64   Pulse 60   Temp 97.7 °F (36.5 °C) (Temporal)   Resp 18   Wt 189 lb 2 oz (85.8 kg)   SpO2 97%    BMI 34.59 kg/m²   GENERAL: well developed, well nourished,in no apparent distress  SKIN: no rashes,no suspicious lesions  NECK: supple,no adenopathy,no bruits  LUNGS: clear to auscultation  CARDIO: RRR without murmur  GI: good BS's,no masses, HSM or tenderness  EXTREMITIES: no cyanosis, clubbing or edema  Bilateral barefoot skin diabetic exam is normal, visualized feet and the appearance is normal.  Bilateral monofilament/sensation of both feet is normal.  Pulsation pedal pulse exam of both lower legs/feet is normal as well.  NEURO: sensation is intact to monofilament     ASSESSMENT AND PLAN:   Lillian Sauceda is a 67 year old female who presents for a recheck of her diabetes. Diabetic control is better.  Recommendations are: continue present meds, check HgbA1C, check fasting lipids and CMP, lose wgt with carbohydrate controlled diet and exercise, check feet daily.  The patient indicates understanding of these issues and agrees to the plan.  The patient is asked to return in 4 months.

## 2024-07-30 ENCOUNTER — TELEPHONE (OUTPATIENT)
Dept: PRIMARY CARE CLINIC | Facility: CLINIC | Age: 79
End: 2024-07-30
Payer: MEDICARE

## 2024-07-30 NOTE — TELEPHONE ENCOUNTER
----- Message from Devi Adams sent at 7/30/2024 12:02 PM CDT -----  Contact: [phone]  Patient is returning a phone call.    Who left a message for the patient: Selene Prieto MA    Does patient know what this is regarding:  yes     Would you like a call back, or a response through your [facility] portal?:   call back     Comments:   States she never heard back in regards to her medicine

## 2024-07-30 NOTE — TELEPHONE ENCOUNTER
Spoke to the patient and scheduled a nurse visit so she can learn how to download the [employer] curry.

## 2024-08-02 ENCOUNTER — CLINICAL SUPPORT (OUTPATIENT)
Dept: PRIMARY CARE CLINIC | Facility: CLINIC | Age: 79
End: 2024-08-02
Payer: MEDICARE

## 2024-08-02 DIAGNOSIS — I10 PRIMARY HYPERTENSION: Primary | ICD-10-CM

## 2024-08-02 NOTE — PROGRESS NOTES
Patient presented to the clinic for education on the el? application and digital hypertension program. Downloaded and activated the patient portal. Consented to the digital hypertension program. Cuff and machine should be shipped out to her. Informed the patient to call the office and schedule a nurse visit when she receives the monitor so we can set it up properly.

## 2024-08-27 ENCOUNTER — TELEPHONE (OUTPATIENT)
Dept: PRIMARY CARE CLINIC | Facility: CLINIC | Age: 79
End: 2024-08-27
Payer: MEDICARE

## 2024-08-27 NOTE — TELEPHONE ENCOUNTER
----- Message from Gildardo Lambert sent at 8/27/2024  1:41 PM CDT -----  Type:  Patient Returning Call    Who Called:PT  Who Left Message for Patient:  Does the patient know what this is regarding?:missed call  Would the patient rather a call back or a response via [facility]? call  Best Call Back Number:[phone]  Additional Information: Pt states she would like a call back from office on phone number listed on this message. Thank you

## 2024-10-07 ENCOUNTER — TELEPHONE (OUTPATIENT)
Dept: PRIMARY CARE CLINIC | Facility: CLINIC | Age: 79
End: 2024-10-07
Payer: MEDICARE

## 2024-10-07 NOTE — TELEPHONE ENCOUNTER
----- Message from Azeb sent at 10/7/2024 12:31 PM CDT -----  Contact: [phone]  .[email or web address]     Patient is calling for Medical Advice regarding:Pt said she has been getting short wind but don't want to take fluticasone furoate-vilanteroL (BREO ELLIPTA) 100-25 mcg/dose diskus inhaler cause he makes her tongue swollen and throat hurts     How long has patient had these symptoms: a while     Pharmacy name and phone#:  [facility] - [place], [place] - [address] [place] [id number]  Phone: [phone] Fax: [phone]        Patient wants a call back or thru [facility]: call back     Comments:    Please advise patient replies from provider may take up to 48 hours.

## 2024-10-07 NOTE — TELEPHONE ENCOUNTER
Left a voicemail for the patient. Returning her call about being short winded. Patient needs to be evaluated in-person for her symptoms.

## 2024-10-09 ENCOUNTER — OFFICE VISIT (OUTPATIENT)
Dept: PRIMARY CARE CLINIC | Facility: CLINIC | Age: 79
End: 2024-10-09
Payer: MEDICARE

## 2024-10-09 VITALS
HEIGHT: 59 IN | DIASTOLIC BLOOD PRESSURE: 62 MMHG | WEIGHT: 147.06 LBS | TEMPERATURE: 98 F | SYSTOLIC BLOOD PRESSURE: 124 MMHG | HEART RATE: 88 BPM | OXYGEN SATURATION: 95 % | BODY MASS INDEX: 29.65 KG/M2

## 2024-10-09 DIAGNOSIS — J45.909 ASTHMA, UNSPECIFIED ASTHMA SEVERITY, UNSPECIFIED WHETHER COMPLICATED, UNSPECIFIED WHETHER PERSISTENT: ICD-10-CM

## 2024-10-09 DIAGNOSIS — R05.9 COUGH, UNSPECIFIED TYPE: ICD-10-CM

## 2024-10-09 DIAGNOSIS — J45.901 ASTHMA WITH ACUTE EXACERBATION, UNSPECIFIED ASTHMA SEVERITY, UNSPECIFIED WHETHER PERSISTENT: ICD-10-CM

## 2024-10-09 DIAGNOSIS — R50.9 FEVER, UNSPECIFIED FEVER CAUSE: ICD-10-CM

## 2024-10-09 DIAGNOSIS — R06.00 DYSPNEA, UNSPECIFIED TYPE: Primary | ICD-10-CM

## 2024-10-09 PROCEDURE — 1101F PT FALLS ASSESS-DOCD LE1/YR: CPT | Mod: HCNC,CPTII,S$GLB, | Performed by: STUDENT IN AN ORGANIZED HEALTH CARE EDUCATION/TRAINING PROGRAM

## 2024-10-09 PROCEDURE — 3288F FALL RISK ASSESSMENT DOCD: CPT | Mod: HCNC,CPTII,S$GLB, | Performed by: STUDENT IN AN ORGANIZED HEALTH CARE EDUCATION/TRAINING PROGRAM

## 2024-10-09 PROCEDURE — 3078F DIAST BP <80 MM HG: CPT | Mod: HCNC,CPTII,S$GLB, | Performed by: STUDENT IN AN ORGANIZED HEALTH CARE EDUCATION/TRAINING PROGRAM

## 2024-10-09 PROCEDURE — 1160F RVW MEDS BY RX/DR IN RCRD: CPT | Mod: HCNC,CPTII,S$GLB, | Performed by: STUDENT IN AN ORGANIZED HEALTH CARE EDUCATION/TRAINING PROGRAM

## 2024-10-09 PROCEDURE — 1159F MED LIST DOCD IN RCRD: CPT | Mod: HCNC,CPTII,S$GLB, | Performed by: STUDENT IN AN ORGANIZED HEALTH CARE EDUCATION/TRAINING PROGRAM

## 2024-10-09 PROCEDURE — 99213 OFFICE O/P EST LOW 20 MIN: CPT | Mod: HCNC,S$GLB,, | Performed by: STUDENT IN AN ORGANIZED HEALTH CARE EDUCATION/TRAINING PROGRAM

## 2024-10-09 PROCEDURE — 99999 PR PBB SHADOW E&M-EST. PATIENT-LVL V: CPT | Mod: PBBFAC,HCNC,, | Performed by: STUDENT IN AN ORGANIZED HEALTH CARE EDUCATION/TRAINING PROGRAM

## 2024-10-09 PROCEDURE — 3074F SYST BP LT 130 MM HG: CPT | Mod: HCNC,CPTII,S$GLB, | Performed by: STUDENT IN AN ORGANIZED HEALTH CARE EDUCATION/TRAINING PROGRAM

## 2024-10-09 PROCEDURE — 1126F AMNT PAIN NOTED NONE PRSNT: CPT | Mod: HCNC,CPTII,S$GLB, | Performed by: STUDENT IN AN ORGANIZED HEALTH CARE EDUCATION/TRAINING PROGRAM

## 2024-10-09 RX ORDER — PREDNISONE 20 MG/1
20 TABLET ORAL DAILY
Qty: 5 TABLET | Refills: 0 | Status: SHIPPED | OUTPATIENT
Start: 2024-10-09

## 2024-10-09 RX ORDER — FLUTICASONE PROPIONATE AND SALMETEROL 250; 50 UG/1; UG/1
1 POWDER RESPIRATORY (INHALATION) 2 TIMES DAILY
Qty: 60 EACH | Refills: 5 | Status: SHIPPED | OUTPATIENT
Start: 2024-10-09

## 2024-10-09 RX ORDER — ALBUTEROL SULFATE 90 UG/1
2 INHALANT RESPIRATORY (INHALATION) EVERY 6 HOURS PRN
Qty: 18 G | Refills: 5 | Status: SHIPPED | OUTPATIENT
Start: 2024-10-09

## 2024-10-09 RX ORDER — MOXIFLOXACIN 5 MG/ML
SOLUTION/ DROPS OPHTHALMIC
COMMUNITY
Start: 2024-09-25

## 2024-10-09 NOTE — PROGRESS NOTES
Office visit  Patient: Francy Bain   10/9/2024       Assessment/Plan:            1. Dyspnea, unspecified type        -suspect asthma exacerbation given wheezing on exam and the fact that albuterol nebulizer helps with symptoms        -however, given reported fever and cough will get a chest x-ray to rule out pneumonia    2. Asthma, unspecified asthma severity, unspecified whether complicated, unspecified whether persistent  -     fluticasone-salmeterol diskus inhaler 250-50 mcg; Inhale 1 puff into the lungs 2 (two) times daily. Controller  Dispense: 60 each; Refill: 5  -     albuterol (PROVENTIL HFA) 90 mcg/actuation inhaler; Inhale 2 puffs into the lungs every 6 (six) hours as needed for Wheezing. Rescue  Dispense: 18 g; Refill: 5         -as patient was having side effects from previous inhaler, will trial Advair instead         -treat with 5 day course of prednisone    3. Asthma with acute exacerbation, unspecified asthma severity, unspecified whether persistent  -     predniSONE (DELTASONE) 20 MG tablet; Take 1 tablet (20 mg total) by mouth once daily. Take 2 po with breakfast x 5d  Dispense: 5 tablet; Refill: 0    4. Fever, unspecified fever cause  -     X-Ray Chest PA And Lateral; Future; Expected date: 10/09/2024    5. Cough, unspecified type  -     X-Ray Chest PA And Lateral; Future; Expected date: 10/09/2024        CHIEF COMPLAINT: shortness of breath    HPI: Francy Bain is a 79 y.o. female who presents for multiple concerns. She reports that she's been getting very dizzy, which started about 2-3 weeks ago.  She reports a long history of shortness of breath, but it used to go away on its own.  She states that when she uses her inhaler, her tongue swells and her throat hurts, which has been going on off and on for several months.  She used her albuterol nebulizer once yesterday. It helps with her shortness of breath.    She has only been taking her furosemide every other day.    Her ankles  "have been more swollen for about a week.    She is unclear about what medications she is taking.      Current Outpatient Medications   Medication Instructions    ABRYSVO 120 mcg/0.5 mL SolR vaccine     albuterol (PROVENTIL HFA) 90 mcg/actuation inhaler 2 puffs, Inhalation, Every 6 hours PRN, Rescue    ascorbic acid (vitamin C) (VITAMIN C) 100 mg, Daily    carboxymethylcellulose sodium (REFRESH TEARS OPHT) Apply to eye. Use as needed    desloratadine (CLARINEX) 5 mg, Daily    fluticasone-salmeterol diskus inhaler 250-50 mcg 1 puff, Inhalation, 2 times daily, Controller    furosemide (LASIX) 20 MG tablet Take 1 tablet (20 mg total) by mouth once daily. FOR FLUID    GEMTESA 75 mg, Oral, Daily    hydrocortisone 2.5 % cream Do not apply morning of surgery    hydrOXYzine HCL (ATARAX) 25 mg, Oral, FOR ANXIETY    ketoconazole (NIZORAL) 2 % cream 1 application , Topical (Top), Daily, Apply to affected area    KEVZARA 200 mg, Subcutaneous, Every 14 days    levocetirizine (XYZAL) 5 mg, Nightly    LORazepam (ATIVAN) 1 MG tablet TAKE ONE TABLET BY MOUTH EVERY DAY AT BEDTIME AS NEEDED FOR ANXIETY OR sleep    losartan (COZAAR) 50 mg, Oral, Daily    moxifloxacin (VIGAMOX) 0.5 % ophthalmic solution Place into both eyes.    nystatin (MYCOSTATIN) cream Topical (Top), 2 times daily    omeprazole (PRILOSEC) 20 mg, Oral, Daily    predniSONE (DELTASONE) 2.5 MG tablet One po daily    predniSONE (DELTASONE) 20 mg, Oral, Daily, Take 2 po with breakfast x 5d    traZODone (DESYREL) 50 mg, Oral, Nightly PRN    VITAMIN D2 50,000 Units, Oral, Every 7 days       Lab Results   Component Value Date    HGBA1C 5.2 06/18/2024    HGBA1C 5.2 09/06/2023    HGBA1C 5.4 09/28/2022     No results found for: "MICALBCREAT"  Lab Results   Component Value Date    LDLCALC 121.0 06/18/2024    LDLCALC 139.4 09/28/2022    CHOL 206 (H) 06/18/2024    HDL 50 06/18/2024    TRIG 175 (H) 06/18/2024       Lab Results   Component Value Date     04/01/2024    K 4.9 " 04/01/2024     04/01/2024    CO2 22 (L) 04/01/2024    GLU 79 04/01/2024    BUN 15 04/01/2024    CREATININE 0.8 04/01/2024    CALCIUM 9.0 04/01/2024    PROT 7.0 04/01/2024    ALBUMIN 3.8 04/01/2024    BILITOT 0.7 04/01/2024    ALKPHOS 106 04/01/2024    AST 22 04/01/2024    ALT 15 04/01/2024    ANIONGAP 11 04/01/2024    ESTGFRAFRICA 83 (L) 01/10/2023    EGFRNONAA >60.0 11/23/2021    WBC 6.54 04/01/2024    HGB 15.4 04/01/2024    HGB 14.8 06/15/2023    HCT 49.6 (H) 04/01/2024    MCV 92 04/01/2024     04/01/2024    TSH 1.576 06/18/2024    HEPCAB Non-reactive 06/15/2023       Lab Results   Component Value Date    JIEGPVUE69YL 67 10/14/2020    MHWREUNM22 417 11/23/2021         Past Medical History:   Diagnosis Date    Affective disorder     Hypertension     Renal cyst, acquired, right 4/14/2021    Rheumatoid arthritis      Past Surgical History:   Procedure Laterality Date    ANKLE FUSION Bilateral     ELBOW ARTHROPLASTY      EYE SURGERY Right 03/2017    cataract    JOINT REPLACEMENT      bilateral knee replacement; bilateral elbow and wrist replacement    KNEE ARTHROPLASTY      OPEN REDUCTION AND INTERNAL FIXATION (ORIF) OF FRACTURE OF ULNA Right 8/17/2021    Procedure: ORIF, FRACTURE, ULNA  AND MASSIVE ALLOGRAFT;  Surgeon: Tio Patel MD;  Location: [address];  Service: Orthopedics;  Laterality: Right;    REVISION OF TOTAL ARTHROPLASTY OF ELBOW Right 8/17/2021    Procedure: REVISION, TOTAL ARTHROPLASTY, ELBOW;  Surgeon: Tio Patel MD;  Location: [address];  Service: Orthopedics;  Laterality: Right;       Social History     Tobacco Use    Smoking status: Never    Smokeless tobacco: Never   Substance Use Topics    Alcohol use: No     Comment: rare- once per month    Drug use: No       family history includes Arthritis in her maternal grandmother and mother; Heart disease in her sister; Hyperlipidemia in her sister; Hypertension in her mother; Stroke in her mother.    Vitals:     "10/09/24 1036   BP: 124/62   Pulse: 88   Temp: 97.9 °F (36.6 °C)   TempSrc: Oral   SpO2: 95%   Weight: 66.7 kg (147 lb 0.8 oz)   Height: 4' 11" (1.499 m)   PainSc: 0-No pain     Objective:   Physical Exam  Vitals reviewed.   Constitutional:       General: She is not in acute distress.     Appearance: Normal appearance. She is well-developed.   HENT:      Head: Normocephalic and atraumatic.      Right Ear: External ear normal.      Left Ear: External ear normal.      Nose: Nose normal.      Mouth/Throat:      Mouth: Mucous membranes are moist.   Eyes:      General: No scleral icterus.        Right eye: No discharge.         Left eye: No discharge.      Conjunctiva/sclera: Conjunctivae normal.   Cardiovascular:      Rate and Rhythm: Normal rate and regular rhythm.      Heart sounds: Normal heart sounds. No murmur heard.     No friction rub. No gallop.   Pulmonary:      Effort: Pulmonary effort is normal. No respiratory distress.      Breath sounds: Wheezing (heard throughout lower lung fields) present. No rhonchi or rales.   Musculoskeletal:         General: No deformity.      Right lower leg: No edema.      Left lower leg: No edema.   Skin:     General: Skin is warm and dry.   Neurological:      General: No focal deficit present.      Mental Status: She is alert and oriented to person, place, and time.   Psychiatric:         Mood and Affect: Mood normal.         Behavior: Behavior normal.         Thought Content: Thought content normal.             Pam Parks MD  Internal Medicine and Pediatrics                            "

## 2024-10-09 NOTE — PATIENT INSTRUCTIONS
Use the purple inhaler (Advair, fluticasone-salmeterol) twice a day, every day, even if you feel like your breathing is normal.    Use the red inhaler (albuterol) as needed, up to 6 times a day for shortness of breath.    STOP taking the prednisone 2.5 mg and START taking prednisone 20 mg for 5 days. Then, go back to taking the prednisone 2.5 mg every other day.    Start taking the furosemide 20 mg (fluid pill) EVERY DAY for at least a week.  If you're still having swelling, take it for another week.

## 2024-10-10 ENCOUNTER — TELEPHONE (OUTPATIENT)
Dept: PRIMARY CARE CLINIC | Facility: CLINIC | Age: 79
End: 2024-10-10
Payer: MEDICARE

## 2024-10-10 ENCOUNTER — HOSPITAL ENCOUNTER (OUTPATIENT)
Dept: RADIOLOGY | Facility: HOSPITAL | Age: 79
Discharge: HOME | End: 2024-10-10
Attending: STUDENT IN AN ORGANIZED HEALTH CARE EDUCATION/TRAINING PROGRAM
Payer: MEDICARE

## 2024-10-10 DIAGNOSIS — R05.9 COUGH, UNSPECIFIED TYPE: ICD-10-CM

## 2024-10-10 DIAGNOSIS — R50.9 FEVER, UNSPECIFIED FEVER CAUSE: ICD-10-CM

## 2024-10-10 PROCEDURE — 71046 X-RAY EXAM CHEST 2 VIEWS: CPT | Mod: TC,HCNC,PN

## 2024-10-10 PROCEDURE — 71046 X-RAY EXAM CHEST 2 VIEWS: CPT | Mod: 26,HCNC,, | Performed by: INTERNAL MEDICINE

## 2024-10-10 NOTE — TELEPHONE ENCOUNTER
----- Message from Pam Parks MD sent at 10/10/2024  2:25 PM CDT -----  Please let patient know that she doesn't have pneumonia - her x-ray was normal.

## 2024-10-11 ENCOUNTER — TELEPHONE (OUTPATIENT)
Dept: PRIMARY CARE CLINIC | Facility: CLINIC | Age: 79
End: 2024-10-11
Payer: MEDICARE

## 2024-10-11 NOTE — TELEPHONE ENCOUNTER
Called patient to discuss test results. No answer. Left detailed VM.    Pam Parks MD  Internal Medicine and Pediatrics

## 2024-10-11 NOTE — TELEPHONE ENCOUNTER
----- Message from Merlene sent at 10/11/2024  3:54 PM CDT -----  Contact: [phone]  [id number]    Type: Test Results    What test was performed?XR CHEST [[id number]] Copay: $0.00  Provider: ANDRE STOUT     Who ordered the test?RAJAT MARIE    When and where were the test performed? [facility]    Would you like a call back and or thru [facility]:callback    Comments:

## 2024-10-17 DIAGNOSIS — M05.9 RHEUMATOID ARTHRITIS WITH RHEUMATOID FACTOR, UNSPECIFIED: ICD-10-CM

## 2024-10-17 DIAGNOSIS — M05.79 RHEUMATOID ARTHRITIS WITH RHEUMATOID FACTOR OF MULTIPLE SITES WITHOUT ORGAN OR SYSTEMS INVOLVEMENT: ICD-10-CM

## 2024-10-17 NOTE — TELEPHONE ENCOUNTER
Called pt to schedule an appointment with Dr. Ham so medications can be refilled. No answer, Voicemail left.

## 2024-10-18 RX ORDER — SARILUMAB 200 MG/1.14ML
INJECTION, SOLUTION SUBCUTANEOUS
Qty: 2 EACH | Refills: 0 | Status: SHIPPED | OUTPATIENT
Start: 2024-10-18

## 2024-10-31 ENCOUNTER — HOSPITAL ENCOUNTER (OUTPATIENT)
Dept: RADIOLOGY | Facility: OTHER | Age: 79
Discharge: HOME | End: 2024-10-31
Attending: UROLOGY
Payer: MEDICARE

## 2024-10-31 DIAGNOSIS — N28.1 COMPLEX RENAL CYST: ICD-10-CM

## 2024-10-31 PROCEDURE — 76770 US EXAM ABDO BACK WALL COMP: CPT | Mod: 26,HCNC,, | Performed by: STUDENT IN AN ORGANIZED HEALTH CARE EDUCATION/TRAINING PROGRAM

## 2024-10-31 PROCEDURE — 76770 US EXAM ABDO BACK WALL COMP: CPT | Mod: TC,HCNC

## 2024-11-11 ENCOUNTER — TELEPHONE (OUTPATIENT)
Dept: FAMILY MEDICINE | Facility: CLINIC | Age: 79
End: 2024-11-11
Payer: MEDICARE

## 2024-11-11 NOTE — TELEPHONE ENCOUNTER
----- Message from Monique sent at 11/11/2024 12:29 PM CST -----  Contact: [phone]  Pt called and stated that she needs help setting up her BP machine.     Please call

## 2024-11-11 NOTE — TELEPHONE ENCOUNTER
Spoke to the patient and scheduled an appointment to be evaluated for her blood pressure. Patient states her blood pressure has been fluctuating recently and she would like to speak with the doctor about it.

## 2024-11-12 ENCOUNTER — TELEPHONE (OUTPATIENT)
Dept: PRIMARY CARE CLINIC | Facility: CLINIC | Age: 79
End: 2024-11-12

## 2024-11-12 ENCOUNTER — OFFICE VISIT (OUTPATIENT)
Dept: PRIMARY CARE CLINIC | Facility: CLINIC | Age: 79
End: 2024-11-12
Payer: MEDICARE

## 2024-11-12 VITALS
DIASTOLIC BLOOD PRESSURE: 68 MMHG | SYSTOLIC BLOOD PRESSURE: 116 MMHG | RESPIRATION RATE: 18 BRPM | OXYGEN SATURATION: 99 % | HEART RATE: 89 BPM | WEIGHT: 144.38 LBS | BODY MASS INDEX: 29.11 KG/M2 | HEIGHT: 59 IN

## 2024-11-12 DIAGNOSIS — Z86.79 HISTORY OF ORTHOSTATIC HYPOTENSION: Primary | ICD-10-CM

## 2024-11-12 DIAGNOSIS — I10 PRIMARY HYPERTENSION: ICD-10-CM

## 2024-11-12 PROCEDURE — 99999 PR PBB SHADOW E&M-EST. PATIENT-LVL IV: CPT | Mod: PBBFAC,HCNC,, | Performed by: FAMILY MEDICINE

## 2024-11-12 NOTE — TELEPHONE ENCOUNTER
----- Message from Janie sent at 11/12/2024  4:23 PM CST -----  Pt is wanting a referral to see a Dermatology.    Thanks

## 2024-11-13 DIAGNOSIS — M05.9 RHEUMATOID ARTHRITIS WITH RHEUMATOID FACTOR, UNSPECIFIED: ICD-10-CM

## 2024-11-13 DIAGNOSIS — M05.79 RHEUMATOID ARTHRITIS WITH RHEUMATOID FACTOR OF MULTIPLE SITES WITHOUT ORGAN OR SYSTEMS INVOLVEMENT: ICD-10-CM

## 2024-11-13 RX ORDER — SARILUMAB 200 MG/1.14ML
200 INJECTION, SOLUTION SUBCUTANEOUS
Qty: 2 EACH | Refills: 0 | Status: SHIPPED | OUTPATIENT
Start: 2024-11-13

## 2024-11-13 NOTE — PROGRESS NOTES
"      /68   Pulse 89   Resp 18   Ht 4' 11" (1.499 m)   Wt 65.5 kg (144 lb 6.4 oz)   SpO2 99%   BMI 29.17 kg/m²       ===========              Francy Bain is a 79 y.o. female           History of Present Illness    CHIEF COMPLAINT:  Francy presents for follow-up regarding blood pressure management and to discuss concerns about home blood pressure readings.    HPI:  Francy has been using a Digital Hypertension Program for monitoring blood pressure at home. Recently, she had elevated readings, with one as high as 213/107 in the past week. Francy is skeptical about the accuracy of the home monitoring device. She reports taking blood pressure readings every Saturday, consistently obtaining high readings, which led her to initiate daily measurements, resulting in increased anxiety.    During a visit in late February, the patient had significant lightheadedness and difficulty walking from the car to the office. Her blood pressure was 98/54. The practitioner advised holding the losartan for about 3 days until February 29th, then starting with half of the 50 mg tablet. The Lasix was also held for at least 3 days.    In a follow-up visit 8-9 days later, the patient's sitting blood pressure was 124/82, and she reported feeling much less lightheaded after the medication adjustment.    MEDICATIONS:  Francy is on Losartan 50 mg, taking half a tablet daily, and Lasix for blood pressure management.    MEDICAL HISTORY:  Francy has a history of hypertension and orthostatic hypotension.               Patient queried and denies any further complaints      Patient Active Problem List   Diagnosis    Rheumatoid arthritis involving multiple sites    Primary hypertension    Cognitive impairment    Glaucoma    Infected olecranon bursa    Osteopenia of multiple sites    History of stroke    Physical deconditioning    History of elbow replacement    Impaired functional mobility, balance, gait, and endurance    Elbow joint " replacement status, right    GERD (gastroesophageal reflux disease)    Renal cyst, acquired, right    Chronic obstructive asthma    Chronic obstructive pulmonary disease, unspecified COPD type    History of arthroplasty of both wrists    Right hip pain    Immunocompromised state due to drug therapy    Aortic atherosclerosis    Recurrent major depressive disorder, in full remission    Situational anxiety    On corticosteroid therapy    Suspected sleep apnea    White matter disease    B12 deficiency    Immunodeficiency due to treatment with immunosuppressive medication    Tinea corporis    Primary osteoarthritis involving multiple joints    History of orthostatic hypotension    Bilateral lower extremity edema       SURGICAL AND MEDICAL HISTORY: updated and reviewed.  Past Surgical History:   Procedure Laterality Date    ANKLE FUSION Bilateral     ELBOW ARTHROPLASTY      EYE SURGERY Right 03/2017    cataract    JOINT REPLACEMENT      bilateral knee replacement; bilateral elbow and wrist replacement    KNEE ARTHROPLASTY      OPEN REDUCTION AND INTERNAL FIXATION (ORIF) OF FRACTURE OF ULNA Right 8/17/2021    Procedure: ORIF, FRACTURE, ULNA  AND MASSIVE ALLOGRAFT;  Surgeon: Tio Patel MD;  Location: [facility] OR [address] [place] [address];  Service: Orthopedics;  Laterality: Right;    REVISION OF TOTAL ARTHROPLASTY OF ELBOW Right 8/17/2021    Procedure: REVISION, TOTAL ARTHROPLASTY, ELBOW;  Surgeon: Tio Patel MD;  Location: [facility] OR [address] [place] [address];  Service: Orthopedics;  Laterality: Right;     ALLERGIES updated and reviewed.  Review of patient's allergies indicates:  No Known Allergies    CURRENT OUTPATIENT MEDICATIONS updated and reviewed    Current Outpatient Medications:     ABRYSVO 120 mcg/0.5 mL SolR vaccine, , Disp: , Rfl:     albuterol (PROVENTIL HFA) 90 mcg/actuation inhaler, Inhale 2 puffs into the lungs every 6 (six) hours as needed for Wheezing. Rescue, Disp: 18 g, Rfl: 5    ascorbic acid, vitamin C, (VITAMIN C) 100 MG tablet,  Take 100 mg by mouth once daily., Disp: , Rfl:     carboxymethylcellulose sodium (REFRESH TEARS OPHT), Apply to eye. Use as needed, Disp: , Rfl:     desloratadine (CLARINEX) 5 mg tablet, Take 5 mg by mouth once daily., Disp: , Rfl:     fluticasone-salmeterol diskus inhaler 250-50 mcg, Inhale 1 puff into the lungs 2 (two) times daily. Controller, Disp: 60 each, Rfl: 5    furosemide (LASIX) 20 MG tablet, Take 1 tablet (20 mg total) by mouth once daily. FOR FLUID, Disp: 90 tablet, Rfl: 3    hydrocortisone 2.5 % cream, Do not apply morning of surgery, Disp: , Rfl:     hydrOXYzine HCL (ATARAX) 25 MG tablet, TAKE 1 TABLET THREE TIMES DAILY AS NEEDED FOR ANXIETY, Disp: 90 tablet, Rfl: 11    ketoconazole (NIZORAL) 2 % cream, Apply 1 application  topically once daily. Apply to affected area, Disp: 60 g, Rfl: 5    KEVZARA 200 mg/1.14 mL PnIj, INJECT 200MG SUBCUTANEOUSLY EVERY 14 DAYS, Disp: 2 each, Rfl: 0    levocetirizine (XYZAL) 5 MG tablet, Take 5 mg by mouth every evening., Disp: , Rfl:     LORazepam (ATIVAN) 1 MG tablet, TAKE ONE TABLET BY MOUTH EVERY DAY AT BEDTIME AS NEEDED FOR ANXIETY OR sleep, Disp: , Rfl:     losartan (COZAAR) 50 MG tablet, Take 1 tablet (50 mg total) by mouth once daily., Disp: , Rfl:     moxifloxacin (VIGAMOX) 0.5 % ophthalmic solution, Place into both eyes., Disp: , Rfl:     nystatin (MYCOSTATIN) cream, Apply topically 2 (two) times daily., Disp: 30 g, Rfl: 5    omeprazole (PRILOSEC) 20 MG capsule, Take 1 capsule (20 mg total) by mouth once daily., Disp: 90 capsule, Rfl: 3    predniSONE (DELTASONE) 2.5 MG tablet, One po daily, Disp: 90 tablet, Rfl: 3    predniSONE (DELTASONE) 20 MG tablet, Take 1 tablet (20 mg total) by mouth once daily. Take 2 po with breakfast x 5d, Disp: 5 tablet, Rfl: 0    sarilumab (KEVZARA) 200 mg/1.14 mL Syrg, Inject 1.14 mLs (200 mg total) into the skin every 14 (fourteen) days., Disp: 2 each, Rfl: 6    traZODone (DESYREL) 50 MG tablet, Take 1 tablet (50 mg total) by  "mouth nightly as needed for Insomnia., Disp: 90 tablet, Rfl: 3    vibegron (GEMTESA) 75 mg Tab, Take 75 mg by mouth once daily., Disp: 30 tablet, Rfl: 11    VITAMIN D2 1,250 mcg (50,000 unit) capsule, Take 1 capsule (50,000 Units total) by mouth every 7 days., Disp: 12 capsule, Rfl: 3    Review of Systems   Constitutional:  Negative for activity change, appetite change, chills, diaphoresis, fatigue, fever and unexpected weight change.   HENT:  Negative for congestion, ear discharge, ear pain, facial swelling, hearing loss, nosebleeds, postnasal drip, rhinorrhea, sinus pressure, sneezing, sore throat, tinnitus, trouble swallowing and voice change.    Eyes:  Negative for photophobia, pain, discharge, redness, itching and visual disturbance.   Respiratory:  Negative for cough, chest tightness, shortness of breath and wheezing.    Cardiovascular:  Negative for chest pain, palpitations and leg swelling.   Gastrointestinal:  Negative for abdominal distention, abdominal pain, anal bleeding, blood in stool, constipation, diarrhea, nausea, rectal pain and vomiting.   Endocrine: Negative for cold intolerance, heat intolerance, polydipsia, polyphagia and polyuria.   Genitourinary:  Negative for difficulty urinating, dysuria and flank pain.   Musculoskeletal:  Negative for arthralgias, back pain, joint swelling, myalgias and neck pain.   Skin:  Negative for rash.   Neurological:  Negative for dizziness, tremors, seizures, syncope, speech difficulty, weakness, light-headedness, numbness and headaches.   Psychiatric/Behavioral:  Negative for behavioral problems, confusion, decreased concentration, dysphoric mood, sleep disturbance and suicidal ideas. The patient is not nervous/anxious and is not hyperactive.        /68   Pulse 89   Resp 18   Ht 4' 11" (1.499 m)   Wt 65.5 kg (144 lb 6.4 oz)   SpO2 99%   BMI 29.17 kg/m²   Physical Exam  Vitals and nursing note reviewed.   Constitutional:       General: She is not in " acute distress.     Appearance: Normal appearance. She is not ill-appearing or diaphoretic.   HENT:      Head: Normocephalic and atraumatic.   Eyes:      General: No scleral icterus.        Right eye: No discharge.         Left eye: No discharge.      Conjunctiva/sclera: Conjunctivae normal.   Skin:     General: Skin is warm and dry.   Neurological:      Mental Status: She is alert.   Psychiatric:         Mood and Affect: Mood normal.         Behavior: Behavior normal.           ASSESSMENT/PLAN    Assessment & Plan    Evaluated patient's blood pressure readings, noting discrepancies between home and office measurements  Considered potential inaccuracy of home blood pressure device  Reviewed patient's history of orthostatic hypotension and previous medication adjustments  Assessed current blood pressure as normal (114/66) despite patient's concerns about elevated home readings    HYPERTENSION:  Continue losartan  Continued Lasix at current dose.  Monitored the patient's blood pressure readings, noting one home reading of 213/70  Evaluated current office blood pressure reading of 114/66, which is lower than the patient's home readings.  Reviewed previous office blood pressure readings: 128/70 in January, 98/54 in late February, and 124/82 about 9 days after adjusting medication.  Assessed previous blood pressure readings and medication adjustments.  Discussed the concept of orthostatic hypotension and its relation to patient's previous symptoms.  Assessed the discrepancy between home and office blood pressure readings, expressing skepticism about the accuracy of home blood pressure machines.  Reassured the patient that their blood pressure is within normal range based on office reading, despite high home readings.           Primary hypertension.  History of orthostatic hypotension.  See above.    Most recent some lab results reviewed with patient.  Any new prescription medications gone over in detail including reason  for taking the medication, most common possible side effects and possible costs, etcetera.    Chronic conditions updated. Other than changes or additions as above, cont current medications and maintain follow-up with specialists if indicated.     Mikey Tolbert MD    Disclaimer:  This clinical note was created with the assistance of [employer], an AI-powered documentation tool.  Portions of this note may also have been dictated with the assistance of a computer-assisted dictation program.  While the healthcare provider has reviewed the content, AI-assisted documentation and/or dictation programs may contain inadvertent errors or omissions.  Patients are encouraged to discuss questions or clarifications regarding their care directly with the provider.

## 2024-11-16 RX ORDER — FUROSEMIDE 20 MG/1
TABLET ORAL
Qty: 90 TABLET | Refills: 1 | Status: SHIPPED | OUTPATIENT
Start: 2024-11-16

## 2024-11-16 NOTE — TELEPHONE ENCOUNTER
Care Due:                  Date            Visit Type   Department     Provider  --------------------------------------------------------------------------------                                EP -                              PRIMARY      [facility]  Last Visit: 11-      CARE ([facility])   VIKA Tolbert                              EP -                              PRIMARY      [facility] PR[facility]  Next Visit: 02-      CARE ([facility])   VIKA Tolbert                                                            Last  Test          Frequency    Reason                     Performed    Due Date  --------------------------------------------------------------------------------    Vitamin D...  12 months..  VITAMIN..................  Not Found    Overdue    [employer] Embedded Care Due Messages. Reference number: [id number].   11/16/2024 2:18:42 AM CST

## 2024-11-16 NOTE — TELEPHONE ENCOUNTER
Refill Decision Note   Francy Bain  is requesting a refill authorization.  Brief Assessment and Rationale for Refill:  Approve     Medication Therapy Plan:        Comments:     Note composed:8:52 AM 11/16/2024

## 2024-11-17 NOTE — PROGRESS NOTES
"    /68   Pulse 89   Resp 18   Ht 4' 11" (1.499 m)   Wt 65.5 kg (144 lb 6.4 oz)   SpO2 99%   BMI 29.17 kg/m²       ===========              Francy Bain is a 79 y.o. female           History of Present Illness    CHIEF COMPLAINT:  Francy presents for follow-up regarding blood pressure management and to discuss concerns about home blood pressure readings.    HPI:  Francy has been using a Digital Hypertension Program for monitoring blood pressure at home. Recently, she had elevated readings, with one as high as 213/107 in the past week. Francy is skeptical about the accuracy of the home monitoring device. She reports taking blood pressure readings every Saturday, consistently obtaining high readings, which led her to initiate daily measurements, resulting in increased anxiety.    During a visit in late February, the patient had significant lightheadedness and difficulty walking from the car to the office. Her blood pressure was 98/54. The practitioner advised holding the losartan for about 3 days until February 29th, then starting with half of the 50 mg tablet. The Lasix was also held for at least 3 days.    In a follow-up visit 8-9 days later, the patient's sitting blood pressure was 124/82, and she reported feeling much less lightheaded after the medication adjustment.    MEDICATIONS:  Francy is on Losartan 50 mg, taking half a tablet daily, and Lasix for blood pressure management.    MEDICAL HISTORY:  Francy has a history of hypertension and orthostatic hypotension.               Patient queried and denies any further complaints      Patient Active Problem List   Diagnosis    Rheumatoid arthritis involving multiple sites    Primary hypertension    Cognitive impairment    Glaucoma    Infected olecranon bursa    Osteopenia of multiple sites    History of stroke    Physical deconditioning    History of elbow replacement    Impaired functional mobility, balance, gait, and endurance    Elbow joint " replacement status, right    GERD (gastroesophageal reflux disease)    Renal cyst, acquired, right    Chronic obstructive asthma    Chronic obstructive pulmonary disease, unspecified COPD type    History of arthroplasty of both wrists    Right hip pain    Immunocompromised state due to drug therapy    Aortic atherosclerosis    Recurrent major depressive disorder, in full remission    Situational anxiety    On corticosteroid therapy    Suspected sleep apnea    White matter disease    B12 deficiency    Immunodeficiency due to treatment with immunosuppressive medication    Tinea corporis    Primary osteoarthritis involving multiple joints    History of orthostatic hypotension    Bilateral lower extremity edema       SURGICAL AND MEDICAL HISTORY: updated and reviewed.  Past Surgical History:   Procedure Laterality Date    ANKLE FUSION Bilateral     ELBOW ARTHROPLASTY      EYE SURGERY Right 03/2017    cataract    JOINT REPLACEMENT      bilateral knee replacement; bilateral elbow and wrist replacement    KNEE ARTHROPLASTY      OPEN REDUCTION AND INTERNAL FIXATION (ORIF) OF FRACTURE OF ULNA Right 8/17/2021    Procedure: ORIF, FRACTURE, ULNA  AND MASSIVE ALLOGRAFT;  Surgeon: Tio Patel MD;  Location: [facility] OR [address] [place] [address];  Service: Orthopedics;  Laterality: Right;    REVISION OF TOTAL ARTHROPLASTY OF ELBOW Right 8/17/2021    Procedure: REVISION, TOTAL ARTHROPLASTY, ELBOW;  Surgeon: Tio Patel MD;  Location: [facility] OR [address] [place] [address];  Service: Orthopedics;  Laterality: Right;     ALLERGIES updated and reviewed.  Review of patient's allergies indicates:  No Known Allergies    CURRENT OUTPATIENT MEDICATIONS updated and reviewed    Current Outpatient Medications:     ABRYSVO 120 mcg/0.5 mL SolR vaccine, , Disp: , Rfl:     albuterol (PROVENTIL HFA) 90 mcg/actuation inhaler, Inhale 2 puffs into the lungs every 6 (six) hours as needed for Wheezing. Rescue, Disp: 18 g, Rfl: 5    ascorbic acid, vitamin C, (VITAMIN C) 100 MG tablet,  Take 100 mg by mouth once daily., Disp: , Rfl:     carboxymethylcellulose sodium (REFRESH TEARS OPHT), Apply to eye. Use as needed, Disp: , Rfl:     desloratadine (CLARINEX) 5 mg tablet, Take 5 mg by mouth once daily., Disp: , Rfl:     fluticasone-salmeterol diskus inhaler 250-50 mcg, Inhale 1 puff into the lungs 2 (two) times daily. Controller, Disp: 60 each, Rfl: 5    hydrocortisone 2.5 % cream, Do not apply morning of surgery, Disp: , Rfl:     hydrOXYzine HCL (ATARAX) 25 MG tablet, TAKE 1 TABLET THREE TIMES DAILY AS NEEDED FOR ANXIETY, Disp: 90 tablet, Rfl: 11    ketoconazole (NIZORAL) 2 % cream, Apply 1 application  topically once daily. Apply to affected area, Disp: 60 g, Rfl: 5    levocetirizine (XYZAL) 5 MG tablet, Take 5 mg by mouth every evening., Disp: , Rfl:     LORazepam (ATIVAN) 1 MG tablet, TAKE ONE TABLET BY MOUTH EVERY DAY AT BEDTIME AS NEEDED FOR ANXIETY OR sleep, Disp: , Rfl:     losartan (COZAAR) 50 MG tablet, Take 1 tablet (50 mg total) by mouth once daily., Disp: , Rfl:     moxifloxacin (VIGAMOX) 0.5 % ophthalmic solution, Place into both eyes., Disp: , Rfl:     nystatin (MYCOSTATIN) cream, Apply topically 2 (two) times daily., Disp: 30 g, Rfl: 5    omeprazole (PRILOSEC) 20 MG capsule, Take 1 capsule (20 mg total) by mouth once daily., Disp: 90 capsule, Rfl: 3    predniSONE (DELTASONE) 2.5 MG tablet, One po daily, Disp: 90 tablet, Rfl: 3    predniSONE (DELTASONE) 20 MG tablet, Take 1 tablet (20 mg total) by mouth once daily. Take 2 po with breakfast x 5d, Disp: 5 tablet, Rfl: 0    sarilumab (KEVZARA) 200 mg/1.14 mL Syrg, Inject 1.14 mLs (200 mg total) into the skin every 14 (fourteen) days., Disp: 2 each, Rfl: 6    traZODone (DESYREL) 50 MG tablet, Take 1 tablet (50 mg total) by mouth nightly as needed for Insomnia., Disp: 90 tablet, Rfl: 3    vibegron (GEMTESA) 75 mg Tab, Take 75 mg by mouth once daily., Disp: 30 tablet, Rfl: 11    VITAMIN D2 1,250 mcg (50,000 unit) capsule, Take 1 capsule  "(50,000 Units total) by mouth every 7 days., Disp: 12 capsule, Rfl: 3    furosemide (LASIX) 20 MG tablet, TAKE 1 TABLET ONE TIME DAILY FOR FLUID, Disp: 90 tablet, Rfl: 1    sarilumab (KEVZARA) 200 mg/1.14 mL PnIj, Inject 1.14 mLs (200 mg total) into the skin every 14 (fourteen) days., Disp: 2 each, Rfl: 0    Review of Systems   Constitutional:  Negative for activity change, appetite change, chills, diaphoresis, fatigue, fever and unexpected weight change.   HENT:  Negative for congestion, ear discharge, ear pain, facial swelling, hearing loss, nosebleeds, postnasal drip, rhinorrhea, sinus pressure, sneezing, sore throat, tinnitus, trouble swallowing and voice change.    Eyes:  Negative for photophobia, pain, discharge, redness, itching and visual disturbance.   Respiratory:  Negative for cough, chest tightness, shortness of breath and wheezing.    Cardiovascular:  Negative for chest pain, palpitations and leg swelling.   Gastrointestinal:  Negative for abdominal distention, abdominal pain, anal bleeding, blood in stool, constipation, diarrhea, nausea, rectal pain and vomiting.   Endocrine: Negative for cold intolerance, heat intolerance, polydipsia, polyphagia and polyuria.   Genitourinary:  Negative for difficulty urinating, dysuria and flank pain.   Musculoskeletal:  Negative for arthralgias, back pain, joint swelling, myalgias and neck pain.   Skin:  Negative for rash.   Neurological:  Negative for dizziness, tremors, seizures, syncope, speech difficulty, weakness, light-headedness, numbness and headaches.   Psychiatric/Behavioral:  Negative for behavioral problems, confusion, decreased concentration, dysphoric mood, sleep disturbance and suicidal ideas. The patient is not nervous/anxious and is not hyperactive.        /68   Pulse 89   Resp 18   Ht 4' 11" (1.499 m)   Wt 65.5 kg (144 lb 6.4 oz)   SpO2 99%   BMI 29.17 kg/m²   Physical Exam  Vitals and nursing note reviewed.   Constitutional:       " General: She is not in acute distress.     Appearance: Normal appearance. She is not ill-appearing or diaphoretic.   HENT:      Head: Normocephalic and atraumatic.   Eyes:      General: No scleral icterus.        Right eye: No discharge.         Left eye: No discharge.      Conjunctiva/sclera: Conjunctivae normal.   Skin:     General: Skin is warm and dry.   Neurological:      Mental Status: She is alert.   Psychiatric:         Mood and Affect: Mood normal.         Behavior: Behavior normal.           ASSESSMENT/PLAN    Assessment & Plan    Evaluated patient's blood pressure readings, noting discrepancies between home and office measurements  Considered potential inaccuracy of home blood pressure device  Reviewed patient's history of orthostatic hypotension and previous medication adjustments  Assessed current blood pressure as normal (114/66) despite patient's concerns about elevated home readings    HYPERTENSION:  Continued losartan 25mg (half of 50mg tablet) daily.  Continued Lasix at current dose.  Monitored the patient's blood pressure readings, noting one home reading of 213/7.  Evaluated current office blood pressure reading of 114/66, which is lower than the patient's home readings.  Reviewed previous office blood pressure readings: 128/70 in January, 98/54 in late February, and 124/82 about 9 days after adjusting medication.  Assessed previous blood pressure readings and medication adjustments.  Previously adjusted losartan dosage to half of 50mg tablet and held Lasix for at least 3 days due to low blood pressure and orthostatic symptoms.  Instructed the patient to continue daily blood pressure readings at home.  Explained potential inaccuracies in home blood pressure monitoring devices compared to hospital-grade equipment.  Discussed the concept of orthostatic hypotension and its relation to patient's previous symptoms.  Monitored the patient's report of high blood pressure readings at home, particularly  on Saturdays.  Assessed the discrepancy between home and office blood pressure readings, expressing skepticism about the accuracy of home blood pressure machines.  Reassured the patient that their blood pressure is within normal range based on office reading, despite high home readings.           Francy was seen today for hypertension.    Diagnoses and all orders for this visit:    History of orthostatic hypotension    Primary hypertension            Most recent some lab results reviewed with patient.  Any new prescription medications gone over in detail including reason for taking the medication, most common possible side effects and possible costs, etcetera.    Chronic conditions updated. Other than changes or additions as above, cont current medications and maintain follow-up with specialists if indicated.     Mikey Tolbert MD    Disclaimer:  This clinical note was created with the assistance of [employer], an AI-powered documentation tool.  Portions of this note may also have been dictated with the assistance of a computer-assisted dictation program.  While the healthcare provider has reviewed the content, AI-assisted documentation and/or dictation programs may contain inadvertent errors or omissions.  Patients are encouraged to discuss questions or clarifications regarding their care directly with the provider.

## 2024-11-22 ENCOUNTER — TELEPHONE (OUTPATIENT)
Dept: UROLOGY | Facility: CLINIC | Age: 79
End: 2024-11-22
Payer: MEDICAID

## 2024-11-22 NOTE — TELEPHONE ENCOUNTER
----- Message from Chicho Franco sent at 11/22/2024  3:22 PM CST -----  Contact: [phone] Patient    ----- Message -----  From: Wilda Clark  Sent: 11/22/2024   3:03 PM CST  To: [facility]ene Orozco Staff    Caller is requesting an earlier appointment then we can schedule.  Caller is requesting a message be sent to the provider.    If this is for urgent care symptoms, did you offer other providers at this location, providers at other locations, or [facility]? (yes, no, n/a):      If this is for the patients physical, did you offer to schedule next available and put on wait list, or to see NP or PA for their physical?  (yes, no, n/a):      When is the next available appointment with their provider:  nothing came up    Reason for the appointment:  6 month f/u    Patient preference of timeframe to be scheduled:  ASAP    Would the patient like a call back, or a response through their [facility] portal?:   Call Back Please. Thank you    Comments:  Pt went to the wrong building for her recent appointment that is why she has to reschedule.

## 2024-12-09 ENCOUNTER — TELEPHONE (OUTPATIENT)
Dept: PRIMARY CARE CLINIC | Facility: CLINIC | Age: 79
End: 2024-12-09
Payer: MEDICAID

## 2024-12-09 ENCOUNTER — NURSE TRIAGE (OUTPATIENT)
Dept: ADMINISTRATIVE | Facility: CLINIC | Age: 79
End: 2024-12-09
Payer: MEDICAID

## 2024-12-09 NOTE — TELEPHONE ENCOUNTER
SW patient and scheduled her with Dr Vallejo tomorrow at 10:40 am  Pt said she's been having breathing difficulties, and difficulties swallowing.

## 2024-12-09 NOTE — TELEPHONE ENCOUNTER
----- Message from Deanna sent at 12/9/2024 12:26 PM CST -----  .[email or web address]     Patient is calling for Medical Advice regarding:     Symptoms: Swallowing Difficulty, Dizziness, Breathing Trouble, Headache  Outcome: Instruct patient to Call 911 NOW!  Reason: This is the only possible outcome for these symptoms    The caller rejected this outcome.    How long has patient had these symptoms:    Pharmacy name and phone#:    Patient wants a call back or thru [facility]:    Comments: patient states that the left side of her brain hurts when she lays down.    Please advise patient replies from provider may take up to 48 hours.

## 2024-12-09 NOTE — TELEPHONE ENCOUNTER
"Spoke with pt who reports that she has been having difficulty with swallowing for some time. She is asking about having swallowing issue examined. Pt also reports that she is having SOB, states SOB has been present for past month, and noted with walking. Also reports she feels like her heart bears faster with exertion. Pt advised to be seen in ED. Pt declined, and requesting appointment. Will send message to office.    Reason for Disposition   Extra heartbeats, irregular heart beating, or heart is beating very fast (i.e., 'palpitations')    Additional Information   Negative: SEVERE difficulty breathing (e.g., struggling for each breath, speaks in single words, pulse > 120)   Negative: Breathing stopped and hasn't returned   Negative: Choking on something   Negative: Bluish (or gray) lips or face   Negative: Difficult to awaken or acting confused (e.g., disoriented, slurred speech)   Negative: Passed out (e.g., fainted, lost consciousness, blacked out and was not responding)   Negative: Wheezing started suddenly after medicine, an allergic food, or bee sting   Negative: Stridor (harsh sound while breathing in)   Negative: Slow, shallow and weak breathing   Negative: Sounds like a life-threatening emergency to the triager   Negative: MODERATE difficulty breathing (e.g., speaks in phrases, SOB even at rest, pulse 100-120) of new-onset or worse than normal   Negative: Oxygen level (e.g., pulse oximetry) 90% or lower   Negative: Wheezing can be heard across the room   Negative: Drooling or spitting out saliva (because can't swallow)   Negative: Any history of prior "blood clot" in leg or lungs   Negative: Illness requiring prolonged bedrest in past month (e.g., immobilization, long hospital stay)   Negative: Hip or leg fracture (broken bone) in past month (or had cast on leg or ankle in past month)   Negative: Major surgery in the past month   Negative: Long-distance travel in past month (e.g., car, bus, train, plane; " with trip lasting 6 or more hours)   Negative: Cancer treatment in past six months (or has cancer now)    Protocols used: Breathing Difficulty-A-OH

## 2024-12-25 DIAGNOSIS — I10 PRIMARY HYPERTENSION: ICD-10-CM

## 2024-12-25 NOTE — TELEPHONE ENCOUNTER
No care due was identified.  [facility] Embedded Care Due Messages. Reference number: [id number].   12/25/2024 2:36:19 AM CST

## 2024-12-26 RX ORDER — LOSARTAN POTASSIUM 50 MG/1
50 TABLET ORAL 2 TIMES DAILY
Qty: 180 TABLET | Refills: 1 | Status: SHIPPED | OUTPATIENT
Start: 2024-12-26

## 2024-12-26 NOTE — TELEPHONE ENCOUNTER
Refill Decision Note   Francy Bain  is requesting a refill authorization.  Brief Assessment and Rationale for Refill:  Approve     Medication Therapy Plan:         Comments:     Note composed:11:07 AM 12/26/2024             Appointments     Last Visit   11/12/2024 Mikey Tolbert MD   Next Visit   2/10/2025 Mikey Tolbert MD

## 2024-12-30 ENCOUNTER — NURSE TRIAGE (OUTPATIENT)
Dept: ADMINISTRATIVE | Facility: CLINIC | Age: 79
End: 2024-12-30
Payer: MEDICARE

## 2024-12-31 NOTE — TELEPHONE ENCOUNTER
Patient reports that when she gets short winded, she uses her Advair inhaler and has started breaking out in hives under her breast and behind her knees, also sometimes on her arms.  Also reports that her throat gets tight and her tongue feels funny when using the advair inhaler.    States that when she does an albuterol breathing tx, the rash gets really bad.  Reviewed her medications and tried to ascertain if she is having an allergic reaction to both inhalers.  It is kind of difficult to follow patient since she isn't really clear on which medication gives her what symptom during use.  Reviewed at length emergent issues to seek medical advice in re: to reaction to her medications.  Appointment scheduled to see Dr. Parks this Thursday 1/2/25 at 3:00 pm.  Patient able to repeat to me when to seek emergent medical advice and assures me that she will.  Also able to confirm appointment for this week.  
Trouble breathing, states when she does her breathing RX helps. But it is causing a rash under her breasts and behind her knees. Red and moist. Patient is unclear re what RX she is taking, sounds like she is only taking her Abuterol. Not the ADVAIR. I see she stopped Brieo, but I see note where she was to use ADVAIR twice a day, even if she felt like breathing was normal. Does not sound like she is doing this. Triage done- dispo see provider in 2 weeks, appointment offered. Can only go to [facility]. Will send message to provider. Verb understanding.   Reason for Disposition   Red, moist, irritated area between skin folds (or under larger breasts)    Additional Information   Negative: Sounds like a life-threatening emergency to the triager   Negative: Fever and localized purple or blood-colored spots or dots that are not from injury or friction   Negative: Fever and localized rash is very painful   Negative: Patient sounds very sick or weak to the triager   Negative: Looks like a boil, infected sore, deep ulcer, or other infected rash (spreading redness, pus)   Negative: Painful rash with multiple small blisters grouped together (i.e., dermatomal distribution or 'band' or 'stripe')   Negative: Localized rash is very painful (no fever)   Negative: Localized purple or blood-colored spots or dots that are not from injury or friction (no fever)   Negative: Lyme disease suspected (e.g., bull's-eye rash or tick bite / exposure)   Negative: Patient wants to be seen   Negative: Tender bumps in armpits   Negative: Pimples (localized) and no improvement after using CARE ADVICE   Negative: SEVERE local itching persists after 2 days of steroid cream   Negative: Applying cream or ointment and it causes severe itch, burning, or pain   Negative: Medication patch causing local rash or itching   Negative: Localized rash present > 7 days    Protocols used: Rash or Redness - [id number]    
DAughter

## 2025-01-02 ENCOUNTER — HOSPITAL ENCOUNTER (OUTPATIENT)
Dept: RADIOLOGY | Facility: HOSPITAL | Age: 80
Discharge: HOME | End: 2025-01-02
Attending: STUDENT IN AN ORGANIZED HEALTH CARE EDUCATION/TRAINING PROGRAM
Payer: MEDICARE

## 2025-01-02 ENCOUNTER — OFFICE VISIT (OUTPATIENT)
Dept: PRIMARY CARE CLINIC | Facility: CLINIC | Age: 80
End: 2025-01-02
Payer: MEDICARE

## 2025-01-02 VITALS
WEIGHT: 143.31 LBS | OXYGEN SATURATION: 96 % | TEMPERATURE: 98 F | HEIGHT: 59 IN | BODY MASS INDEX: 28.89 KG/M2 | HEART RATE: 85 BPM | DIASTOLIC BLOOD PRESSURE: 74 MMHG | SYSTOLIC BLOOD PRESSURE: 136 MMHG

## 2025-01-02 DIAGNOSIS — J44.89 CHRONIC OBSTRUCTIVE ASTHMA: ICD-10-CM

## 2025-01-02 DIAGNOSIS — R06.02 SHORTNESS OF BREATH: ICD-10-CM

## 2025-01-02 DIAGNOSIS — J44.1 COPD EXACERBATION: ICD-10-CM

## 2025-01-02 DIAGNOSIS — M05.79 RHEUMATOID ARTHRITIS INVOLVING MULTIPLE SITES WITH POSITIVE RHEUMATOID FACTOR: ICD-10-CM

## 2025-01-02 DIAGNOSIS — R06.02 SHORTNESS OF BREATH: Primary | ICD-10-CM

## 2025-01-02 PROCEDURE — 71046 X-RAY EXAM CHEST 2 VIEWS: CPT | Mod: TC,HCNC,PN

## 2025-01-02 PROCEDURE — 1101F PT FALLS ASSESS-DOCD LE1/YR: CPT | Mod: HCNC,CPTII,S$GLB, | Performed by: STUDENT IN AN ORGANIZED HEALTH CARE EDUCATION/TRAINING PROGRAM

## 2025-01-02 PROCEDURE — 3075F SYST BP GE 130 - 139MM HG: CPT | Mod: HCNC,CPTII,S$GLB, | Performed by: STUDENT IN AN ORGANIZED HEALTH CARE EDUCATION/TRAINING PROGRAM

## 2025-01-02 PROCEDURE — 71046 X-RAY EXAM CHEST 2 VIEWS: CPT | Mod: 26,HCNC,, | Performed by: RADIOLOGY

## 2025-01-02 PROCEDURE — 1160F RVW MEDS BY RX/DR IN RCRD: CPT | Mod: HCNC,CPTII,S$GLB, | Performed by: STUDENT IN AN ORGANIZED HEALTH CARE EDUCATION/TRAINING PROGRAM

## 2025-01-02 PROCEDURE — 1159F MED LIST DOCD IN RCRD: CPT | Mod: HCNC,CPTII,S$GLB, | Performed by: STUDENT IN AN ORGANIZED HEALTH CARE EDUCATION/TRAINING PROGRAM

## 2025-01-02 PROCEDURE — 1126F AMNT PAIN NOTED NONE PRSNT: CPT | Mod: HCNC,CPTII,S$GLB, | Performed by: STUDENT IN AN ORGANIZED HEALTH CARE EDUCATION/TRAINING PROGRAM

## 2025-01-02 PROCEDURE — 99214 OFFICE O/P EST MOD 30 MIN: CPT | Mod: HCNC,S$GLB,, | Performed by: STUDENT IN AN ORGANIZED HEALTH CARE EDUCATION/TRAINING PROGRAM

## 2025-01-02 PROCEDURE — 3288F FALL RISK ASSESSMENT DOCD: CPT | Mod: HCNC,CPTII,S$GLB, | Performed by: STUDENT IN AN ORGANIZED HEALTH CARE EDUCATION/TRAINING PROGRAM

## 2025-01-02 PROCEDURE — 99999 PR PBB SHADOW E&M-EST. PATIENT-LVL V: CPT | Mod: PBBFAC,HCNC,, | Performed by: STUDENT IN AN ORGANIZED HEALTH CARE EDUCATION/TRAINING PROGRAM

## 2025-01-02 PROCEDURE — 3078F DIAST BP <80 MM HG: CPT | Mod: HCNC,CPTII,S$GLB, | Performed by: STUDENT IN AN ORGANIZED HEALTH CARE EDUCATION/TRAINING PROGRAM

## 2025-01-02 RX ORDER — NYSTATIN 100000 [USP'U]/G
POWDER TOPICAL 4 TIMES DAILY
Qty: 60 G | Refills: 0 | Status: SHIPPED | OUTPATIENT
Start: 2025-01-02

## 2025-01-02 RX ORDER — METHYLPREDNISOLONE 4 MG/1
TABLET ORAL
Qty: 21 EACH | Refills: 0 | Status: SHIPPED | OUTPATIENT
Start: 2025-01-02 | End: 2025-01-23

## 2025-01-02 RX ORDER — AZITHROMYCIN 250 MG/1
TABLET, FILM COATED ORAL
Qty: 6 TABLET | Refills: 0 | Status: SHIPPED | OUTPATIENT
Start: 2025-01-02 | End: 2025-01-07

## 2025-01-02 NOTE — PROGRESS NOTES
Office visit  Patient: Francy Bain   1/2/2025       Assessment/Plan:       1. Shortness of breath  -     CT Chest Without Contrast; Future; Expected date: 01/02/2025  -     methylPREDNISolone (MEDROL DOSEPACK) 4 mg tablet; use as directed  Dispense: 21 each; Refill: 0  -     X-Ray Chest PA And Lateral; Future; Expected date: 01/02/2025        -unclear etiology, concern for asthma/COPD exacerbation; considered heart failure, but no crackles heard on exam and no lower extremity edema, and echocardiogram in June 2024 was normal        -if above workup is unrevealing, we will consider repeat ECHO     2. Rheumatoid arthritis involving multiple sites with positive rheumatoid factor        -stable, continue current medication         -follow-up with rheumatology     3. Chronic obstructive asthma  Overview:  No post available on PFTs unfortunately, they do show obstruction.  Recommend repeat  Has hx of asthma that has not been treated with maintenance meds  Start ICS/LABA  CT shows improving PNA (which fits in with clinical picture a week or so ago) with GGOs and interstitial edema  No desat on walk    Orders:  -     Complete PFT w/ bronchodilator; Future  -     Ambulatory referral/consult to Pulmonology; Future; Expected date: 01/09/2025    4. COPD exacerbation  -     azithromycin (Z-ANALI) 250 MG tablet; Take 2 tablets by mouth on day 1; Take 1 tablet by mouth on days 2-5  Dispense: 6 tablet; Refill: 0    Other orders  -     nystatin (MYCOSTATIN) powder; Apply topically 4 (four) times daily.  Dispense: 60 g; Refill: 0        Return to clinic in 1 month for follow up with Dr. Tolbert or sooner as needed.        CHIEF COMPLAINT: shortness of breath and dizziness    HPI: Francy Bain is a 79 y.o. female who presents for dizziness, shortness of breath, and palpitations.  She states that the dizziness has been for about a month and the shortness of breath/palpitations for months.  She also reports redness underneath  her breasts.    When I saw her in October, I prescribed her Advair.  She has not been taking this at home because she feels that it breaks her out - she thinks it is causing a candidal rash underneath her breasts.    She reports nasal congestion for 2 weeks.  She reports that she gets dizzy if she stands up suddenly.  She states that she's still taking her losartan at the lower dose that Dr. Tolbert advised.  She has not been checking her blood pressure at home.  She also reports about a week of a productive cough.        Current Outpatient Medications   Medication Instructions    ABRYSVO 120 mcg/0.5 mL SolR vaccine     albuterol (PROVENTIL HFA) 90 mcg/actuation inhaler 2 puffs, Inhalation, Every 6 hours PRN, Rescue    ascorbic acid (vitamin C) (VITAMIN C) 100 mg, Daily    carboxymethylcellulose sodium (REFRESH TEARS OPHT) Apply to eye. Use as needed    desloratadine (CLARINEX) 5 mg, Daily    fluticasone-salmeterol diskus inhaler 250-50 mcg 1 puff, Inhalation, 2 times daily, Controller    furosemide (LASIX) 20 MG tablet TAKE 1 TABLET ONE TIME DAILY FOR FLUID    GEMTESA 75 mg, Oral, Daily    hydrocortisone 2.5 % cream Do not apply morning of surgery    hydrOXYzine HCL (ATARAX) 25 mg, Oral, FOR ANXIETY    ketoconazole (NIZORAL) 2 % cream 1 application , Topical (Top), Daily, Apply to affected area    KEVZARA 200 mg, Subcutaneous, Every 14 days    KEVZARA 200 mg, Subcutaneous, Every 14 days    levocetirizine (XYZAL) 5 mg, Nightly    LORazepam (ATIVAN) 1 MG tablet TAKE ONE TABLET BY MOUTH EVERY DAY AT BEDTIME AS NEEDED FOR ANXIETY OR sleep    losartan (COZAAR) 50 mg, Oral, 2 times daily    moxifloxacin (VIGAMOX) 0.5 % ophthalmic solution Place into both eyes.    nystatin (MYCOSTATIN) cream Topical (Top), 2 times daily    omeprazole (PRILOSEC) 20 mg, Oral, Daily    predniSONE (DELTASONE) 2.5 MG tablet One po daily    predniSONE (DELTASONE) 20 mg, Oral, Daily, Take 2 po with breakfast x 5d    traZODone (DESYREL) 50 mg,  "Oral, Nightly PRN    VITAMIN D2 50,000 Units, Oral, Every 7 days       Lab Results   Component Value Date    HGBA1C 5.2 06/18/2024    HGBA1C 5.2 09/06/2023    HGBA1C 5.4 09/28/2022     No results found for: "MICALBCREAT"  Lab Results   Component Value Date    LDLCALC 121.0 06/18/2024    LDLCALC 139.4 09/28/2022    CHOL 206 (H) 06/18/2024    HDL 50 06/18/2024    TRIG 175 (H) 06/18/2024       Lab Results   Component Value Date     04/01/2024    K 4.9 04/01/2024     04/01/2024    CO2 22 (L) 04/01/2024    GLU 79 04/01/2024    BUN 15 04/01/2024    CREATININE 0.8 04/01/2024    CALCIUM 9.0 04/01/2024    PROT 7.0 04/01/2024    ALBUMIN 3.8 04/01/2024    BILITOT 0.7 04/01/2024    ALKPHOS 106 04/01/2024    AST 22 04/01/2024    ALT 15 04/01/2024    ANIONGAP 11 04/01/2024    ESTGFRAFRICA 83 (L) 01/10/2023    EGFRNONAA >60.0 11/23/2021    WBC 6.54 04/01/2024    HGB 15.4 04/01/2024    HGB 14.8 06/15/2023    HCT 49.6 (H) 04/01/2024    MCV 92 04/01/2024     04/01/2024    TSH 1.576 06/18/2024    HEPCAB Non-reactive 06/15/2023       Lab Results   Component Value Date    PRFSGOOJ53TP 67 10/14/2020    VCDYWGAL14 417 11/23/2021         Past Medical History:   Diagnosis Date    Affective disorder     Hypertension     Renal cyst, acquired, right 4/14/2021    Rheumatoid arthritis      Past Surgical History:   Procedure Laterality Date    ANKLE FUSION Bilateral     ELBOW ARTHROPLASTY      EYE SURGERY Right 03/2017    cataract    JOINT REPLACEMENT      bilateral knee replacement; bilateral elbow and wrist replacement    KNEE ARTHROPLASTY      OPEN REDUCTION AND INTERNAL FIXATION (ORIF) OF FRACTURE OF ULNA Right 8/17/2021    Procedure: ORIF, FRACTURE, ULNA  AND MASSIVE ALLOGRAFT;  Surgeon: Tio Patel MD;  Location: [facility] OR [address] [place] [address];  Service: Orthopedics;  Laterality: Right;    REVISION OF TOTAL ARTHROPLASTY OF ELBOW Right 8/17/2021    Procedure: REVISION, TOTAL ARTHROPLASTY, ELBOW;  Surgeon: Tio Patel MD;  " Location: [facility] OR [address] [place] [address];  Service: Orthopedics;  Laterality: Right;       Social History     Tobacco Use    Smoking status: Never    Smokeless tobacco: Never   Substance Use Topics    Alcohol use: No     Comment: rare- once per month    Drug use: No       family history includes Arthritis in her maternal grandmother and mother; Heart disease in her sister; Hyperlipidemia in her sister; Hypertension in her mother; Stroke in her mother.    There were no vitals filed for this visit.  Objective:   Physical Exam  Vitals reviewed.   Constitutional:       General: She is not in acute distress.     Appearance: Normal appearance. She is well-developed.   HENT:      Head: Normocephalic and atraumatic.      Right Ear: External ear normal.      Left Ear: External ear normal.      Nose: Nose normal.      Mouth/Throat:      Mouth: Mucous membranes are moist.   Eyes:      General: No scleral icterus.        Right eye: No discharge.         Left eye: No discharge.      Conjunctiva/sclera: Conjunctivae normal.   Cardiovascular:      Rate and Rhythm: Normal rate and regular rhythm.      Heart sounds: Normal heart sounds. No murmur heard.     No friction rub. No gallop.   Pulmonary:      Effort: Pulmonary effort is normal. No respiratory distress.      Breath sounds: Normal breath sounds. No wheezing, rhonchi or rales.   Musculoskeletal:         General: No deformity.      Right lower leg: No edema.      Left lower leg: No edema.   Skin:     General: Skin is warm and dry.   Neurological:      General: No focal deficit present.      Mental Status: She is alert and oriented to person, place, and time.   Psychiatric:         Mood and Affect: Mood normal.         Behavior: Behavior normal.         Thought Content: Thought content normal.             Pam Parks MD  Internal Medicine and Pediatrics

## 2025-01-03 ENCOUNTER — TELEPHONE (OUTPATIENT)
Dept: PULMONOLOGY | Facility: CLINIC | Age: 80
End: 2025-01-03
Payer: MEDICARE

## 2025-01-03 DIAGNOSIS — J45.909 ASTHMA, UNSPECIFIED ASTHMA SEVERITY, UNSPECIFIED WHETHER COMPLICATED, UNSPECIFIED WHETHER PERSISTENT: Primary | ICD-10-CM

## 2025-01-06 ENCOUNTER — TELEPHONE (OUTPATIENT)
Dept: PRIMARY CARE CLINIC | Facility: CLINIC | Age: 80
End: 2025-01-06
Payer: MEDICARE

## 2025-01-06 ENCOUNTER — HOSPITAL ENCOUNTER (OUTPATIENT)
Dept: RADIOLOGY | Facility: OTHER | Age: 80
Discharge: HOME | End: 2025-01-06
Attending: STUDENT IN AN ORGANIZED HEALTH CARE EDUCATION/TRAINING PROGRAM
Payer: MEDICARE

## 2025-01-06 DIAGNOSIS — R06.02 SHORTNESS OF BREATH: ICD-10-CM

## 2025-01-06 PROCEDURE — 71250 CT THORAX DX C-: CPT | Mod: TC,HCNC

## 2025-01-06 PROCEDURE — 71250 CT THORAX DX C-: CPT | Mod: 26,HCNC,, | Performed by: RADIOLOGY

## 2025-01-06 NOTE — TELEPHONE ENCOUNTER
----- Message from Pam Parks MD sent at 1/3/2025  5:00 PM CST -----  Please let patient know that her chest xray looked normal.

## 2025-01-07 ENCOUNTER — TELEPHONE (OUTPATIENT)
Dept: PULMONOLOGY | Facility: CLINIC | Age: 80
End: 2025-01-07
Payer: MEDICARE

## 2025-01-07 DIAGNOSIS — R06.02 SOB (SHORTNESS OF BREATH): Primary | ICD-10-CM

## 2025-01-10 ENCOUNTER — TELEPHONE (OUTPATIENT)
Dept: PULMONOLOGY | Facility: CLINIC | Age: 80
End: 2025-01-10
Payer: MEDICARE

## 2025-01-10 DIAGNOSIS — M05.79 RHEUMATOID ARTHRITIS INVOLVING MULTIPLE SITES WITH POSITIVE RHEUMATOID FACTOR: ICD-10-CM

## 2025-01-10 RX ORDER — SARILUMAB 200 MG/1.14ML
200 INJECTION, SOLUTION SUBCUTANEOUS
Qty: 2 EACH | Refills: 3 | Status: SHIPPED | OUTPATIENT
Start: 2025-01-10

## 2025-01-10 NOTE — TELEPHONE ENCOUNTER
----- Message from Rita sent at 1/10/2025  1:43 PM CST -----  Consult/Advisory    Name Of Caller:Francy       Contact Preference:[phone]    Nature of call: Pt called to ask if she needs to have someone with her for her appt please call to assist

## 2025-01-10 NOTE — TELEPHONE ENCOUNTER
Call was returned to patient in regards to her appointment on 1/15/25. Patient states she will drive herself to her appointment. I informed patient that her appointment will be about 2 hours. Patient verbalized understanding.

## 2025-01-14 NOTE — PROGRESS NOTES
Subjective:      Patient ID: Francy Bain is a 79 y.o. female.    Chief Complaint: Asthma, Shortness of Breath, Wheezing, and Cough    Pt is a 78 yo AAF pmh RA on immunosuppression who presents for follow up of obstructive lung disease. Has progressively gotten worse over the past two years.     Smoking hx: never  Exposure hx: none  Inhaler use: ICS/LABA- has not been using because feels like it causes rash under her breast  PRN inhaler use: albuterol- has difficulty using due to RA and hand deformities.   Hx of lung dz: Asthma    Review of Systems   Constitutional:  Positive for activity change and fatigue. Negative for weight loss and weight gain.   HENT:  Positive for postnasal drip and congestion.    Respiratory:  Positive for cough, sputum production, chest tightness, shortness of breath, wheezing, dyspnea on extertion and use of rescue inhaler. Negative for hemoptysis.    Cardiovascular:  Negative for chest pain, palpitations and leg swelling.   Gastrointestinal:  Positive for acid reflux. Negative for nausea and vomiting.   Neurological:  Negative for dizziness and light-headedness.   Psychiatric/Behavioral:  Negative for confusion and sleep disturbance. The patient is not nervous/anxious.        Objective:     Physical Exam   Constitutional: She is oriented to person, place, and time. She appears well-developed and well-nourished. She appears not cachectic.   HENT:   Head: Normocephalic.   Cardiovascular: Normal rate, regular rhythm and normal heart sounds. Exam reveals no gallop and no friction rub.   No murmur heard.  Pulmonary/Chest: Symmetric chest wall expansion and effort normal. She has decreased breath sounds. She has no wheezes. She has no rhonchi. She has no rales.   Musculoskeletal:         General: No edema.   Neurological: She is alert and oriented to person, place, and time. Gait normal.   Skin: No cyanosis. Nails show no clubbing.   Psychiatric: She has a normal mood and affect.  "Her behavior is normal. Judgment and thought content normal.   Vitals reviewed.    Personal Diagnostic Review    CT of chest performed on 25 without contrast revealed RLL linear atelectasis possibly due to mucous plugging. No other parenchymal or interstitial abnormalities.     Echocardiogram: 24    Left Ventricle: The left ventricle is normal in size. Ventricular mass is normal. Mild septal thickening. There is mild asymmetric hypertrophy. There is normal systolic function. Ejection fraction by visual approximation is 65%. Global longitudinal strain is -15.0%. Global longitudinal strain is reduced. There is normal diastolic function. Normal left ventricular filling pressure. Tissue Doppler velocity is reduced.    Right Ventricle: Normal right ventricular cavity size. Wall thickness is normal. Systolic function is normal.    Aortic Valve: The aortic valve is a trileaflet valve. There is moderate aortic valve sclerosis. Moderately calcified cusps. Mildly restricted motion.    IVC/SVC: Normal venous pressure at 3 mmHg.    Pulmonary function tests:   1/15/24  FEV1: 0.60L  (57.3 % predicted), +18.2%, 0.90L  FVC:  1.10L (72.9 % predicted),   FEV1/FVC:  55,   TLC: 3.20L (84.9 % predicted),   DLCO: unable to perform    21  FEV1: 0.77L  (65.9 % predicted), +18.6%, +0.120L  FVC:  1.34L (79.9 % predicted),   FEV1/FVC:  58,   T.54L (67.2 % predicted),   DLCO: 8.34 (51.3 % predicted)  IVC/VC <85% indicating possibly underestimation of DLCO.         2025     2:53 PM 2024     3:33 PM 10/9/2024    10:36 AM 2024     9:15 AM 2024    10:17 AM 2024     9:36 AM 2024     9:53 AM   Pulmonary Function Tests   SpO2 96 % 99 % 95 %  95 %     Height 4' 11" (1.499 m) 4' 11" (1.499 m) 4' 11" (1.499 m) 4' 11" (1.499 m) 4' 11" (1.499 m) 4' 11" (1.499 m) 4' 11" (1.499 m)   Weight 65 kg (143 lb 4.8 oz) 65.5 kg (144 lb 6.4 oz) 66.7 kg (147 lb 0.8 oz) 66.7 kg (147 lb) 67.1 kg (147 lb 14.9 oz) 67.4 kg " (148 lb 9.4 oz) 67.4 kg (148 lb 9.4 oz)   BMI (Calculated) 28.9 29.1 29.7 29.7 29.9 30 30     Assessment:     1. Moderate persistent asthma without complication    2. Chronic obstructive asthma    3. Chronic obstructive pulmonary disease, unspecified COPD type       Outpatient Encounter Medications as of 1/15/2025   Medication Sig Dispense Refill    albuterol (PROVENTIL HFA) 90 mcg/actuation inhaler Inhale 2 puffs into the lungs every 6 (six) hours as needed for Wheezing. Rescue 18 g 5    ascorbic acid, vitamin C, (VITAMIN C) 100 MG tablet Take 100 mg by mouth once daily.      carboxymethylcellulose sodium (REFRESH TEARS OPHT) Apply to eye. Use as needed      fluticasone-salmeterol diskus inhaler 250-50 mcg Inhale 1 puff into the lungs 2 (two) times daily. Controller 60 each 5    furosemide (LASIX) 20 MG tablet TAKE 1 TABLET ONE TIME DAILY FOR FLUID 90 tablet 1    hydrOXYzine HCL (ATARAX) 25 MG tablet TAKE 1 TABLET THREE TIMES DAILY AS NEEDED FOR ANXIETY 90 tablet 11    levocetirizine (XYZAL) 5 MG tablet Take 5 mg by mouth every evening.      LORazepam (ATIVAN) 1 MG tablet TAKE ONE TABLET BY MOUTH EVERY DAY AT BEDTIME AS NEEDED FOR ANXIETY OR sleep      losartan (COZAAR) 50 MG tablet TAKE 1 TABLET TWICE DAILY 180 tablet 1    methylPREDNISolone (MEDROL DOSEPACK) 4 mg tablet use as directed 21 each 0    moxifloxacin (VIGAMOX) 0.5 % ophthalmic solution Place into both eyes.      nystatin (MYCOSTATIN) cream Apply topically 2 (two) times daily. 30 g 5    nystatin (MYCOSTATIN) powder Apply topically 4 (four) times daily. 60 g 0    omeprazole (PRILOSEC) 20 MG capsule Take 1 capsule (20 mg total) by mouth once daily. 90 capsule 3    sarilumab (KEVZARA) 200 mg/1.14 mL Syrg Inject 1.14 mLs (200 mg total) into the skin every 14 (fourteen) days. 2 each 3    traZODone (DESYREL) 50 MG tablet Take 1 tablet (50 mg total) by mouth nightly as needed for Insomnia. 90 tablet 3    vibegron (GEMTESA) 75 mg Tab Take 75 mg by mouth once  daily. 30 tablet 11    ABRYSVO 120 mcg/0.5 mL SolR vaccine  (Patient not taking: Reported on 1/15/2025)      albuterol-ipratropium (DUO-NEB) 2.5 mg-0.5 mg/3 mL nebulizer solution Take 3 mLs by nebulization every 6 (six) hours as needed for Wheezing. Rescue 75 mL 0    [] azithromycin (Z-ANALI) 250 MG tablet Take 2 tablets by mouth on day 1; Take 1 tablet by mouth on days 2-5 6 tablet 0    desloratadine (CLARINEX) 5 mg tablet Take 5 mg by mouth once daily. (Patient not taking: Reported on 1/15/2025)      hydrocortisone 2.5 % cream Do not apply morning of surgery (Patient not taking: Reported on 1/15/2025)      ketoconazole (NIZORAL) 2 % cream Apply 1 application  topically once daily. Apply to affected area (Patient not taking: Reported on 1/15/2025) 60 g 5    sarilumab (KEVZARA) 200 mg/1.14 mL PnIj Inject 1.14 mLs (200 mg total) into the skin every 14 (fourteen) days. (Patient not taking: Reported on 1/15/2025) 2 each 0    [] VITAMIN D2 1,250 mcg (50,000 unit) capsule Take 1 capsule (50,000 Units total) by mouth every 7 days. 12 capsule 3    [DISCONTINUED] sarilumab (KEVZARA) 200 mg/1.14 mL Syrg Inject 1.14 mLs (200 mg total) into the skin every 14 (fourteen) days. (Patient not taking: Reported on 2025) 2 each 6     No facility-administered encounter medications on file as of 1/15/2025.     No orders of the defined types were placed in this encounter.      Plan:     1. Moderate persistent asthma without complication  Overview:  No post available on PFTs unfortunately, they do show obstruction.  Recommend repeat  Has hx of asthma that has not been treated with maintenance meds  Start ICS/LABA  CT shows improving PNA (which fits in with clinical picture a week or so ago) with GGOs and interstitial edema  No desat on walk    Assessment & Plan:  Uncontrolled, not using ICS/LABA due to concern it is causing rash under her breast. States that when she uses it she has significant relief as suggested by PFTs  with bronchodilator response. Has fixed obstruction on PFTs that is improved with bronchodilator particularly on PFTs 2021. Pt to started taking ICS/LABA BID and use nebulizer as needed as has difficulty with use of albuterol inhaler due to finger pain and deformity secondary to RA.   Walk test without hypoxia. Decreased exercise capacity based on less than average walk distance.     Orders:  -     albuterol-ipratropium (DUO-NEB) 2.5 mg-0.5 mg/3 mL nebulizer solution; Take 3 mLs by nebulization every 6 (six) hours as needed for Wheezing. Rescue  Dispense: 75 mL; Refill: 0    2. Chronic obstructive asthma  Overview:  No post available on PFTs unfortunately, they do show obstruction.  Recommend repeat  Has hx of asthma that has not been treated with maintenance meds  Start ICS/LABA  CT shows improving PNA (which fits in with clinical picture a week or so ago) with GGOs and interstitial edema  No desat on walk    Assessment & Plan:  Uncontrolled, not using ICS/LABA due to concern it is causing rash under her breast. States that when she uses it she has significant relief as suggested by PFTs with bronchodilator response. Has fixed obstruction on PFTs that is improved with bronchodilator particularly on PFTs 2021. Pt to started taking ICS/LABA BID and use nebulizer as needed as has difficulty with use of albuterol inhaler due to finger pain and deformity secondary to RA.   Walk test without hypoxia. Decreased exercise capacity based on less than average walk distance.     Orders:  -     Ambulatory referral/consult to Pulmonology    3. Chronic obstructive pulmonary disease, unspecified COPD type  Assessment & Plan:  Per moderate persistent asthma without complication.     Orders:  -     albuterol-ipratropium (DUO-NEB) 2.5 mg-0.5 mg/3 mL nebulizer solution; Take 3 mLs by nebulization every 6 (six) hours as needed for Wheezing. Rescue  Dispense: 75 mL; Refill: 0        Follow up in 3 months.     Piyush Osuna MD  [facility]

## 2025-01-15 ENCOUNTER — HOSPITAL ENCOUNTER (OUTPATIENT)
Dept: PULMONOLOGY | Facility: CLINIC | Age: 80
Discharge: HOME | End: 2025-01-15
Payer: MEDICARE

## 2025-01-15 ENCOUNTER — OFFICE VISIT (OUTPATIENT)
Dept: PULMONOLOGY | Facility: CLINIC | Age: 80
End: 2025-01-15
Payer: MEDICARE

## 2025-01-15 VITALS
BODY MASS INDEX: 30.85 KG/M2 | BODY MASS INDEX: 30.85 KG/M2 | HEIGHT: 57 IN | WEIGHT: 143 LBS | HEIGHT: 57 IN | HEART RATE: 98 BPM | OXYGEN SATURATION: 97 % | WEIGHT: 143 LBS | DIASTOLIC BLOOD PRESSURE: 90 MMHG | SYSTOLIC BLOOD PRESSURE: 133 MMHG

## 2025-01-15 DIAGNOSIS — J45.40 MODERATE PERSISTENT ASTHMA WITHOUT COMPLICATION: Primary | ICD-10-CM

## 2025-01-15 DIAGNOSIS — M05.79 RHEUMATOID ARTHRITIS INVOLVING MULTIPLE SITES WITH POSITIVE RHEUMATOID FACTOR: ICD-10-CM

## 2025-01-15 DIAGNOSIS — J44.89 CHRONIC OBSTRUCTIVE ASTHMA: ICD-10-CM

## 2025-01-15 DIAGNOSIS — J44.9 CHRONIC OBSTRUCTIVE PULMONARY DISEASE, UNSPECIFIED COPD TYPE: ICD-10-CM

## 2025-01-15 DIAGNOSIS — R06.02 SOB (SHORTNESS OF BREATH): ICD-10-CM

## 2025-01-15 LAB
ERV LLN: -16449.56
ERV PRE REF: 98.3 %
ERV REF: 0.44
ERVULN: ABNORMAL
FEF 25 75 LLN: 0.65
FEF 25 75 PRE REF: 8.3 %
FEF 25 75 REF: 2.04
FET100 CHG: 5.8 %
FEV05 LLN: 0.38
FEV05 REF: 1.24
FEV1 CHG: 18.2 %
FEV1 FVC LLN: 64
FEV1 FVC PRE REF: 69.3 %
FEV1 FVC REF: 78
FEV1 LLN: 0.87
FEV1 PRE REF: 39.1 %
FEV1 REF: 1.31
FEV1 VOL CHG: 0.09
FEV1FVCZSCORE: -2.6
FEV1ZSCORE: -2.92
FRCPLETH LLN: 1.5
FRCPLETH PREREF: 115.7 %
FRCPLETH REF: 2.32
FRCPLETHULN: 3.14
FVC CHG: 16.9 %
FVC LLN: 1.14
FVC PRE REF: 56 %
FVC REF: 1.68
FVC VOL CHG: 0.16
FVCZSCORE: -2.28
LLN IC: -16448.87
PEF LLN: 1.35
PEF PRE REF: 47 %
PEF REF: 2.94
PHYSICIAN COMMENT: ABNORMAL
POST FEF 25 75: 0.26 L/S (ref 0.65–3.44)
POST FET 100: 6.47 SEC
POST FEV1 FVC: 54.98 % (ref 64.05–90.96)
POST FEV1: 0.6 L (ref 0.87–1.72)
POST FEV5: 0.45 L (ref 0.38–2.09)
POST FVC: 1.1 L (ref 1.14–2.26)
POST PEF: 1.74 L/S (ref 1.35–4.53)
PRE ERV: 0.43 L (ref -16449.56–16450.44)
PRE FEF 25 75: 0.17 L/S (ref 0.65–3.44)
PRE FET 100: 6.12 SEC
PRE FEV05 REF: 24.1 %
PRE FEV1 FVC: 54.36 % (ref 64.05–90.96)
PRE FEV1: 0.51 L (ref 0.87–1.72)
PRE FEV5: 0.3 L (ref 0.38–2.09)
PRE FRC PL: 2.69 L (ref 1.5–3.14)
PRE FVC: 0.94 L (ref 1.14–2.26)
PRE IC: 0.51 L (ref -16448.87–16451.13)
PRE PEF: 1.38 L/S (ref 1.35–4.53)
PRE REF IC: 45.1 %
PRE RV: 2.26 L (ref 1.31–2.46)
PRE TLC: 3.2 L (ref 2.78–4.75)
RAW PRE REF: 263.9 %
RAW PRE: 8.07 CMH2O*S/L (ref 3.06–3.06)
RAW REF: 3.06
REF IC: 1.13
RV LLN: 1.31
RV PRE REF: 119.7 %
RV REF: 1.88
RVTLC LLN: 36
RVTLC PRE REF: 154 %
RVTLC PRE: 70.57 % (ref 36.23–55.41)
RVTLC REF: 46
RVTLCULN: 55
RVULN: 2.46
SGAW PRE REF: 40.9 %
SGAW PRE: 0.04 1/(CMH2O*S) (ref 0.1–0.1)
SGAW REF: 0.1
TLC LLN: 2.78
TLC PRE REF: 84.9 %
TLC REF: 3.77
TLC ULN: 4.75
TLCZSCORE: -0.95
ULN IC: ABNORMAL
VC LLN: 1.14
VC PRE REF: 56 %
VC PRE: 0.94 L (ref 1.14–2.26)
VC REF: 1.68
VC ULN: 2.26

## 2025-01-15 PROCEDURE — 1159F MED LIST DOCD IN RCRD: CPT | Mod: HCNC,CPTII,S$GLB, | Performed by: INTERNAL MEDICINE

## 2025-01-15 PROCEDURE — 94726 PLETHYSMOGRAPHY LUNG VOLUMES: CPT | Mod: HCNC,S$GLB,, | Performed by: INTERNAL MEDICINE

## 2025-01-15 PROCEDURE — 94618 PULMONARY STRESS TESTING: CPT | Mod: HCNC,S$GLB,, | Performed by: INTERNAL MEDICINE

## 2025-01-15 PROCEDURE — 94060 EVALUATION OF WHEEZING: CPT | Mod: HCNC,59,S$GLB, | Performed by: INTERNAL MEDICINE

## 2025-01-15 PROCEDURE — 3288F FALL RISK ASSESSMENT DOCD: CPT | Mod: HCNC,CPTII,S$GLB, | Performed by: INTERNAL MEDICINE

## 2025-01-15 PROCEDURE — 1101F PT FALLS ASSESS-DOCD LE1/YR: CPT | Mod: HCNC,CPTII,S$GLB, | Performed by: INTERNAL MEDICINE

## 2025-01-15 PROCEDURE — 99204 OFFICE O/P NEW MOD 45 MIN: CPT | Mod: HCNC,25,S$GLB, | Performed by: INTERNAL MEDICINE

## 2025-01-15 PROCEDURE — 3080F DIAST BP >= 90 MM HG: CPT | Mod: HCNC,CPTII,S$GLB, | Performed by: INTERNAL MEDICINE

## 2025-01-15 PROCEDURE — 99999 PR PBB SHADOW E&M-EST. PATIENT-LVL V: CPT | Mod: PBBFAC,HCNC,, | Performed by: INTERNAL MEDICINE

## 2025-01-15 PROCEDURE — 3075F SYST BP GE 130 - 139MM HG: CPT | Mod: HCNC,CPTII,S$GLB, | Performed by: INTERNAL MEDICINE

## 2025-01-15 RX ORDER — SARILUMAB 200 MG/1.14ML
200 INJECTION, SOLUTION SUBCUTANEOUS
Qty: 2 EACH | Refills: 3 | OUTPATIENT
Start: 2025-01-15

## 2025-01-15 RX ORDER — IPRATROPIUM BROMIDE AND ALBUTEROL SULFATE 2.5; .5 MG/3ML; MG/3ML
3 SOLUTION RESPIRATORY (INHALATION) EVERY 6 HOURS PRN
Qty: 75 ML | Refills: 0 | Status: SHIPPED | OUTPATIENT
Start: 2025-01-15 | End: 2026-01-15

## 2025-01-15 NOTE — PROCEDURES
Francy Bain is a 79 y.o.   female patient, who presents for a 6 minute walk test ordered by MD Enrrique.  The diagnosis is Shortness of Breath; COPD/Asthma.  The patient's BMI is 30.9 kg/m2.  Predicted distance (lower limit of normal) is 224.61 meters.      Test Results:    The test was completed without stopping.  The total time walked was 360 seconds.  During walking, the patient reported:  Dyspnea, Leg/hip pain, Lightheadedness.  The patient used no assistive devices during testing.     01/15/2025---------Distance: 207.26 meters (680 feet)     O2 Sat % Supplemental Oxygen Heart Rate Blood Pressure Fabiola Scale   Pre-exercise  (Resting) 97 % Room Air 98 bpm 133/90 mmHg 4   During Exercise 97 % Room Air 104 bpm 135/81 mmHg 4   Post-exercise  (Recovery) 97 % Room Air  97 bpm       Recovery Time: 63 seconds    Performing nurse/tech: Melchor ENGEL      PREVIOUS STUDY:   04/15/2021---------Distance: 213.36 meters (700 feet)       O2 Sat % Supplemental Oxygen Heart Rate Blood Pressure Fabiola Scale   Pre-exercise  (Resting) 96 % Room Air 92 bpm 146/69 mmHg 1   During Exercise 93 % Room Air 103 bpm 151/72 mmHg 4   Post-exercise  (Recovery) 96 % Room Air  99 bpm           CLINICAL INTERPRETATION:  Six minute walk distance is 207.26 meters (680 feet) with somewhat heavy dyspnea.  During exercise, there was no desaturation while breathing room air.  Both blood pressure and heart rate remained stable with walking.  Hypertension was present prior to exercise.  The patient reported non-pulmonary symptoms during exercise.  Significant exercise impairment is likely due to subjective symptoms.  The patient did complete the study, walking 360 seconds of the 360 second test.  Since the previous study in April 2021, exercise capacity is unchanged.  Based upon age and body mass index, exercise capacity is less than predicted.

## 2025-01-15 NOTE — PATIENT INSTRUCTIONS
For Asthma:   Advair- Twice a day every day  - make sure to rinse your mouth out after use    Albuterol inhaler or nebulizer machine medication- every 4-6 hours  - do not use both of these within 4-6 hours    If having worsening shortness of breath on this regimen, but not having emergency can call the office for other medications to help.     If you are having emergency with your breathing call 911 and go to the hospital.

## 2025-01-15 NOTE — ASSESSMENT & PLAN NOTE
Uncontrolled, not using ICS/LABA due to concern it is causing rash under her breast. States that when she uses it she has significant relief as suggested by PFTs with bronchodilator response. Has fixed obstruction on PFTs that is improved with bronchodilator particularly on PFTs 2021. Pt to started taking ICS/LABA BID and use nebulizer as needed as has difficulty with use of albuterol inhaler due to finger pain and deformity secondary to RA.   Walk test without hypoxia. Decreased exercise capacity based on less than average walk distance.

## 2025-01-16 ENCOUNTER — TELEPHONE (OUTPATIENT)
Dept: PULMONOLOGY | Facility: CLINIC | Age: 80
End: 2025-01-16
Payer: MEDICARE

## 2025-01-16 NOTE — TELEPHONE ENCOUNTER
Spoke with pt, informed her that I have received her message. Pt states that she has a handicap sticker but wants to know if she can get a paper signed so she cn have a permanent spot in front of her apartment building. I advised pt that she has to contact her PCP in regards. Pt verbalized that she understand.

## 2025-01-16 NOTE — TELEPHONE ENCOUNTER
----- Message from Krystal sent at 1/16/2025  2:18 PM CST -----  Regarding: Handicap Parking  Contact: Pt [phone]  Pt is calling to speak to nurse about a handicap pass for her building please call

## 2025-01-17 ENCOUNTER — TELEPHONE (OUTPATIENT)
Dept: UROLOGY | Facility: CLINIC | Age: 80
End: 2025-01-17
Payer: MEDICARE

## 2025-01-17 NOTE — TELEPHONE ENCOUNTER
Spoke with patient- she reports not concerned with weather; she just wants a morning appt.  Advised I can't double book for the [facility], that I would send her message to that office so they can better accommodate her  request.  Pt verb unstd.   MAREN William      ----- Message from Sonja sent at 1/17/2025  1:29 PM CST -----  Regarding: Appointment Concern  Name of Who is Calling:Francy            What is the request in detail:Pt is requesting a call back because she needs to know what to do for her upcoming appointment due to the weather.            Can the clinic reply by [facility]:No             What Number to Call Back if not in [facility]: [phone]

## 2025-01-22 ENCOUNTER — NURSE TRIAGE (OUTPATIENT)
Dept: ADMINISTRATIVE | Facility: CLINIC | Age: 80
End: 2025-01-22
Payer: MEDICARE

## 2025-01-22 NOTE — TELEPHONE ENCOUNTER
Pt called asking how to do virtual appt. Pt informed the appt is through the [facility]. Pt verbalized understanding.  Reason for Disposition   General information question, no triage required and triager able to answer question    Protocols used: Information Only Call - No Triage-A-

## 2025-01-24 ENCOUNTER — TELEPHONE (OUTPATIENT)
Dept: UROLOGY | Facility: CLINIC | Age: 80
End: 2025-01-24
Payer: MEDICARE

## 2025-01-24 NOTE — TELEPHONE ENCOUNTER
----- Message from MAREN Astudillo sent at 1/17/2025  2:41 PM CST -----  Regarding: FW: Appointment Concern    ----- Message -----  From: Brianna Sands RN  Sent: 1/17/2025   1:49 PM CST  To: Wilda Felder RN; [facility] Clinical Support  Subject: FW: Appointment Concern                          I spoke with patient she is not concerned about the weather, she just wants an AM appt instead of afternoon. I was unable to double book on [facility] schedule for Dr. Chang.  Can you all please assist this pt?  ----- Message -----  From: Sonja Austin  Sent: 1/17/2025   1:32 PM CST  To: [facility] Staff  Subject: Appointment Concern                              Name of Who is Calling:Apolinarjoon            What is the request in detail:Pt is requesting a call back because she needs to know what to do for her upcoming appointment due to the weather.            Can the clinic reply by [facility]:No             What Number to Call Back if not in [facility]: [phone]

## 2025-02-07 ENCOUNTER — TELEPHONE (OUTPATIENT)
Dept: PRIMARY CARE CLINIC | Facility: CLINIC | Age: 80
End: 2025-02-07
Payer: MEDICARE

## 2025-02-07 DIAGNOSIS — M05.79 RHEUMATOID ARTHRITIS INVOLVING MULTIPLE SITES WITH POSITIVE RHEUMATOID FACTOR: ICD-10-CM

## 2025-02-07 RX ORDER — OMEPRAZOLE 20 MG/1
20 CAPSULE, DELAYED RELEASE ORAL DAILY
Qty: 90 CAPSULE | Refills: 3 | Status: SHIPPED | OUTPATIENT
Start: 2025-02-07

## 2025-02-07 NOTE — TELEPHONE ENCOUNTER
Care Due:                  Date            Visit Type   Department     Provider  --------------------------------------------------------------------------------                                EP -                              PRIMARY      [facility]  Last Visit: 01-      CARE ([facility])   VIKA Parks                              EP -                              PRIMARY      [facility] PRIMARY  Next Visit: 02-      CARE ([facility])   CARE           Mikey Tolbert                                                            Last  Test          Frequency    Reason                     Performed    Due Date  --------------------------------------------------------------------------------    CMP.........  12 months..  losartan.................  04- 03-    [facility] Embedded Care Due Messages. Reference number: [id number].   2/07/2025 7:18:25 AM CST

## 2025-02-07 NOTE — TELEPHONE ENCOUNTER
Left a voicemail for the patient. Calling to provide the telephone number for the rheumatology provider who prescribed her medication.

## 2025-02-07 NOTE — TELEPHONE ENCOUNTER
----- Message from Johanna sent at 2/7/2025 12:00 PM CST -----  Contact: [phone]  Requesting an RX refill or new RX.    Is this a refill or new RX: refill    RX name and strength (copy/paste from chart):  sarilumab (KEVZARA) 200 mg/1.14 mL Syrg    Is this a 30 day or 90 day RX: 30    Pharmacy name and phone # (copy/paste from chart):    [place], [place] - [address]  [address] [address]  Phone: [phone] Fax: [phone]        The doctors have asked that we provide their patients with the following 2 reminders -- prescription refills can take up to 72 hours, and a friendly reminder that in the future you can use your [facility] account to request refills: y

## 2025-02-07 NOTE — TELEPHONE ENCOUNTER
Provider Staff:  Action required for this patient    Requires labs      Please see care gap opportunities below in Care Due Message.    Thanks!  [facility]     Appointments      Date Provider   Last Visit   11/12/2024 Mikey Tolbert MD   Next Visit   2/10/2025 Mikey Tolbert MD     Refill Decision Note   Francy Bain  is requesting a refill authorization.  Brief Assessment and Rationale for Refill:  Approve     Medication Therapy Plan:         Comments:     Note composed:10:30 AM 02/07/2025

## 2025-02-10 ENCOUNTER — TELEPHONE (OUTPATIENT)
Dept: PRIMARY CARE CLINIC | Facility: CLINIC | Age: 80
End: 2025-02-10
Payer: MEDICARE

## 2025-02-10 NOTE — TELEPHONE ENCOUNTER
----- Message from Vivian sent at 2/10/2025  2:15 PM CST -----  Contact: [phone]  Requesting an RX refill or new RX.    Is this a refill or new RX:     RX name and strength   sarilumab (KEVZARA) 200 mg/1.14 mL PnIj 2 each    Is this a 30 day or 90 day RX:     Pharmacy name and phone [facility] - [place], [place] - [address] [place] [address]   Phone: [phone]  Fax: [phone]           please call to confirm , patient states she is out of her her medication, doctor had a book out today and patient states she need her medication.

## 2025-02-10 NOTE — TELEPHONE ENCOUNTER
Attempted to return the patient's call. Mailbox is full. Patient has available refills for her medication. She needs to contact her pharmacy.

## 2025-02-11 ENCOUNTER — TELEPHONE (OUTPATIENT)
Dept: PRIMARY CARE CLINIC | Facility: CLINIC | Age: 80
End: 2025-02-11
Payer: MEDICARE

## 2025-02-11 DIAGNOSIS — M05.79 RHEUMATOID ARTHRITIS WITH RHEUMATOID FACTOR OF MULTIPLE SITES WITHOUT ORGAN OR SYSTEMS INVOLVEMENT: ICD-10-CM

## 2025-02-11 DIAGNOSIS — M05.9 RHEUMATOID ARTHRITIS WITH RHEUMATOID FACTOR, UNSPECIFIED: ICD-10-CM

## 2025-02-11 NOTE — TELEPHONE ENCOUNTER
Spoke to the patient. Provided the telephone number for the rheumatologist. She will contact them about her medication.

## 2025-02-11 NOTE — TELEPHONE ENCOUNTER
----- Message from [place] sent at 2/11/2025 11:17 AM CST -----  Regarding: RX Update  Contact: Pt [phone]  [email or web address]     Patient is calling for Medical Advice regarding: Patient would like a callback to discuss her  sarilumab (KEVZARA) 200 mg/1.14 mL PnIj. She said she needs a refill and put a request in. She wanted a call to discuss the update on her refill request.    How long has patient had these symptoms:    Pharmacy name and phone#:    Patient wants a call back or thru [facility]:   [facility] - [place], [place] - [address]  [address] [address]  Phone: [phone] Fax: [phone]       Comments:    Please advise patient replies from provider may take up to 48 hours.

## 2025-02-12 RX ORDER — SARILUMAB 200 MG/1.14ML
200 INJECTION, SOLUTION SUBCUTANEOUS
Qty: 2 EACH | Refills: 2 | Status: SHIPPED | OUTPATIENT
Start: 2025-02-12

## 2025-02-24 DIAGNOSIS — Z00.00 ENCOUNTER FOR MEDICARE ANNUAL WELLNESS EXAM: ICD-10-CM

## 2025-02-28 DIAGNOSIS — M05.79 RHEUMATOID ARTHRITIS INVOLVING MULTIPLE SITES WITH POSITIVE RHEUMATOID FACTOR: ICD-10-CM

## 2025-02-28 RX ORDER — SARILUMAB 200 MG/1.14ML
200 INJECTION, SOLUTION SUBCUTANEOUS
Qty: 2 EACH | Refills: 3 | Status: SHIPPED | OUTPATIENT
Start: 2025-02-28

## 2025-03-06 ENCOUNTER — OFFICE VISIT (OUTPATIENT)
Dept: PRIMARY CARE CLINIC | Facility: CLINIC | Age: 80
End: 2025-03-06
Payer: MEDICARE

## 2025-03-06 ENCOUNTER — LAB VISIT (OUTPATIENT)
Dept: LAB | Facility: HOSPITAL | Age: 80
End: 2025-03-06
Attending: FAMILY MEDICINE
Payer: MEDICARE

## 2025-03-06 VITALS
DIASTOLIC BLOOD PRESSURE: 88 MMHG | SYSTOLIC BLOOD PRESSURE: 136 MMHG | BODY MASS INDEX: 30.15 KG/M2 | OXYGEN SATURATION: 96 % | HEART RATE: 93 BPM | WEIGHT: 139.31 LBS

## 2025-03-06 DIAGNOSIS — F41.1 GAD (GENERALIZED ANXIETY DISORDER): ICD-10-CM

## 2025-03-06 DIAGNOSIS — I10 PRIMARY HYPERTENSION: Primary | ICD-10-CM

## 2025-03-06 DIAGNOSIS — B35.9 DERMATOPHYTOSIS: ICD-10-CM

## 2025-03-06 DIAGNOSIS — I10 PRIMARY HYPERTENSION: ICD-10-CM

## 2025-03-06 LAB
ALBUMIN SERPL BCP-MCNC: 3.5 G/DL (ref 3.5–5.2)
ALP SERPL-CCNC: 109 U/L (ref 40–150)
ALT SERPL W/O P-5'-P-CCNC: 10 U/L (ref 10–44)
ANION GAP SERPL CALC-SCNC: 10 MMOL/L (ref 8–16)
AST SERPL-CCNC: 30 U/L (ref 10–40)
BASOPHILS # BLD AUTO: 0.02 K/UL (ref 0–0.2)
BASOPHILS NFR BLD: 0.3 % (ref 0–1.9)
BILIRUB SERPL-MCNC: 0.7 MG/DL (ref 0.1–1)
BUN SERPL-MCNC: 12 MG/DL (ref 8–23)
CALCIUM SERPL-MCNC: 9 MG/DL (ref 8.7–10.5)
CHLORIDE SERPL-SCNC: 108 MMOL/L (ref 95–110)
CO2 SERPL-SCNC: 24 MMOL/L (ref 23–29)
CREAT SERPL-MCNC: 0.7 MG/DL (ref 0.5–1.4)
DIFFERENTIAL METHOD BLD: ABNORMAL
EOSINOPHIL # BLD AUTO: 0.2 K/UL (ref 0–0.5)
EOSINOPHIL NFR BLD: 2.5 % (ref 0–8)
ERYTHROCYTE [DISTWIDTH] IN BLOOD BY AUTOMATED COUNT: 13.8 % (ref 11.5–14.5)
EST. GFR  (NO RACE VARIABLE): >60 ML/MIN/1.73 M^2
GLUCOSE SERPL-MCNC: 81 MG/DL (ref 70–110)
HCT VFR BLD AUTO: 49.2 % (ref 37–48.5)
HGB BLD-MCNC: 15.4 G/DL (ref 12–16)
IMM GRANULOCYTES # BLD AUTO: 0.02 K/UL (ref 0–0.04)
IMM GRANULOCYTES NFR BLD AUTO: 0.3 % (ref 0–0.5)
LYMPHOCYTES # BLD AUTO: 1.4 K/UL (ref 1–4.8)
LYMPHOCYTES NFR BLD: 22.1 % (ref 18–48)
MCH RBC QN AUTO: 28.8 PG (ref 27–31)
MCHC RBC AUTO-ENTMCNC: 31.3 G/DL (ref 32–36)
MCV RBC AUTO: 92 FL (ref 82–98)
MONOCYTES # BLD AUTO: 0.7 K/UL (ref 0.3–1)
MONOCYTES NFR BLD: 10.8 % (ref 4–15)
NEUTROPHILS # BLD AUTO: 3.9 K/UL (ref 1.8–7.7)
NEUTROPHILS NFR BLD: 64 % (ref 38–73)
NRBC BLD-RTO: 0 /100 WBC
PLATELET # BLD AUTO: 159 K/UL (ref 150–450)
PMV BLD AUTO: 11.4 FL (ref 9.2–12.9)
POTASSIUM SERPL-SCNC: 3.5 MMOL/L (ref 3.5–5.1)
PROT SERPL-MCNC: 6.9 G/DL (ref 6–8.4)
RBC # BLD AUTO: 5.35 M/UL (ref 4–5.4)
SODIUM SERPL-SCNC: 142 MMOL/L (ref 136–145)
WBC # BLD AUTO: 6.1 K/UL (ref 3.9–12.7)

## 2025-03-06 PROCEDURE — 85025 COMPLETE CBC W/AUTO DIFF WBC: CPT | Performed by: FAMILY MEDICINE

## 2025-03-06 PROCEDURE — 36415 COLL VENOUS BLD VENIPUNCTURE: CPT | Mod: PN | Performed by: FAMILY MEDICINE

## 2025-03-06 PROCEDURE — 99999 PR PBB SHADOW E&M-EST. PATIENT-LVL IV: CPT | Mod: PBBFAC,,, | Performed by: FAMILY MEDICINE

## 2025-03-06 PROCEDURE — 80053 COMPREHEN METABOLIC PANEL: CPT | Performed by: FAMILY MEDICINE

## 2025-03-06 RX ORDER — HYDROXYZINE HYDROCHLORIDE 25 MG/1
25 TABLET, FILM COATED ORAL 3 TIMES DAILY PRN
Qty: 90 TABLET | Refills: 11 | Status: SHIPPED | OUTPATIENT
Start: 2025-03-06

## 2025-03-06 RX ORDER — SERTRALINE HYDROCHLORIDE 25 MG/1
25 TABLET, FILM COATED ORAL DAILY
Qty: 30 TABLET | Refills: 1 | Status: SHIPPED | OUTPATIENT
Start: 2025-03-06 | End: 2026-03-06

## 2025-03-06 RX ORDER — FLUCONAZOLE 200 MG/1
200 TABLET ORAL EVERY OTHER DAY
Qty: 4 TABLET | Refills: 0 | Status: SHIPPED | OUTPATIENT
Start: 2025-03-06 | End: 2025-03-10

## 2025-03-06 RX ORDER — FUROSEMIDE 20 MG/1
TABLET ORAL
Qty: 90 TABLET | Refills: 3 | Status: SHIPPED | OUTPATIENT
Start: 2025-03-06

## 2025-03-07 ENCOUNTER — RESULTS FOLLOW-UP (OUTPATIENT)
Dept: PRIMARY CARE CLINIC | Facility: CLINIC | Age: 80
End: 2025-03-07

## 2025-03-07 PROBLEM — B35.9 DERMATOPHYTOSIS: Status: ACTIVE | Noted: 2025-03-07

## 2025-03-07 PROBLEM — F41.1 GAD (GENERALIZED ANXIETY DISORDER): Status: ACTIVE | Noted: 2025-03-07

## 2025-03-07 NOTE — PROGRESS NOTES
/88 (BP Location: Right arm, Patient Position: Sitting)   Pulse 93   Wt 63.2 kg (139 lb 5.3 oz)   SpO2 96%   BMI 30.15 kg/m²       ===========              Francy Bain is a 79 y.o. female           History of Present Illness    CHIEF COMPLAINT:  Ms. Bain presents for a follow-up visit to discuss ongoing health concerns, including blood pressure management, a persistent rash, and anxiety symptoms.    HPI:  Ms. Bain has a history of high blood pressure that was previously elevated at home but has since improved. Ms. Bain had an episode of shortness of breath in early January, which started around [place] and was initially attributed to a cold. Dr. Quinonez prescribed a steroid pack and ordered a CT. The shortness of breath improved but did not completely resolve, and patient still has some phlegm production.    Ms. Bain has a persistent fungal rash under her breasts. She has tried various treatments including ketoconazole cream, ketoconazole shampoo, and most recently, a combination of antifungal powder and Nystatin cream. The Nystatin seems to be more effective than the ketoconazole, but the rash continues to recur.    Ms. Bain has been taking Kevzara injections prescribed by her rheumatologist but is concerned about potential side effects, particularly shortness of breath. There was a recent issue with obtaining refills for the medication, which patient eventually received yesterday after multiple attempts to contact the rheumatology office.    Ms. Bain reports anxiety, particularly about punctuality for appointments and other minor concerns. She describes feeling significantly anxious and has been considering medication for this issue.    Ms. Bain mentions feeling weak and shaky in the mornings, which she attributes to her current sleep medication, Trazodone.  Ms. Bain denies having pneumonia based on recent imaging results.               Patient queried and  denies any further complaints      Problem List[1]    SURGICAL AND MEDICAL HISTORY: updated and reviewed.  Past Surgical History:   Procedure Laterality Date    ANKLE FUSION Bilateral     ELBOW ARTHROPLASTY      EYE SURGERY Right 03/2017    cataract    JOINT REPLACEMENT      bilateral knee replacement; bilateral elbow and wrist replacement    KNEE ARTHROPLASTY      OPEN REDUCTION AND INTERNAL FIXATION (ORIF) OF FRACTURE OF ULNA Right 8/17/2021    Procedure: ORIF, FRACTURE, ULNA  AND MASSIVE ALLOGRAFT;  Surgeon: Tio Patel MD;  Location: [address];  Service: Orthopedics;  Laterality: Right;    REVISION OF TOTAL ARTHROPLASTY OF ELBOW Right 8/17/2021    Procedure: REVISION, TOTAL ARTHROPLASTY, ELBOW;  Surgeon: Tio Patel MD;  Location: [facility] OR [address] [place] [address];  Service: Orthopedics;  Laterality: Right;     ALLERGIES updated and reviewed.  Review of patient's allergies indicates:  No Known Allergies    CURRENT OUTPATIENT MEDICATIONS updated and reviewed  Current Medications[2]    Review of Systems   Constitutional:  Negative for activity change, appetite change, chills, diaphoresis, fatigue, fever and unexpected weight change.   HENT:  Negative for congestion, ear discharge, ear pain, facial swelling, hearing loss, nosebleeds, postnasal drip, rhinorrhea, sinus pressure, sneezing, sore throat, tinnitus, trouble swallowing and voice change.    Eyes:  Negative for photophobia, pain, discharge, redness, itching and visual disturbance.   Respiratory:  Negative for cough, chest tightness, shortness of breath and wheezing.    Cardiovascular:  Negative for chest pain, palpitations and leg swelling.   Gastrointestinal:  Negative for abdominal distention, abdominal pain, anal bleeding, blood in stool, constipation, diarrhea, nausea, rectal pain and vomiting.   Endocrine: Negative for cold intolerance, heat intolerance, polydipsia, polyphagia and polyuria.   Genitourinary:  Negative for difficulty urinating, dysuria  and flank pain.   Musculoskeletal:  Negative for arthralgias, back pain, joint swelling, myalgias and neck pain.   Skin:  Negative for rash.   Neurological:  Negative for dizziness, tremors, seizures, syncope, speech difficulty, weakness, light-headedness, numbness and headaches.   Psychiatric/Behavioral:  Negative for behavioral problems, confusion, decreased concentration, dysphoric mood, sleep disturbance and suicidal ideas. The patient is not nervous/anxious and is not hyperactive.        /88 (BP Location: Right arm, Patient Position: Sitting)   Pulse 93   Wt 63.2 kg (139 lb 5.3 oz)   SpO2 96%   BMI 30.15 kg/m²   Physical Exam  Vitals and nursing note reviewed.   Constitutional:       General: She is not in acute distress.     Appearance: Normal appearance. She is not ill-appearing or diaphoretic.   HENT:      Head: Normocephalic and atraumatic.   Eyes:      General: No scleral icterus.        Right eye: No discharge.         Left eye: No discharge.      Conjunctiva/sclera: Conjunctivae normal.   Cardiovascular:      Rate and Rhythm: Normal rate and regular rhythm.      Heart sounds: Normal heart sounds.   Pulmonary:      Breath sounds: Normal breath sounds. No wheezing.   Skin:     General: Skin is warm and dry.   Neurological:      Mental Status: She is alert.   Psychiatric:         Mood and Affect: Mood normal.         Behavior: Behavior normal.           ASSESSMENT/PLAN    Assessment & Plan    IMPRESSION:  - Assessed chronic fungal infection under breasts, which has been persistent despite previous treatments  - Evaluated efficacy of current antifungal regimen (Nystatin cream and powder), which patient reports as more effective than previous treatments  - Considered patient's report of weakness and shaking in the morning, possibly related to Trazodone use  - Assessed patient's anxiety symptoms and decided to initiate low-dose SSRI (Zoloft) for management  - Reviewed patient's concerns about Kevzara  (rheumatology medication) and recent CT results showing no cause for shortness of breath  - Noted patient's report of persistent cough since December, but previous imaging showed no acute process    HYPERTENSION:  - Monitored the patient's blood pressure, which was previously high at home but is now better controlled at 136/88.  - Evaluated current blood pressure as improved from previous high readings.  - Reviewed past blood pressure readings, noting improvement from 154/78 to 116/68 in June.  - Continued Losartan for blood pressure management without changes.    RHEUMATOID ARTHRITIS:  - Monitored the patient's chronic arm pain related to rheumatoid arthritis.  - Acknowledged the patient's concern about rheumatoid arthritis and suggested discussing it with the specialist.  - Noted that the patient was previously on Kevzara injections for rheumatoid arthritis, but treatment was discontinued by rheumatologist.  - Informed the patient about the potential for Kevzara to lower immune system and increase risk of opportunistic infections.    DEPRESSION:  - Discussed the possibility of starting a daily medication for depression and anxiety, such as Prozac, Zoloft, or Lexapro.  - Prescribed Sertraline (Zoloft) 25 mg daily for depression.  - Explained that Sertraline is used for depression and may take up to 6 weeks to become fully effective.  - Discussed that SSRIs like Sertraline do not change life circumstances but can help manage reactions to stressors.  - Scheduled a follow-up visit in 6-8 weeks to assess the effectiveness of Sertraline, with option for virtual or in-person visit.    ANXIETY:  - Monitored the patient's reported anxiety, particularly about getting to appointments on time and other unnecessary worries.  - Acknowledged the patient's anxiety and explained that medication won't change problems but can help manage reactions to stress.  - Prescribed Sertraline (Zoloft) 25 mg daily for anxiety.  - Explained  that Sertraline is used for anxiety and may take up to 6 weeks to become fully effective.  - Discussed that SSRIs like Sertraline do not change life circumstances but can help manage reactions to stressors.  - Refilled Hydroxyzine to be taken as needed for anxiety or itching.  - Scheduled a follow-up visit in 6-8 weeks to assess the effectiveness of Sertraline, with option for virtual or in-person visit.    FUNGAL INFECTION:  - Monitored the patient's ongoing issues with rash under breasts, which keeps recurring despite treatment.  - Prescribed Fluconazole 1 tablet every other day for 4 doses (Thursday, Saturday, Monday, Wednesday).  - Continued Nystatin cream and antifungal powder for fungal infection under breasts.  - Refilled Hydroxyzine to be taken as needed for itching.  - Discontinued Ketoconazole cream, noting that Nystatin cream works better for the patient.    DERMATOLOGY REFERRAL:  - Considered referral to dermatologist for persistent rash.  - Referred to dermatology for persistent fungal infection under breasts.    RESPIRATORY SYMPTOMS:  - Monitored the patient's reported shortness of breath and productive cough since around Christmas, which improved but is now recurring.  - Noted that previous chest XR and CT were normal,   showing no cause for shortness of breath or pneumonia.  - Reviewed previous treatment, which included a steroid pack prescribed by Dr. Esposito.  - Continued monitoring current symptoms without new interventions.    COVID VACCINATION:  - Offered and administered COVID vaccine during the visit.    MEDICATIONS/SUPPLEMENTS:  - Addressed the patient's experience with delays in getting medication refilled.  - Continued Trazodone for sleep, with option to decrease dose to half if morning weakness persists.  - Recommend starting either Levosterazine or Zizol for allergies.    FOLLOW UP:  - Ms. Bain to consider using [employer] for easier access to test results and communication with  healthcare providers.  - Follow up in late June or early July for annual appointment.           Francy was seen today for follow-up.    Diagnoses and all orders for this visit:    Primary hypertension  -     CBC Auto Differential; Future  -     Comprehensive Metabolic Panel; Future    Dermatophytosis  -     Ambulatory referral/consult to Dermatology; Future    KAILASH (generalized anxiety disorder)    Other orders  -     fluconazole (DIFLUCAN) 200 MG Tab; Take 1 tablet (200 mg total) by mouth every other day. for 4 days  -     sertraline (ZOLOFT) 25 MG tablet; Take 1 tablet (25 mg total) by mouth once daily.  -     hydrOXYzine HCL (ATARAX) 25 MG tablet; Take 1 tablet (25 mg total) by mouth 3 (three) times daily as needed for Itching or Anxiety. FOR ANXIETY  -     furosemide (LASIX) 20 MG tablet; TAKE 1 TABLET ONE TIME DAILY FOR FLUID            Most recent some lab results reviewed with patient.  Any new prescription medications gone over in detail including reason for taking the medication, most common possible side effects and possible costs, etcetera.    Chronic conditions updated. Other than changes or additions as above, cont current medications and maintain follow-up with specialists if indicated.     - Cautioned the patient against excessive use of internet searches for interpreting results before professional review.     Mikey Tolbert MD    Disclaimer:  This clinical note was created with the assistance of [employer], an AI-powered documentation tool.  Portions of this note may also have been dictated with the assistance of a computer-assisted dictation program.  While the healthcare provider has reviewed the content, AI-assisted documentation and/or dictation programs may contain inadvertent errors or omissions.  Patients are encouraged to discuss questions or clarifications regarding their care directly with the provider.                         [1]   Patient Active Problem List  Diagnosis    Rheumatoid  arthritis involving multiple sites    Primary hypertension    Cognitive impairment    Glaucoma    Infected olecranon bursa    Osteopenia of multiple sites    History of stroke    Physical deconditioning    History of elbow replacement    Impaired functional mobility, balance, gait, and endurance    Elbow joint replacement status, right    GERD (gastroesophageal reflux disease)    Renal cyst, acquired, right    Moderate persistent asthma without complication    Chronic obstructive pulmonary disease, unspecified COPD type    History of arthroplasty of both wrists    Right hip pain    Immunocompromised state due to drug therapy    Aortic atherosclerosis    Recurrent major depressive disorder, in full remission    Situational anxiety    On corticosteroid therapy    Suspected sleep apnea    White matter disease    B12 deficiency    Immunodeficiency due to treatment with immunosuppressive medication    Tinea corporis    Primary osteoarthritis involving multiple joints    History of orthostatic hypotension    Bilateral lower extremity edema    Dermatophytosis    KAILASH (generalized anxiety disorder)   [2]   Current Outpatient Medications:     albuterol (PROVENTIL HFA) 90 mcg/actuation inhaler, Inhale 2 puffs into the lungs every 6 (six) hours as needed for Wheezing. Rescue, Disp: 18 g, Rfl: 5    albuterol-ipratropium (DUO-NEB) 2.5 mg-0.5 mg/3 mL nebulizer solution, Take 3 mLs by nebulization every 6 (six) hours as needed for Wheezing. Rescue, Disp: 75 mL, Rfl: 0    ascorbic acid, vitamin C, (VITAMIN C) 100 MG tablet, Take 100 mg by mouth once daily., Disp: , Rfl:     carboxymethylcellulose sodium (REFRESH TEARS OPHT), Apply to eye. Use as needed, Disp: , Rfl:     fluticasone-salmeterol diskus inhaler 250-50 mcg, Inhale 1 puff into the lungs 2 (two) times daily. Controller, Disp: 60 each, Rfl: 5    hydrocortisone 2.5 % cream, Do not apply morning of surgery, Disp: , Rfl:     levocetirizine (XYZAL) 5 MG tablet, Take 5 mg by  mouth every evening., Disp: , Rfl:     losartan (COZAAR) 50 MG tablet, TAKE 1 TABLET TWICE DAILY, Disp: 180 tablet, Rfl: 1    moxifloxacin (VIGAMOX) 0.5 % ophthalmic solution, Place into both eyes., Disp: , Rfl:     nystatin (MYCOSTATIN) cream, Apply topically 2 (two) times daily., Disp: 30 g, Rfl: 5    nystatin (MYCOSTATIN) powder, Apply topically 4 (four) times daily., Disp: 60 g, Rfl: 0    omeprazole (PRILOSEC) 20 MG capsule, Take 1 capsule (20 mg total) by mouth once daily., Disp: 90 capsule, Rfl: 3    sarilumab (KEVZARA) 200 mg/1.14 mL PnIj, Inject 1.14 mLs (200 mg total) into the skin every 14 (fourteen) days., Disp: 2 each, Rfl: 2    sarilumab (KEVZARA) 200 mg/1.14 mL Syrg, Inject 1.14 mLs (200 mg total) into the skin every 14 (fourteen) days., Disp: 2 each, Rfl: 3    traZODone (DESYREL) 50 MG tablet, Take 1 tablet (50 mg total) by mouth nightly as needed for Insomnia., Disp: 90 tablet, Rfl: 3    vibegron (GEMTESA) 75 mg Tab, Take 75 mg by mouth once daily., Disp: 30 tablet, Rfl: 11    fluconazole (DIFLUCAN) 200 MG Tab, Take 1 tablet (200 mg total) by mouth every other day. for 4 days, Disp: 4 tablet, Rfl: 0    furosemide (LASIX) 20 MG tablet, TAKE 1 TABLET ONE TIME DAILY FOR FLUID, Disp: 90 tablet, Rfl: 3    hydrOXYzine HCL (ATARAX) 25 MG tablet, Take 1 tablet (25 mg total) by mouth 3 (three) times daily as needed for Itching or Anxiety. FOR ANXIETY, Disp: 90 tablet, Rfl: 11    sertraline (ZOLOFT) 25 MG tablet, Take 1 tablet (25 mg total) by mouth once daily., Disp: 30 tablet, Rfl: 1

## 2025-03-10 DIAGNOSIS — M05.9 RHEUMATOID ARTHRITIS WITH RHEUMATOID FACTOR, UNSPECIFIED: ICD-10-CM

## 2025-03-10 DIAGNOSIS — M05.79 RHEUMATOID ARTHRITIS WITH RHEUMATOID FACTOR OF MULTIPLE SITES WITHOUT ORGAN OR SYSTEMS INVOLVEMENT: ICD-10-CM

## 2025-03-10 RX ORDER — SARILUMAB 200 MG/1.14ML
200 INJECTION, SOLUTION SUBCUTANEOUS
Qty: 2 EACH | Refills: 2 | Status: ACTIVE | OUTPATIENT
Start: 2025-03-10

## 2025-03-13 ENCOUNTER — TELEPHONE (OUTPATIENT)
Dept: UROLOGY | Facility: CLINIC | Age: 80
End: 2025-03-13
Payer: MEDICARE

## 2025-03-19 ENCOUNTER — TELEPHONE (OUTPATIENT)
Dept: UROLOGY | Facility: CLINIC | Age: 80
End: 2025-03-19
Payer: MEDICARE

## 2025-03-28 NOTE — TELEPHONE ENCOUNTER
No care due was identified.  [facility] Embedded Care Due Messages. Reference number: [id number].   3/28/2025 3:26:27 PM CDT

## 2025-03-31 RX ORDER — ERGOCALCIFEROL 1.25 MG/1
CAPSULE ORAL
Qty: 12 CAPSULE | Refills: 3 | Status: SHIPPED | OUTPATIENT
Start: 2025-03-31

## 2025-04-09 RX ORDER — PREDNISONE 2.5 MG/1
2.5 TABLET ORAL
Qty: 90 TABLET | Refills: 3 | OUTPATIENT
Start: 2025-04-09

## 2025-04-09 NOTE — TELEPHONE ENCOUNTER
Care Due:                  Date            Visit Type   Department     Provider  --------------------------------------------------------------------------------                                EP -                              PRIMARY      [facility] PRIMARY  Last Visit: 03-      CARE ([facility])   VIKA Tolbert                              EP -                              PRIMARY      [facility] PRIMARY  Next Visit: 05-      CARE ([facility])   VIKA Tolbert                                                            Last  Test          Frequency    Reason                     Performed    Due Date  --------------------------------------------------------------------------------    Vitamin D...  12 months..  ergocalciferol...........  Not Found    Overdue    [employer] Embedded Care Due Messages. Reference number: [id number].   4/09/2025 11:14:32 AM CDT

## 2025-04-09 NOTE — TELEPHONE ENCOUNTER
Spoke to the patient and discussed her prednisone refill. Appointment scheduled so she can discuss getting a refill with a provider. Dr. Tolbert does not have any available appointments.

## 2025-04-10 ENCOUNTER — OFFICE VISIT (OUTPATIENT)
Dept: PRIMARY CARE CLINIC | Facility: CLINIC | Age: 80
End: 2025-04-10
Payer: MEDICARE

## 2025-04-10 VITALS
SYSTOLIC BLOOD PRESSURE: 132 MMHG | TEMPERATURE: 98 F | DIASTOLIC BLOOD PRESSURE: 84 MMHG | HEIGHT: 57 IN | WEIGHT: 136.69 LBS | BODY MASS INDEX: 29.49 KG/M2 | HEART RATE: 98 BPM | OXYGEN SATURATION: 96 %

## 2025-04-10 DIAGNOSIS — R06.2 WHEEZING: ICD-10-CM

## 2025-04-10 DIAGNOSIS — L03.115 CELLULITIS OF RIGHT FOOT: Primary | ICD-10-CM

## 2025-04-10 DIAGNOSIS — M06.9 RHEUMATOID ARTHRITIS INVOLVING MULTIPLE SITES, UNSPECIFIED WHETHER RHEUMATOID FACTOR PRESENT: ICD-10-CM

## 2025-04-10 PROBLEM — Z86.73 HISTORY OF STROKE: Status: RESOLVED | Noted: 2020-10-08 | Resolved: 2025-04-10

## 2025-04-10 PROBLEM — Z86.79 HISTORY OF ORTHOSTATIC HYPOTENSION: Status: RESOLVED | Noted: 2024-06-20 | Resolved: 2025-04-10

## 2025-04-10 PROCEDURE — 99999 PR PBB SHADOW E&M-EST. PATIENT-LVL V: CPT | Mod: PBBFAC,HCNC,, | Performed by: NURSE PRACTITIONER

## 2025-04-10 RX ORDER — CEPHALEXIN 500 MG/1
500 CAPSULE ORAL 2 TIMES DAILY WITH MEALS
Qty: 10 CAPSULE | Refills: 0 | Status: SHIPPED | OUTPATIENT
Start: 2025-04-10 | End: 2025-04-15

## 2025-04-10 RX ORDER — PREDNISONE 2.5 MG/1
2.5 TABLET ORAL DAILY
Qty: 30 TABLET | Refills: 0 | Status: SHIPPED | OUTPATIENT
Start: 2025-04-10 | End: 2025-05-10

## 2025-04-10 NOTE — PROGRESS NOTES
[facility] Note    Assessment & Plan       1. Cellulitis of right foot  Comments:  Cellulitis present from end of toes to mid foot. No streaking or systemic symptoms. Prescription below. Follow-up 2 days. ED if worsening within 24 hours.  Orders:  -     cephALEXin (KEFLEX) 500 MG capsule; Take 1 capsule (500 mg total) by mouth 2 (two) times daily with meals. for 5 days  Dispense: 10 capsule; Refill: 0    2. Rheumatoid arthritis involving multiple sites, unspecified whether rheumatoid factor present  Comments:  One time courtesy refill of prednisone. Discussed increased blood glucose and risk of infection with chronic use. Follow-up rheumatology and pcp.  Orders:  -     predniSONE (DELTASONE) 2.5 MG tablet; Take 1 tablet (2.5 mg total) by mouth once daily.  Dispense: 30 tablet; Refill: 0    3. Wheezing  Comments:  Wheezing on exam. Chronic SOB. Pulmonology notes reviewed. Cannot actuate inhalers because of RA. Can use nebs. Has pulm f/u 4/17. NAD. O2% 96 upon repeat. Discuss case management and/or occupational therapy in two days follow-up with me.            Subjective      Patient ID:  Francy Bain is a 79 y.o. female and  has a past medical history of Affective disorder, History of orthostatic hypotension (06/20/2024), History of stroke (10/08/2020), Hypertension, Renal cyst, acquired, right (04/14/2021), and Rheumatoid arthritis.    Chief Complaint: Medication Refill (Prednisone refill) and Insect Bite (Right foot)     History of Present Illness:    She is a patient of Mikey Tolbert MD, who has prescribed prednisone 2.5 mg for problems related to rheumatoid arthritis. She states that she takes it every other day as needed but is out. She is scheduled with rheumatology in 11 days and with Dr. Tolbert on 5/5/25.    Reports bug bite on right great toe since last night. Foot is itching.    Active Problem List with Overview Notes    Diagnosis Date Noted    Dermatophytosis 03/07/2025     KAILASH (generalized anxiety disorder) 03/07/2025    Bilateral lower extremity edema 06/20/2024    Primary osteoarthritis involving multiple joints 04/18/2024    Tinea corporis 01/10/2024    Immunodeficiency due to treatment with immunosuppressive medication 09/08/2023    Suspected sleep apnea 05/26/2023    White matter disease 05/26/2023    B12 deficiency 05/26/2023    Immunocompromised state due to drug therapy 03/06/2023    Aortic atherosclerosis 03/06/2023    Recurrent major depressive disorder, in full remission 03/06/2023    Situational anxiety 03/06/2023    On corticosteroid therapy 03/06/2023    Right hip pain 06/09/2022    History of arthroplasty of both wrists 10/22/2021    Chronic obstructive pulmonary disease, unspecified COPD type 06/04/2021    Moderate persistent asthma without complication 04/25/2021     No post available on PFTs unfortunately, they do show obstruction.  Recommend repeat  Has hx of asthma that has not been treated with maintenance meds  Start ICS/LABA  CT shows improving PNA (which fits in with clinical picture a week or so ago) with GGOs and interstitial edema  No desat on walk      Renal cyst, acquired, right 04/14/2021    GERD (gastroesophageal reflux disease) 03/31/2021    Elbow joint replacement status, right 03/19/2021    Impaired functional mobility, balance, gait, and endurance 01/08/2021    History of elbow replacement 11/24/2020    Osteopenia of multiple sites 10/08/2020    Physical deconditioning 10/08/2020    Infected olecranon bursa 08/22/2017    Glaucoma 02/08/2016    Rheumatoid arthritis involving multiple sites     Primary hypertension     Cognitive impairment      Medications:    Current Outpatient Medications   Medication Instructions    albuterol (PROVENTIL HFA) 90 mcg/actuation inhaler 2 puffs, Inhalation, Every 6 hours PRN, Rescue    albuterol-ipratropium (DUO-NEB) 2.5 mg-0.5 mg/3 mL nebulizer solution 3 mLs, Nebulization, Every 6 hours PRN, Rescue    ascorbic acid  "(vitamin C) (VITAMIN C) 100 mg, Daily    carboxymethylcellulose sodium (REFRESH TEARS OPHT) Apply to eye. Use as needed    cephALEXin (KEFLEX) 500 mg, Oral, 2 times daily with meals    ergocalciferol (ERGOCALCIFEROL) 50,000 unit Cap TAKE 1 CAPSULE EVERY 7 DAYS    fluticasone-salmeterol diskus inhaler 250-50 mcg 1 puff, Inhalation, 2 times daily, Controller    furosemide (LASIX) 20 MG tablet TAKE 1 TABLET ONE TIME DAILY FOR FLUID    GEMTESA 75 mg, Oral, Daily    hydrocortisone 2.5 % cream Do not apply morning of surgery    hydrOXYzine HCL (ATARAX) 25 mg, Oral, 3 times daily PRN, FOR ANXIETY    KEVZARA 200 mg, Subcutaneous, Every 14 days    levocetirizine (XYZAL) 5 mg, Nightly    losartan (COZAAR) 50 mg, Oral, 2 times daily    moxifloxacin (VIGAMOX) 0.5 % ophthalmic solution Place into both eyes.    nystatin (MYCOSTATIN) cream Topical (Top), 2 times daily    nystatin (MYCOSTATIN) powder Topical (Top), 4 times daily    omeprazole (PRILOSEC) 20 mg, Oral, Daily    predniSONE (DELTASONE) 2.5 mg, Oral, Daily    sertraline (ZOLOFT) 25 mg, Oral, Daily    traZODone (DESYREL) 50 mg, Oral, Nightly PRN         Allergies:    Review of patient's allergies indicates:  No Known Allergies        Objective     Vital Signs:    Blood pressure 132/84, pulse 98, temperature 98.3 °F (36.8 °C), temperature source Oral, height 4' 9" (1.448 m), weight 62 kg (136 lb 11 oz), SpO2 96%.          Physical Exam:    Physical Exam  Vitals reviewed.   Constitutional:       General: She is not in acute distress.     Appearance: She is not ill-appearing.   HENT:      Head: Normocephalic.   Eyes:      Conjunctiva/sclera: Conjunctivae normal.   Cardiovascular:      Rate and Rhythm: Normal rate and regular rhythm.      Heart sounds: No murmur heard.  Pulmonary:      Effort: Pulmonary effort is normal.      Breath sounds: Normal air entry. Decreased breath sounds (all lobes) and wheezing (all lobes) present. No rhonchi.   Musculoskeletal:      Right lower " le+ Edema (ankle) present.      Left lower le+ Edema (ankle) present.      Comments: Marked hand/wrist flexion contracture like deformities bilaterally.   Skin:     General: Skin is warm and dry.      Findings: Erythema present.      Comments: Erythema and heat of right foot originating at right great toe, migrating to the lateral foot and proximally 9.5 cm. No lymphangitic streaking., See inset photograph.   Neurological:      Mental Status: She is alert and oriented to person, place, and time.   Psychiatric:         Mood and Affect: Mood normal.         Behavior: Behavior normal.         Thought Content: Thought content normal.         Judgment: Judgment normal.          Assessment & Plan       1. Cellulitis of right foot  Comments:  Cellulitis present from end of toes to mid foot. No streaking or systemic symptoms. Prescription below. Follow-up 2 days. ED if worsening within 24 hours.  Orders:  -     cephALEXin (KEFLEX) 500 MG capsule; Take 1 capsule (500 mg total) by mouth 2 (two) times daily with meals. for 5 days  Dispense: 10 capsule; Refill: 0    2. Rheumatoid arthritis involving multiple sites, unspecified whether rheumatoid factor present  Comments:  One time courtesy refill of prednisone. Discussed increased blood glucose and risk of infection with chronic use. Follow-up rheumatology and pcp.  Orders:  -     predniSONE (DELTASONE) 2.5 MG tablet; Take 1 tablet (2.5 mg total) by mouth once daily.  Dispense: 30 tablet; Refill: 0    3. Wheezing  Comments:  Wheezing on exam. Chronic SOB. Pulmonology notes reviewed. Pulm f/u . NAD. O2% 96 upon repeat. Cannot actuate inhalers because of RA. Marked hand/wrist deformities secondary to RA. Can use nebs.  Discuss case management and/or occupational therapy in two days follow-up with me.         Follow-up     Follow Up with Mikey Tolbert MD, primary care: Follow up in about 2 days (around 2025) for for foot infection in 2 days then as scheduled  appointments with Dr. Tolbert and rheumatology.    Upcoming Scheduled Appointments and Follow Up:    Future Appointments   Date Time Provider Department Center   4/14/2025  9:30 AM Sami Cutler NP [facility]   4/17/2025  9:00 AM Piyush Osuna MD [facility]   4/21/2025  9:20 AM Renard Ham MD [facility]   5/5/2025  9:40 AM Mikey Tolbret MD [facility]   5/7/2025 11:40 AM Pam Chang MD [facility] UROLOGY [facility]   6/26/2025  9:20 AM Mikey Tolbert MD [facility]   7/28/2025  9:45 AM Luz Elena Castro MD [facility]       Sami Cutler, ME[place]

## 2025-04-14 ENCOUNTER — OFFICE VISIT (OUTPATIENT)
Dept: PRIMARY CARE CLINIC | Facility: CLINIC | Age: 80
End: 2025-04-14
Payer: MEDICARE

## 2025-04-14 VITALS
WEIGHT: 136.69 LBS | SYSTOLIC BLOOD PRESSURE: 110 MMHG | OXYGEN SATURATION: 95 % | DIASTOLIC BLOOD PRESSURE: 72 MMHG | RESPIRATION RATE: 18 BRPM | TEMPERATURE: 98 F | BODY MASS INDEX: 29.49 KG/M2 | HEIGHT: 57 IN | HEART RATE: 96 BPM

## 2025-04-14 DIAGNOSIS — J44.9 CHRONIC OBSTRUCTIVE PULMONARY DISEASE, UNSPECIFIED COPD TYPE: ICD-10-CM

## 2025-04-14 DIAGNOSIS — L03.115 CELLULITIS OF RIGHT FOOT: Primary | ICD-10-CM

## 2025-04-14 DIAGNOSIS — M06.9 RHEUMATOID ARTHRITIS INVOLVING MULTIPLE SITES, UNSPECIFIED WHETHER RHEUMATOID FACTOR PRESENT: ICD-10-CM

## 2025-04-14 PROCEDURE — 1160F RVW MEDS BY RX/DR IN RCRD: CPT | Mod: HCNC,CPTII,S$GLB, | Performed by: NURSE PRACTITIONER

## 2025-04-14 PROCEDURE — 3078F DIAST BP <80 MM HG: CPT | Mod: HCNC,CPTII,S$GLB, | Performed by: NURSE PRACTITIONER

## 2025-04-14 PROCEDURE — 3288F FALL RISK ASSESSMENT DOCD: CPT | Mod: HCNC,CPTII,S$GLB, | Performed by: NURSE PRACTITIONER

## 2025-04-14 PROCEDURE — 1125F AMNT PAIN NOTED PAIN PRSNT: CPT | Mod: HCNC,CPTII,S$GLB, | Performed by: NURSE PRACTITIONER

## 2025-04-14 PROCEDURE — 1159F MED LIST DOCD IN RCRD: CPT | Mod: HCNC,CPTII,S$GLB, | Performed by: NURSE PRACTITIONER

## 2025-04-14 PROCEDURE — 1101F PT FALLS ASSESS-DOCD LE1/YR: CPT | Mod: HCNC,CPTII,S$GLB, | Performed by: NURSE PRACTITIONER

## 2025-04-14 PROCEDURE — 99213 OFFICE O/P EST LOW 20 MIN: CPT | Mod: HCNC,S$GLB,, | Performed by: NURSE PRACTITIONER

## 2025-04-14 PROCEDURE — 3074F SYST BP LT 130 MM HG: CPT | Mod: HCNC,CPTII,S$GLB, | Performed by: NURSE PRACTITIONER

## 2025-04-14 PROCEDURE — 99999 PR PBB SHADOW E&M-EST. PATIENT-LVL V: CPT | Mod: PBBFAC,HCNC,, | Performed by: NURSE PRACTITIONER

## 2025-04-14 NOTE — PROGRESS NOTES
"Subjective     Patient ID: Francy Bain is a 79 y.o. female.    Chief Complaint: F/U cellulitis.    Cellulitis R foot diagnosed 4/10/25 and treated with cephalexin x 5 days. Patient reports foot swelling is down. She is taking antibiotic as prescribed and today is the last day she has to take it.     She has RA with deformities that cause problems with actuating inhalers needed for COPD.           Objective     Blood pressure 110/72, pulse 96, temperature 97.9 °F (36.6 °C), temperature source Oral, resp. rate 18, height 4' 9" (1.448 m), weight 62 kg (136 lb 11 oz), SpO2 95%.      Physical Exam  Vitals reviewed.   Constitutional:       General: She is not in acute distress.     Appearance: She is not ill-appearing.   HENT:      Head: Normocephalic.   Eyes:      Conjunctiva/sclera: Conjunctivae normal.   Pulmonary:      Effort: Pulmonary effort is normal.   Musculoskeletal:      Comments: Marked hand/wrist flexion contracture like deformities bilaterally.    Feet:      Right foot:      Skin integrity: Dry skin present. No erythema or warmth.   Skin:     General: Skin is warm and dry.   Neurological:      Mental Status: She is alert and oriented to person, place, and time.   Psychiatric:         Mood and Affect: Mood normal.         Behavior: Behavior normal.         Thought Content: Thought content normal.         Judgment: Judgment normal.            Assessment and Plan     1. Cellulitis of right foot    2. Rheumatoid arthritis involving multiple sites, unspecified whether rheumatoid factor present  -     Ambulatory Referral/Consult to Occupational Therapy; Future; Expected date: 04/21/2025    3. Chronic obstructive pulmonary disease, unspecified COPD type        Cellulitis of right foot resolved. Vitals stable.  Finish antibiotics. Return to clinic with signs of infection.  Refer to occupational therapy to help with strategies to actuate inhalers for COPD and other aids as needed.         Follow up if " symptoms worsen or fail to improve, for appoitnment with Dr. Tolbert as scheduled.    Future Appointments   Date Time Provider Department Center   4/17/2025  9:00 AM Piyush Osuna MD [facility]   4/21/2025  9:20 AM Renard Ham MD [facility] RHEUM [facility]   5/5/2025  9:40 AM Mikey Tolbert MD [facility]   5/7/2025 11:40 AM Pam Chang MD [facility] UROLOGY [facility]   6/26/2025  9:20 AM Mikey Tolbert MD [facility]   7/28/2025  9:45 AM Luz Elena Castro MD [facility]

## 2025-04-16 NOTE — PROGRESS NOTES
Subjective:      Patient ID: Francy Bain is a 79 y.o. female.    Chief Complaint: No chief complaint on file.    Pt is a 80 yo AAF pmh RA on immunosuppression who presents for follow up of obstructive lung disease. Has progressively gotten worse over the past two years.      Smoking hx: never  Exposure hx: none  Inhaler use: ICS/LABA- has not been using because feels like it causes rash under her breast  PRN inhaler use: albuterol- has difficulty using due to RA and hand deformities, nebulizer treatment:    Hx of lung dz: Asthma    Swelling in lower extremities. Unable to use Advair due to hand deformities.     Review of Systems   Constitutional:  Positive for activity change and fatigue. Negative for weight loss and weight gain.   HENT:  Positive for postnasal drip. Negative for congestion.    Respiratory:  Positive for cough (only at night), shortness of breath, wheezing, dyspnea on extertion and use of rescue inhaler. Negative for hemoptysis, sputum production and chest tightness.    Cardiovascular:  Negative for chest pain, palpitations and leg swelling.   Gastrointestinal:  Positive for acid reflux. Negative for nausea and vomiting.   Neurological:  Negative for dizziness and light-headedness.   Psychiatric/Behavioral:  Negative for confusion and sleep disturbance. The patient is not nervous/anxious.      Objective:     Physical Exam   Constitutional: She is oriented to person, place, and time. She appears well-developed and well-nourished. She appears not cachectic.   HENT:   Head: Normocephalic.   Cardiovascular: Normal rate, regular rhythm and normal heart sounds. Exam reveals no gallop and no friction rub.   No murmur heard.  Pulmonary/Chest: Symmetric chest wall expansion and effort normal. She has decreased breath sounds. She has no wheezes. She has no rhonchi. She has no rales.   Musculoskeletal:         General: No edema.   Neurological: She is alert and oriented to person, place, and  time. Gait normal.   Skin: No cyanosis. Nails show no clubbing.   Psychiatric: She has a normal mood and affect. Her behavior is normal. Judgment and thought content normal.   Vitals reviewed.    Personal Diagnostic Review    CT of chest performed on 25 without contrast revealed RLL linear atelectasis possibly due to mucous plugging. No other parenchymal or interstitial abnormalities.      Echocardiogram: 24    Left Ventricle: The left ventricle is normal in size. Ventricular mass is normal. Mild septal thickening. There is mild asymmetric hypertrophy. There is normal systolic function. Ejection fraction by visual approximation is 65%. Global longitudinal strain is -15.0%. Global longitudinal strain is reduced. There is normal diastolic function. Normal left ventricular filling pressure. Tissue Doppler velocity is reduced.    Right Ventricle: Normal right ventricular cavity size. Wall thickness is normal. Systolic function is normal.    Aortic Valve: The aortic valve is a trileaflet valve. There is moderate aortic valve sclerosis. Moderately calcified cusps. Mildly restricted motion.    IVC/SVC: Normal venous pressure at 3 mmHg.     Pulmonary function tests:     1/15/25  FEV1: 0.60L  (57.3 % predicted), +18.2%, 0.90L  FVC:  1.10L (72.9 % predicted),   FEV1/FVC:  55,   TLC: 3.20L (84.9 % predicted),   DLCO: unable to perform     21  FEV1: 0.77L  (65.9 % predicted), +18.6%, +0.120L  FVC:  1.34L (79.9 % predicted),   FEV1/FVC:  58,   T.54L (67.2 % predicted),   DLCO: 8.34 (51.3 % predicted)  IVC/VC <85% indicating possibly underestimation of DLCO.     Test Results:     The test was completed without stopping.  The total time walked was 360 seconds.  During walking, the patient reported:  Dyspnea, Leg/hip pain, Lightheadedness.  The patient used no assistive devices during testing.      01/15/2025---------Distance: 207.26 meters (680 feet)       O2 Sat % Supplemental Oxygen Heart Rate Blood Pressure  "Fabiola Scale   Pre-exercise  (Resting) 97 % Room Air 98 bpm 133/90 mmHg 4   During Exercise 97 % Room Air 104 bpm 135/81 mmHg 4   Post-exercise  (Recovery) 97 % Room Air  97 bpm          Recovery Time: 63 seconds     Performing nurse/tech: Melchor ENGEL     PREVIOUS STUDY:   04/15/2021---------Distance: 213.36 meters (700 feet)       O2 Sat % Supplemental Oxygen Heart Rate Blood Pressure Fabiola Scale   Pre-exercise  (Resting) 96 % Room Air 92 bpm 146/69 mmHg 1   During Exercise 93 % Room Air 103 bpm 151/72 mmHg 4   Post-exercise  (Recovery) 96 % Room Air  99 bpm         CLINICAL INTERPRETATION:  Six minute walk distance is 207.26 meters (680 feet) with somewhat heavy dyspnea.  During exercise, there was no desaturation while breathing room air.  Both blood pressure and heart rate remained stable with walking.  Hypertension was present prior to exercise.  The patient reported non-pulmonary symptoms during exercise.  Significant exercise impairment is likely due to subjective symptoms.  The patient did complete the study, walking 360 seconds of the 360 second test.  Since the previous study in April 2021, exercise capacity is unchanged.  Based upon age and body mass index, exercise capacity is less than predicted.        4/14/2025     9:17 AM 4/10/2025     9:55 AM 4/10/2025     9:18 AM 3/6/2025     8:43 AM 1/15/2025     9:51 AM 1/15/2025     9:36 AM 1/2/2025     2:53 PM   Pulmonary Function Tests   SpO2 95 % 96 % 93 % 96 % 97 %  96 %   Ordering Provider      MD Enrrique    Performing nurse/tech/RT      Melchor ENGEL    Diagnosis      Shortness of Breath    Height 4' 9" (1.448 m)  4' 9" (1.448 m)  4' 9" (1.448 m) 4' 9" (1.448 m) 4' 11" (1.499 m)   Weight 62 kg (136 lb 11 oz)  62 kg (136 lb 11 oz) 63.2 kg (139 lb 5.3 oz) 64.9 kg (143 lb) 64.9 kg (143 lb) 65 kg (143 lb 4.8 oz)   BMI (Calculated) 29.6  29.6  30.9 30.9 28.9   6MWT Status      completed without stopping    Patient Reported      Dyspnea;Leg/hip pain;Lightheadedness    Was " O2 used?      No    6MW Distance walked (feet)      680 feet    Distance walked (meters)      207.26 meters    Did patient stop?      No    Type of assistive device(s) used?      no assistive devices    Oxygen Saturation      97 %    Supplemental Oxygen      Room Air    Heart Rate      98 bpm    Blood Pressure      133/90    Fabiola Dyspnea Rating       somewhat heavy    Oxygen Saturation      97 %    Supplemental Oxygen      Room Air    Heart Rate      104 bpm    Blood Pressure      135/81    Fabiola Dyspnea Rating       somewhat heavy    Recovery Time (seconds)      63 seconds    Oxygen Saturation      97 %    Supplemental Oxygen      Room Air    Heart Rate      97 bpm    Is procedure ready for interpretation?      Yes      Assessment:     No diagnosis found.     Encounter Medications[1]    No orders of the defined types were placed in this encounter.    Plan:     ***          [1]   Outpatient Encounter Medications as of 4/17/2025   Medication Sig Dispense Refill    albuterol (PROVENTIL HFA) 90 mcg/actuation inhaler Inhale 2 puffs into the lungs every 6 (six) hours as needed for Wheezing. Rescue 18 g 5    albuterol-ipratropium (DUO-NEB) 2.5 mg-0.5 mg/3 mL nebulizer solution Take 3 mLs by nebulization every 6 (six) hours as needed for Wheezing. Rescue 75 mL 0    ascorbic acid, vitamin C, (VITAMIN C) 100 MG tablet Take 100 mg by mouth once daily.      carboxymethylcellulose sodium (REFRESH TEARS OPHT) Apply to eye. Use as needed      cephALEXin (KEFLEX) 500 MG capsule Take 1 capsule (500 mg total) by mouth 2 (two) times daily with meals. for 5 days 10 capsule 0    ergocalciferol (ERGOCALCIFEROL) 50,000 unit Cap TAKE 1 CAPSULE EVERY 7 DAYS 12 capsule 3    fluticasone-salmeterol diskus inhaler 250-50 mcg Inhale 1 puff into the lungs 2 (two) times daily. Controller 60 each 5    furosemide (LASIX) 20 MG tablet TAKE 1 TABLET ONE TIME DAILY FOR FLUID 90 tablet 3    hydrocortisone 2.5 % cream Do not apply morning of surgery       hydrOXYzine HCL (ATARAX) 25 MG tablet Take 1 tablet (25 mg total) by mouth 3 (three) times daily as needed for Itching or Anxiety. FOR ANXIETY 90 tablet 11    levocetirizine (XYZAL) 5 MG tablet Take 5 mg by mouth every evening.      losartan (COZAAR) 50 MG tablet TAKE 1 TABLET TWICE DAILY 180 tablet 1    moxifloxacin (VIGAMOX) 0.5 % ophthalmic solution Place into both eyes.      nystatin (MYCOSTATIN) cream Apply topically 2 (two) times daily. 30 g 5    nystatin (MYCOSTATIN) powder Apply topically 4 (four) times daily. 60 g 0    omeprazole (PRILOSEC) 20 MG capsule Take 1 capsule (20 mg total) by mouth once daily. 90 capsule 3    predniSONE (DELTASONE) 2.5 MG tablet Take 1 tablet (2.5 mg total) by mouth once daily. 30 tablet 0    sarilumab (KEVZARA) 200 mg/1.14 mL PnIj Inject 1.14 mLs (200 mg total) into the skin every 14 (fourteen) days. 2 each 2    sertraline (ZOLOFT) 25 MG tablet Take 1 tablet (25 mg total) by mouth once daily. 30 tablet 1    traZODone (DESYREL) 50 MG tablet Take 1 tablet (50 mg total) by mouth nightly as needed for Insomnia. 90 tablet 3    vibegron (GEMTESA) 75 mg Tab Take 75 mg by mouth once daily. 30 tablet 11     No facility-administered encounter medications on file as of 4/17/2025.

## 2025-04-17 ENCOUNTER — OFFICE VISIT (OUTPATIENT)
Dept: PULMONOLOGY | Facility: CLINIC | Age: 80
End: 2025-04-17
Payer: MEDICARE

## 2025-04-17 VITALS
HEART RATE: 92 BPM | HEIGHT: 57 IN | OXYGEN SATURATION: 96 % | SYSTOLIC BLOOD PRESSURE: 140 MMHG | WEIGHT: 136 LBS | BODY MASS INDEX: 29.34 KG/M2 | DIASTOLIC BLOOD PRESSURE: 86 MMHG

## 2025-04-17 DIAGNOSIS — I50.32 CHRONIC HEART FAILURE WITH PRESERVED EJECTION FRACTION: ICD-10-CM

## 2025-04-17 DIAGNOSIS — J45.40 MODERATE PERSISTENT ASTHMA WITHOUT COMPLICATION: ICD-10-CM

## 2025-04-17 DIAGNOSIS — J44.9 CHRONIC OBSTRUCTIVE PULMONARY DISEASE, UNSPECIFIED COPD TYPE: Primary | ICD-10-CM

## 2025-04-17 PROCEDURE — 99999 PR PBB SHADOW E&M-EST. PATIENT-LVL IV: CPT | Mod: PBBFAC,HCNC,, | Performed by: INTERNAL MEDICINE

## 2025-04-17 PROCEDURE — 1159F MED LIST DOCD IN RCRD: CPT | Mod: HCNC,CPTII,S$GLB, | Performed by: INTERNAL MEDICINE

## 2025-04-17 PROCEDURE — 1101F PT FALLS ASSESS-DOCD LE1/YR: CPT | Mod: HCNC,CPTII,S$GLB, | Performed by: INTERNAL MEDICINE

## 2025-04-17 PROCEDURE — 99214 OFFICE O/P EST MOD 30 MIN: CPT | Mod: HCNC,S$GLB,, | Performed by: INTERNAL MEDICINE

## 2025-04-17 PROCEDURE — 3079F DIAST BP 80-89 MM HG: CPT | Mod: HCNC,CPTII,S$GLB, | Performed by: INTERNAL MEDICINE

## 2025-04-17 PROCEDURE — 3288F FALL RISK ASSESSMENT DOCD: CPT | Mod: HCNC,CPTII,S$GLB, | Performed by: INTERNAL MEDICINE

## 2025-04-17 PROCEDURE — 3077F SYST BP >= 140 MM HG: CPT | Mod: HCNC,CPTII,S$GLB, | Performed by: INTERNAL MEDICINE

## 2025-04-17 RX ORDER — FORMOTEROL FUMARATE 20 UG/2ML
20 SOLUTION RESPIRATORY (INHALATION) 2 TIMES DAILY
Qty: 120 ML | Refills: 11 | Status: SHIPPED | OUTPATIENT
Start: 2025-04-17 | End: 2026-04-17

## 2025-04-17 RX ORDER — BUDESONIDE 0.5 MG/2ML
0.5 INHALANT ORAL 2 TIMES DAILY
Qty: 120 ML | Refills: 11 | Status: SHIPPED | OUTPATIENT
Start: 2025-04-17 | End: 2026-04-17

## 2025-04-17 NOTE — PROGRESS NOTES
Subjective:       Patient ID: Francy Bain is a 79 y.o. female.    History of Present Illness    CHIEF COMPLAINT:  Patient presents today for follow up of COPD/Asthma    RESPIRATORY:  She experiences daily wheezing with increased frequency. She has difficulty using her inhaler due to inability to properly depress the device due to hand deformities. She primarily uses nebulizer treatments at night when wheezing occurs, with improvement of symptoms following use. She reports occasional post-nasal drip at night with associated nighttime cough, but denies frequent coughing or significant sinus congestion.    CARDIOVASCULAR:  She reports ankle swelling for approximately one month that has persisted and worsened since the decrease in Lasix dosage. The swelling has not improved as it typically would. She notes increased salt intake, including consumption of potato chips, corn chips, and pickles.    SLEEP:  She reports fatigue since starting new sleep medication identified as trazodone.    Smoking hx: never  Exposure hx: none  Inhaler use: ICS/LABA- has not been using because feels like it causes rash under her breast  PRN inhaler use: albuterol- has difficulty using due to RA and hand deformities, nebulizer treatment:    Hx of lung dz: Asthma    Review of Systems   Constitutional:  Positive for activity change and fatigue. Negative for weight loss and weight gain.   HENT:  Positive for postnasal drip and congestion.    Respiratory:  Positive for cough (intermittent at night), wheezing, dyspnea on extertion and use of rescue inhaler. Negative for hemoptysis, sputum production, chest tightness and shortness of breath.    Cardiovascular:  Negative for chest pain, palpitations and leg swelling.   Gastrointestinal:  Negative for nausea, vomiting and acid reflux.   Neurological:  Negative for dizziness and light-headedness.   Psychiatric/Behavioral:  Negative for confusion and sleep disturbance. The patient is not  nervous/anxious.        As above    Social History     Tobacco Use    Smoking status: Never    Smokeless tobacco: Never   Substance Use Topics    Alcohol use: No     Comment: rare- once per month       Review of patient's allergies indicates:  No Known Allergies  Past Medical History:   Diagnosis Date    Affective disorder     History of orthostatic hypotension 06/20/2024    History of stroke 10/08/2020    Hypertension     Renal cyst, acquired, right 04/14/2021    Rheumatoid arthritis      Past Surgical History:   Procedure Laterality Date    ANKLE FUSION Bilateral     ELBOW ARTHROPLASTY      EYE SURGERY Right 03/2017    cataract    JOINT REPLACEMENT      bilateral knee replacement; bilateral elbow and wrist replacement    KNEE ARTHROPLASTY      OPEN REDUCTION AND INTERNAL FIXATION (ORIF) OF FRACTURE OF ULNA Right 8/17/2021    Procedure: ORIF, FRACTURE, ULNA  AND MASSIVE ALLOGRAFT;  Surgeon: Tio Patel MD;  Location: [facility] OR [address] [place] [address];  Service: Orthopedics;  Laterality: Right;    REVISION OF TOTAL ARTHROPLASTY OF ELBOW Right 8/17/2021    Procedure: REVISION, TOTAL ARTHROPLASTY, ELBOW;  Surgeon: Tio Patel MD;  Location: [facility] OR [address] [place] [address];  Service: Orthopedics;  Laterality: Right;     Current Outpatient Medications on File Prior to Visit   Medication Sig    albuterol-ipratropium (DUO-NEB) 2.5 mg-0.5 mg/3 mL nebulizer solution Take 3 mLs by nebulization every 6 (six) hours as needed for Wheezing. Rescue    ascorbic acid, vitamin C, (VITAMIN C) 100 MG tablet Take 100 mg by mouth once daily.    carboxymethylcellulose sodium (REFRESH TEARS OPHT) Apply to eye. Use as needed    ergocalciferol (ERGOCALCIFEROL) 50,000 unit Cap TAKE 1 CAPSULE EVERY 7 DAYS    furosemide (LASIX) 20 MG tablet TAKE 1 TABLET ONE TIME DAILY FOR FLUID    hydrocortisone 2.5 % cream Do not apply morning of surgery    hydrOXYzine HCL (ATARAX) 25 MG tablet Take 1 tablet (25 mg total) by mouth 3 (three) times daily as needed for Itching or  "Anxiety. FOR ANXIETY    levocetirizine (XYZAL) 5 MG tablet Take 5 mg by mouth every evening.    losartan (COZAAR) 50 MG tablet TAKE 1 TABLET TWICE DAILY    moxifloxacin (VIGAMOX) 0.5 % ophthalmic solution Place into both eyes.    nystatin (MYCOSTATIN) cream Apply topically 2 (two) times daily.    nystatin (MYCOSTATIN) powder Apply topically 4 (four) times daily.    omeprazole (PRILOSEC) 20 MG capsule Take 1 capsule (20 mg total) by mouth once daily.    predniSONE (DELTASONE) 2.5 MG tablet Take 1 tablet (2.5 mg total) by mouth once daily.    sarilumab (KEVZARA) 200 mg/1.14 mL PnIj Inject 1.14 mLs (200 mg total) into the skin every 14 (fourteen) days.    sertraline (ZOLOFT) 25 MG tablet Take 1 tablet (25 mg total) by mouth once daily.    traZODone (DESYREL) 50 MG tablet Take 1 tablet (50 mg total) by mouth nightly as needed for Insomnia.    vibegron (GEMTESA) 75 mg Tab Take 75 mg by mouth once daily.    [DISCONTINUED] albuterol (PROVENTIL HFA) 90 mcg/actuation inhaler Inhale 2 puffs into the lungs every 6 (six) hours as needed for Wheezing. Rescue    [DISCONTINUED] fluticasone-salmeterol diskus inhaler 250-50 mcg Inhale 1 puff into the lungs 2 (two) times daily. Controller     No current facility-administered medications on file prior to visit.       Objective:      Vitals:    04/17/25 0847   BP: (!) 140/86   BP Location: Left arm   Patient Position: Sitting   Pulse: 92   SpO2: 96%   Weight: 61.7 kg (136 lb 0.4 oz)   Height: 4' 9" (1.448 m)     Physical Exam   Constitutional: She is oriented to person, place, and time. She appears well-developed and well-nourished. She appears not cachectic.   HENT:   Head: Normocephalic.   Cardiovascular: Normal rate, regular rhythm and normal heart sounds. Exam reveals no gallop and no friction rub.   No murmur heard.  Pulmonary/Chest: Symmetric chest wall expansion and effort normal. She has decreased breath sounds. She has no wheezes. She has no rhonchi. She has no rales. "   Musculoskeletal:         General: No edema.   Neurological: She is alert and oriented to person, place, and time. Gait normal.   Skin: No cyanosis. Nails show no clubbing.   Psychiatric: She has a normal mood and affect. Her behavior is normal. Judgment and thought content normal.   Vitals reviewed.    Personal Diagnostic Review    Laboratory:     CT of chest performed on 25 without contrast revealed RLL linear atelectasis possibly due to mucous plugging. No other parenchymal or interstitial abnormalities.      Pulmonary function tests:      1/15/25  FEV1: 0.60L  (57.3 % predicted), +18.2%, 0.90L  FVC:  1.10L (72.9 % predicted),   FEV1/FVC:  55,   TLC: 3.20L (84.9 % predicted),   DLCO: unable to perform     21  FEV1: 0.77L  (65.9 % predicted), +18.6%, +0.120L  FVC:  1.34L (79.9 % predicted),   FEV1/FVC:  58,   T.54L (67.2 % predicted),   DLCO: 8.34 (51.3 % predicted)  IVC/VC <85% indicating possibly underestimation of DLCO.      Test Results:     The test was completed without stopping.  The total time walked was 360 seconds.  During walking, the patient reported:  Dyspnea, Leg/hip pain, Lightheadedness.  The patient used no assistive devices during testing.      01/15/2025---------Distance: 207.26 meters (680 feet)       O2 Sat % Supplemental Oxygen Heart Rate Blood Pressure Fabiola Scale   Pre-exercise  (Resting) 97 % Room Air 98 bpm 133/90 mmHg 4   During Exercise 97 % Room Air 104 bpm 135/81 mmHg 4   Post-exercise  (Recovery) 97 % Room Air  97 bpm          Recovery Time: 63 seconds     Performing nurse/tech: Melchor ENGEL     PREVIOUS STUDY:   04/15/2021---------Distance: 213.36 meters (700 feet)       O2 Sat % Supplemental Oxygen Heart Rate Blood Pressure Fabiola Scale   Pre-exercise  (Resting) 96 % Room Air 92 bpm 146/69 mmHg 1   During Exercise 93 % Room Air 103 bpm 151/72 mmHg 4   Post-exercise  (Recovery) 96 % Room Air  99 bpm          CLINICAL INTERPRETATION:  Six minute walk distance is 207.26 meters  (680 feet) with somewhat heavy dyspnea.  During exercise, there was no desaturation while breathing room air.  Both blood pressure and heart rate remained stable with walking.  Hypertension was present prior to exercise.  The patient reported non-pulmonary symptoms during exercise.  Significant exercise impairment is likely due to subjective symptoms.  The patient did complete the study, walking 360 seconds of the 360 second test.  Since the previous study in April 2021, exercise capacity is unchanged.  Based upon age and body mass index, exercise capacity is less than predicted.       Echo  Result Date: 6/25/2024    Left Ventricle: The left ventricle is normal in size. Ventricular mass   is normal. Mild septal thickening. There is mild asymmetric hypertrophy.   There is normal systolic function. Ejection fraction by visual   approximation is 65%. Global longitudinal strain is -15.0%. Global   longitudinal strain is reduced. There is normal diastolic function. Normal   left ventricular filling pressure. Tissue Doppler velocity is reduced.    Right Ventricle: Normal right ventricular cavity size. Wall thickness   is normal. Systolic function is normal.    Aortic Valve: The aortic valve is a trileaflet valve. There is moderate   aortic valve sclerosis. Moderately calcified cusps. Mildly restricted   motion.    IVC/SVC: Normal venous pressure at 3 mmHg.         Assessment:     1. Chronic obstructive pulmonary disease, unspecified COPD type  -     budesonide (PULMICORT) 0.5 mg/2 mL nebulizer solution; Take 2 mLs (0.5 mg total) by nebulization 2 (two) times daily. Controller  Dispense: 120 mL; Refill: 11    2. Moderate persistent asthma without complication    Orders:  -     budesonide (PULMICORT) 0.5 mg/2 mL nebulizer solution; Take 2 mLs (0.5 mg total) by nebulization 2 (two) times daily. Controller  Dispense: 120 mL; Refill: 11    3. HFpEF   - decrease salt intake to 2g daily. Discussed lifestyle modifications as  below.   - currently on lasix 20 mg daily, should increase to BID for 3 days.   - Discuss with PFTs.      Assessment & Plan    J44.9 Chronic obstructive pulmonary disease, unspecified COPD type  J45.40 Moderate persistent asthma without complication    IMPRESSION:  - Switched from Advair inhaler and albuterol inhaler to nebulizer treatments due to difficulty using the inhaler.  - Noted heart failure symptoms, with increased leg swelling likely due to reduced Lasix dose and increased salt intake.  - Evaluated sleep issues, decreased Trazodone dose by half (patient to use pill cutter) to address fatigue.    MODERATE PERSISTENT ASTHMA WITHOUT COMPLICATION:  - Started Pulmicort (budesonide) nebulizer solution for steroid portion of treatment, to be used twice daily.  - Increased Duoneb (albuterol nebulizer) frequency to every 4-6 hours as needed for symptom management.  - Follow up in 3 months to assess medication efficacy and make any necessary adjustments.  - Contact the office when the longer-acting nebulized medication is approved by insurance.    LIFESTYLE CHANGES:  - Explained heart failure with preserved EF and its relation to fluid retention and shortness of breath.  - Discussed the importance of limiting salt intake to 2 g per day for managing heart failure symptoms.  - Advised on high-salt content in processed foods and takeout meals.  - Patient to reduce salt intake, particularly avoiding high-sodium foods like chips and pickles.  - Recommend incorporating more low-sodium options into diet, such as fruits, vegetables, and lean meats.  - Educated on proper use of sinus rinses or neti pots with distilled water for occasional nasal congestion, emphasizing the importance of using distilled water and maintaining cleanliness.  - Explained the rationale for avoiding long-term use of Valium, including risks of falls and cognitive impairment in older adults.  - Increased Lasix to 2 pills in the morning for 2-3 days,  then return to normal dose to address fluid retention.  - Decreased Trazodone dose by half (patient to use pill cutter) to address fatigue and follow up with PCP.      30 minutes of total time spent on the encounter, which includes face to face time and non-face to face time preparing to see the patient (eg, review of tests), Obtaining and/or reviewing separately obtained history, Documenting clinical information in the electronic or other health record, Independently interpreting results (not separately reported) and communicating results to the patient/family/caregiver, or Care coordination (not separately reported).      This note was generated with the assistance of ambient listening technology. Verbal consent was obtained by the patient and accompanying visitor(s) for the recording of patient appointment to facilitate this note. I attest to having reviewed and edited the generated note for accuracy, though some syntax or spelling errors may persist. Please contact the author of this note for any clarification.

## 2025-04-21 ENCOUNTER — OFFICE VISIT (OUTPATIENT)
Dept: RHEUMATOLOGY | Facility: CLINIC | Age: 80
End: 2025-04-21
Payer: MEDICARE

## 2025-04-21 ENCOUNTER — RESULTS FOLLOW-UP (OUTPATIENT)
Dept: RHEUMATOLOGY | Facility: CLINIC | Age: 80
End: 2025-04-21

## 2025-04-21 ENCOUNTER — LAB VISIT (OUTPATIENT)
Dept: LAB | Facility: HOSPITAL | Age: 80
End: 2025-04-21
Payer: MEDICARE

## 2025-04-21 VITALS — DIASTOLIC BLOOD PRESSURE: 94 MMHG | SYSTOLIC BLOOD PRESSURE: 162 MMHG | HEART RATE: 92 BPM

## 2025-04-21 DIAGNOSIS — M05.79 RHEUMATOID ARTHRITIS WITH RHEUMATOID FACTOR OF MULTIPLE SITES WITHOUT ORGAN OR SYSTEMS INVOLVEMENT: ICD-10-CM

## 2025-04-21 DIAGNOSIS — M05.79 RHEUMATOID ARTHRITIS INVOLVING MULTIPLE SITES WITH POSITIVE RHEUMATOID FACTOR: Primary | ICD-10-CM

## 2025-04-21 DIAGNOSIS — Z79.899 IMMUNOCOMPROMISED STATE DUE TO DRUG THERAPY: ICD-10-CM

## 2025-04-21 DIAGNOSIS — M05.9 RHEUMATOID ARTHRITIS WITH RHEUMATOID FACTOR, UNSPECIFIED: ICD-10-CM

## 2025-04-21 DIAGNOSIS — M05.79 RHEUMATOID ARTHRITIS INVOLVING MULTIPLE SITES WITH POSITIVE RHEUMATOID FACTOR: ICD-10-CM

## 2025-04-21 DIAGNOSIS — D84.821 IMMUNOCOMPROMISED STATE DUE TO DRUG THERAPY: ICD-10-CM

## 2025-04-21 DIAGNOSIS — M15.0 PRIMARY OSTEOARTHRITIS INVOLVING MULTIPLE JOINTS: ICD-10-CM

## 2025-04-21 DIAGNOSIS — J44.9 CHRONIC OBSTRUCTIVE PULMONARY DISEASE, UNSPECIFIED COPD TYPE: ICD-10-CM

## 2025-04-21 PROBLEM — Z79.60 IMMUNODEFICIENCY DUE TO TREATMENT WITH IMMUNOSUPPRESSIVE MEDICATION: Status: RESOLVED | Noted: 2023-09-08 | Resolved: 2025-04-21

## 2025-04-21 PROBLEM — T45.1X5A IMMUNODEFICIENCY DUE TO TREATMENT WITH IMMUNOSUPPRESSIVE MEDICATION: Status: RESOLVED | Noted: 2023-09-08 | Resolved: 2025-04-21

## 2025-04-21 LAB
CRP SERPL-MCNC: <0.3 MG/L
ERYTHROCYTE [SEDIMENTATION RATE] IN BLOOD BY PHOTOMETRIC METHOD: 6 MM/HR

## 2025-04-21 PROCEDURE — 1160F RVW MEDS BY RX/DR IN RCRD: CPT | Mod: HCNC,CPTII,S$GLB, | Performed by: INTERNAL MEDICINE

## 2025-04-21 PROCEDURE — 86140 C-REACTIVE PROTEIN: CPT | Mod: HCNC

## 2025-04-21 PROCEDURE — 99999 PR PBB SHADOW E&M-EST. PATIENT-LVL III: CPT | Mod: PBBFAC,HCNC,, | Performed by: INTERNAL MEDICINE

## 2025-04-21 PROCEDURE — 1159F MED LIST DOCD IN RCRD: CPT | Mod: HCNC,CPTII,S$GLB, | Performed by: INTERNAL MEDICINE

## 2025-04-21 PROCEDURE — 3288F FALL RISK ASSESSMENT DOCD: CPT | Mod: HCNC,CPTII,S$GLB, | Performed by: INTERNAL MEDICINE

## 2025-04-21 PROCEDURE — 1101F PT FALLS ASSESS-DOCD LE1/YR: CPT | Mod: HCNC,CPTII,S$GLB, | Performed by: INTERNAL MEDICINE

## 2025-04-21 PROCEDURE — G2211 COMPLEX E/M VISIT ADD ON: HCPCS | Mod: HCNC,S$GLB,, | Performed by: INTERNAL MEDICINE

## 2025-04-21 PROCEDURE — 3077F SYST BP >= 140 MM HG: CPT | Mod: HCNC,CPTII,S$GLB, | Performed by: INTERNAL MEDICINE

## 2025-04-21 PROCEDURE — 1125F AMNT PAIN NOTED PAIN PRSNT: CPT | Mod: HCNC,CPTII,S$GLB, | Performed by: INTERNAL MEDICINE

## 2025-04-21 PROCEDURE — 99214 OFFICE O/P EST MOD 30 MIN: CPT | Mod: HCNC,S$GLB,, | Performed by: INTERNAL MEDICINE

## 2025-04-21 PROCEDURE — 36415 COLL VENOUS BLD VENIPUNCTURE: CPT | Mod: HCNC

## 2025-04-21 PROCEDURE — 85652 RBC SED RATE AUTOMATED: CPT | Mod: HCNC

## 2025-04-21 PROCEDURE — 3080F DIAST BP >= 90 MM HG: CPT | Mod: HCNC,CPTII,S$GLB, | Performed by: INTERNAL MEDICINE

## 2025-04-21 RX ORDER — SARILUMAB 200 MG/1.14ML
200 INJECTION, SOLUTION SUBCUTANEOUS
Qty: 2 EACH | Refills: 6 | Status: SHIPPED | OUTPATIENT
Start: 2025-04-21

## 2025-04-21 ASSESSMENT — ROUTINE ASSESSMENT OF PATIENT INDEX DATA (RAPID3)
AM STIFFNESS SCORE: 1, YES
PSYCHOLOGICAL DISTRESS SCORE: 3.3
TOTAL RAPID3 SCORE: 3.11
MDHAQ FUNCTION SCORE: 1.3
PAIN SCORE: 5
PATIENT GLOBAL ASSESSMENT SCORE: 0
FATIGUE SCORE: 0

## 2025-04-22 NOTE — PROGRESS NOTES
History of present illness:  79=year-old female was diagnosed as having rheumatoid arthritis in 1976 she was originally followed by Dr. Allen until his FPC.  She then was followed by Dr. Watson.  She was seen 2 years ago by Gunner Figueroa/Augie I saw her for the 1st time 1 year ago.  She was on Kevzara every other week.  She was on prednisone 2.5 mg every other day.  She had no evidence of active synovitis.  I continued her on the same medication.      She had a recent episode of cellulitis in her leg.  She responded to antibiotics.  She also had a recent urinary tract infection.  She remains on the same medication.  Her worst area is her shoulder.  She does complain of some swelling.  She takes pain pills on occasion.  She has not used heat or ice.  She did get a letter from her insurance company stating that Kevzara would no longer be covered but she just got a new prescription.    Her Cipro COPD has been stable.  She is now on a nebulizer.  She has not required increased steroids for her breathing difficulty.      Physical examination:  Musculoskeletal:  Cervical spine is unremarkable.    She has pain on range of motion of the shoulders bilaterally.  She has no swelling.  She has no tenderness to palpation.    Elbows, wrists, small joints of the hands are unremarkable.    She has tenderness of the right knee.  She has some soft tissue swelling.  Left knee is unremarkable.    Ankles and small joints the feet are within normal limits  Laboratory: Had recent normal CBC and CMP     Assessment:  1. No evidence of active rheumatoid arthritis   2. Her shoulder problem is mostly due to osteoarthritis.      Plans:  1.  I told her she can take Tylenol as needed for the pain  2.  I recommend the use of Biofreeze   3.  Check inflammatory markers   4. Continue Kevzara and prednisone for now   5.  I will have our pharmacist check on what is going on with the insurance company   6.  Return in 6 months

## 2025-04-25 ENCOUNTER — TELEPHONE (OUTPATIENT)
Dept: PULMONOLOGY | Facility: CLINIC | Age: 80
End: 2025-04-25
Payer: MEDICARE

## 2025-04-25 DIAGNOSIS — J44.9 CHRONIC OBSTRUCTIVE PULMONARY DISEASE, UNSPECIFIED COPD TYPE: Primary | ICD-10-CM

## 2025-04-25 DIAGNOSIS — J45.40 MODERATE PERSISTENT ASTHMA WITHOUT COMPLICATION: ICD-10-CM

## 2025-04-25 NOTE — TELEPHONE ENCOUNTER
Call was returned to patient in regards to her nebulizer. Patient states her nebulizer is not working. I advised patient to reach out to her [employer] company. Patient verbalized understanding.

## 2025-04-25 NOTE — TELEPHONE ENCOUNTER
----- Message from Rita sent at 4/25/2025 10:46 AM CDT -----  Consult/AdvisoryName Of Caller:Francy Contact Preference:[phone] or [phone] of call: Pt called stating she needs a new machine due to her machine breaking a few days ago  she has a few questions because regarding if she can use another machine until a new one is ordered please call to assist

## 2025-05-01 ENCOUNTER — TELEPHONE (OUTPATIENT)
Dept: UROLOGY | Facility: CLINIC | Age: 80
End: 2025-05-01
Payer: MEDICARE

## 2025-05-01 NOTE — TELEPHONE ENCOUNTER
Staff attempted to call pt to reschedule appt with Dr. Chang. Pt did not answer and mail box is not set up for messages. Staff was unable to leave vm instructing call back

## 2025-05-05 ENCOUNTER — LAB VISIT (OUTPATIENT)
Dept: LAB | Facility: HOSPITAL | Age: 80
End: 2025-05-05
Attending: FAMILY MEDICINE
Payer: MEDICARE

## 2025-05-05 ENCOUNTER — OFFICE VISIT (OUTPATIENT)
Dept: PRIMARY CARE CLINIC | Facility: CLINIC | Age: 80
End: 2025-05-05
Payer: MEDICARE

## 2025-05-05 VITALS
BODY MASS INDEX: 29.02 KG/M2 | SYSTOLIC BLOOD PRESSURE: 122 MMHG | DIASTOLIC BLOOD PRESSURE: 82 MMHG | HEIGHT: 57 IN | OXYGEN SATURATION: 100 % | WEIGHT: 134.5 LBS | HEART RATE: 95 BPM

## 2025-05-05 DIAGNOSIS — R63.4 ABNORMAL WEIGHT LOSS: ICD-10-CM

## 2025-05-05 DIAGNOSIS — F41.1 GAD (GENERALIZED ANXIETY DISORDER): Primary | ICD-10-CM

## 2025-05-05 DIAGNOSIS — I50.32 CHRONIC HEART FAILURE WITH PRESERVED EJECTION FRACTION: ICD-10-CM

## 2025-05-05 DIAGNOSIS — R60.0 LOWER EXTREMITY EDEMA: ICD-10-CM

## 2025-05-05 DIAGNOSIS — R13.10 DYSPHAGIA, UNSPECIFIED TYPE: ICD-10-CM

## 2025-05-05 DIAGNOSIS — R41.3 OTHER AMNESIA: ICD-10-CM

## 2025-05-05 DIAGNOSIS — I10 PRIMARY HYPERTENSION: ICD-10-CM

## 2025-05-05 DIAGNOSIS — R41.3 MEMORY CHANGES: ICD-10-CM

## 2025-05-05 DIAGNOSIS — J44.9 CHRONIC OBSTRUCTIVE PULMONARY DISEASE, UNSPECIFIED COPD TYPE: ICD-10-CM

## 2025-05-05 LAB
ALBUMIN SERPL BCP-MCNC: 3.7 G/DL (ref 3.5–5.2)
ALP SERPL-CCNC: 113 UNIT/L (ref 40–150)
ALT SERPL W/O P-5'-P-CCNC: 11 UNIT/L (ref 10–44)
ANION GAP (OHS): 12 MMOL/L (ref 8–16)
AST SERPL-CCNC: 16 UNIT/L (ref 11–45)
BILIRUB SERPL-MCNC: 0.6 MG/DL (ref 0.1–1)
BUN SERPL-MCNC: 15 MG/DL (ref 8–23)
CALCIUM SERPL-MCNC: 8.9 MG/DL (ref 8.7–10.5)
CHLORIDE SERPL-SCNC: 110 MMOL/L (ref 95–110)
CO2 SERPL-SCNC: 21 MMOL/L (ref 23–29)
CREAT SERPL-MCNC: 0.8 MG/DL (ref 0.5–1.4)
GFR SERPLBLD CREATININE-BSD FMLA CKD-EPI: >60 ML/MIN/1.73/M2
GLUCOSE SERPL-MCNC: 82 MG/DL (ref 70–110)
POTASSIUM SERPL-SCNC: 3.5 MMOL/L (ref 3.5–5.1)
PROT SERPL-MCNC: 6.5 GM/DL (ref 6–8.4)
SODIUM SERPL-SCNC: 143 MMOL/L (ref 136–145)
TSH SERPL-ACNC: 1.79 UIU/ML (ref 0.4–4)

## 2025-05-05 PROCEDURE — 3079F DIAST BP 80-89 MM HG: CPT | Mod: CPTII,HCNC,S$GLB, | Performed by: FAMILY MEDICINE

## 2025-05-05 PROCEDURE — 1125F AMNT PAIN NOTED PAIN PRSNT: CPT | Mod: CPTII,HCNC,S$GLB, | Performed by: FAMILY MEDICINE

## 2025-05-05 PROCEDURE — 1159F MED LIST DOCD IN RCRD: CPT | Mod: CPTII,HCNC,S$GLB, | Performed by: FAMILY MEDICINE

## 2025-05-05 PROCEDURE — 1101F PT FALLS ASSESS-DOCD LE1/YR: CPT | Mod: CPTII,HCNC,S$GLB, | Performed by: FAMILY MEDICINE

## 2025-05-05 PROCEDURE — 99999 PR PBB SHADOW E&M-EST. PATIENT-LVL V: CPT | Mod: PBBFAC,HCNC,, | Performed by: FAMILY MEDICINE

## 2025-05-05 PROCEDURE — 1160F RVW MEDS BY RX/DR IN RCRD: CPT | Mod: CPTII,HCNC,S$GLB, | Performed by: FAMILY MEDICINE

## 2025-05-05 PROCEDURE — 80053 COMPREHEN METABOLIC PANEL: CPT | Mod: HCNC

## 2025-05-05 PROCEDURE — 84443 ASSAY THYROID STIM HORMONE: CPT | Mod: HCNC

## 2025-05-05 PROCEDURE — 3288F FALL RISK ASSESSMENT DOCD: CPT | Mod: CPTII,HCNC,S$GLB, | Performed by: FAMILY MEDICINE

## 2025-05-05 PROCEDURE — 99215 OFFICE O/P EST HI 40 MIN: CPT | Mod: HCNC,S$GLB,, | Performed by: FAMILY MEDICINE

## 2025-05-05 PROCEDURE — 36415 COLL VENOUS BLD VENIPUNCTURE: CPT | Mod: HCNC,PN

## 2025-05-05 PROCEDURE — 3074F SYST BP LT 130 MM HG: CPT | Mod: CPTII,HCNC,S$GLB, | Performed by: FAMILY MEDICINE

## 2025-05-05 RX ORDER — LOSARTAN POTASSIUM 50 MG/1
TABLET ORAL
Start: 2025-05-05

## 2025-05-05 RX ORDER — POTASSIUM CHLORIDE 750 MG/1
10 CAPSULE, EXTENDED RELEASE ORAL ONCE
Qty: 90 CAPSULE | Refills: 1 | Status: SHIPPED | OUTPATIENT
Start: 2025-05-05 | End: 2025-05-05

## 2025-05-06 ENCOUNTER — HOSPITAL ENCOUNTER (OUTPATIENT)
Dept: RADIOLOGY | Facility: OTHER | Age: 80
Discharge: HOME | End: 2025-05-06
Attending: FAMILY MEDICINE
Payer: MEDICARE

## 2025-05-06 ENCOUNTER — TELEPHONE (OUTPATIENT)
Dept: PRIMARY CARE CLINIC | Facility: CLINIC | Age: 80
End: 2025-05-06
Payer: MEDICARE

## 2025-05-06 DIAGNOSIS — R41.3 OTHER AMNESIA: ICD-10-CM

## 2025-05-06 PROCEDURE — 70450 CT HEAD/BRAIN W/O DYE: CPT | Mod: 26,HCNC,, | Performed by: RADIOLOGY

## 2025-05-06 PROCEDURE — 70450 CT HEAD/BRAIN W/O DYE: CPT | Mod: TC,HCNC

## 2025-05-06 NOTE — TELEPHONE ENCOUNTER
----- Message from Mikey Tolbert MD sent at 5/6/2025  1:06 PM CDT -----  Please put on letterhead. I promised patient I would have this before the end of today.To whom it may concern: I am writing on behalf of my patient, Francy Bain, who resides at [facility].  She has a current handicap placard due to multiple chronic medical conditions, including severe rheumatoid arthritis, chronic obstructive pulmonary disease (COPD), and persistent asthma.Despite her existing designation, she continues to encounter difficulty parking close to her building on [address].  This physical strain of walking longer distances significantly exacerbates her respiratory symptoms and joint pain, making it extremely challenging for her to safely access her residence.  Given the severity and chronic nature of her medical conditions, I strongly support her request for any accommodations that would allow her to park as close as possible to her building insurance. These measures are medically necessary to reduce the risk of respiratory distress and physical harm.  Please feel free to contact me if further documentation or clarification is needed Cordially, Mikey Tolbert MD [facility] [place] [facility]

## 2025-05-06 NOTE — TELEPHONE ENCOUNTER
Letter made. Spoke to the patient. The letter will be at the  on the first floor. Patient verbalized understanding.

## 2025-05-06 NOTE — LETTER
May 6, 2025    Francy Bain  [address] [address]             [facility]  [address] [facility] [address]  Phone: [phone]  Fax: [phone] To whom it may concern:     I am writing on behalf of my patient, Francy Bain, who resides at [facility].  She has a current handicap placard due to multiple chronic medical conditions, including severe rheumatoid arthritis, chronic obstructive pulmonary disease (COPD), and persistent asthma.     Despite her existing designation, she continues to encounter difficulty parking close to her building on [address].  This physical strain of walking longer distances significantly exacerbates her respiratory symptoms and joint pain, making it extremely challenging for her to safely access her residence.      Given the severity and chronic nature of her medical conditions, I strongly support her request for any accommodations that would allow her to park as close as possible to her building insurance. These measures are medically necessary to reduce the risk of respiratory distress and physical harm.      Please feel free to contact me if further documentation or clarification is needed     Cordially,             Mikey Tolbert MD   Family Medicine   [facility] [place]

## 2025-05-07 ENCOUNTER — RESULTS FOLLOW-UP (OUTPATIENT)
Dept: PRIMARY CARE CLINIC | Facility: CLINIC | Age: 80
End: 2025-05-07

## 2025-05-08 ENCOUNTER — TELEPHONE (OUTPATIENT)
Dept: UROLOGY | Facility: CLINIC | Age: 80
End: 2025-05-08
Payer: MEDICARE

## 2025-05-08 NOTE — TELEPHONE ENCOUNTER
Unable to LVM- mailbox full.    ----- Message from MAREN Astudillo sent at 5/7/2025  3:32 PM CDT -----    ----- Message -----  From: Devora Osorio LPN  Sent: 5/7/2025  12:36 PM CDT  To: Wilda Felder RN      ----- Message -----  From: Bambi Mckeon  Sent: 5/7/2025  12:19 PM CDT  To: Charlene Orozco Staff    Type: Patient callWho called: Patient Does the patient know what this is regarding? Requesting a call back in regards to missing virtual appt ; states she did not have help to join ; please advise Would the patient rather a call back or response via [facility]? [facility] back number: [phone] information:

## 2025-05-09 ENCOUNTER — TELEPHONE (OUTPATIENT)
Dept: PRIMARY CARE CLINIC | Facility: CLINIC | Age: 80
End: 2025-05-09
Payer: MEDICARE

## 2025-05-09 NOTE — TELEPHONE ENCOUNTER
----- Message from Monique sent at 5/9/2025 11:30 AM CDT -----  Contact: [phone]  [email or web address] Patient is calling for Medical Advice regarding: Pt is calling regarding a letter How long has patient had these symptoms:Pharmacy name and phone#:Patient wants a call back or thru [facility]: Call back Comments:Please advise patient replies from provider may take up to 48 hours.

## 2025-05-09 NOTE — TELEPHONE ENCOUNTER
Spoke to the patient. Patient confirmed wanted to confirm that there was only one copy of the letter, which there was.

## 2025-05-12 ENCOUNTER — HOSPITAL ENCOUNTER (OUTPATIENT)
Dept: CARDIOLOGY | Facility: OTHER | Age: 80
Discharge: HOME | End: 2025-05-12
Attending: FAMILY MEDICINE
Payer: MEDICARE

## 2025-05-12 VITALS
WEIGHT: 134 LBS | HEIGHT: 57 IN | SYSTOLIC BLOOD PRESSURE: 122 MMHG | BODY MASS INDEX: 28.91 KG/M2 | DIASTOLIC BLOOD PRESSURE: 82 MMHG

## 2025-05-12 DIAGNOSIS — R60.0 LOWER EXTREMITY EDEMA: ICD-10-CM

## 2025-05-12 LAB
AORTIC ROOT ANNULUS: 2.3 CM
AORTIC SIZE INDEX: 1.4 CM/M2
ASCENDING AORTA: 2.1 CM
AV INDEX (PROSTH): 0.96
AV MEAN GRADIENT: 2 MMHG
AV PEAK GRADIENT: 3 MMHG
AV VALVE AREA BY VELOCITY RATIO: 2.2 CM²
AV VALVE AREA: 2.7 CM²
AV VELOCITY RATIO: 0.78
BSA FOR ECHO PROCEDURE: 1.56 M2
CV ECHO LV RWT: 0.53 CM
DOP CALC AO PEAK VEL: 0.9 M/S
DOP CALC AO VTI: 20.1 CM
DOP CALC LVOT AREA: 2.8 CM2
DOP CALC LVOT DIAMETER: 1.9 CM
DOP CALC LVOT PEAK VEL: 0.7 M/S
DOP CALC LVOT STROKE VOLUME: 54.7 CM3
DOP CALCLVOT PEAK VEL VTI: 19.3 CM
E WAVE DECELERATION TIME: 271 MSEC
E/A RATIO: 0.79
E/E' RATIO: 8 M/S
ECHO LV POSTERIOR WALL: 0.8 CM (ref 0.6–1.1)
EJECTION FRACTION: 65 %
FRACTIONAL SHORTENING: 43.3 % (ref 28–44)
INTERVENTRICULAR SEPTUM: 1 CM (ref 0.6–1.1)
IVC DIAMETER: 2 CM
LA MAJOR: 3 CM
LA MINOR: 2.7 CM
LA WIDTH: 3.2 CM
LAAS-AP2: 9 CM2
LAAS-AP4: 10 CM2
LEFT ATRIUM AREA SYSTOLIC (APICAL 2 CHAMBER): 10.42 CM2
LEFT ATRIUM AREA SYSTOLIC (APICAL 4 CHAMBER): 9.84 CM2
LEFT ATRIUM SIZE: 2.7 CM
LEFT ATRIUM VOLUME INDEX MOD: 15 ML/M2
LEFT ATRIUM VOLUME INDEX: 14 ML/M2
LEFT ATRIUM VOLUME MOD: 23 ML
LEFT ATRIUM VOLUME: 21 CM3
LEFT INTERNAL DIMENSION IN SYSTOLE: 1.7 CM (ref 2.1–4)
LEFT VENTRICLE DIASTOLIC VOLUME INDEX: 22.37 ML/M2
LEFT VENTRICLE DIASTOLIC VOLUME: 34 ML
LEFT VENTRICLE END SYSTOLIC VOLUME APICAL 2 CHAMBER: 24.57 ML
LEFT VENTRICLE END SYSTOLIC VOLUME APICAL 4 CHAMBER: 21.64 ML
LEFT VENTRICLE MASS INDEX: 46.2 G/M2
LEFT VENTRICLE SYSTOLIC VOLUME INDEX: 5.3 ML/M2
LEFT VENTRICLE SYSTOLIC VOLUME: 8 ML
LEFT VENTRICULAR INTERNAL DIMENSION IN DIASTOLE: 3 CM (ref 3.5–6)
LEFT VENTRICULAR MASS: 70.1 G
LV LATERAL E/E' RATIO: 6.8 M/S
LV SEPTAL E/E' RATIO: 8.7 M/S
LVED V (TEICH): 34.34 ML
LVES V (TEICH): 7.82 ML
LVOT MG: 1.44 MMHG
LVOT MV: 0.58 CM/S
MV PEAK A VEL: 0.77 M/S
MV PEAK E VEL: 0.61 M/S
MV STENOSIS PRESSURE HALF TIME: 78.5 MS
MV VALVE AREA P 1/2 METHOD: 2.8 CM2
OHS CV RV/LV RATIO: 0.43 CM
PISA MRMAX VEL: 1.68 M/S
PISA TR MAX VEL: 2 M/S
PV MV: 0.44 M/S
PV PEAK GRADIENT: 2 MMHG
PV PEAK VELOCITY: 0.71 M/S
RA MAJOR: 3.05 CM
RA PRESSURE ESTIMATED: 3 MMHG
RA WIDTH: 3 CM
RIGHT VENTRICLE DIASTOLIC BASEL DIMENSION: 1.3 CM
RIGHT VENTRICULAR END-DIASTOLIC DIMENSION: 1.3 CM
RV TB RVSP: 5 MMHG
RV TISSUE DOPPLER FREE WALL SYSTOLIC VELOCITY 1 (APICAL 4 CHAMBER VIEW): 10.97 CM/S
SINUS: 2.2 CM
STJ: 2.1 CM
TDI LATERAL: 0.09 M/S
TDI SEPTAL: 0.07 M/S
TDI: 0.08 M/S
TR MAX PG: 16 MMHG
TRICUSPID ANNULAR PLANE SYSTOLIC EXCURSION: 1.2 CM
TV REST PULMONARY ARTERY PRESSURE: 19 MMHG
Z-SCORE OF LEFT VENTRICULAR DIMENSION IN END DIASTOLE: -3.95
Z-SCORE OF LEFT VENTRICULAR DIMENSION IN END SYSTOLE: -3.84

## 2025-05-12 PROCEDURE — 93306 TTE W/DOPPLER COMPLETE: CPT | Mod: 26,HCNC,, | Performed by: INTERNAL MEDICINE

## 2025-05-12 PROCEDURE — 93306 TTE W/DOPPLER COMPLETE: CPT | Mod: HCNC

## 2025-05-14 PROBLEM — R13.10 DYSPHAGIA: Status: ACTIVE | Noted: 2025-05-14

## 2025-05-14 PROBLEM — R63.4 ABNORMAL WEIGHT LOSS: Status: ACTIVE | Noted: 2025-05-14

## 2025-05-14 RX ORDER — SERTRALINE HYDROCHLORIDE 50 MG/1
50 TABLET, FILM COATED ORAL DAILY
Qty: 90 TABLET | Refills: 1 | Status: SHIPPED | OUTPATIENT
Start: 2025-05-14 | End: 2026-05-14

## 2025-05-14 NOTE — PROGRESS NOTES
"      /82   Pulse 95   Ht 4' 9" (1.448 m)   Wt 61 kg (134 lb 7.7 oz)   SpO2 100%   BMI 29.10 kg/m²       ===========              Francy Bain is a 80 y.o. female           History of Present Illness    CHIEF COMPLAINT:  Ms. Bain presents with multiple concerns including swelling in the legs, heart rhythm irregularities, difficulty swallowing, and shortness of breath.    HPI:  Ms. Bain reports ongoing swelling in the legs. She mentions intermittent heart rhythm irregularities, describing both skipped beats and rapid heart rate, which were exacerbated by walking to the appointment.    She has been having difficulty swallowing for several months, particularly with certain foods in the morning. She describes a sensation of tongue enlargement. With liquids, she requires a straw and must pause before swallowing.    She reports unexplained fatigue and unintentional weight loss. Since June 2023, she has lost approximately 14-15 lbs, with about 6-7 lbs lost in the last 2 months.    She mentions gradual onset of numbness, weakness, aches, and pains on the left side of her body, but maintains ability to move.    She has been diagnosed with COPD and was prescribed nebulizer treatments by a pulmonologist about 3 weeks ago. She has not received the nebulizer machine yet, despite multiple attempts to contact the company.    She also has rheumatoid arthritis, for which she takes prednisone as prescribed by a rheumatologist.    Regarding her breathing issues, she was switched from Advair inhaler and albuterol metered dose inhaler to nebulizer treatments due to difficulties using the inhaler.    She reports muscle cramps, possibly related to her medication regimen.    She denies having any chest pain currently.               Patient queried and denies any further complaints      Problem List[1]    SURGICAL AND MEDICAL HISTORY: updated and reviewed.  Past Surgical History:   Procedure Laterality Date    " ANKLE FUSION Bilateral     ELBOW ARTHROPLASTY      EYE SURGERY Right 03/2017    cataract    JOINT REPLACEMENT      bilateral knee replacement; bilateral elbow and wrist replacement    KNEE ARTHROPLASTY      OPEN REDUCTION AND INTERNAL FIXATION (ORIF) OF FRACTURE OF ULNA Right 8/17/2021    Procedure: ORIF, FRACTURE, ULNA  AND MASSIVE ALLOGRAFT;  Surgeon: Tio Patel MD;  Location: [address];  Service: Orthopedics;  Laterality: Right;    REVISION OF TOTAL ARTHROPLASTY OF ELBOW Right 8/17/2021    Procedure: REVISION, TOTAL ARTHROPLASTY, ELBOW;  Surgeon: Tio Patel MD;  Location: [facility] OR [address] [place] [address];  Service: Orthopedics;  Laterality: Right;     ALLERGIES updated and reviewed.  Review of patient's allergies indicates:  No Known Allergies    CURRENT OUTPATIENT MEDICATIONS updated and reviewed  Current Medications[2]    Review of Systems   Constitutional:  Negative for activity change, appetite change, chills, diaphoresis, fatigue, fever and unexpected weight change.   HENT:  Negative for congestion, ear discharge, ear pain, facial swelling, hearing loss, nosebleeds, postnasal drip, rhinorrhea, sinus pressure, sneezing, sore throat, tinnitus, trouble swallowing and voice change.    Eyes:  Negative for photophobia, pain, discharge, redness, itching and visual disturbance.   Respiratory:  Positive for cough. Negative for chest tightness, shortness of breath and wheezing.    Cardiovascular:  Negative for chest pain, palpitations and leg swelling.   Gastrointestinal:  Negative for abdominal distention, abdominal pain, anal bleeding, blood in stool, constipation, diarrhea, nausea, rectal pain and vomiting.   Endocrine: Negative for cold intolerance, heat intolerance, polydipsia, polyphagia and polyuria.   Genitourinary:  Negative for difficulty urinating, dysuria and flank pain.   Musculoskeletal:  Negative for arthralgias, back pain, joint swelling, myalgias and neck pain.   Skin:  Negative for rash.  "  Neurological:  Negative for dizziness, tremors, seizures, syncope, speech difficulty, weakness, light-headedness, numbness and headaches.   Psychiatric/Behavioral:  Positive for decreased concentration and dysphoric mood. Negative for behavioral problems, confusion, sleep disturbance and suicidal ideas. The patient is nervous/anxious. The patient is not hyperactive.        /82   Pulse 95   Ht 4' 9" (1.448 m)   Wt 61 kg (134 lb 7.7 oz)   SpO2 100%   BMI 29.10 kg/m²   Physical Exam  Vitals and nursing note reviewed.   Constitutional:       General: She is not in acute distress.     Appearance: Normal appearance. She is well-developed. She is not ill-appearing or toxic-appearing.   HENT:      Head: Normocephalic and atraumatic.      Right Ear: Tympanic membrane, ear canal and external ear normal.      Left Ear: Tympanic membrane, ear canal and external ear normal.      Nose: Nose normal.      Mouth/Throat:      Lips: Pink.      Mouth: Mucous membranes are moist.      Pharynx: No oropharyngeal exudate or posterior oropharyngeal erythema.   Eyes:      General: No scleral icterus.        Right eye: No discharge.         Left eye: No discharge.      Extraocular Movements: Extraocular movements intact.      Conjunctiva/sclera: Conjunctivae normal.   Cardiovascular:      Rate and Rhythm: Normal rate and regular rhythm.      Pulses: Normal pulses.      Heart sounds: Normal heart sounds. No murmur heard.  Pulmonary:      Effort: Pulmonary effort is normal. No respiratory distress.      Breath sounds: Normal breath sounds. No wheezing or rales.   Abdominal:      General: Bowel sounds are normal. There is no distension.      Palpations: Abdomen is soft. There is no mass.      Tenderness: There is no abdominal tenderness. There is no right CVA tenderness, left CVA tenderness, guarding or rebound.      Hernia: No hernia is present.   Musculoskeletal:      Cervical back: Normal range of motion and neck supple. No " rigidity or tenderness.   Lymphadenopathy:      Cervical: No cervical adenopathy.   Skin:     General: Skin is warm and dry.   Neurological:      General: No focal deficit present.      Mental Status: She is alert. Mental status is at baseline.   Psychiatric:         Mood and Affect: Mood normal.         Behavior: Behavior normal. Behavior is cooperative.           ASSESSMENT/PLAN    Assessment & Plan      IMPRESSION:  - Assessed BP, noting significant variability in recent readings.  - Evaluated reports of heart rhythm irregularities.  - Reviewed recent echocardiogram showing good EF (65%) but moderate aortic valve sclerosis.  - Considered swelling in legs and potential causes, including salt intake and medication effects.  - Assessed COPD management and need for nebulizer treatments.  - Evaluated new swallowing difficulties and speech issues.  - Considered potential neurological causes for reported numbness/weakness and word-finding difficulties.    RHEUMATOID ARTHRITIS:  - Evaluated the patient who is on long-term prednisone for rheumatoid arthritis.  - Reviewed the medication list and confirmed current prednisone use.  - Will continue prednisone as prescribed by the rheumatologist.    CHRONIC OBSTRUCTIVE PULMONARY DISEASE:  - Ms. Bain reports shortness of breath and issues with breathing medication delivery.  - Noted problems with the nebulizer machine delivery.  - Advised patient to call the company daily to track the delivery and instructed to use nebulizer treatments as prescribed once the machine is received.    CHF w preserved EF  - Evaluated echocardiogram showing heart efficiency at 65% (good) and moderate aortic valve sclerosis.  - Explained the condition and its implications for heart function.  - Ordered another echocardiogram to reassess the condition.    ESSENTIAL HYPERTENSION:  - Blood pressure is currently well-controlled at 122/80, improved from previous reading of 152.  - Decreased losartan to  every other day instead of twice daily.  - Discussed importance of salt intake management for BP control and advised patient to consider compression hose.  - Ms. Bain to continue monitoring blood pressure and adjust medication as needed.      DYSPHAGIA:  - Ms. Bain reports difficulty swallowing pills and certain foods, feeling like tongue is too big.  - Has difficulty with large pills and needs to drink with a straw.  - Ordered a modified swallow study with a speech therapist.  - Educated on proper chewing techniques to prevent choking and aspiration pneumonia.  - Advised to chew food thoroughly, take small bites, and consider using a pill  for large pills.    LOCALIZED EDEMA:  - Ms. Bain reports swelling in legs and ankles, possibly related to cardiac or pulmonary issues.  - Recommend using compression hose for leg swelling.  - Will continue furosemide (Lasix).    WEAKNESS:  - Ms. Bain reports weakness on one side of the body with gradual onset.  - Evaluated weakness as more related to aches and pains rather than inability to move.  - Discussed potential causes, including musculoskeletal issues.  - Ordered CT Head and thyroi  d labs.  - Will monitor symptoms and reassess if weakness persists or worsens.    MUSCULOSKELETAL SYMPTOMS:  - Ms. Bain reports aches and pains, difficulty climbing stairs, and leg pain and swelling, possibly related to rheumatoid arthritis.  - Advised to continue current management for these symptoms.  - Will provide letter for apartment complex regarding need for closer parking.    ABNORMAL WEIGHT LOSS:  - Ms. Bain has lost 14-15 lbs since June 18th, 2024, with about 10 lbs by November, and 6-7 lbs in the last 2 months.  - Confirmed weight loss occurred without intentional effort.  - Will investigate the unintentional weight loss further.      GENERAL INSTRUCTIONS:  - Ms. Bain to use a pill  for large medications, particularly potassium.  - Stay  hydrated.  - Follow up in 2 months.           Francy was seen today for follow-up.    Diagnoses and all orders for this visit:    KAILASH (generalized anxiety disorder)  Increase zoloft to 50mg and monitor. SEs discussed    Chronic obstructive pulmonary disease, unspecified COPD type  See above.  Chronic heart failure with preserved ejection fraction  See above    Primary hypertension  -     losartan (COZAAR) 50 MG tablet; One po qod  -     Comprehensive Metabolic Panel; Future  Stable; cont current tx and monitor.  Dysphagia, unspecified type  -     Fl Modified Barium Swallow Speech; Future  -     SLP video swallow; Future  -     TSH; Future  Chew food slowly. Discussed foods that may worsen problem. Put fork down bw bites. See study. Monitor.  Memory changes  Other amnesia  -     CT Head Without Contrast; Future    Lower extremity edema  -     Echo; Future  -     Comprehensive Metabolic Panel; Future  Compression hose; low sodium diet     -     potassium chloride (MICRO-K) 10 MEQ CpSR; Take 1 capsule (10 mEq total) by mouth once. for 1 dose            Most recent some lab results reviewed with patient.  Any new prescription medications gone over in detail including reason for taking the medication, most common possible side effects and possible costs, etcetera.    Chronic conditions updated. Other than changes or additions as above, cont current medications and maintain follow-up with specialists if indicated.     - Cautioned the patient against excessive use of internet searches for interpreting results before professional review.     Mikey Tolbert MD    Disclaimer:  This clinical note was created with the assistance of [employer], an [employer]-powered documentation tool.  Portions of this note may also have been dictated with the assistance of a computer-assisted dictation program.  While the healthcare provider has reviewed the content, AI-assisted documentation and/or dictation programs may contain inadvertent errors or  omissions.  Patients are encouraged to discuss questions or clarifications regarding their care directly with the provider.    Est5  Time spent in the evaluation and management of this patient exceeded 40min and greater than 50% of this time was in face-to-face time with the patient on day of the clinic visit.   This includes face-to-face time and non face-to-face time and includes the following:  --preparing to see the patient (eg, obtaining and/or reviewing old records such as, when applicable, primary care notes, specialist notes, hospital notes, review of laboratory tests, and/or radiographic and/or cardiology or other studies)  --performing a medically appropriate review of systems and examination and/or evaluation  --reviewing and independently interpreting results (not separately reported; eg, lab results) and communicating results to the patient and/or family/caregiver  --placing orders and/or reviewing other physician's orders which can both include medications, laboratory studies, radiographic studies, procedures, referrals etcetera and   --counseling and educating the patient and/or family member/caregiver regarding the treatment plan  --documentation of the visit in the electronic health record  --communicating with other health care providers regarding referrals, studies, follow-up, etc                         [1]   Patient Active Problem List  Diagnosis    Rheumatoid arthritis involving multiple sites    Primary hypertension    Memory changes    Glaucoma    Infected olecranon bursa    Osteopenia of multiple sites    Physical deconditioning    History of elbow replacement    Impaired functional mobility, balance, gait, and endurance    Elbow joint replacement status, right    GERD (gastroesophageal reflux disease)    Renal cyst, acquired, right    Moderate persistent asthma without complication    Chronic obstructive pulmonary disease, unspecified COPD type    History of arthroplasty of both wrists     Right hip pain    Immunocompromised state due to drug therapy    Aortic atherosclerosis    Recurrent major depressive disorder, in full remission    Situational anxiety    On corticosteroid therapy    Suspected sleep apnea    White matter disease    B12 deficiency    Tinea corporis    Primary osteoarthritis involving multiple joints    Lower extremity edema    Dermatophytosis    KAILASH (generalized anxiety disorder)    Chronic heart failure with preserved ejection fraction    Dysphagia    Abnormal weight loss   [2]   Current Outpatient Medications:     albuterol-ipratropium (DUO-NEB) 2.5 mg-0.5 mg/3 mL nebulizer solution, Take 3 mLs by nebulization every 6 (six) hours as needed for Wheezing. Rescue, Disp: 75 mL, Rfl: 0    ascorbic acid, vitamin C, (VITAMIN C) 100 MG tablet, Take 100 mg by mouth once daily., Disp: , Rfl:     budesonide (PULMICORT) 0.5 mg/2 mL nebulizer solution, Take 2 mLs (0.5 mg total) by nebulization 2 (two) times daily. Controller, Disp: 120 mL, Rfl: 11    carboxymethylcellulose sodium (REFRESH TEARS OPHT), Apply to eye. Use as needed, Disp: , Rfl:     ergocalciferol (ERGOCALCIFEROL) 50,000 unit Cap, TAKE 1 CAPSULE EVERY 7 DAYS, Disp: 12 capsule, Rfl: 3    formoterol (PERFOROMIST) 20 mcg/2 mL Nebu, Take 2 mLs (20 mcg total) by nebulization 2 (two) times a day. Controller, Disp: 120 mL, Rfl: 11    furosemide (LASIX) 20 MG tablet, TAKE 1 TABLET ONE TIME DAILY FOR FLUID, Disp: 90 tablet, Rfl: 3    hydrocortisone 2.5 % cream, Do not apply morning of surgery, Disp: , Rfl:     hydrOXYzine HCL (ATARAX) 25 MG tablet, Take 1 tablet (25 mg total) by mouth 3 (three) times daily as needed for Itching or Anxiety. FOR ANXIETY, Disp: 90 tablet, Rfl: 11    levocetirizine (XYZAL) 5 MG tablet, Take 5 mg by mouth every evening., Disp: , Rfl:     moxifloxacin (VIGAMOX) 0.5 % ophthalmic solution, Place into both eyes., Disp: , Rfl:     nystatin (MYCOSTATIN) cream, Apply topically 2 (two) times daily., Disp: 30 g, Rfl:  5    nystatin (MYCOSTATIN) powder, Apply topically 4 (four) times daily., Disp: 60 g, Rfl: 0    omeprazole (PRILOSEC) 20 MG capsule, Take 1 capsule (20 mg total) by mouth once daily., Disp: 90 capsule, Rfl: 3    sarilumab (KEVZARA) 200 mg/1.14 mL PnIj, Inject 1.14 mLs (200 mg total) into the skin every 14 (fourteen) days., Disp: 2 each, Rfl: 6    sertraline (ZOLOFT) 25 MG tablet, Take 1 tablet (25 mg total) by mouth once daily., Disp: 30 tablet, Rfl: 1    traZODone (DESYREL) 50 MG tablet, Take 1 tablet (50 mg total) by mouth nightly as needed for Insomnia., Disp: 90 tablet, Rfl: 3    vibegron (GEMTESA) 75 mg Tab, Take 75 mg by mouth once daily., Disp: 30 tablet, Rfl: 11    losartan (COZAAR) 50 MG tablet, One po qod, Disp: , Rfl:

## 2025-05-15 ENCOUNTER — TELEPHONE (OUTPATIENT)
Dept: SPEECH THERAPY | Facility: HOSPITAL | Age: 80
End: 2025-05-15
Payer: MEDICARE

## 2025-05-21 DIAGNOSIS — I10 PRIMARY HYPERTENSION: ICD-10-CM

## 2025-05-21 RX ORDER — LOSARTAN POTASSIUM 50 MG/1
TABLET ORAL
Qty: 45 TABLET | Refills: 3 | Status: SHIPPED | OUTPATIENT
Start: 2025-05-21

## 2025-05-21 NOTE — TELEPHONE ENCOUNTER
Left a voicemail for the patient. Informed her that Dr. Tolbert refilled her losartan 50 mg and that she should be taking it every other day. We will see her for her follow up in July.

## 2025-05-21 NOTE — TELEPHONE ENCOUNTER
Refill Routing Note   Medication(s) are not appropriate for processing by [facility] for the following reason(s):        New or recently adjusted medication    [facility] action(s):  Defer             Appointments  past 12m or future 3m with PCP    Date Provider   Last Visit   5/5/2025 iMkey Tolbert MD   Next Visit   7/7/2025 Mikey Tolbert MD   ED visits in past 90 days: 0        Note composed:8:01 AM 05/21/2025

## 2025-05-21 NOTE — TELEPHONE ENCOUNTER
No care due was identified.  Health [facility] Embedded Care Due Messages. Reference number: [id number].   5/21/2025 4:29:18 AM CDT

## 2025-06-06 ENCOUNTER — TELEPHONE (OUTPATIENT)
Dept: GASTROENTEROLOGY | Facility: CLINIC | Age: 80
End: 2025-06-06
Payer: MEDICARE

## 2025-06-27 ENCOUNTER — HOSPITAL ENCOUNTER (OUTPATIENT)
Dept: RADIOLOGY | Facility: OTHER | Age: 80
Discharge: HOME | End: 2025-06-27
Attending: FAMILY MEDICINE
Payer: MEDICARE

## 2025-06-27 DIAGNOSIS — R13.10 DYSPHAGIA, UNSPECIFIED TYPE: ICD-10-CM

## 2025-06-27 PROCEDURE — 92611 MOTION FLUOROSCOPY/SWALLOW: CPT | Mod: HCNC

## 2025-06-27 PROCEDURE — 74230 X-RAY XM SWLNG FUNCJ C+: CPT | Mod: 26,HCNC,, | Performed by: RADIOLOGY

## 2025-06-27 PROCEDURE — 74230 X-RAY XM SWLNG FUNCJ C+: CPT | Mod: TC,HCNC

## 2025-06-27 NOTE — PROCEDURES
Modified Barium Swallow    Patient Name:  Francy Bain   MRN:  [id number]      Recommendations:     Recommendations:                General Recommendations:    Recommend follow up with GI due to esophageal retention and slow transit of purees, solids and barium tablet noted during esophageal sweep at end of this MBS    Diet recommendations:  SOLIDS:   Regular Diet - IDDSI Level 7,   LIQUIDS: Thin liquids - IDDSI Level 0     Aspiration Precautions:   Small sips of liquids via straw  Dry swallows to reduce oral and pharyngeal residue  Small bites of food  Upright for all intake  Alternate liquids and solids  Large water washes with pill form medications     General Precautions: Standard,      Referral     Reason for Referral  Patient was referred for a Modified Barium Swallow Study to assess the efficiency of his/her swallow function, rule out aspiration and make recommendations regarding safe dietary consistencies, effective compensatory strategies, and safe eating environment.     Diagnosis: <principal problem not specified>       History:     80 year old female referred by her primary care MD due to difficulty swallowing.    As per Primary care Note 5/5/25:   Ms. Bain presents with multiple concerns including swelling in the legs, heart rhythm irregularities, difficulty swallowing, and shortness of breath.     HPI:  Ms. Bain reports ongoing swelling in the legs. She mentions intermittent heart rhythm irregularities, describing both skipped beats and rapid heart rate, which were exacerbated by walking to the appointment.     She has been having difficulty swallowing for several months, particularly with certain foods in the morning. She describes a sensation of tongue enlargement. With liquids, she requires a straw and must pause before swallowing.     She reports unexplained fatigue and unintentional weight loss. Since June 2023, she has lost approximately 14-15 lbs, with about 6-7 lbs lost in the  "last 2 months.     She mentions gradual onset of numbness, weakness, aches, and pains on the left side of her body, but maintains ability to move.     She has been diagnosed with COPD and was prescribed nebulizer treatments by a pulmonologist about 3 weeks ago. She has not received the nebulizer machine yet, despite multiple attempts to contact the company.     She also has rheumatoid arthritis, for which she takes prednisone as prescribed by a rheumatologist.     Regarding her breathing issues, she was switched from Advair inhaler and albuterol metered dose inhaler to nebulizer treatments due to difficulties using the inhaler.     She reports muscle cramps, possibly related to her medication regimen.      Past Medical History:   Diagnosis Date    Affective disorder     History of orthostatic hypotension 06/20/2024    History of stroke 10/08/2020    Hypertension     Renal cyst, acquired, right 04/14/2021    Rheumatoid arthritis        Objective:       Visualization  Pt seated upright and a lateral view of the head and neck was taken       Consistencies Assessed  Thin 5 mls, 10 mls, large volume sips via straw  Puree 1 tsp  Soft solids small bites of solids  Barium tablet with a water wash    Oral Preparation/Oral Phase  Delayed onset of oral swallow  Searching lingual movements, lingual "rolling" back and forth  Dcr formation of a cohesive bolus  Dcr a-p transports  Mlld to moderate oral residue after swallow on all consistencies that reduced with secondary swallows     Pharyngeal Phase : Cervical osteophyte noted at C5-C6 level    THIN LIQUIDS:  trigger of the swallow reflex at the level of the valleculae, and at the level of the pyriform sinuses on larger volume sips. No airway penetration or aspiration on 8 swallows. Minimal to Mild RESIDUE after the swallow on the tongue base, in the vallecular pit and in the pyriform sinuses after the swallow. Pt able to reduce and clear residuals with verbal cue to follow " through with a secondary swallow.    PUREES: trigger of the swallow reflex at the level of the valleculae.  No airway penetration or aspiration on 2 swallows. Mild RESIDUE after the swallow in the valleculae, that cleared with a secondary swallows    SOLIDS: trigger of the swallow reflex at the level of the valleculae, with aggregation in the valleculae while chewing.  No airway penetration or aspiration on 2 swallows.  Minimal  RESIDUE after the swallow on the tongue base,  in the valleculae, and in the pyriform sinuses      BARIUM TABLET: Pt able to achieve pharyngo esophageal transit of a barium tablet with liquid wash. Mild hesitation/stasis  of the tablet in the pyriform sinuses, however, able to transit to the esophagus with continued swallowing of liquid    Cervical Esophageal Phase  Cervical osteophyte at the C5-C6 level:   mildly dcr distension of the PE segment noted   Brief hesitation of a barium tablet at this level in the pharynx  Pt also noted to esophageal retention of purees, solids and barium table on esophageal sweep at the end of the study.   Please see radiologist report    Assessment:     Impressions   Mild oral phase dysphagia  Minimal pharyngeal phase dysphagia   Cervical osteophyte at the C5-C6 level:  With  mildly dcr distension of the PE segment noted   Brief hesitation of a barium tablet at this level in the pharynx  Pt also noted to esophageal retention of purees, solids and barium table on esophageal sweep at the end of the study. Please see radiologist report for details      Prognosis: Good    Education  Recorded results reviewed with patient. Very minimal degree of pharyngeal dysphagia. Discussed presence of cervical osteophyte at the level of the pyriform sinuses that may be causing brief hesitation of pills during pharyngo esophageal transit. Discussed slow transit of purees, solids and barium table in the esophagus. Discussed recommendation for GI follow up.    Goals:       Plan:    Patient to be seen:      Plan of Care expires:     Plan of Care reviewed with:      patient     Discharge recommendations:      Barriers to Discharge:      Time Tracking:   SLP Treatment Date:   06/27/25  Speech Start Time:  1000  Speech Stop Time:  1056     Speech Total Time (min):  56 min    06/27/2025

## 2025-07-01 ENCOUNTER — TELEPHONE (OUTPATIENT)
Dept: PULMONOLOGY | Facility: CLINIC | Age: 80
End: 2025-07-01
Payer: MEDICARE

## 2025-07-01 NOTE — TELEPHONE ENCOUNTER
Attempted to contact patient in regards to rescheduling her appointment due to booked out. No answer. LVM informing patient that she's rescheduled with Dr. Osuna available appointment on 10/1/25 at 1 pm. Patient is added to the wait list. Office number was provided.

## 2025-07-07 ENCOUNTER — HOSPITAL ENCOUNTER (OUTPATIENT)
Dept: RADIOLOGY | Facility: HOSPITAL | Age: 80
Discharge: HOME | End: 2025-07-07
Attending: FAMILY MEDICINE
Payer: MEDICARE

## 2025-07-07 ENCOUNTER — OFFICE VISIT (OUTPATIENT)
Dept: PRIMARY CARE CLINIC | Facility: CLINIC | Age: 80
End: 2025-07-07
Payer: MEDICARE

## 2025-07-07 VITALS
HEIGHT: 57 IN | SYSTOLIC BLOOD PRESSURE: 130 MMHG | DIASTOLIC BLOOD PRESSURE: 72 MMHG | BODY MASS INDEX: 28.53 KG/M2 | RESPIRATION RATE: 18 BRPM | HEART RATE: 96 BPM | OXYGEN SATURATION: 99 % | WEIGHT: 132.25 LBS

## 2025-07-07 DIAGNOSIS — Z00.00 GENERAL MEDICAL EXAM: Primary | ICD-10-CM

## 2025-07-07 DIAGNOSIS — G89.29 CHRONIC BILATERAL LOW BACK PAIN WITH BILATERAL SCIATICA: ICD-10-CM

## 2025-07-07 DIAGNOSIS — M54.41 CHRONIC BILATERAL LOW BACK PAIN WITH BILATERAL SCIATICA: ICD-10-CM

## 2025-07-07 DIAGNOSIS — M54.42 CHRONIC BILATERAL LOW BACK PAIN WITH BILATERAL SCIATICA: ICD-10-CM

## 2025-07-07 DIAGNOSIS — K22.4 ESOPHAGEAL DYSMOTILITY: ICD-10-CM

## 2025-07-07 DIAGNOSIS — M25.512 CHRONIC LEFT SHOULDER PAIN: ICD-10-CM

## 2025-07-07 DIAGNOSIS — G89.4 CHRONIC PAIN SYNDROME: ICD-10-CM

## 2025-07-07 DIAGNOSIS — G89.29 CHRONIC LEFT SHOULDER PAIN: ICD-10-CM

## 2025-07-07 DIAGNOSIS — I10 PRIMARY HYPERTENSION: ICD-10-CM

## 2025-07-07 PROCEDURE — 99999 PR PBB SHADOW E&M-EST. PATIENT-LVL V: CPT | Mod: PBBFAC,HCNC,, | Performed by: FAMILY MEDICINE

## 2025-07-07 PROCEDURE — 73030 X-RAY EXAM OF SHOULDER: CPT | Mod: 26,HCNC,LT, | Performed by: RADIOLOGY

## 2025-07-07 PROCEDURE — 72100 X-RAY EXAM L-S SPINE 2/3 VWS: CPT | Mod: 26,HCNC,, | Performed by: RADIOLOGY

## 2025-07-07 PROCEDURE — 73030 X-RAY EXAM OF SHOULDER: CPT | Mod: TC,HCNC,PN,LT

## 2025-07-07 PROCEDURE — 72100 X-RAY EXAM L-S SPINE 2/3 VWS: CPT | Mod: TC,HCNC,PN

## 2025-07-07 RX ORDER — FUROSEMIDE 20 MG/1
TABLET ORAL
Qty: 90 TABLET | Refills: 3 | Status: SHIPPED | OUTPATIENT
Start: 2025-07-07

## 2025-07-07 RX ORDER — TRAZODONE HYDROCHLORIDE 50 MG/1
50 TABLET ORAL NIGHTLY PRN
Qty: 90 TABLET | Refills: 3 | Status: SHIPPED | OUTPATIENT
Start: 2025-07-07 | End: 2026-07-07

## 2025-07-07 RX ORDER — LOSARTAN POTASSIUM 50 MG/1
TABLET ORAL
Qty: 45 TABLET | Refills: 3 | Status: SHIPPED | OUTPATIENT
Start: 2025-07-07

## 2025-07-07 RX ORDER — SERTRALINE HYDROCHLORIDE 50 MG/1
50 TABLET, FILM COATED ORAL DAILY
Qty: 90 TABLET | Refills: 3 | Status: SHIPPED | OUTPATIENT
Start: 2025-07-07 | End: 2026-07-07

## 2025-07-07 RX ORDER — TRAMADOL HYDROCHLORIDE 50 MG/1
50 TABLET, FILM COATED ORAL EVERY 6 HOURS PRN
Qty: 20 EACH | Refills: 0 | Status: SHIPPED | OUTPATIENT
Start: 2025-07-07

## 2025-07-07 RX ORDER — METHYLPREDNISOLONE 4 MG/1
TABLET ORAL
Qty: 1 EACH | Refills: 0 | Status: SHIPPED | OUTPATIENT
Start: 2025-07-07 | End: 2025-07-28

## 2025-07-07 RX ORDER — VIBEGRON 75 MG/1
75 TABLET, FILM COATED ORAL DAILY
Qty: 30 TABLET | Refills: 11 | Status: SHIPPED | OUTPATIENT
Start: 2025-07-07

## 2025-07-10 ENCOUNTER — TELEPHONE (OUTPATIENT)
Dept: PAIN MEDICINE | Facility: CLINIC | Age: 80
End: 2025-07-10
Payer: MEDICARE

## 2025-07-10 NOTE — TELEPHONE ENCOUNTER
Staff contacted the patient to inform them that their scheduled appointment will need to be canceled/rescheduled due to Dr. Wright being out of the office. The patient was advised that the appointment will need to be rescheduled with a different provider. The patient verbalized understanding, and staff assisted with rescheduling to the next available appointment.

## 2025-07-11 PROBLEM — G89.29 CHRONIC BILATERAL LOW BACK PAIN WITH BILATERAL SCIATICA: Status: ACTIVE | Noted: 2025-07-11

## 2025-07-11 PROBLEM — K22.4 ESOPHAGEAL DYSMOTILITY: Status: ACTIVE | Noted: 2025-07-11

## 2025-07-11 PROBLEM — M54.41 CHRONIC BILATERAL LOW BACK PAIN WITH BILATERAL SCIATICA: Status: ACTIVE | Noted: 2025-07-11

## 2025-07-11 PROBLEM — G89.4 CHRONIC PAIN SYNDROME: Status: ACTIVE | Noted: 2025-07-11

## 2025-07-11 PROBLEM — M54.42 CHRONIC BILATERAL LOW BACK PAIN WITH BILATERAL SCIATICA: Status: ACTIVE | Noted: 2025-07-11

## 2025-07-11 PROBLEM — M25.512 CHRONIC LEFT SHOULDER PAIN: Status: ACTIVE | Noted: 2025-07-11

## 2025-07-11 PROBLEM — G89.29 CHRONIC LEFT SHOULDER PAIN: Status: ACTIVE | Noted: 2025-07-11

## 2025-07-11 NOTE — PROGRESS NOTES
"    /72 (BP Location: Left arm, Patient Position: Sitting)   Pulse 96   Resp 18   Ht 4' 9" (1.448 m)   Wt 60 kg (132 lb 4.4 oz)   SpO2 99%   BMI 28.62 kg/m²       ===========              Apolinarjoon Epifanio Bain is a 80 y.o. female     here for    Annual exam.    Health maintenance reviewed with patient in detail inc any recent labs and studies and needs for future screening labs.  Age-appropriate vaccines and other age-appropriate screening studies reviewed with patient in detail.  Sleep health reviewed with patient.  Skin health regarding possible skin cancer screening reviewed with patient.  General regularity of bowel movements and urinations reviewed with patient including any possibility of urine leakage.  Vision screening reviewed with patient.      Patient queried and denies any further complaints      Problem List[1]    SURGICAL AND MEDICAL HISTORY: updated and reviewed.  Past Surgical History:   Procedure Laterality Date    ANKLE FUSION Bilateral     ELBOW ARTHROPLASTY      EYE SURGERY Right 03/2017    cataract    JOINT REPLACEMENT      bilateral knee replacement; bilateral elbow and wrist replacement    KNEE ARTHROPLASTY      OPEN REDUCTION AND INTERNAL FIXATION (ORIF) OF FRACTURE OF ULNA Right 8/17/2021    Procedure: ORIF, FRACTURE, ULNA  AND MASSIVE ALLOGRAFT;  Surgeon: Tio Patel MD;  Location: [address];  Service: Orthopedics;  Laterality: Right;    REVISION OF TOTAL ARTHROPLASTY OF ELBOW Right 8/17/2021    Procedure: REVISION, TOTAL ARTHROPLASTY, ELBOW;  Surgeon: Tio Patel MD;  Location: [facility] OR [address] [place] [address];  Service: Orthopedics;  Laterality: Right;     ALLERGIES updated and reviewed.  Review of patient's allergies indicates:  No Known Allergies    CURRENT OUTPATIENT MEDICATIONS updated and reviewed  Current Medications[2]    Review of Systems   Constitutional:  Positive for fatigue. Negative for activity change, appetite change, chills, diaphoresis, fever and " "unexpected weight change.   HENT:  Negative for congestion, ear discharge, ear pain, facial swelling, hearing loss, nosebleeds, postnasal drip, rhinorrhea, sinus pressure, sneezing, sore throat, tinnitus, trouble swallowing and voice change.    Eyes:  Negative for photophobia, pain, discharge, redness, itching and visual disturbance.   Respiratory:  Negative for cough, chest tightness, shortness of breath and wheezing.    Cardiovascular:  Negative for chest pain, palpitations and leg swelling.   Gastrointestinal:  Negative for abdominal distention, abdominal pain, anal bleeding, blood in stool, constipation, diarrhea, nausea, rectal pain and vomiting.   Endocrine: Negative for cold intolerance, heat intolerance, polydipsia, polyphagia and polyuria.   Genitourinary:  Negative for difficulty urinating, dysuria and flank pain.   Musculoskeletal:  Positive for arthralgias and back pain. Negative for joint swelling, myalgias and neck pain.   Skin:  Negative for rash.   Neurological:  Negative for dizziness, tremors, seizures, syncope, speech difficulty, weakness, light-headedness, numbness and headaches.   Psychiatric/Behavioral:  Negative for behavioral problems, confusion, decreased concentration, dysphoric mood, sleep disturbance and suicidal ideas. The patient is not nervous/anxious and is not hyperactive.        /72 (BP Location: Left arm, Patient Position: Sitting)   Pulse 96   Resp 18   Ht 4' 9" (1.448 m)   Wt 60 kg (132 lb 4.4 oz)   SpO2 99%   BMI 28.62 kg/m²   Physical Exam  Vitals and nursing note reviewed.   Constitutional:       General: She is not in acute distress.     Appearance: Normal appearance. She is well-developed. She is not ill-appearing or toxic-appearing.   HENT:      Head: Normocephalic and atraumatic.      Right Ear: Tympanic membrane, ear canal and external ear normal.      Left Ear: Tympanic membrane, ear canal and external ear normal.      Nose: Nose normal.      Mouth/Throat:    "   Lips: Pink.      Mouth: Mucous membranes are moist.      Pharynx: No oropharyngeal exudate or posterior oropharyngeal erythema.   Eyes:      General: No scleral icterus.        Right eye: No discharge.         Left eye: No discharge.      Extraocular Movements: Extraocular movements intact.      Conjunctiva/sclera: Conjunctivae normal.   Cardiovascular:      Rate and Rhythm: Normal rate and regular rhythm.      Pulses: Normal pulses.      Heart sounds: Normal heart sounds. No murmur heard.  Pulmonary:      Effort: Pulmonary effort is normal. No respiratory distress.      Breath sounds: Normal breath sounds. No wheezing or rales.   Abdominal:      General: Bowel sounds are normal. There is no distension.      Palpations: Abdomen is soft. There is no mass.      Tenderness: There is no abdominal tenderness. There is no right CVA tenderness, left CVA tenderness, guarding or rebound.      Hernia: No hernia is present.   Musculoskeletal:      Cervical back: Normal range of motion and neck supple. No rigidity or tenderness.   Lymphadenopathy:      Cervical: No cervical adenopathy.   Skin:     General: Skin is warm and dry.   Neurological:      General: No focal deficit present.      Mental Status: She is alert. Mental status is at baseline.   Psychiatric:         Mood and Affect: Mood normal.         Behavior: Behavior normal. Behavior is cooperative.         ASSESSMENT/PLAN    Francy was seen today for follow-up.    Diagnoses and all orders for this visit:    General medical exam  -     TSH; Future  -     CBC Without Differential; Future  -     Comprehensive Metabolic Panel; Future  -     Hemoglobin A1C; Future  -     Lipid Panel; Future    Chronic left shoulder pain    -     traMADoL (ULTRAM) 50 mg tablet; Take 1 tablet (50 mg total) by mouth every 6 (six) hours as needed for Pain.    Educated patient then I can not continue to prescribe this regularly that this is not indicated for long-term use.    OTC Tylenol as  needed as directed  -     Ambulatory referral/consult to Orthopedics; Future  -     X-Ray Shoulder 2 or More Views Left; Future    Primary hypertension  -     losartan (COZAAR) 50 MG tablet; One po qod  Good control.  Continue low-sodium diet.  Continue losartan.  Monitor.    Chronic bilateral low back pain with bilateral sciatica  -     X-Ray Lumbar Spine AP And Lateral; Future  Consider pain management.  X-ray lumbar spine.  Consider physical therapy   Medrol Dosepak.  Also educated patient that this is not for long-term use and can not be taken several times during year    Chronic pain syndrome  -     Ambulatory referral/consult to Pain Clinic; Future  Educated patient that I can not prescribe tramadol long-term use in that pain Clinic may not do so either.  They may use other modalities to try to help with her chronic pain       Esophageal dysmotility  -     Ambulatory referral/consult to Gastroenterology; Future  Chew foods slowly.  Swallow study reviewed.                Most recent some lab results reviewed with patient.  Any new prescription medications gone over in detail including reason for taking the medication, most common possible side effects and possible costs, etcetera.    Chronic conditions updated. Other than changes or additions as above, cont current medications and maintain follow-up with specialists if indicated.     Mikey Tolbert MD  A dictation device was used to produce this document. Use of such devices sometimes results in grammatical errors or replacement of words that sound similarly.                           [1]   Patient Active Problem List  Diagnosis    Rheumatoid arthritis involving multiple sites    Primary hypertension    Memory changes    Glaucoma    Infected olecranon bursa    Osteopenia of multiple sites    Physical deconditioning    History of elbow replacement    Impaired functional mobility, balance, gait, and endurance    Elbow joint replacement status, right    GERD  (gastroesophageal reflux disease)    Renal cyst, acquired, right    Moderate persistent asthma without complication    Chronic obstructive pulmonary disease, unspecified COPD type    History of arthroplasty of both wrists    Right hip pain    Immunocompromised state due to drug therapy    Aortic atherosclerosis    Recurrent major depressive disorder, in full remission    Situational anxiety    On corticosteroid therapy    Suspected sleep apnea    White matter disease    B12 deficiency    Tinea corporis    Primary osteoarthritis involving multiple joints    Lower extremity edema    Dermatophytosis    KAILASH (generalized anxiety disorder)    Chronic heart failure with preserved ejection fraction    Dysphagia    Abnormal weight loss    Chronic bilateral low back pain with bilateral sciatica    Chronic left shoulder pain    Esophageal dysmotility    Chronic pain syndrome   [2]   Current Outpatient Medications:     albuterol-ipratropium (DUO-NEB) 2.5 mg-0.5 mg/3 mL nebulizer solution, Take 3 mLs by nebulization every 6 (six) hours as needed for Wheezing. Rescue, Disp: 75 mL, Rfl: 0    ascorbic acid, vitamin C, (VITAMIN C) 100 MG tablet, Take 100 mg by mouth once daily., Disp: , Rfl:     budesonide (PULMICORT) 0.5 mg/2 mL nebulizer solution, Take 2 mLs (0.5 mg total) by nebulization 2 (two) times daily. Controller, Disp: 120 mL, Rfl: 11    carboxymethylcellulose sodium (REFRESH TEARS OPHT), Apply to eye. Use as needed, Disp: , Rfl:     ergocalciferol (ERGOCALCIFEROL) 50,000 unit Cap, TAKE 1 CAPSULE EVERY 7 DAYS, Disp: 12 capsule, Rfl: 3    formoterol (PERFOROMIST) 20 mcg/2 mL Nebu, Take 2 mLs (20 mcg total) by nebulization 2 (two) times a day. Controller, Disp: 120 mL, Rfl: 11    hydrocortisone 2.5 % cream, Do not apply morning of surgery, Disp: , Rfl:     hydrOXYzine HCL (ATARAX) 25 MG tablet, Take 1 tablet (25 mg total) by mouth 3 (three) times daily as needed for Itching or Anxiety. FOR ANXIETY, Disp: 90 tablet, Rfl: 11     levocetirizine (XYZAL) 5 MG tablet, Take 5 mg by mouth every evening., Disp: , Rfl:     moxifloxacin (VIGAMOX) 0.5 % ophthalmic solution, Place into both eyes., Disp: , Rfl:     nystatin (MYCOSTATIN) cream, Apply topically 2 (two) times daily., Disp: 30 g, Rfl: 5    nystatin (MYCOSTATIN) powder, Apply topically 4 (four) times daily., Disp: 60 g, Rfl: 0    omeprazole (PRILOSEC) 20 MG capsule, Take 1 capsule (20 mg total) by mouth once daily., Disp: 90 capsule, Rfl: 3    sarilumab (KEVZARA) 200 mg/1.14 mL PnIj, Inject 1.14 mLs (200 mg total) into the skin every 14 (fourteen) days., Disp: 2 each, Rfl: 6    furosemide (LASIX) 20 MG tablet, TAKE 1 TABLET ONE TIME DAILY FOR FLUID, Disp: 90 tablet, Rfl: 3    losartan (COZAAR) 50 MG tablet, One po qod, Disp: 45 tablet, Rfl: 3    methylPREDNISolone (MEDROL DOSEPACK) 4 mg tablet, use as directed, Disp: 1 each, Rfl: 0    sertraline (ZOLOFT) 50 MG tablet, Take 1 tablet (50 mg total) by mouth once daily., Disp: 90 tablet, Rfl: 3    traMADoL (ULTRAM) 50 mg tablet, Take 1 tablet (50 mg total) by mouth every 6 (six) hours as needed for Pain., Disp: 20 each, Rfl: 0    traZODone (DESYREL) 50 MG tablet, Take 1 tablet (50 mg total) by mouth nightly as needed for Insomnia., Disp: 90 tablet, Rfl: 3    vibegron (GEMTESA) 75 mg Tab, Take 1 tablet (75 mg total) by mouth once daily., Disp: 30 tablet, Rfl: 11

## 2025-07-21 ENCOUNTER — TELEPHONE (OUTPATIENT)
Dept: PAIN MEDICINE | Facility: CLINIC | Age: 80
End: 2025-07-21
Payer: MEDICARE

## 2025-07-21 NOTE — TELEPHONE ENCOUNTER
Spoke with patient. Confirmed appointment location & time on 07/23/25  with Dr. Mendoza.  Patient expressed understanding & gratitude. Call ended.

## 2025-07-23 ENCOUNTER — OFFICE VISIT (OUTPATIENT)
Dept: PAIN MEDICINE | Facility: CLINIC | Age: 80
End: 2025-07-23
Payer: MEDICARE

## 2025-07-23 VITALS
BODY MASS INDEX: 28.15 KG/M2 | OXYGEN SATURATION: 96 % | RESPIRATION RATE: 18 BRPM | HEART RATE: 87 BPM | SYSTOLIC BLOOD PRESSURE: 172 MMHG | WEIGHT: 130.5 LBS | TEMPERATURE: 99 F | DIASTOLIC BLOOD PRESSURE: 70 MMHG | HEIGHT: 57 IN

## 2025-07-23 DIAGNOSIS — G89.4 CHRONIC PAIN SYNDROME: ICD-10-CM

## 2025-07-23 DIAGNOSIS — M54.50 CHRONIC MIDLINE LOW BACK PAIN, UNSPECIFIED WHETHER SCIATICA PRESENT: ICD-10-CM

## 2025-07-23 DIAGNOSIS — G89.29 CHRONIC PAIN OF BOTH ANKLES: ICD-10-CM

## 2025-07-23 DIAGNOSIS — M25.572 CHRONIC PAIN OF BOTH ANKLES: ICD-10-CM

## 2025-07-23 DIAGNOSIS — M25.571 CHRONIC PAIN OF BOTH ANKLES: ICD-10-CM

## 2025-07-23 DIAGNOSIS — M54.2 NECK PAIN: ICD-10-CM

## 2025-07-23 DIAGNOSIS — M54.9 DORSALGIA: ICD-10-CM

## 2025-07-23 DIAGNOSIS — G89.29 CHRONIC MIDLINE LOW BACK PAIN, UNSPECIFIED WHETHER SCIATICA PRESENT: ICD-10-CM

## 2025-07-23 DIAGNOSIS — M54.12 CERVICAL RADICULOPATHY: Primary | ICD-10-CM

## 2025-07-23 DIAGNOSIS — M79.18 MYOFASCIAL PAIN: ICD-10-CM

## 2025-07-23 PROCEDURE — 99999 PR PBB SHADOW E&M-EST. PATIENT-LVL V: CPT | Mod: PBBFAC,HCNC,, | Performed by: STUDENT IN AN ORGANIZED HEALTH CARE EDUCATION/TRAINING PROGRAM

## 2025-07-23 PROCEDURE — 99204 OFFICE O/P NEW MOD 45 MIN: CPT | Mod: HCNC,S$GLB,, | Performed by: STUDENT IN AN ORGANIZED HEALTH CARE EDUCATION/TRAINING PROGRAM

## 2025-07-23 PROCEDURE — 3077F SYST BP >= 140 MM HG: CPT | Mod: CPTII,HCNC,S$GLB, | Performed by: STUDENT IN AN ORGANIZED HEALTH CARE EDUCATION/TRAINING PROGRAM

## 2025-07-23 PROCEDURE — G2211 COMPLEX E/M VISIT ADD ON: HCPCS | Mod: HCNC,S$GLB,, | Performed by: STUDENT IN AN ORGANIZED HEALTH CARE EDUCATION/TRAINING PROGRAM

## 2025-07-23 PROCEDURE — 1125F AMNT PAIN NOTED PAIN PRSNT: CPT | Mod: CPTII,HCNC,S$GLB, | Performed by: STUDENT IN AN ORGANIZED HEALTH CARE EDUCATION/TRAINING PROGRAM

## 2025-07-23 PROCEDURE — 1159F MED LIST DOCD IN RCRD: CPT | Mod: CPTII,HCNC,S$GLB, | Performed by: STUDENT IN AN ORGANIZED HEALTH CARE EDUCATION/TRAINING PROGRAM

## 2025-07-23 PROCEDURE — 3078F DIAST BP <80 MM HG: CPT | Mod: CPTII,HCNC,S$GLB, | Performed by: STUDENT IN AN ORGANIZED HEALTH CARE EDUCATION/TRAINING PROGRAM

## 2025-07-23 PROCEDURE — 1160F RVW MEDS BY RX/DR IN RCRD: CPT | Mod: CPTII,HCNC,S$GLB, | Performed by: STUDENT IN AN ORGANIZED HEALTH CARE EDUCATION/TRAINING PROGRAM

## 2025-07-23 RX ORDER — NORTRIPTYLINE HYDROCHLORIDE 10 MG/1
10 CAPSULE ORAL NIGHTLY
Qty: 30 CAPSULE | Refills: 3 | Status: SHIPPED | OUTPATIENT
Start: 2025-07-23 | End: 2025-11-20

## 2025-07-23 RX ORDER — DICLOFENAC SODIUM 10 MG/G
2 GEL TOPICAL DAILY
Qty: 100 EACH | Refills: 1 | Status: SHIPPED | OUTPATIENT
Start: 2025-07-23

## 2025-07-23 NOTE — PROGRESS NOTES
Chronic Pain - New Consult    Referring Physician: Mikey Tolbert MD    Chief Complaint:   Chief Complaint   Patient presents with    Ankle Pain    Shoulder Pain    Neck Pain    Low-back Pain          SUBJECTIVE:  Francy Bain is a 80 y.o. female with a past medical history of RA on prednisone 25 mg daily, HTN, HLD, prior CVA, COPD presents to the clinic for the evaluation of neck pain. Patient was referred to us by her PCP, Dr. Tolbert. The pain started 6 months ago without any inciting event and symptoms have been worsening.The pain is located in the neck area and radiates to the both shoulders. She has occasional paraesthesias to her bilateral forearms on the volar aspect worse at night.  The pain is described as aching and sharp and is rated as 9/10. The pain is rated with a score of  6/10 on the BEST day and a score of 10/10 on the WORST day.  Symptoms interfere with daily activity. The pain is exacerbated by any movement. She has been dropping items and some fine motor movements. The pain is mitigated by heat.    Her Tramadol was prescribed by her PCP which does provide some relief but it causes GI distress. Patient states that she has not taken NSAIDS or Tylenol for this.    Patient denies urinary incontinence, bowel incontinence, and loss of sensations. She reports dark stools. She reports night sweats.    She also reports bilateral ankle pain and low back pain. Patient's low back pain is axial and described as sharp and stabbing.    Physical Therapy/Home Exercise: no     Pain Disability Index Review:      7/23/2025     2:40 PM   Last 3 PDI Scores   Pain Disability Index (PDI) 29       Pain Medications:    - Opioids: Ultram (Tramadol HCL)     report:  Reviewed and consistent with medication use as prescribed.    Pain Procedures:   N/A    Imaging:   LUMBAR X-RAY  Results for orders placed during the hospital encounter of 07/07/25    X-Ray Lumbar Spine AP And  Lateral    Narrative  EXAMINATION:  XR LUMBAR SPINE AP AND LATERAL    CLINICAL HISTORY:  Lumbago with sciatica, left side    TECHNIQUE:  AP, lateral and spot images were performed of the lumbar spine.    COMPARISON:  None    FINDINGS:  Mild DJD.  No significant disc space narrowing.  There is a grade 1 L3/L4 anterolisthesis.  No fracture or dislocation.  No bone destruction identified.  Mild lumbar scoliosis.    Impression  See above      Electronically signed by: Reji Ahmadi MD  Date:    07/07/2025  Time:    12:07         Past Medical History:   Diagnosis Date    Affective disorder     History of orthostatic hypotension 06/20/2024    History of stroke 10/08/2020    Hypertension     Renal cyst, acquired, right 04/14/2021    Rheumatoid arthritis      Past Surgical History:   Procedure Laterality Date    ANKLE FUSION Bilateral     ELBOW ARTHROPLASTY      EYE SURGERY Right 03/2017    cataract    JOINT REPLACEMENT      bilateral knee replacement; bilateral elbow and wrist replacement    KNEE ARTHROPLASTY      OPEN REDUCTION AND INTERNAL FIXATION (ORIF) OF FRACTURE OF ULNA Right 8/17/2021    Procedure: ORIF, FRACTURE, ULNA  AND MASSIVE ALLOGRAFT;  Surgeon: Tio Patel MD;  [place] [facility] [place] [address] [place] [address];  Service: Orthopedics;  Laterality: Right;    REVISION OF TOTAL ARTHROPLASTY OF ELBOW Right 8/17/2021    Procedure: REVISION, TOTAL ARTHROPLASTY, ELBOW;  Surgeon: Tio Patel MD;  [place] [facility] [place] [address] [place] [address];  Service: Orthopedics;  Laterality: Right;     Social History[1]  Family History   Problem Relation Name Age of Onset    Hypertension Mother      Arthritis Mother      Stroke Mother      Heart disease Sister Azalea     Hyperlipidemia Sister Azalea     Arthritis Maternal Grandmother      Heart attack Neg Hx         Review of patient's allergies indicates:  No Known Allergies    Current Outpatient Medications   Medication Sig    albuterol-ipratropium (DUO-NEB) 2.5 mg-0.5 mg/3 mL nebulizer solution Take 3 mLs  by nebulization every 6 (six) hours as needed for Wheezing. Rescue    ascorbic acid, vitamin C, (VITAMIN C) 100 MG tablet Take 100 mg by mouth once daily.    budesonide (PULMICORT) 0.5 mg/2 mL nebulizer solution Take 2 mLs (0.5 mg total) by nebulization 2 (two) times daily. Controller    carboxymethylcellulose sodium (REFRESH TEARS OPHT) Apply to eye. Use as needed    ergocalciferol (ERGOCALCIFEROL) 50,000 unit Cap TAKE 1 CAPSULE EVERY 7 DAYS    formoterol (PERFOROMIST) 20 mcg/2 mL Nebu Take 2 mLs (20 mcg total) by nebulization 2 (two) times a day. Controller    furosemide (LASIX) 20 MG tablet TAKE 1 TABLET ONE TIME DAILY FOR FLUID    hydrocortisone 2.5 % cream Do not apply morning of surgery    hydrOXYzine HCL (ATARAX) 25 MG tablet Take 1 tablet (25 mg total) by mouth 3 (three) times daily as needed for Itching or Anxiety. FOR ANXIETY    levocetirizine (XYZAL) 5 MG tablet Take 5 mg by mouth every evening.    losartan (COZAAR) 50 MG tablet One po qod    methylPREDNISolone (MEDROL DOSEPACK) 4 mg tablet use as directed    moxifloxacin (VIGAMOX) 0.5 % ophthalmic solution Place into both eyes.    nystatin (MYCOSTATIN) cream Apply topically 2 (two) times daily.    nystatin (MYCOSTATIN) powder Apply topically 4 (four) times daily.    omeprazole (PRILOSEC) 20 MG capsule Take 1 capsule (20 mg total) by mouth once daily.    sarilumab (KEVZARA) 200 mg/1.14 mL PnIj Inject 1.14 mLs (200 mg total) into the skin every 14 (fourteen) days.    sertraline (ZOLOFT) 50 MG tablet Take 1 tablet (50 mg total) by mouth once daily.    traMADoL (ULTRAM) 50 mg tablet Take 1 tablet (50 mg total) by mouth every 6 (six) hours as needed for Pain.    traZODone (DESYREL) 50 MG tablet Take 1 tablet (50 mg total) by mouth nightly as needed for Insomnia.    vibegron (GEMTESA) 75 mg Tab Take 1 tablet (75 mg total) by mouth once daily.    diclofenac sodium (VOLTAREN ARTHRITIS PAIN) 1 % Gel Apply 2 g topically once daily.    nortriptyline (PAMELOR) 10 MG  "capsule Take 1 capsule (10 mg total) by mouth every evening.     No current facility-administered medications for this visit.       REVIEW OF SYSTEMS:    GENERAL:  current fevers or chills, recent use of antibiotics?: denies.  HEENT:  History of migraines/headaches?: denies   RESPIRATORY:  History of home oxygen or pulmonary hypertension/severe breathing dysfunction?: reports COPD; Smoking history?: denies  CARDIOVASCULAR:  Hx of palpitations/rhythm problems?: reports occasional palpations Hx of Heart Attacks/Surgery?: denies  GI:  Recent abdominal discomfort or recent change in bowel habits?: reports as above  MUSCULOSKELETAL:  See HPI.  SKIN:  unhealed wounds or rashes?: denies   PSYCH: psychiatric history or recent psychosocial stressors?: denies   HEMATOLOGY/LYMPHOLOGY:  Hx of prolonged bleeding, Hx of Blood thinner usage?: denies   NEURO:   history of seizures, strokes, chronic/old weakness (such as paralysis or paresis of any body part)?: reports prior stroke in 2020  All other reviewed and negative other than HPI.    OBJECTIVE:    BP (!) 172/70 (BP Location: Right arm, Patient Position: Sitting)   Pulse 87   Temp 99 °F (37.2 °C) (Oral)   Resp 18   Ht 4' 9" (1.448 m)   Wt 59.2 kg (130 lb 8.2 oz)   SpO2 96%   BMI 28.24 kg/m²     PHYSICAL EXAMINATION:  General appearance: Well appearing, in no acute distress, alert and appropriately communicative.  Psych:  Mood and affect appropriate.  Skin: Skin color, texture, turgor normal, no rashes or lesions, in both upper and lower body for exposed skin.  Head/face:  Atraumatic, normocephalic.  NECK / SHOULDER EXAM  Inspection: no muscle atrophy noted  ROM: decreased in all planes with pain with every ROM  Palpation: cervical paraspinals tenderness is POSITIVE BILATERALLY  Neck Special Tests:  [place]urling's: Negative  Facet Loading: POSITIVE on LEFT  Shoulder Special Tests  Neer's: Negative  Shine: Negative  Empty's Can: POSITIVE BILATERALLY  [place]'s: " Negative  Cor: regular rate  Pulm: non-labored breathing  GI: Abdomen non-distended and non-tender.  LOW BACK / HIP / PELVIS EXAM  Inspection: no muscle atrophy noted  ROM: decreased in all planes with pain in every ROM  Palpation: lumbar paraspinals tenderness is Negative  Low Back Special Tests:  Facet Loading: POSITIVE on LEFT for axial pain  Straight Leg Raise: Negative for radicular pain  Hip Special Tests  Limited as patient is not able to lie flat on exam table  Sacroiliac Special Tests  Francie's finger: POSITIVE BILATERALLY   Extremities: No deformities, significant lymphedema, or skin discoloration. No significant open wounds. No major amputations. Hand deformities present. Full ROM of ankle without valgus or varus laxity.  Musculoskeletal: Bilateral upper and lower extremity strength is 4/5 and symmetric.  No atrophy or tone abnormalities are noted.  Neuro: Bilateral upper and lower extremity coordination and muscle stretch reflexes abnormalities.  Chairez and/or Clonus: negative; Symmetric and equal sensation to light touch:   Gait: Antalgic    CMP  Sodium   Date Value Ref Range Status   07/07/2025 144 136 - 145 mmol/L Final   03/06/2025 142 136 - 145 mmol/L Final     Potassium   Date Value Ref Range Status   07/07/2025 3.5 3.5 - 5.1 mmol/L Final   03/06/2025 3.5 3.5 - 5.1 mmol/L Final     Chloride   Date Value Ref Range Status   07/07/2025 109 95 - 110 mmol/L Final   03/06/2025 108 95 - 110 mmol/L Final     CO2   Date Value Ref Range Status   07/07/2025 25 23 - 29 mmol/L Final   03/06/2025 24 23 - 29 mmol/L Final     Glucose   Date Value Ref Range Status   07/07/2025 78 70 - 110 mg/dL Final   03/06/2025 81 70 - 110 mg/dL Final     BUN   Date Value Ref Range Status   07/07/2025 12 8 - 23 mg/dL Final     Creatinine   Date Value Ref Range Status   07/07/2025 0.7 0.5 - 1.4 mg/dL Final     Calcium   Date Value Ref Range Status   07/07/2025 8.9 8.7 - 10.5 mg/dL Final   03/06/2025 9.0 8.7 - 10.5 mg/dL Final      Protein Total   Date Value Ref Range Status   07/07/2025 6.6 6.0 - 8.4 gm/dL Final     Total Protein   Date Value Ref Range Status   03/06/2025 6.9 6.0 - 8.4 g/dL Final     Albumin   Date Value Ref Range Status   07/07/2025 3.9 3.5 - 5.2 g/dL Final   03/06/2025 3.5 3.5 - 5.2 g/dL Final     Total Bilirubin   Date Value Ref Range Status   03/06/2025 0.7 0.1 - 1.0 mg/dL Final     Comment:     For infants and newborns, interpretation of results should be based  on gestational age, weight and in agreement with clinical  observations.    Premature Infant recommended reference ranges:  Up to 24 hours.............<8.0 mg/dL  Up to 48 hours............<12.0 mg/dL  3-5 days..................<15.0 mg/dL  6-29 days.................<15.0 mg/dL       Bilirubin Total   Date Value Ref Range Status   07/07/2025 0.7 0.1 - 1.0 mg/dL Final     Comment:     For infants and newborns, interpretation of results should be based   on gestational age, weight and in agreement with clinical   observations.    Premature Infant recommended reference ranges:   0-24 hours:  <8.0 mg/dL   24-48 hours: <12.0 mg/dL   3-5 days:    <15.0 mg/dL   6-29 days:   <15.0 mg/dL     Alkaline Phosphatase   Date Value Ref Range Status   03/06/2025 109 40 - 150 U/L Final     ALP   Date Value Ref Range Status   07/07/2025 121 40 - 150 unit/L Final     AST   Date Value Ref Range Status   07/07/2025 13 11 - 45 unit/L Final   03/06/2025 30 10 - 40 U/L Final     ALT   Date Value Ref Range Status   07/07/2025 12 10 - 44 unit/L Final   03/06/2025 10 10 - 44 U/L Final     Anion Gap   Date Value Ref Range Status   07/07/2025 10 8 - 16 mmol/L Final     eGFR   Date Value Ref Range Status   07/07/2025 >60 >60 mL/min/1.73/m2 Final     Comment:     Estimated GFR calculated using the CKD-EPI creatinine (2021) equation.   03/06/2025 >60.0 >60 mL/min/1.73 m^2 Final     ASSESSMENT: 80 y.o. year old female with chronic pain, consistent with:    1. Cervical radiculopathy  X-Ray  Cervical Spine 5 View With Flex And Ext    Ambulatory Referral/Consult to Physical Therapy    diclofenac sodium (VOLTAREN ARTHRITIS PAIN) 1 % Gel    nortriptyline (PAMELOR) 10 MG capsule      2. Chronic pain syndrome  Ambulatory referral/consult to Pain Clinic    nortriptyline (PAMELOR) 10 MG capsule      3. Neck pain  X-Ray Cervical Spine 5 View With Flex And Ext    Ambulatory Referral/Consult to Physical Therapy      4. Dorsalgia        5. Myofascial pain  diclofenac sodium (VOLTAREN ARTHRITIS PAIN) 1 % Gel      6. Chronic midline low back pain, unspecified whether sciatica present  diclofenac sodium (VOLTAREN ARTHRITIS PAIN) 1 % Gel      7. Chronic pain of both ankles  Ambulatory referral/consult to Podiatry        IMPRESSION: Francy Bain presents today for multiple complaints but primarily neck pain. She also reports chronic axial low back pain and bilateral ankle pain. She has limited ROM of her neck and back. Any active ROM elicits her pain. History and physical exam are consistent with cervical radiculopathy, axial cervical and axial lumbar facetogenic pain. She has no recent XR imaging of cervical spine. XR of her lumbar spine shows mild DJD and grade 1 L3/L4 anterolisthesis.  We will start with basic imaging and conservative management (physical therapy and non-narcotic medications). If their pain persists despite conservative measures, we will consider advanced imaging and/or interventions in an effort to give them additional relief for their pain.    PLAN:   - I have stressed the importance of physical activity and a home exercise plan to help with pain and improve health.  - Patient can continue with medications for now since they are providing benefits, using them appropriately, and without side effects.  - Ordered cervical spine flex-ex x-ray  - Initiate nortriptyline 10 mg qHS.  - Recommended against the use of Tramadol while taking nortriptyline.  - Apply Voltaren gel to affected  area.  - We discussed back pain and the nature of back pain.  We discussed that it is not one thing that causes the pain but an accumulation of multiple things that we do.    - Encouraged patient to follow up with her PCP to discuss her dark stools. She does not take NSAIDS routinely nor is she on AC.  - We discussed posture sitting and the importance of trying to sit better.    - We discussed the benefits of therapy and exercise and continuing to move.   - Podiatry referral for bilateral ankle pain  - Ambulatory referral to Physical Therapy for core strengthening, lumbar stabilization, and development of Home Exercise Program  - RTC in 2 - 3 months to discuss advanced imaging and/or interventions, if indicated at that time  - Counseled patient regarding the importance of activity modification, constant sleeping habits, and physical therapy.    The above plan and management options were discussed at length with patient. Patient is in agreement with the above and verbalized understanding. It will be communicated with the referring physician via electronic record, fax, or mail.    Melvi Art  07/23/2025     I have personally reviewed the history and exam of this patient and agree with the student/resident/fellow/NPs note as stated above.  I have seen the patient and personally examined them as appropriate.  I have personally discussed the plan of care with the provider and patient and have reviewed and/or edited the plan of care as appropriate.  All questions have been answered to the best of my ability.    Tomasz Mendoza MD PhD       [1]   Social History  Socioeconomic History    Marital status:     Number of children: 1   Occupational History    Occupation: Worked [employer]     Comment: retired   Tobacco Use    Smoking status: Never    Smokeless tobacco: Never   Substance and Sexual Activity    Alcohol use: No     Comment: rare- once per month    Drug use: No    Sexual activity: Not  Currently   Social History Narrative    Live in an apartment- no stairs     Social Drivers of Health     Financial Resource Strain: Medium Risk (11/29/2022)    Overall Financial Resource Strain (CARDIA)     Difficulty of Paying Living Expenses: Somewhat hard   Food Insecurity: Food Insecurity Present (11/29/2022)    Hunger Vital Sign     Worried About Running Out of Food in the Last Year: Sometimes true     Ran Out of Food in the Last Year: Sometimes true   Transportation Needs: No Transportation Needs (11/29/2022)    PRAPARE - Transportation     Lack of Transportation (Medical): No     Lack of Transportation (Non-Medical): No   Physical Activity: Insufficiently Active (11/29/2022)    Exercise Vital Sign     Days of Exercise per Week: 2 days     Minutes of Exercise per Session: 20 min   Stress: Stress Concern Present (11/29/2022)    [employer] [place] [employer] - Occupational Stress Questionnaire     Feeling of Stress : Rather much   Housing Stability: High Risk (11/29/2022)    Housing Stability Vital Sign     Unable to Pay for Housing in the Last Year: Yes     Number of Places Lived in the Last Year: 1     Unstable Housing in the Last Year: No

## 2025-07-24 ENCOUNTER — TELEPHONE (OUTPATIENT)
Dept: SPORTS MEDICINE | Facility: CLINIC | Age: 80
End: 2025-07-24
Payer: MEDICARE

## 2025-07-24 ENCOUNTER — TELEPHONE (OUTPATIENT)
Dept: PRIMARY CARE CLINIC | Facility: CLINIC | Age: 80
End: 2025-07-24
Payer: MEDICARE

## 2025-07-24 ENCOUNTER — HOSPITAL ENCOUNTER (OUTPATIENT)
Dept: RADIOLOGY | Facility: HOSPITAL | Age: 80
Discharge: HOME | End: 2025-07-24
Attending: PHYSICIAN ASSISTANT
Payer: MEDICARE

## 2025-07-24 ENCOUNTER — OFFICE VISIT (OUTPATIENT)
Dept: SPORTS MEDICINE | Facility: CLINIC | Age: 80
End: 2025-07-24
Payer: MEDICARE

## 2025-07-24 VITALS
WEIGHT: 130.75 LBS | HEIGHT: 57 IN | HEART RATE: 94 BPM | SYSTOLIC BLOOD PRESSURE: 162 MMHG | BODY MASS INDEX: 28.21 KG/M2 | DIASTOLIC BLOOD PRESSURE: 84 MMHG

## 2025-07-24 DIAGNOSIS — M54.12 CERVICAL RADICULOPATHY: ICD-10-CM

## 2025-07-24 DIAGNOSIS — M79.602 LEFT ARM PAIN: Primary | ICD-10-CM

## 2025-07-24 DIAGNOSIS — M25.512 CHRONIC LEFT SHOULDER PAIN: ICD-10-CM

## 2025-07-24 DIAGNOSIS — M79.602 LEFT ARM PAIN: ICD-10-CM

## 2025-07-24 DIAGNOSIS — G89.29 CHRONIC LEFT SHOULDER PAIN: ICD-10-CM

## 2025-07-24 PROCEDURE — 1125F AMNT PAIN NOTED PAIN PRSNT: CPT | Mod: CPTII,HCNC,S$GLB, | Performed by: PHYSICIAN ASSISTANT

## 2025-07-24 PROCEDURE — 3079F DIAST BP 80-89 MM HG: CPT | Mod: CPTII,HCNC,S$GLB, | Performed by: PHYSICIAN ASSISTANT

## 2025-07-24 PROCEDURE — 73090 X-RAY EXAM OF FOREARM: CPT | Mod: 26,HCNC,LT, | Performed by: RADIOLOGY

## 2025-07-24 PROCEDURE — 99215 OFFICE O/P EST HI 40 MIN: CPT | Mod: HCNC,S$GLB,, | Performed by: PHYSICIAN ASSISTANT

## 2025-07-24 PROCEDURE — 73060 X-RAY EXAM OF HUMERUS: CPT | Mod: TC,HCNC,PN,LT

## 2025-07-24 PROCEDURE — 3077F SYST BP >= 140 MM HG: CPT | Mod: CPTII,HCNC,S$GLB, | Performed by: PHYSICIAN ASSISTANT

## 2025-07-24 PROCEDURE — 1101F PT FALLS ASSESS-DOCD LE1/YR: CPT | Mod: CPTII,HCNC,S$GLB, | Performed by: PHYSICIAN ASSISTANT

## 2025-07-24 PROCEDURE — 73060 X-RAY EXAM OF HUMERUS: CPT | Mod: 26,HCNC,LT, | Performed by: RADIOLOGY

## 2025-07-24 PROCEDURE — 99999 PR PBB SHADOW E&M-EST. PATIENT-LVL V: CPT | Mod: PBBFAC,HCNC,, | Performed by: PHYSICIAN ASSISTANT

## 2025-07-24 PROCEDURE — 3288F FALL RISK ASSESSMENT DOCD: CPT | Mod: CPTII,HCNC,S$GLB, | Performed by: PHYSICIAN ASSISTANT

## 2025-07-24 PROCEDURE — 73090 X-RAY EXAM OF FOREARM: CPT | Mod: TC,HCNC,PN,LT

## 2025-07-24 NOTE — PROGRESS NOTES
Subjective:          Chief Complaint: Francy Bain is a 80 y.o. female who had concerns including Pain of the Left Shoulder.    History of Present Illness    CHIEF COMPLAINT:  - Left shoulder pain and stiffness    HPI:  Francy presents for evaluation of left shoulder pain that started approximately 2-3 months ago and has been progressively worsening. She describes it as stiff and reports that it is now causing pain. Pain limits her ability to raise the left arm overhead, requiring assistance from the right hand to do so. She mentions never being able to raise it completely, but the pain is a new development. She denies any recent injury or fall.    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 Patel on August 17th, 2021, which included an ORIF of the ulnar shaft. 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 Blum in [facility]. She also has a history of rheumatoid arthritis.    She was last seen by Dr. Brendan Campbell on January 27th, 2022, who recommended PT for her wrists. At that time, it was noted that the next step for her left wrist would have been a fusion with hardware removal.    She reports some pain in her elbow from her last operation, which was about four years ago. Pain began to increase gradually and then stopped, now localizing in the shoulder.    Her back has been experiencing severe discomfort recently. She has been having black stools for about 2.5 weeks, occurring about 3 times daily. She reports taking a dose of Pepto-Bismol daily and then an additional dose occasionally later in the day for the past 2 months.    She denies ability to raise arm completely overhead without assistance.    MEDICATIONS:  - Medrol Dosepak: Given on July 7th, completed         Pain  Associated symptoms include neck pain. Pertinent negatives include no abdominal pain, chest  pain, chills, congestion, coughing, fever, headaches, joint swelling, myalgias, nausea, numbness, rash, sore throat or vomiting.       Review of Systems   Constitutional: Negative. Negative for chills, fever, weight gain and weight loss.   HENT:  Negative for congestion and sore throat.    Eyes:  Negative for blurred vision and double vision.   Cardiovascular:  Negative for chest pain, leg swelling and palpitations.   Respiratory:  Negative for cough and shortness of breath.    Hematologic/Lymphatic: Does not bruise/bleed easily.   Skin:  Negative for itching, poor wound healing and rash.   Musculoskeletal:  Positive for joint pain and neck pain. Negative for back pain, joint swelling, muscle weakness, myalgias and stiffness.   Gastrointestinal:  Negative for abdominal pain, constipation, diarrhea, nausea and vomiting.   Genitourinary: Negative.  Negative for frequency and hematuria.   Neurological:  Negative for dizziness, headaches, numbness, paresthesias and sensory change.   Psychiatric/Behavioral:  Negative for altered mental status and depression. The patient is not nervous/anxious.    Allergic/Immunologic: Negative for hives.                   Objective:        General: Francy is well-developed, well-nourished, appears stated age, in no acute distress, alert and oriented to time, place and person.   .      General    Vitals reviewed.  Constitutional: She is oriented to person, place, and time. She appears well-developed and well-nourished. No distress.   HENT:   Head: Normocephalic.   Eyes: EOM are normal.   Cardiovascular:  Normal rate and regular rhythm.            Pulmonary/Chest: Effort normal. No respiratory distress.   Neurological: She is alert and oriented to person, place, and time. She has normal reflexes. No cranial nerve deficit. Coordination normal.   Psychiatric: She has a normal mood and affect. Her behavior is normal. Judgment and thought content normal.         Left Elbow Exam     Inspection    Scars: present    Range of Motion   Extension:  abnormal Left elbow extension: 30.  Flexion:  130 normal     Right Shoulder Exam     Inspection/Observation   Swelling: absent  Bruising: absent  Scars: absent  Deformity: absent  Scapular Winging: absent  Scapular Dyskinesia: negative  Atrophy: absent    Tenderness   The patient is experiencing no tenderness.    Range of Motion   Active abduction:  80 abnormal   Passive abduction:  80 abnormal   Extension:  20 abnormal   Forward Flexion:  90 abnormal   Adduction: 10 abnormal    Tests & Signs   Apprehension: negative  Drop arm: negative  Shine test: negative  Impingement: negative  Sulcus: absent  Lift Off Sign: negative  Active Compression Test ([place]'s Sign): negative  Speed's Test: negative    Other   Sensation: normal    Left Shoulder Exam     Inspection/Observation   Swelling: absent  Bruising: absent  Scars: present  Deformity: absent  Scapular Winging: absent  Scapular Dyskinesia: negative  Atrophy: absent    Tenderness   The patient is experiencing no tenderness.     Range of Motion   Active abduction:  80 abnormal   Passive abduction:  80 abnormal   Extension:  20 abnormal   Forward Flexion:  90 abnormal   Adduction: 10 abnormal    Tests & Signs   Apprehension: negative  Drop arm: negative  Shine test: negative  Impingement: negative  Sulcus: absent  Rotator Cuff Painful Arc/Range: moderate  Lift Off Sign: negative  Active Compression test ([place]'s Sign): negative  Speed's Test: negative  Anterior Drawer Test: 1+  Posterior Drawer Test: 1+    Other   Sensation: normal       Muscle Strength   Right Upper Extremity   Shoulder Abduction: 4/5   Shoulder Internal Rotation: 4/5   Shoulder External Rotation: 4/5   Supraspinatus: 4/5   Subscapularis: 4/5   Biceps: 4/5   Left Upper Extremity  Shoulder Abduction: 2/5   Shoulder Internal Rotation: 3/5   Shoulder External Rotation: 2/5   Supraspinatus: 2/5   Subscapularis: 2/5   Biceps: 3/5     Reflexes     Left  Side  Biceps:  2+  Triceps:  2+  Brachioradialis:  2+    Right Side   Biceps:  2+  Triceps:  2+  Brachioradialis:  2+      RADIOGRAPHS: 7/7/25  Left shoulder:  FINDINGS:  Mild DJD and mild narrowing of the glenohumeral joint space.  No acute fracture or dislocation.  No bone destruction identified.  There is an intramedullary adrianna noted in the left humerus only partially visualized.  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    Left humerus: 7/24/25  FINDINGS:  Humerus two views left.     There is an elbow joint prosthesis.  There is segment seen throughout the humerus.  No loosening or infection seen.  There is severe deformity of the distal humerus, proximal ulna, and proximal radius.     Left forearm: 7/24/25  FINDINGS:  Forearm left.     There is severe deformity of the distal radius, and wrist.  There is a side plate bridging from distal radius to the 3rd metacarpal.  There is resection of the distal ulna.  There is an elbow joint prosthesis.  There is severe deformity of the distal humerus, proximal ulna, and proximal radius.  No definite hardware failure seen.         Assessment:           ICD-10-CM ICD-9-CM   1. Left arm pain  M79.602 729.5   2. Chronic left shoulder pain  M25.512 719.41    G89.29 338.29   3. Cervical radiculopathy  M54.12 723.4       Plan:         Assessment & Plan    REFERRALS:  - Referred to PT at [facility] for Cervical spine and left shoulder pain.    PROCEDURES:  - Discussed potential steroid injection for left shoulder pain  - Deferred injection due to patient's recent history of black stools.  - Injection to be considered after further investigation of GI issues.  -message sent to her PCP today.  He recommended she follow-up in his clinic after 1 week of discontinuing Pepto-Bismol to ensure no active GI bleeds.    IMAGING:  -I made the decision to obtain old records of the patient including previous notes and imaging. New imaging was ordered today  of the extremity or extremities evaluated. I independently reviewed and interpreted the radiographs and/or MRIs today as well as prior imaging.  Reviewed radiographs with patient in detail.    PATIENT INSTRUCTIONS:  - Perform shoulder mobility exercises in the shower, including crawling upper extremity up the wall.  - Continue upper extremity to prevent stiffness and adhesive capsulitis.       Return to clinic prn for shoulder CSI as needed.            This note was generated with the assistance of ambient listening technology. Verbal consent was obtained by the patient and accompanying visitor(s) for the recording of patient appointment to facilitate this note. I attest to having reviewed and edited the generated note for accuracy, though some syntax or spelling errors may persist. Please contact the author of this note for any clarification.       [facility] patient questionnaires have been collected today.

## 2025-07-24 NOTE — TELEPHONE ENCOUNTER
Left a voicemail for the patient. Calling to discuss her stool issues. Dr. Tolbert recommends stopping the pepto and following up in a week after stopping the medication.

## 2025-07-24 NOTE — TELEPHONE ENCOUNTER
Called patient after speaking with her primary care physician.  He recommended patient go to the ER to rule out acute GI bleed if she is having black stools.  He also inquired if she has been taking  Pepto-Bismol.    Patient states that she takes Pepto-Bismol every morning for the past 2 months and sometimes twice daily.  I explained to her that this can cause dark, black stools.  I recommended she stop taking for a few days to see if this stops the dark stools.  However, if this continues and/or abdominal pain begins, she will need to go to the ER to rule out GI bleed.

## 2025-07-24 NOTE — TELEPHONE ENCOUNTER
----- Message from Mikey Tolbert MD sent at 7/24/2025 11:19 AM CDT -----  Regarding: RE: patient advise  Thank you. I think it is ok to proceed with procedure.    Nursing staff, ask pt to stop taking pepto and f/u with me about a week thereafter--assuming she is not having any new lightheadedness symptoms (she has had some chronic sensations of such inc vertigo sx's off and on)  ----- Message -----  From: Padmini Cordero PA-C  Sent: 7/24/2025  11:13 AM CDT  To: Mikey Tolbert MD  Subject: RE: patient advise                               I just called her, and she stated that she takes pepto bismol every morning for the past 2 months and sometimes twice a day.  ----- Message -----  From: Mikey Tolbert MD  Sent: 7/24/2025  10:51 AM CDT  To: Padmini Cordero PA-C; Tomasz Jensen Staff  Subject: RE: patient advise                               If pt is having black stools and is not taking Pepto Bismol regularly then she should go to the ED immediately. Would hold off on any procedure until acute upper GI bleed is ruled out.  ----- Message -----  From: Padmini Cordero PA-C  Sent: 7/24/2025  10:46 AM CDT  To: Mikey Tolbert MD; Tomasz Jensen Staff  Subject: RE: patient advise                               Thank you! I also wanted to make sure you knew this to reach out to her about next steps for her black stool.  ----- Message -----  From: Mackenzie Cantu MA  Sent: 7/24/2025  10:44 AM CDT  To: Padmini Cordero PA-C; Tomasz Jensen Staff  Subject: patient advise                                   Good morning,     The attached patient was seen by Padmini Cordero PA-C  for left shoulder pain today. The patient informed Padmini of dark/black stool for approximately two weeks. Padmini wanted to proceed with steroid injection of the shoulder but would like clearance from Dr. Tolbert before proceeding with the injection.       Mackenzie Cantu MA  Medical Assistant to Padmini Cordero PA-C

## 2025-07-28 ENCOUNTER — OFFICE VISIT (OUTPATIENT)
Dept: DERMATOLOGY | Facility: CLINIC | Age: 80
End: 2025-07-28
Payer: MEDICARE

## 2025-07-28 DIAGNOSIS — L30.4 INTERTRIGO: ICD-10-CM

## 2025-07-28 PROCEDURE — 1126F AMNT PAIN NOTED NONE PRSNT: CPT | Mod: CPTII,HCNC,S$GLB, | Performed by: DERMATOLOGY

## 2025-07-28 PROCEDURE — 1159F MED LIST DOCD IN RCRD: CPT | Mod: CPTII,HCNC,S$GLB, | Performed by: DERMATOLOGY

## 2025-07-28 PROCEDURE — 1160F RVW MEDS BY RX/DR IN RCRD: CPT | Mod: CPTII,HCNC,S$GLB, | Performed by: DERMATOLOGY

## 2025-07-28 PROCEDURE — 99203 OFFICE O/P NEW LOW 30 MIN: CPT | Mod: HCNC,S$GLB,, | Performed by: DERMATOLOGY

## 2025-07-28 PROCEDURE — 99999 PR PBB SHADOW E&M-EST. PATIENT-LVL IV: CPT | Mod: PBBFAC,HCNC,, | Performed by: DERMATOLOGY

## 2025-07-28 RX ORDER — CICLOPIROX OLAMINE 7.7 MG/G
CREAM TOPICAL 2 TIMES DAILY
Qty: 90 G | Refills: 2 | Status: SHIPPED | OUTPATIENT
Start: 2025-07-28

## 2025-07-28 RX ORDER — TRIAMCINOLONE ACETONIDE 0.25 MG/G
CREAM TOPICAL 2 TIMES DAILY
Qty: 80 G | Refills: 2 | Status: SHIPPED | OUTPATIENT
Start: 2025-07-28

## 2025-07-28 NOTE — PATIENT INSTRUCTIONS
Flares:  Recommend white vinegar: water 1:1 compresses 2x/day followed by cool blow dry and then application of prescription medication.     Maintenance:  Cool blow dry after showering 1x/day then apply Zeasorb AF powder.     Sun Protection      The [facility] would like to remind you of the importance of sun protection all year round and particularly during the summer when the suns rays are the strongest. It has been proven that both acute and chronic sun exposure damages our cells and leads to skin cancer. Beyond skin cancer, the sun causes 90% of the symptoms of premature skin aging, including wrinkles, lentigines (brown spots), and thin, easily bruised skin. Proper sun protection can help prevent these unwanted conditions.    Many patients report that they dont go in the sun. It has been shown that the average person receives 18 hours of incidental sun exposure per week during activities such as walking through parking lots, driving, or sitting next to windows. This accumulates to several bad sunburns per year!    In choosing sunscreen, you want one that protects against both UVA and UVB rays (broad spectrum). It is recommended that you use one of SPF 30 or higher. It is important to apply the sunscreen about 20 minutes prior to sun exposure. Most sunscreens are chemical sunscreens and a reaction must take place in the skin so that they are effective. If they are applied and then you are immediately exposed to the sun or start sweating, this reaction has not had time to take place and you are therefore unprotected. Sunscreen needs to be reapplied every 2 hours if you are participating in water sports or sweating. We recommend Elta MD or CeraVe sunscreens for daily use; however there are many options and it is most important for you to find one that you will use on a consistent basis.    If you have sensitive skin, you may do best with a sunscreen that contains only physical blockers in  the active ingredient section. The only physical blockers available in the [place] currently are titanium dioxide or zinc oxide. These are typically thicker and harder to apply, however they afford very good protection. Neutrogena Sensitive Skin, Blue Lizard Sensitive Skin (pink top) or Neutrogena Pure and Free are popular ones.     Aside from sunscreen, clothes with UV protection (UPF), wide brimmed hats, and sunglasses are other means of sun protection that we recommend.      Based on a recent study (6/2021) and out of an abundance of caution, we are recommending that you AVOID the following sunscreens as they may contain the carcinogen, benzene:    Spray and gel sunscreens  Any [facility] or [facility] brands as well as Max Block and TopCare brands   Neutrogena Ultra Sheer Dry-touch Water Resistant Sunscreen LOTION SPF 70   Neutrogena Sheer Zinc Dry-touch Face Sunscreen LOTION SPF 50   5.   Aveeno Baby Continuous Protection Sensitive Skin Sunscreen LOTION - Broad Spectrum SPF 50    Please note that Benzene is not an ingredient or the degradation product of any ingredient in any sunscreen. This study suggested that the findings are a result of contamination in the manufacturing process. At this point, we don't know how effectively Benzene gets through the skin, if it gets absorbed systemically, and what effects it may have.     We do know that ultraviolet radiation is a well-established carcinogen. Please use daily sun protection/avoidance and use of at least SPF 30, broad-spectrum sunscreen not listed above.                       [facility] [address]  [address]  [place] [address]  Dept: [phone]  Dept Fax: [phone]

## 2025-07-28 NOTE — PROGRESS NOTES
Subjective:      Patient ID:  Francy Bain is a 80 y.o. female who presents for   Chief Complaint   Patient presents with    Rash    Intertrigo     History of Present Illness: The patient presents with chief complaint of rash.  Location: legs and under breast   Duration: months ( now clear )   Signs/Symptoms: itchy     Prior treatments: hydrocortisone for legs and Nystatin for under breast       Rash - Initial  Affected locations: right lower leg, left lower leg, right ankle and left ankle  Signs / symptoms: itching and redness  Relieving factors/Treatments tried: Rx topical steroids    Intertrigo - Initial  Affected locations: chest and torso  Signs / symptoms: redness  Treatments tried: nystatin.      Pt states rash on legs is no longer there.    Rash under breasts comes and goes x yrs. Nystatin cream and powders don't help much.    Review of Systems   Constitutional:  Negative for fever and chills.   Respiratory:  Negative for cough and shortness of breath.    Gastrointestinal:  Negative for nausea and vomiting.   Musculoskeletal:  Negative for joint swelling and arthralgias.   Skin:  Positive for rash. Negative for daily sunscreen use, activity-related sunscreen use, recent sunburn and wears hat.   All other systems reviewed and are negative.  Hematologic/Lymphatic: Does not bruise/bleed easily.       Objective:   Physical Exam   Constitutional: She appears well-developed and well-nourished.   Psychiatric: She has a normal mood and affect.   Skin:   Areas Examined (abnormalities noted in diagram):   Head / Face Inspection Performed  Chest / Axilla Inspection Performed  Abdomen Inspection Performed  RLE Inspected  LLE Inspection Performed            Diagram Legend     Erythematous scaling macule/papule c/w actinic keratosis       Vascular papule c/w angioma      Pigmented verrucoid papule/plaque c/w seborrheic keratosis      Yellow umbilicated papule c/w sebaceous hyperplasia      Irregularly  shaped tan macule c/w lentigo     1-2 mm smooth white papules consistent with Milia      Movable subcutaneous cyst with punctum c/w epidermal inclusion cyst      Subcutaneous movable cyst c/w pilar cyst      Firm pink to brown papule c/w dermatofibroma      Pedunculated fleshy papule(s) c/w skin tag(s)      Evenly pigmented macule c/w junctional nevus     Mildly variegated pigmented, slightly irregular-bordered macule c/w mildly atypical nevus      Flesh colored to evenly pigmented papule c/w intradermal nevus       Pink pearly papule/plaque c/w basal cell carcinoma      Erythematous hyperkeratotic cursted plaque c/w SCC      Surgical scar with no sign of skin cancer recurrence      Open and closed comedones      Inflammatory papules and pustules      Verrucoid papule consistent consistent with wart     Erythematous eczematous patches and plaques     Dystrophic onycholytic nail with subungual debris c/w onychomycosis     Umbilicated papule    Erythematous-base heme-crusted tan verrucoid plaque consistent with inflamed seborrheic keratosis     Erythematous Silvery Scaling Plaque c/w Psoriasis     See annotation      Assessment / Plan:        Intertrigo  -     triamcinolone acetonide 0.025% (KENALOG) 0.025 % cream; Apply topically 2 (two) times daily. To affected areas under breasts and in groin creases x 1-2 wks then prn flares only  Dispense: 80 g; Refill: 2  -     ciclopirox (LOPROX) 0.77 % Crea; Apply topically 2 (two) times daily. To affected areas under breasts and in groin creases x 1-2 wks then prn flares only  Dispense: 90 g; Refill: 2  Maintenance:  Cool blow dry after showering 1x/day then apply Zeasorb AF powder.    You have been prescribed a topical steroid. There are possible side effects from overuse of topical steroids, including thinning of skin, lightening of skin, easy tearing/bruising of skin, stretch marks, etc. It is best to only use it when it's needed, preferably for no more than 2 weeks at a  time to the same area, and to take breaks from the topical steroid, especially if any of the above side effects are noticed.    Rtc prn

## 2025-08-11 ENCOUNTER — TELEPHONE (OUTPATIENT)
Dept: PAIN MEDICINE | Facility: CLINIC | Age: 80
End: 2025-08-11
Payer: MEDICARE

## 2025-08-20 ENCOUNTER — TELEPHONE (OUTPATIENT)
Dept: PRIMARY CARE CLINIC | Facility: CLINIC | Age: 80
End: 2025-08-20
Payer: MEDICARE

## 2025-08-20 ENCOUNTER — TELEPHONE (OUTPATIENT)
Dept: PULMONOLOGY | Facility: CLINIC | Age: 80
End: 2025-08-20
Payer: MEDICARE

## 2025-08-21 ENCOUNTER — TELEPHONE (OUTPATIENT)
Dept: PRIMARY CARE CLINIC | Facility: CLINIC | Age: 80
End: 2025-08-21
Payer: MEDICARE

## 2025-08-22 ENCOUNTER — PATIENT MESSAGE (OUTPATIENT)
Dept: PULMONOLOGY | Facility: CLINIC | Age: 80
End: 2025-08-22
Payer: MEDICARE

## 2025-08-22 DIAGNOSIS — J45.40 MODERATE PERSISTENT ASTHMA WITHOUT COMPLICATION: Primary | ICD-10-CM

## 2025-08-22 DIAGNOSIS — J44.9 CHRONIC OBSTRUCTIVE PULMONARY DISEASE, UNSPECIFIED COPD TYPE: ICD-10-CM

## 2025-08-22 RX ORDER — ERGOCALCIFEROL 1.25 MG/1
50000 CAPSULE ORAL
Qty: 12 CAPSULE | Refills: 3 | Status: SHIPPED | OUTPATIENT
Start: 2025-08-22

## 2025-08-22 RX ORDER — ALBUTEROL SULFATE 90 UG/1
2 INHALANT RESPIRATORY (INHALATION) EVERY 6 HOURS PRN
Qty: 18 G | Refills: 6 | Status: SHIPPED | OUTPATIENT
Start: 2025-08-22

## 2025-08-22 RX ORDER — OMEPRAZOLE 20 MG/1
20 CAPSULE, DELAYED RELEASE ORAL DAILY
Qty: 90 CAPSULE | Refills: 3 | Status: SHIPPED | OUTPATIENT
Start: 2025-08-22

## 2025-08-25 ENCOUNTER — HOSPITAL ENCOUNTER (EMERGENCY)
Facility: OTHER | Age: 80
Discharge: HOME | End: 2025-08-25
Payer: MEDICARE

## 2025-08-25 ENCOUNTER — OCHSNER VIRTUAL EMERGENCY DEPARTMENT (OUTPATIENT)
Facility: CLINIC | Age: 80
End: 2025-08-25
Payer: MEDICARE

## 2025-08-25 ENCOUNTER — PATIENT OUTREACH (OUTPATIENT)
Facility: OTHER | Age: 80
End: 2025-08-25
Payer: MEDICARE

## 2025-08-25 ENCOUNTER — OFFICE VISIT (OUTPATIENT)
Dept: INTERNAL MEDICINE | Facility: CLINIC | Age: 80
End: 2025-08-25
Payer: MEDICARE

## 2025-08-25 VITALS
HEIGHT: 57 IN | DIASTOLIC BLOOD PRESSURE: 80 MMHG | HEART RATE: 91 BPM | OXYGEN SATURATION: 95 % | SYSTOLIC BLOOD PRESSURE: 156 MMHG | BODY MASS INDEX: 27.68 KG/M2 | WEIGHT: 128.31 LBS

## 2025-08-25 VITALS
HEART RATE: 78 BPM | WEIGHT: 130 LBS | HEIGHT: 57 IN | DIASTOLIC BLOOD PRESSURE: 68 MMHG | BODY MASS INDEX: 28.05 KG/M2 | OXYGEN SATURATION: 97 % | SYSTOLIC BLOOD PRESSURE: 151 MMHG | RESPIRATION RATE: 18 BRPM | TEMPERATURE: 98 F

## 2025-08-25 DIAGNOSIS — H91.93 BILATERAL HEARING LOSS, UNSPECIFIED HEARING LOSS TYPE: ICD-10-CM

## 2025-08-25 DIAGNOSIS — B35.9 DERMATOPHYTOSIS: ICD-10-CM

## 2025-08-25 DIAGNOSIS — Z60.8 OTHER PROBLEMS RELATED TO SOCIAL ENVIRONMENT: ICD-10-CM

## 2025-08-25 DIAGNOSIS — M71.129: ICD-10-CM

## 2025-08-25 DIAGNOSIS — Z96.621 ELBOW JOINT REPLACEMENT STATUS, RIGHT: ICD-10-CM

## 2025-08-25 DIAGNOSIS — Z74.09 IMPAIRED FUNCTIONAL MOBILITY, BALANCE, GAIT, AND ENDURANCE: ICD-10-CM

## 2025-08-25 DIAGNOSIS — R53.81 PHYSICAL DECONDITIONING: ICD-10-CM

## 2025-08-25 DIAGNOSIS — G89.4 CHRONIC PAIN SYNDROME: Chronic | ICD-10-CM

## 2025-08-25 DIAGNOSIS — E53.8 B12 DEFICIENCY: ICD-10-CM

## 2025-08-25 DIAGNOSIS — Z00.00 ENCOUNTER FOR MEDICARE ANNUAL WELLNESS EXAM: Primary | ICD-10-CM

## 2025-08-25 DIAGNOSIS — F41.1 GAD (GENERALIZED ANXIETY DISORDER): ICD-10-CM

## 2025-08-25 DIAGNOSIS — I50.32 CHRONIC HEART FAILURE WITH PRESERVED EJECTION FRACTION: Chronic | ICD-10-CM

## 2025-08-25 DIAGNOSIS — Z01.10 EVALUATION OF HEARING IMPAIRMENT: ICD-10-CM

## 2025-08-25 DIAGNOSIS — F41.8 SITUATIONAL ANXIETY: ICD-10-CM

## 2025-08-25 DIAGNOSIS — M85.89 OSTEOPENIA OF MULTIPLE SITES: Chronic | ICD-10-CM

## 2025-08-25 DIAGNOSIS — K22.4 ESOPHAGEAL DYSMOTILITY: ICD-10-CM

## 2025-08-25 DIAGNOSIS — K21.9 GASTROESOPHAGEAL REFLUX DISEASE WITHOUT ESOPHAGITIS: ICD-10-CM

## 2025-08-25 DIAGNOSIS — R13.10 DYSPHAGIA, UNSPECIFIED TYPE: ICD-10-CM

## 2025-08-25 DIAGNOSIS — R41.3 MEMORY CHANGES: ICD-10-CM

## 2025-08-25 DIAGNOSIS — Z86.19 HISTORY OF TINEA CORPORIS: ICD-10-CM

## 2025-08-25 DIAGNOSIS — M06.9 RHEUMATOID ARTHRITIS INVOLVING MULTIPLE SITES, UNSPECIFIED WHETHER RHEUMATOID FACTOR PRESENT: Chronic | ICD-10-CM

## 2025-08-25 DIAGNOSIS — J45.40 MODERATE PERSISTENT ASTHMA WITHOUT COMPLICATION: Chronic | ICD-10-CM

## 2025-08-25 DIAGNOSIS — R29.818 SUSPECTED SLEEP APNEA: ICD-10-CM

## 2025-08-25 DIAGNOSIS — Z79.899 IMMUNOCOMPROMISED STATE DUE TO DRUG THERAPY: ICD-10-CM

## 2025-08-25 DIAGNOSIS — Z96.632: ICD-10-CM

## 2025-08-25 DIAGNOSIS — H40.1131 PRIMARY OPEN ANGLE GLAUCOMA (POAG) OF BOTH EYES, MILD STAGE: Chronic | ICD-10-CM

## 2025-08-25 DIAGNOSIS — M15.0 PRIMARY OSTEOARTHRITIS INVOLVING MULTIPLE JOINTS: ICD-10-CM

## 2025-08-25 DIAGNOSIS — D84.821 IMMUNOCOMPROMISED STATE DUE TO DRUG THERAPY: ICD-10-CM

## 2025-08-25 DIAGNOSIS — I70.0 AORTIC ATHEROSCLEROSIS: Chronic | ICD-10-CM

## 2025-08-25 DIAGNOSIS — R60.0 LOWER EXTREMITY EDEMA: Chronic | ICD-10-CM

## 2025-08-25 DIAGNOSIS — R42 DIZZINESS: Primary | ICD-10-CM

## 2025-08-25 DIAGNOSIS — Z96.631: ICD-10-CM

## 2025-08-25 DIAGNOSIS — F33.42 RECURRENT MAJOR DEPRESSIVE DISORDER, IN FULL REMISSION: Chronic | ICD-10-CM

## 2025-08-25 DIAGNOSIS — Z87.09 HISTORY OF COPD: ICD-10-CM

## 2025-08-25 DIAGNOSIS — R42 DIZZINESS: ICD-10-CM

## 2025-08-25 DIAGNOSIS — R90.82 WHITE MATTER DISEASE: ICD-10-CM

## 2025-08-25 DIAGNOSIS — R09.02 HYPOXIA: Primary | ICD-10-CM

## 2025-08-25 DIAGNOSIS — R06.09 DYSPNEA ON EXERTION: ICD-10-CM

## 2025-08-25 DIAGNOSIS — J44.9 CHRONIC OBSTRUCTIVE PULMONARY DISEASE, UNSPECIFIED COPD TYPE: ICD-10-CM

## 2025-08-25 DIAGNOSIS — N28.1 RENAL CYST, ACQUIRED, RIGHT: ICD-10-CM

## 2025-08-25 DIAGNOSIS — I10 PRIMARY HYPERTENSION: Chronic | ICD-10-CM

## 2025-08-25 PROBLEM — M54.41 CHRONIC BILATERAL LOW BACK PAIN WITH BILATERAL SCIATICA: Status: RESOLVED | Noted: 2025-07-11 | Resolved: 2025-08-25

## 2025-08-25 PROBLEM — G89.29 CHRONIC LEFT SHOULDER PAIN: Status: RESOLVED | Noted: 2025-07-11 | Resolved: 2025-08-25

## 2025-08-25 PROBLEM — Z79.52 ON CORTICOSTEROID THERAPY: Status: RESOLVED | Noted: 2023-03-06 | Resolved: 2025-08-25

## 2025-08-25 PROBLEM — G89.29 CHRONIC BILATERAL LOW BACK PAIN WITH BILATERAL SCIATICA: Status: RESOLVED | Noted: 2025-07-11 | Resolved: 2025-08-25

## 2025-08-25 PROBLEM — M25.512 CHRONIC LEFT SHOULDER PAIN: Status: RESOLVED | Noted: 2025-07-11 | Resolved: 2025-08-25

## 2025-08-25 PROBLEM — M54.42 CHRONIC BILATERAL LOW BACK PAIN WITH BILATERAL SCIATICA: Status: RESOLVED | Noted: 2025-07-11 | Resolved: 2025-08-25

## 2025-08-25 LAB
ABSOLUTE EOSINOPHIL (OHS): 0.15 K/UL
ABSOLUTE MONOCYTE (OHS): 0.65 K/UL (ref 0.3–1)
ABSOLUTE NEUTROPHIL COUNT (OHS): 4.3 K/UL (ref 1.8–7.7)
ALBUMIN SERPL BCP-MCNC: 4.2 G/DL (ref 3.5–5.2)
ALP SERPL-CCNC: 127 UNIT/L (ref 40–150)
ALT SERPL W/O P-5'-P-CCNC: 11 UNIT/L (ref 10–44)
ANION GAP (OHS): 13 MMOL/L (ref 8–16)
AST SERPL-CCNC: 23 UNIT/L (ref 11–45)
BASOPHILS # BLD AUTO: 0.02 K/UL
BASOPHILS NFR BLD AUTO: 0.3 %
BILIRUB SERPL-MCNC: 0.8 MG/DL (ref 0.1–1)
BUN SERPL-MCNC: 16 MG/DL (ref 8–23)
CALCIUM SERPL-MCNC: 9.5 MG/DL (ref 8.7–10.5)
CHLORIDE SERPL-SCNC: 110 MMOL/L (ref 95–110)
CK SERPL-CCNC: 46 U/L (ref 20–180)
CO2 SERPL-SCNC: 21 MMOL/L (ref 23–29)
CREAT SERPL-MCNC: 0.7 MG/DL (ref 0.5–1.4)
CRP SERPL-MCNC: <0.3 MG/L
CTP QC/QA: YES
ERYTHROCYTE [DISTWIDTH] IN BLOOD BY AUTOMATED COUNT: 14.2 % (ref 11.5–14.5)
FERRITIN SERPL-MCNC: 174 NG/ML (ref 20–300)
GFR SERPLBLD CREATININE-BSD FMLA CKD-EPI: >60 ML/MIN/1.73/M2
GLUCOSE SERPL-MCNC: 76 MG/DL (ref 70–110)
HCT VFR BLD AUTO: 52.8 % (ref 37–48.5)
HGB BLD-MCNC: 16.3 GM/DL (ref 12–16)
IMM GRANULOCYTES # BLD AUTO: 0.02 K/UL (ref 0–0.04)
IMM GRANULOCYTES NFR BLD AUTO: 0.3 % (ref 0–0.5)
LACTATE SERPL-SCNC: 2.1 MMOL/L (ref 0.5–2.2)
LDH SERPL-CCNC: 355 U/L (ref 110–260)
LYMPHOCYTES # BLD AUTO: 2.23 K/UL (ref 1–4.8)
MCH RBC QN AUTO: 27.8 PG (ref 27–31)
MCHC RBC AUTO-ENTMCNC: 30.9 G/DL (ref 32–36)
MCV RBC AUTO: 90 FL (ref 82–98)
NT-PROBNP SERPL-MCNC: 36 PG/ML
NUCLEATED RBC (/100WBC) (OHS): 0 /100 WBC
OHS QRS DURATION: 72 MS
OHS QRS DURATION: 72 MS
OHS QTC CALCULATION: 429 MS
OHS QTC CALCULATION: 435 MS
PLATELET # BLD AUTO: 184 K/UL (ref 150–450)
PMV BLD AUTO: 9.8 FL (ref 9.2–12.9)
POTASSIUM SERPL-SCNC: 4.1 MMOL/L (ref 3.5–5.1)
PROT SERPL-MCNC: 7.5 GM/DL (ref 6–8.4)
RBC # BLD AUTO: 5.87 M/UL (ref 4–5.4)
RELATIVE EOSINOPHIL (OHS): 2 %
RELATIVE LYMPHOCYTE (OHS): 30.3 % (ref 18–48)
RELATIVE MONOCYTE (OHS): 8.8 % (ref 4–15)
RELATIVE NEUTROPHIL (OHS): 58.3 % (ref 38–73)
SARS-COV-2 RDRP RESP QL NAA+PROBE: NEGATIVE
SODIUM SERPL-SCNC: 144 MMOL/L (ref 136–145)
TROPONIN I SERPL HS-MCNC: <3 NG/L
TROPONIN I SERPL HS-MCNC: <3 NG/L
WBC # BLD AUTO: 7.37 K/UL (ref 3.9–12.7)

## 2025-08-25 PROCEDURE — 93010 ELECTROCARDIOGRAM REPORT: CPT | Mod: HCNC,S$GLB,, | Performed by: INTERNAL MEDICINE

## 2025-08-25 PROCEDURE — 93005 ELECTROCARDIOGRAM TRACING: CPT | Mod: HCNC

## 2025-08-25 PROCEDURE — 83605 ASSAY OF LACTIC ACID: CPT | Mod: HCNC | Performed by: NURSE PRACTITIONER

## 2025-08-25 PROCEDURE — 99285 EMERGENCY DEPT VISIT HI MDM: CPT | Mod: 25,HCNC

## 2025-08-25 PROCEDURE — 85025 COMPLETE CBC W/AUTO DIFF WBC: CPT | Mod: HCNC | Performed by: NURSE PRACTITIONER

## 2025-08-25 PROCEDURE — 83880 ASSAY OF NATRIURETIC PEPTIDE: CPT | Mod: HCNC | Performed by: NURSE PRACTITIONER

## 2025-08-25 PROCEDURE — 86140 C-REACTIVE PROTEIN: CPT | Mod: HCNC | Performed by: NURSE PRACTITIONER

## 2025-08-25 PROCEDURE — 82550 ASSAY OF CK (CPK): CPT | Mod: HCNC | Performed by: NURSE PRACTITIONER

## 2025-08-25 PROCEDURE — 82728 ASSAY OF FERRITIN: CPT | Mod: HCNC | Performed by: NURSE PRACTITIONER

## 2025-08-25 PROCEDURE — 96374 THER/PROPH/DIAG INJ IV PUSH: CPT | Mod: HCNC

## 2025-08-25 PROCEDURE — 84484 ASSAY OF TROPONIN QUANT: CPT | Mod: HCNC | Performed by: NURSE PRACTITIONER

## 2025-08-25 PROCEDURE — 87635 SARS-COV-2 COVID-19 AMP PRB: CPT | Mod: HCNC | Performed by: NURSE PRACTITIONER

## 2025-08-25 PROCEDURE — 93010 ELECTROCARDIOGRAM REPORT: CPT | Mod: HCNC,,, | Performed by: INTERNAL MEDICINE

## 2025-08-25 PROCEDURE — 99999 PR PBB SHADOW E&M-EST. PATIENT-LVL V: CPT | Mod: PBBFAC,HCNC,, | Performed by: COUNSELOR

## 2025-08-25 PROCEDURE — 83615 LACTATE (LD) (LDH) ENZYME: CPT | Mod: HCNC | Performed by: NURSE PRACTITIONER

## 2025-08-25 PROCEDURE — 25000003 PHARM REV CODE 250: Mod: HCNC

## 2025-08-25 PROCEDURE — 80053 COMPREHEN METABOLIC PANEL: CPT | Mod: HCNC | Performed by: NURSE PRACTITIONER

## 2025-08-25 PROCEDURE — 63600175 PHARM REV CODE 636 W HCPCS: Mod: HCNC

## 2025-08-25 RX ORDER — MECLIZINE HYDROCHLORIDE 25 MG/1
25 TABLET ORAL
Status: COMPLETED | OUTPATIENT
Start: 2025-08-25 | End: 2025-08-25

## 2025-08-25 RX ORDER — MECLIZINE HCL 12.5 MG 12.5 MG/1
12.5 TABLET ORAL 3 TIMES DAILY PRN
Qty: 20 TABLET | Refills: 0 | Status: SHIPPED | OUTPATIENT
Start: 2025-08-25

## 2025-08-25 RX ORDER — LORAZEPAM 2 MG/ML
1 INJECTION INTRAMUSCULAR
Status: COMPLETED | OUTPATIENT
Start: 2025-08-25 | End: 2025-08-25

## 2025-08-25 RX ORDER — IPRATROPIUM BROMIDE AND ALBUTEROL SULFATE 2.5; .5 MG/3ML; MG/3ML
3 SOLUTION RESPIRATORY (INHALATION)
Status: SHIPPED | OUTPATIENT
Start: 2025-08-25

## 2025-08-25 RX ORDER — LOSARTAN POTASSIUM 50 MG/1
50 TABLET ORAL
Status: COMPLETED | OUTPATIENT
Start: 2025-08-25 | End: 2025-08-25

## 2025-08-25 RX ADMIN — LOSARTAN POTASSIUM 50 MG: 50 TABLET, FILM COATED ORAL at 02:08

## 2025-08-25 RX ADMIN — MECLIZINE HYDROCHLORIDE 25 MG: 25 TABLET ORAL at 02:08

## 2025-08-25 RX ADMIN — LORAZEPAM 1 MG: 2 INJECTION INTRAMUSCULAR; INTRAVENOUS at 02:08

## 2025-08-25 SDOH — SOCIAL DETERMINANTS OF HEALTH (SDOH): OTHER PROBLEMS RELATED TO SOCIAL ENVIRONMENT: Z60.8

## 2025-08-26 ENCOUNTER — PATIENT OUTREACH (OUTPATIENT)
Facility: OTHER | Age: 80
End: 2025-08-26
Payer: MEDICARE

## 2025-09-02 ENCOUNTER — OFFICE VISIT (OUTPATIENT)
Dept: PRIMARY CARE CLINIC | Facility: CLINIC | Age: 80
End: 2025-09-02
Payer: MEDICARE

## 2025-09-02 VITALS
OXYGEN SATURATION: 95 % | HEART RATE: 100 BPM | WEIGHT: 129.19 LBS | RESPIRATION RATE: 18 BRPM | BODY MASS INDEX: 27.87 KG/M2 | DIASTOLIC BLOOD PRESSURE: 76 MMHG | SYSTOLIC BLOOD PRESSURE: 116 MMHG | HEIGHT: 57 IN

## 2025-09-02 DIAGNOSIS — R53.81 DEBILITY: ICD-10-CM

## 2025-09-02 DIAGNOSIS — I10 PRIMARY HYPERTENSION: Primary | ICD-10-CM

## 2025-09-02 DIAGNOSIS — R41.0 CONFUSED: ICD-10-CM

## 2025-09-02 PROCEDURE — 1159F MED LIST DOCD IN RCRD: CPT | Mod: CPTII,HCNC,S$GLB, | Performed by: FAMILY MEDICINE

## 2025-09-02 PROCEDURE — 3074F SYST BP LT 130 MM HG: CPT | Mod: CPTII,HCNC,S$GLB, | Performed by: FAMILY MEDICINE

## 2025-09-02 PROCEDURE — 1160F RVW MEDS BY RX/DR IN RCRD: CPT | Mod: CPTII,HCNC,S$GLB, | Performed by: FAMILY MEDICINE

## 2025-09-02 PROCEDURE — 3288F FALL RISK ASSESSMENT DOCD: CPT | Mod: CPTII,HCNC,S$GLB, | Performed by: FAMILY MEDICINE

## 2025-09-02 PROCEDURE — 99999 PR PBB SHADOW E&M-EST. PATIENT-LVL V: CPT | Mod: PBBFAC,HCNC,, | Performed by: FAMILY MEDICINE

## 2025-09-02 PROCEDURE — 1126F AMNT PAIN NOTED NONE PRSNT: CPT | Mod: CPTII,HCNC,S$GLB, | Performed by: FAMILY MEDICINE

## 2025-09-02 PROCEDURE — 3078F DIAST BP <80 MM HG: CPT | Mod: CPTII,HCNC,S$GLB, | Performed by: FAMILY MEDICINE

## 2025-09-02 PROCEDURE — 1101F PT FALLS ASSESS-DOCD LE1/YR: CPT | Mod: CPTII,HCNC,S$GLB, | Performed by: FAMILY MEDICINE

## 2025-09-02 PROCEDURE — 99214 OFFICE O/P EST MOD 30 MIN: CPT | Mod: HCNC,S$GLB,, | Performed by: FAMILY MEDICINE

## 2025-09-02 RX ORDER — POTASSIUM CHLORIDE 750 MG/1
10 TABLET, EXTENDED RELEASE ORAL
COMMUNITY
Start: 2025-07-08

## 2025-09-05 ENCOUNTER — PATIENT OUTREACH (OUTPATIENT)
Facility: OTHER | Age: 80
End: 2025-09-05
Payer: MEDICARE

## (undated) DEVICE — SUT PROLENE 4-0 MONO 18IN

## (undated) DEVICE — ADHESIVE DERMABOND ADVANCED

## (undated) DEVICE — SOL 9P NACL IRR PIC IL

## (undated) DEVICE — CORD FOR BIPOLAR FORCEPS 12

## (undated) DEVICE — SEE MEDLINE ITEM 157173

## (undated) DEVICE — SEE MEDLINE ITEM 146231

## (undated) DEVICE — ELECTRODE REM PLYHSV RETURN 9

## (undated) DEVICE — DRESSING GAUZE 6PLY 4X4

## (undated) DEVICE — BANDAGE ACE DOUBLE STER 6IN

## (undated) DEVICE — SEE MEDLINE ITEM 152529

## (undated) DEVICE — SUT MONOCRYL 3-0 PS-2 UND

## (undated) DEVICE — SUT VICRYL PLUS 3-0 SH 18IN

## (undated) DEVICE — Device

## (undated) DEVICE — CATH SUCTION 10FR

## (undated) DEVICE — DRESSING AQUACEL AG RBBN 2X45

## (undated) DEVICE — GAUZE SPONGE 4X4 12PLY

## (undated) DEVICE — BIT DRILL AO 2.6X135MM SCALED

## (undated) DEVICE — COVER MAYO STAND REINFRCD 30

## (undated) DEVICE — ALCOHOL 70% ISOP W/GREEN 16OZ

## (undated) DEVICE — SUT VICRYL PLUS 0 CT1 18IN

## (undated) DEVICE — TAPE SURG DURAPORE 2 X10YD

## (undated) DEVICE — DRAPE C ARM 42 X 120 10/BX

## (undated) DEVICE — SPLINT COMFORMABLE 5X30

## (undated) DEVICE — DRESSING XEROFORM FOIL PK 1X8

## (undated) DEVICE — SEE MEDLINE ITEM 152622

## (undated) DEVICE — APPLICATOR CHLORAPREP ORN 26ML

## (undated) DEVICE — DRAPE C-ARM MINI DISP

## (undated) DEVICE — PAD CAST SPECIALIST STRL 6

## (undated) DEVICE — SEE MEDLINE ITEM 152522

## (undated) DEVICE — SEE MEDLINE ITEM 146308

## (undated) DEVICE — DRAPE PLASTIC U 60X72

## (undated) DEVICE — DRESSING AQUACEL AG ADV 3.5X6

## (undated) DEVICE — SPONGE GAUZE 16PLY 4X4

## (undated) DEVICE — PAD ABD 8X10 STERILE

## (undated) DEVICE — PACK UPPER EXTREMITY BAPTIST

## (undated) DEVICE — DRESSING XEROFORM 1X8IN

## (undated) DEVICE — DRAPE STERI-DRAPE 1000 17X11IN

## (undated) DEVICE — SUT 3/0 18IN PDS II CLR MON

## (undated) DEVICE — TRAY MINOR ORTHO

## (undated) DEVICE — SEE MEDLINE ITEM 157150

## (undated) DEVICE — SPLINT PLASTER FAST SET 5X30IN

## (undated) DEVICE — PAD CAST SPECIALIST STRL 4